# Patient Record
Sex: FEMALE | Race: WHITE | NOT HISPANIC OR LATINO
[De-identification: names, ages, dates, MRNs, and addresses within clinical notes are randomized per-mention and may not be internally consistent; named-entity substitution may affect disease eponyms.]

---

## 2017-01-05 ENCOUNTER — CLINICAL ADVICE (OUTPATIENT)
Age: 82
End: 2017-01-05

## 2017-01-18 ENCOUNTER — LABORATORY RESULT (OUTPATIENT)
Age: 82
End: 2017-01-18

## 2017-02-03 ENCOUNTER — MEDICATION RENEWAL (OUTPATIENT)
Age: 82
End: 2017-02-03

## 2017-02-14 ENCOUNTER — LABORATORY RESULT (OUTPATIENT)
Age: 82
End: 2017-02-14

## 2017-03-03 ENCOUNTER — APPOINTMENT (OUTPATIENT)
Dept: GERIATRICS | Facility: CLINIC | Age: 82
End: 2017-03-03

## 2017-03-03 VITALS
SYSTOLIC BLOOD PRESSURE: 130 MMHG | WEIGHT: 141 LBS | HEART RATE: 93 BPM | TEMPERATURE: 97.8 F | OXYGEN SATURATION: 98 % | HEIGHT: 61 IN | BODY MASS INDEX: 26.62 KG/M2 | RESPIRATION RATE: 16 BRPM | DIASTOLIC BLOOD PRESSURE: 62 MMHG

## 2017-03-03 DIAGNOSIS — Z87.19 PERSONAL HISTORY OF OTHER DISEASES OF THE DIGESTIVE SYSTEM: ICD-10-CM

## 2017-03-03 DIAGNOSIS — Z86.018 PERSONAL HISTORY OF OTHER BENIGN NEOPLASM: ICD-10-CM

## 2017-03-03 DIAGNOSIS — K56.5 INTESTINAL ADHESIONS [BANDS] WITH OBSTRUCTION (POSTPROCEDURAL) (POSTINFECTION): ICD-10-CM

## 2017-03-03 DIAGNOSIS — J06.9 ACUTE UPPER RESPIRATORY INFECTION, UNSPECIFIED: ICD-10-CM

## 2017-03-03 DIAGNOSIS — Z00.00 ENCOUNTER FOR GENERAL ADULT MEDICAL EXAMINATION W/OUT ABNORMAL FINDINGS: ICD-10-CM

## 2017-03-03 DIAGNOSIS — K64.8 OTHER HEMORRHOIDS: ICD-10-CM

## 2017-03-06 LAB
ALBUMIN SERPL ELPH-MCNC: 4.1 G/DL
ALP BLD-CCNC: 77 U/L
ALT SERPL-CCNC: 14 U/L
ANION GAP SERPL CALC-SCNC: 13 MMOL/L
AST SERPL-CCNC: 20 U/L
BASOPHILS # BLD AUTO: 0.01 K/UL
BASOPHILS NFR BLD AUTO: 0.1 %
BILIRUB SERPL-MCNC: 0.4 MG/DL
BUN SERPL-MCNC: 22 MG/DL
CALCIUM SERPL-MCNC: 10.3 MG/DL
CHLORIDE SERPL-SCNC: 105 MMOL/L
CO2 SERPL-SCNC: 24 MMOL/L
CREAT SERPL-MCNC: 1.07 MG/DL
EOSINOPHIL # BLD AUTO: 0.07 K/UL
EOSINOPHIL NFR BLD AUTO: 0.8 %
GLUCOSE SERPL-MCNC: 89 MG/DL
HBA1C MFR BLD HPLC: 5.5 %
HCT VFR BLD CALC: 33.2 %
HGB BLD-MCNC: 10.5 G/DL
IMM GRANULOCYTES NFR BLD AUTO: 0.2 %
LYMPHOCYTES # BLD AUTO: 0.96 K/UL
LYMPHOCYTES NFR BLD AUTO: 11.1 %
MAN DIFF?: NORMAL
MCHC RBC-ENTMCNC: 27.9 PG
MCHC RBC-ENTMCNC: 31.6 GM/DL
MCV RBC AUTO: 88.1 FL
MONOCYTES # BLD AUTO: 0.48 K/UL
MONOCYTES NFR BLD AUTO: 5.5 %
NEUTROPHILS # BLD AUTO: 7.12 K/UL
NEUTROPHILS NFR BLD AUTO: 82.3 %
PLATELET # BLD AUTO: 347 K/UL
POTASSIUM SERPL-SCNC: 4.3 MMOL/L
PROT SERPL-MCNC: 6.8 G/DL
RBC # BLD: 3.77 M/UL
RBC # FLD: 15.9 %
SODIUM SERPL-SCNC: 142 MMOL/L
TSH SERPL-ACNC: 1.55 UIU/ML
WBC # FLD AUTO: 8.66 K/UL

## 2017-03-07 ENCOUNTER — CLINICAL ADVICE (OUTPATIENT)
Age: 82
End: 2017-03-07

## 2017-03-07 LAB — DEPRECATED CARDIOLIPIN IGA SER: <5 APL

## 2017-03-23 ENCOUNTER — APPOINTMENT (OUTPATIENT)
Dept: OTOLARYNGOLOGY | Facility: CLINIC | Age: 82
End: 2017-03-23

## 2017-03-31 ENCOUNTER — APPOINTMENT (OUTPATIENT)
Dept: PULMONOLOGY | Facility: CLINIC | Age: 82
End: 2017-03-31

## 2017-03-31 VITALS
HEIGHT: 61 IN | WEIGHT: 140 LBS | DIASTOLIC BLOOD PRESSURE: 70 MMHG | HEART RATE: 68 BPM | RESPIRATION RATE: 17 BRPM | OXYGEN SATURATION: 98 % | BODY MASS INDEX: 26.43 KG/M2 | TEMPERATURE: 98.6 F | SYSTOLIC BLOOD PRESSURE: 140 MMHG

## 2017-04-19 ENCOUNTER — LABORATORY RESULT (OUTPATIENT)
Age: 82
End: 2017-04-19

## 2017-05-04 ENCOUNTER — APPOINTMENT (OUTPATIENT)
Dept: GERIATRICS | Facility: CLINIC | Age: 82
End: 2017-05-04

## 2017-05-04 VITALS
WEIGHT: 141 LBS | BODY MASS INDEX: 26.62 KG/M2 | HEIGHT: 61 IN | RESPIRATION RATE: 17 BRPM | TEMPERATURE: 97.8 F | SYSTOLIC BLOOD PRESSURE: 150 MMHG | HEART RATE: 68 BPM | DIASTOLIC BLOOD PRESSURE: 78 MMHG | OXYGEN SATURATION: 98 %

## 2017-05-04 RX ORDER — AZITHROMYCIN 250 MG/1
250 TABLET, FILM COATED ORAL
Qty: 6 | Refills: 0 | Status: DISCONTINUED | COMMUNITY
Start: 2017-01-14 | End: 2017-05-04

## 2017-05-15 ENCOUNTER — OTHER (OUTPATIENT)
Age: 82
End: 2017-05-15

## 2017-05-16 ENCOUNTER — MEDICATION RENEWAL (OUTPATIENT)
Age: 82
End: 2017-05-16

## 2017-05-16 ENCOUNTER — LABORATORY RESULT (OUTPATIENT)
Age: 82
End: 2017-05-16

## 2017-06-13 ENCOUNTER — LABORATORY RESULT (OUTPATIENT)
Age: 82
End: 2017-06-13

## 2017-07-11 ENCOUNTER — LABORATORY RESULT (OUTPATIENT)
Age: 82
End: 2017-07-11

## 2017-07-31 ENCOUNTER — INPATIENT (INPATIENT)
Facility: HOSPITAL | Age: 82
LOS: 6 days | Discharge: HOME | End: 2017-08-07
Attending: SURGERY | Admitting: SURGERY
Payer: MEDICARE

## 2017-07-31 VITALS
DIASTOLIC BLOOD PRESSURE: 106 MMHG | SYSTOLIC BLOOD PRESSURE: 146 MMHG | HEART RATE: 92 BPM | TEMPERATURE: 98 F | RESPIRATION RATE: 18 BRPM | OXYGEN SATURATION: 100 %

## 2017-07-31 DIAGNOSIS — Z98.89 OTHER SPECIFIED POSTPROCEDURAL STATES: Chronic | ICD-10-CM

## 2017-07-31 DIAGNOSIS — R10.9 UNSPECIFIED ABDOMINAL PAIN: ICD-10-CM

## 2017-07-31 LAB
ALBUMIN SERPL ELPH-MCNC: 3.3 G/DL — SIGNIFICANT CHANGE UP (ref 3.3–5)
ALP SERPL-CCNC: 68 U/L — SIGNIFICANT CHANGE UP (ref 40–120)
ALT FLD-CCNC: 14 U/L — SIGNIFICANT CHANGE UP (ref 4–33)
AMYLASE P1 CFR SERPL: 69 U/L — SIGNIFICANT CHANGE UP (ref 25–125)
APPEARANCE UR: CLEAR — SIGNIFICANT CHANGE UP
APTT BLD: 49.4 SEC — HIGH (ref 27.5–37.4)
AST SERPL-CCNC: 16 U/L — SIGNIFICANT CHANGE UP (ref 4–32)
BACTERIA # UR AUTO: SIGNIFICANT CHANGE UP
BASE EXCESS BLDV CALC-SCNC: 1.6 MMOL/L — SIGNIFICANT CHANGE UP
BILIRUB SERPL-MCNC: 0.3 MG/DL — SIGNIFICANT CHANGE UP (ref 0.2–1.2)
BILIRUB UR-MCNC: NEGATIVE — SIGNIFICANT CHANGE UP
BLOOD GAS VENOUS - CREATININE: 1.15 MG/DL — SIGNIFICANT CHANGE UP (ref 0.5–1.3)
BLOOD UR QL VISUAL: NEGATIVE — SIGNIFICANT CHANGE UP
BUN SERPL-MCNC: 25 MG/DL — HIGH (ref 7–23)
CALCIUM SERPL-MCNC: 9.8 MG/DL — SIGNIFICANT CHANGE UP (ref 8.4–10.5)
CHLORIDE BLDV-SCNC: 106 MMOL/L — SIGNIFICANT CHANGE UP (ref 96–108)
CHLORIDE SERPL-SCNC: 107 MMOL/L — SIGNIFICANT CHANGE UP (ref 98–107)
CO2 SERPL-SCNC: 22 MMOL/L — SIGNIFICANT CHANGE UP (ref 22–31)
COLOR SPEC: SIGNIFICANT CHANGE UP
CREAT SERPL-MCNC: 1.06 MG/DL — SIGNIFICANT CHANGE UP (ref 0.5–1.3)
GAS PNL BLDV: 136 MMOL/L — SIGNIFICANT CHANGE UP (ref 136–146)
GLUCOSE BLDV-MCNC: 91 — SIGNIFICANT CHANGE UP (ref 70–99)
GLUCOSE SERPL-MCNC: 92 MG/DL — SIGNIFICANT CHANGE UP (ref 70–99)
GLUCOSE UR-MCNC: NEGATIVE — SIGNIFICANT CHANGE UP
HCO3 BLDV-SCNC: 26 MMOL/L — SIGNIFICANT CHANGE UP (ref 20–27)
HCT VFR BLD CALC: 27.8 % — LOW (ref 34.5–45)
HCT VFR BLDV CALC: 28.5 % — LOW (ref 34.5–45)
HGB BLD-MCNC: 9.1 G/DL — LOW (ref 11.5–15.5)
HGB BLDV-MCNC: 9.2 G/DL — LOW (ref 11.5–15.5)
INR BLD: 5.02 — CRITICAL HIGH (ref 0.88–1.17)
KETONES UR-MCNC: NEGATIVE — SIGNIFICANT CHANGE UP
LACTATE BLDV-MCNC: 1.7 MMOL/L — SIGNIFICANT CHANGE UP (ref 0.5–2)
LEUKOCYTE ESTERASE UR-ACNC: SIGNIFICANT CHANGE UP
LIDOCAIN IGE QN: 37.7 U/L — SIGNIFICANT CHANGE UP (ref 7–60)
MCHC RBC-ENTMCNC: 27.6 PG — SIGNIFICANT CHANGE UP (ref 27–34)
MCHC RBC-ENTMCNC: 32.7 % — SIGNIFICANT CHANGE UP (ref 32–36)
MCV RBC AUTO: 84.2 FL — SIGNIFICANT CHANGE UP (ref 80–100)
MUCOUS THREADS # UR AUTO: SIGNIFICANT CHANGE UP
NITRITE UR-MCNC: NEGATIVE — SIGNIFICANT CHANGE UP
NRBC # FLD: 0 — SIGNIFICANT CHANGE UP
PCO2 BLDV: 35 MMHG — LOW (ref 41–51)
PH BLDV: 7.47 PH — HIGH (ref 7.32–7.43)
PH UR: 7 — SIGNIFICANT CHANGE UP (ref 4.6–8)
PLATELET # BLD AUTO: 248 K/UL — SIGNIFICANT CHANGE UP (ref 150–400)
PMV BLD: 9.3 FL — SIGNIFICANT CHANGE UP (ref 7–13)
PO2 BLDV: 37 MMHG — SIGNIFICANT CHANGE UP (ref 35–40)
POTASSIUM BLDV-SCNC: 3.7 MMOL/L — SIGNIFICANT CHANGE UP (ref 3.4–4.5)
POTASSIUM SERPL-MCNC: 4 MMOL/L — SIGNIFICANT CHANGE UP (ref 3.5–5.3)
POTASSIUM SERPL-SCNC: 4 MMOL/L — SIGNIFICANT CHANGE UP (ref 3.5–5.3)
PROT SERPL-MCNC: 5.9 G/DL — LOW (ref 6–8.3)
PROT UR-MCNC: NEGATIVE — SIGNIFICANT CHANGE UP
PROTHROM AB SERPL-ACNC: 58.1 SEC — HIGH (ref 9.8–13.1)
RBC # BLD: 3.3 M/UL — LOW (ref 3.8–5.2)
RBC # FLD: 14.7 % — HIGH (ref 10.3–14.5)
RBC CASTS # UR COMP ASSIST: SIGNIFICANT CHANGE UP (ref 0–?)
SAO2 % BLDV: 71 % — SIGNIFICANT CHANGE UP (ref 60–85)
SODIUM SERPL-SCNC: 141 MMOL/L — SIGNIFICANT CHANGE UP (ref 135–145)
SP GR SPEC: 1.01 — SIGNIFICANT CHANGE UP (ref 1–1.03)
UROBILINOGEN FLD QL: NORMAL E.U. — SIGNIFICANT CHANGE UP (ref 0.1–0.2)
WBC # BLD: 7.73 K/UL — SIGNIFICANT CHANGE UP (ref 3.8–10.5)
WBC # FLD AUTO: 7.73 K/UL — SIGNIFICANT CHANGE UP (ref 3.8–10.5)
WBC UR QL: SIGNIFICANT CHANGE UP (ref 0–?)

## 2017-07-31 PROCEDURE — 71010: CPT | Mod: 26

## 2017-07-31 PROCEDURE — 74177 CT ABD & PELVIS W/CONTRAST: CPT | Mod: 26

## 2017-07-31 PROCEDURE — 99222 1ST HOSP IP/OBS MODERATE 55: CPT | Mod: GC

## 2017-07-31 RX ORDER — MORPHINE SULFATE 50 MG/1
4 CAPSULE, EXTENDED RELEASE ORAL ONCE
Qty: 0 | Refills: 0 | Status: DISCONTINUED | OUTPATIENT
Start: 2017-07-31 | End: 2017-07-31

## 2017-07-31 RX ORDER — ONDANSETRON 8 MG/1
4 TABLET, FILM COATED ORAL ONCE
Qty: 0 | Refills: 0 | Status: COMPLETED | OUTPATIENT
Start: 2017-07-31 | End: 2017-07-31

## 2017-07-31 RX ORDER — SODIUM CHLORIDE 9 MG/ML
500 INJECTION INTRAMUSCULAR; INTRAVENOUS; SUBCUTANEOUS ONCE
Qty: 0 | Refills: 0 | Status: COMPLETED | OUTPATIENT
Start: 2017-07-31 | End: 2017-07-31

## 2017-07-31 RX ORDER — PANTOPRAZOLE SODIUM 20 MG/1
40 TABLET, DELAYED RELEASE ORAL DAILY
Qty: 0 | Refills: 0 | Status: DISCONTINUED | OUTPATIENT
Start: 2017-07-31 | End: 2017-08-04

## 2017-07-31 RX ORDER — LATANOPROST 0.05 MG/ML
1 SOLUTION/ DROPS OPHTHALMIC; TOPICAL AT BEDTIME
Qty: 0 | Refills: 0 | Status: DISCONTINUED | OUTPATIENT
Start: 2017-07-31 | End: 2017-08-07

## 2017-07-31 RX ORDER — SODIUM CHLORIDE 9 MG/ML
1000 INJECTION, SOLUTION INTRAVENOUS
Qty: 0 | Refills: 0 | Status: DISCONTINUED | OUTPATIENT
Start: 2017-07-31 | End: 2017-08-02

## 2017-07-31 RX ADMIN — PANTOPRAZOLE SODIUM 40 MILLIGRAM(S): 20 TABLET, DELAYED RELEASE ORAL at 12:16

## 2017-07-31 RX ADMIN — MORPHINE SULFATE 4 MILLIGRAM(S): 50 CAPSULE, EXTENDED RELEASE ORAL at 06:15

## 2017-07-31 RX ADMIN — SODIUM CHLORIDE 500 MILLILITER(S): 9 INJECTION INTRAMUSCULAR; INTRAVENOUS; SUBCUTANEOUS at 05:59

## 2017-07-31 RX ADMIN — ONDANSETRON 4 MILLIGRAM(S): 8 TABLET, FILM COATED ORAL at 05:59

## 2017-07-31 RX ADMIN — SODIUM CHLORIDE 100 MILLILITER(S): 9 INJECTION, SOLUTION INTRAVENOUS at 22:17

## 2017-07-31 RX ADMIN — MORPHINE SULFATE 4 MILLIGRAM(S): 50 CAPSULE, EXTENDED RELEASE ORAL at 05:59

## 2017-07-31 RX ADMIN — SODIUM CHLORIDE 100 MILLILITER(S): 9 INJECTION, SOLUTION INTRAVENOUS at 12:21

## 2017-07-31 RX ADMIN — LATANOPROST 1 DROP(S): 0.05 SOLUTION/ DROPS OPHTHALMIC; TOPICAL at 22:17

## 2017-07-31 NOTE — H&P ADULT - HISTORY OF PRESENT ILLNESS
89yo F hx of PE (coumadin), prior SBO (last 2 years ago; NGT only), now presents with one day of lower abdominal pain, nausea, and inability to pass gas since yesterday morning. She last had an obstruction 2 years ago; admitted to [facility] ([place] managed with NGT and d/c'd on HD4. Today she reports lower dull abdominal pain, and denies vomiting. She has started burping more as well. No fevers/chills.

## 2017-07-31 NOTE — ED ADULT NURSE NOTE - PMH
B12 deficiency    Colon cancer  6 yrs ago. did not require chemo  Gallstone    GERD (Gastroesophageal Reflux Disease)    Hard of Hearing    Hypertension    Lymphedema, limb  BLE

## 2017-07-31 NOTE — ED ADULT NURSE NOTE - OBJECTIVE STATEMENT
alert c/o diffuse abd pain since 1800   after eating  abd tender to touch with positive BS   states she is belching but not passing gas   seen by Dr Mojica Alert c/o diffuse abd pain since 1800   after eating  abd tender to touch with positive BS   states she is belching but not passing gas   seen by Dr Mojica  Received patient report from PM RN. Patient is alert and oriented times three and is resting comfortably. Patient is not c/o active pain but states that she has intermittent tolerable pain and slight discomfort r/t nasogastric tube. VS recorded and has low suction for naso gastric tube in place.  MALCOLM Vaz

## 2017-07-31 NOTE — H&P ADULT - NSHPPHYSICALEXAM_GEN_ALL_CORE
Gen: NAD  HEENT: no scleral injection, EOMI, no neck masses  CV: S1/S2  Abdomen: soft, tender to palpation in the epigastric area, mildly distended. No guarding/rebound.  Ext: warm well perfused

## 2017-07-31 NOTE — H&P ADULT - NSHPLABSRESULTS_GEN_ALL_CORE
CBC Full  -  ( 31 Jul 2017 05:00 )  WBC Count : 7.73 K/uL  Hemoglobin : 9.1 g/dL  Hematocrit : 27.8 %  Platelet Count - Automated : 248 K/uL  Mean Cell Volume : 84.2 fL  Mean Cell Hemoglobin : 27.6 pg  Mean Cell Hemoglobin Concentration : 32.7 %  Auto Neutrophil # : x  Auto Lymphocyte # : x  Auto Monocyte # : x  Auto Eosinophil # : x  Auto Basophil # : x  Auto Neutrophil % : x  Auto Lymphocyte % : x  Auto Monocyte % : x  Auto Eosinophil % : x  Auto Basophil % : x      07-31    141  |  107  |  25<H>  ----------------------------<  92  4.0   |  22  |  1.06    Ca    9.8      31 Jul 2017 05:00    TPro  5.9<L>  /  Alb  3.3  /  TBili  0.3  /  DBili  x   /  AST  16  /  ALT  14  /  AlkPhos  68  07-31    Imaging  CT Abdomen and Pelvis w/ Oral Cont and w/ IV Cont (07.31.17 @ 06:35)  BOWEL: Multiple dilated fluid-filled loops of small bowel with a   transition point to collapsed loops of bowel in the anterior right   hemiabdomen (series 602B: 23) with fecalization of bowel contents as   proximal tothe transition point. Status post right hemicolectomy.   Scattered diverticulosis without evidence of diverticulitis.  IMPRESSION:   Small bowel obstruction with a transition point in the anterior right   hemiabdomen.

## 2017-07-31 NOTE — H&P ADULT - ASSESSMENT
89yo F with SBO. Most likely adhesive SBO from prior surgery.  - NPO/NGT  - IVF  - Will hold coumadin (currently supratherapeutic INR at 5). Trend INR. If becomes subtherapeutic, will start heparin gtt  - Serial abdominal exams  - OOB  - venodynes  - D/w Dr. Rich

## 2017-07-31 NOTE — PATIENT PROFILE ADULT. - VISION (WITH CORRECTIVE LENSES IF THE PATIENT USUALLY WEARS THEM):
Partially impaired: cannot see medication labels or newsprint, but can see obstacles in path, and the surrounding layout; can count fingers at arm's length/uses eyeglasses

## 2017-07-31 NOTE — ED PROVIDER NOTE - PHYSICAL EXAMINATION
Diffuse abd pain in the lower abdomen.   No guarding, no rebound tenderness.   Abd is soft. No distension. Diffuse abd pain in the lower abdomen.   No guarding, no rebound tenderness.   Abd is soft. No distension.  Surgical scar on abdomen.

## 2017-07-31 NOTE — ED PROVIDER NOTE - ATTENDING CONTRIBUTION TO CARE
DR. SHELL, ATTENDING MD-  I performed a face to face bedside interview with patient regarding history of present illness, review of symptoms and past medical history. I completed an independent physical exam.  I have discussed patient's plan of care with the resident.   Documentation as above in the note.    89 y/o female h/o sbo here with abd pain, nausea, burping at 6pm.  Feels like prior sbo.  Not passing bm/flatus.  Denies f/c, ha, neck stiffness, cp, sob, cough, dysuria, rash.  Afebrile, vs wnl, uncomfortable, ctabil, s1s2 rrr no m/r/g, abd soft diffusely ttp distended no r/g, no cva tenderness b/l, no leg swelling b/l, no rash.  Likely sbo, consider mesenteric ischemia.  Obtain cbc, cmp, coags, t&s, ct abd/pelv, give ivf, pain med, npo, surg consult, needs admission.

## 2017-07-31 NOTE — ED PROVIDER NOTE - OBJECTIVE STATEMENT
89F hx of PE on coumadin, SBO, total hysterectomy and R hemicolectomy for CA presents with abdominal pain with nausea. Pain started yesterday evening, localized in lower abdomen. Pt went to sleep with a sleeping pill - pt then woke up at 3am with the same pain. Pain is constant and associated in nausea and burping. Pt had BM yesterday but it was difficult to push. Pt is not passing any gas but burping a lot. Denies any fever, chills. Increased urination at night but no dysuria or blood in urine. Pt had this kind of pain before but does not remember what was causing the pain in the past.

## 2017-07-31 NOTE — ED PROVIDER NOTE - PSH
H/O exploratory laparotomy  for SBO?  S/P colectomy  partial colectomy due to colon ca  S/P CAREY (Total Abdominal Hysterectomy)

## 2017-07-31 NOTE — ED PROCEDURE NOTE - ATTENDING CONTRIBUTION TO CARE
MD SHELL:  I was present for the critical portions of this procedure and provided direct attending supervision.

## 2017-08-01 LAB
APTT BLD: 47.2 SEC — HIGH (ref 27.5–37.4)
BUN SERPL-MCNC: 17 MG/DL — SIGNIFICANT CHANGE UP (ref 7–23)
BUN SERPL-MCNC: 17 MG/DL — SIGNIFICANT CHANGE UP (ref 7–23)
CALCIUM SERPL-MCNC: 9.1 MG/DL — SIGNIFICANT CHANGE UP (ref 8.4–10.5)
CALCIUM SERPL-MCNC: 9.1 MG/DL — SIGNIFICANT CHANGE UP (ref 8.4–10.5)
CHLORIDE SERPL-SCNC: 106 MMOL/L — SIGNIFICANT CHANGE UP (ref 98–107)
CHLORIDE SERPL-SCNC: 106 MMOL/L — SIGNIFICANT CHANGE UP (ref 98–107)
CO2 SERPL-SCNC: 25 MMOL/L — SIGNIFICANT CHANGE UP (ref 22–31)
CO2 SERPL-SCNC: 25 MMOL/L — SIGNIFICANT CHANGE UP (ref 22–31)
CREAT SERPL-MCNC: 1.03 MG/DL — SIGNIFICANT CHANGE UP (ref 0.5–1.3)
CREAT SERPL-MCNC: 1.03 MG/DL — SIGNIFICANT CHANGE UP (ref 0.5–1.3)
GLUCOSE SERPL-MCNC: 64 MG/DL — LOW (ref 70–99)
GLUCOSE SERPL-MCNC: 64 MG/DL — LOW (ref 70–99)
HCT VFR BLD CALC: 28.9 % — LOW (ref 34.5–45)
HGB BLD-MCNC: 8.9 G/DL — LOW (ref 11.5–15.5)
INR BLD: 3.46 — HIGH (ref 0.88–1.17)
LACTATE SERPL-SCNC: 0.9 MMOL/L — SIGNIFICANT CHANGE UP (ref 0.5–2)
MAGNESIUM SERPL-MCNC: 1.8 MG/DL — SIGNIFICANT CHANGE UP (ref 1.6–2.6)
MCHC RBC-ENTMCNC: 26.5 PG — LOW (ref 27–34)
MCHC RBC-ENTMCNC: 30.8 % — LOW (ref 32–36)
MCV RBC AUTO: 86 FL — SIGNIFICANT CHANGE UP (ref 80–100)
NRBC # FLD: 0 — SIGNIFICANT CHANGE UP
PHOSPHATE SERPL-MCNC: 2.7 MG/DL — SIGNIFICANT CHANGE UP (ref 2.5–4.5)
PLATELET # BLD AUTO: 231 K/UL — SIGNIFICANT CHANGE UP (ref 150–400)
PMV BLD: 9.3 FL — SIGNIFICANT CHANGE UP (ref 7–13)
POTASSIUM SERPL-MCNC: 3.8 MMOL/L — SIGNIFICANT CHANGE UP (ref 3.5–5.3)
POTASSIUM SERPL-MCNC: 3.8 MMOL/L — SIGNIFICANT CHANGE UP (ref 3.5–5.3)
POTASSIUM SERPL-SCNC: 3.8 MMOL/L — SIGNIFICANT CHANGE UP (ref 3.5–5.3)
POTASSIUM SERPL-SCNC: 3.8 MMOL/L — SIGNIFICANT CHANGE UP (ref 3.5–5.3)
PROTHROM AB SERPL-ACNC: 39.7 SEC — HIGH (ref 9.8–13.1)
RBC # BLD: 3.36 M/UL — LOW (ref 3.8–5.2)
RBC # FLD: 14.7 % — HIGH (ref 10.3–14.5)
SODIUM SERPL-SCNC: 143 MMOL/L — SIGNIFICANT CHANGE UP (ref 135–145)
SODIUM SERPL-SCNC: 143 MMOL/L — SIGNIFICANT CHANGE UP (ref 135–145)
WBC # BLD: 5.81 K/UL — SIGNIFICANT CHANGE UP (ref 3.8–10.5)
WBC # FLD AUTO: 5.81 K/UL — SIGNIFICANT CHANGE UP (ref 3.8–10.5)

## 2017-08-01 PROCEDURE — 99232 SBSQ HOSP IP/OBS MODERATE 35: CPT | Mod: GC

## 2017-08-01 RX ORDER — MAGNESIUM SULFATE 500 MG/ML
2 VIAL (ML) INJECTION ONCE
Qty: 0 | Refills: 0 | Status: COMPLETED | OUTPATIENT
Start: 2017-08-01 | End: 2017-08-01

## 2017-08-01 RX ORDER — POTASSIUM CHLORIDE 20 MEQ
10 PACKET (EA) ORAL
Qty: 0 | Refills: 0 | Status: COMPLETED | OUTPATIENT
Start: 2017-08-01 | End: 2017-08-01

## 2017-08-01 RX ADMIN — PANTOPRAZOLE SODIUM 40 MILLIGRAM(S): 20 TABLET, DELAYED RELEASE ORAL at 14:28

## 2017-08-01 RX ADMIN — Medication 100 MILLIEQUIVALENT(S): at 12:21

## 2017-08-01 RX ADMIN — LATANOPROST 1 DROP(S): 0.05 SOLUTION/ DROPS OPHTHALMIC; TOPICAL at 22:02

## 2017-08-01 RX ADMIN — SODIUM CHLORIDE 50 MILLILITER(S): 9 INJECTION, SOLUTION INTRAVENOUS at 10:47

## 2017-08-01 RX ADMIN — Medication 1.25 MILLIGRAM(S): at 14:27

## 2017-08-01 RX ADMIN — Medication 100 MILLIEQUIVALENT(S): at 14:22

## 2017-08-01 RX ADMIN — Medication 100 MILLIEQUIVALENT(S): at 15:50

## 2017-08-01 RX ADMIN — Medication 1.25 MILLIGRAM(S): at 21:02

## 2017-08-01 RX ADMIN — Medication 50 GRAM(S): at 10:47

## 2017-08-01 NOTE — PROGRESS NOTE ADULT - SUBJECTIVE AND OBJECTIVE BOX
DX:  SBO      SUBJECTIVE    Pt's NGT put out 100cc last night. She reports flatus but denies passing BM's.     10-point review of systems completed and negative except as noted above.    lactated ringers. 1000 milliLiter(s) IV Continuous <Continuous>  latanoprost 0.005% Ophthalmic Solution 1 Drop(s) Both EYES at bedtime  pantoprazole  Injectable 40 milliGRAM(s) IV Push daily  potassium chloride  10 mEq/100 mL IVPB 10 milliEquivalent(s) IV Intermittent every 1 hour            OBJECTIVE    T(C): 36.5 (17 @ 10:43), Max: 36.8 (17 @ 17:46)  HR: 69 (17 @ 10:43) (56 - 69)  BP: 152/68 (17 @ 10:43) (136/67 - 162/66)  RR: 17 (17 @ 10:43) (14 - 17)  SpO2: 98% (17 @ 10:43) (98% - 100%)    17 @ 07:  -  17 @ 07:00  --------------------------------------------------------  IN: 1500 mL / OUT: 1150 mL / NET: 350 mL    17 @ 07:  -  17 @ 11:28  --------------------------------------------------------  IN: 500 mL / OUT: 450 mL / NET: 50 mL        EXAM  General: awake, alert, NAD  Pulm: CTAB, respirations unlabored, no increased WOB  CV: Regular rate and rhythm  Abdomen: soft, decreased distention, non-tender  Extremities: WWPx4, Grossly symmetric    LABS                        8.9    5.81  )-----------( 231      ( 01 Aug 2017 07:05 )             28.9         143  |  106  |  17  ----------------------------<  64<L>  3.8   |  25  |  1.03    Ca    9.1      01 Aug 2017 07:05  Phos  2.7     08-  Mg     1.8     08-01    TPro  5.9<L>  /  Alb  3.3  /  TBili  0.3  /  DBili  x   /  AST  16  /  ALT  14  /  AlkPhos  68  07-31    PT/INR - ( 01 Aug 2017 07:05 )   PT: 39.7 SEC;   INR: 3.46          PTT - ( 01 Aug 2017 07:05 )  PTT:47.2 SEC  Urinalysis Basic - ( 2017 06:00 )    Color: PLYEL / Appearance: CLEAR / S.009 / pH: 7.0  Gluc: NEGATIVE / Ketone: NEGATIVE  / Bili: NEGATIVE / Urobili: NORMAL E.U.   Blood: NEGATIVE / Protein: NEGATIVE / Nitrite: NEGATIVE   Leuk Esterase: TRACE / RBC: 0-2 / WBC 2-5   Sq Epi: x / Non Sq Epi: x / Bacteria: FEW        RADIOLOGY & ADDITIONAL STUDIES        ASSESSMENT AND PLAN  89F with SBO. Pt has been passing flatus and has had low NGT output.    - clamp NGT trial  - decrease mIVF  - F/u GI function  - f/u INR (if sub-therapeutic may consider heparin gtt)  - continue to hold coumadin  - f/u PMD about coumadin  - c/s physical therapy

## 2017-08-01 NOTE — PHYSICAL THERAPY INITIAL EVALUATION ADULT - DISCHARGE DISPOSITION, PT EVAL
Anticipating Home PT/Home with Assist; Follow progress with PT after completion of functional assessment.

## 2017-08-02 LAB
APTT BLD: 41.9 SEC — HIGH (ref 27.5–37.4)
BUN SERPL-MCNC: 15 MG/DL — SIGNIFICANT CHANGE UP (ref 7–23)
CALCIUM SERPL-MCNC: 9 MG/DL — SIGNIFICANT CHANGE UP (ref 8.4–10.5)
CHLORIDE SERPL-SCNC: 105 MMOL/L — SIGNIFICANT CHANGE UP (ref 98–107)
CO2 SERPL-SCNC: 23 MMOL/L — SIGNIFICANT CHANGE UP (ref 22–31)
CREAT SERPL-MCNC: 1 MG/DL — SIGNIFICANT CHANGE UP (ref 0.5–1.3)
GLUCOSE SERPL-MCNC: 55 MG/DL — LOW (ref 70–99)
HCT VFR BLD CALC: 26.1 % — LOW (ref 34.5–45)
HGB BLD-MCNC: 8.3 G/DL — LOW (ref 11.5–15.5)
INR BLD: 2.79 — HIGH (ref 0.88–1.17)
MAGNESIUM SERPL-MCNC: 2.1 MG/DL — SIGNIFICANT CHANGE UP (ref 1.6–2.6)
MCHC RBC-ENTMCNC: 26.7 PG — LOW (ref 27–34)
MCHC RBC-ENTMCNC: 31.8 % — LOW (ref 32–36)
MCV RBC AUTO: 83.9 FL — SIGNIFICANT CHANGE UP (ref 80–100)
NRBC # FLD: 0 — SIGNIFICANT CHANGE UP
PHOSPHATE SERPL-MCNC: 2.8 MG/DL — SIGNIFICANT CHANGE UP (ref 2.5–4.5)
PLATELET # BLD AUTO: 219 K/UL — SIGNIFICANT CHANGE UP (ref 150–400)
PMV BLD: 8.8 FL — SIGNIFICANT CHANGE UP (ref 7–13)
POTASSIUM SERPL-MCNC: 3.7 MMOL/L — SIGNIFICANT CHANGE UP (ref 3.5–5.3)
POTASSIUM SERPL-SCNC: 3.7 MMOL/L — SIGNIFICANT CHANGE UP (ref 3.5–5.3)
PROTHROM AB SERPL-ACNC: 31.9 SEC — HIGH (ref 9.8–13.1)
RBC # BLD: 3.11 M/UL — LOW (ref 3.8–5.2)
RBC # FLD: 14.4 % — SIGNIFICANT CHANGE UP (ref 10.3–14.5)
SODIUM SERPL-SCNC: 143 MMOL/L — SIGNIFICANT CHANGE UP (ref 135–145)
WBC # BLD: 6.82 K/UL — SIGNIFICANT CHANGE UP (ref 3.8–10.5)
WBC # FLD AUTO: 6.82 K/UL — SIGNIFICANT CHANGE UP (ref 3.8–10.5)

## 2017-08-02 RX ORDER — POTASSIUM CHLORIDE 20 MEQ
10 PACKET (EA) ORAL
Qty: 0 | Refills: 0 | Status: DISCONTINUED | OUTPATIENT
Start: 2017-08-02 | End: 2017-08-02

## 2017-08-02 RX ORDER — POTASSIUM CHLORIDE 20 MEQ
10 PACKET (EA) ORAL DAILY
Qty: 0 | Refills: 0 | Status: DISCONTINUED | OUTPATIENT
Start: 2017-08-02 | End: 2017-08-03

## 2017-08-02 RX ORDER — SODIUM CHLORIDE 9 MG/ML
1000 INJECTION, SOLUTION INTRAVENOUS
Qty: 0 | Refills: 0 | Status: DISCONTINUED | OUTPATIENT
Start: 2017-08-02 | End: 2017-08-03

## 2017-08-02 RX ADMIN — Medication 1.25 MILLIGRAM(S): at 18:50

## 2017-08-02 RX ADMIN — Medication 1.25 MILLIGRAM(S): at 12:50

## 2017-08-02 RX ADMIN — PANTOPRAZOLE SODIUM 40 MILLIGRAM(S): 20 TABLET, DELAYED RELEASE ORAL at 13:16

## 2017-08-02 RX ADMIN — Medication 1.25 MILLIGRAM(S): at 00:08

## 2017-08-02 RX ADMIN — LATANOPROST 1 DROP(S): 0.05 SOLUTION/ DROPS OPHTHALMIC; TOPICAL at 22:47

## 2017-08-02 RX ADMIN — Medication 10 MILLIEQUIVALENT(S): at 13:16

## 2017-08-02 NOTE — PROGRESS NOTE ADULT - SUBJECTIVE AND OBJECTIVE BOX
ACS Progress Note    S: 89yo Female seen and examined. No acute events overnight; afebrile, VS stable. Pain well controlled with current regimen. Tolerating diet; denies N/V. Endorses flatus and bowel movements.     O:  Vital Signs Last 24 Hrs  T(C): 36.8 (02 Aug 2017 09:43), Max: 37.1 (01 Aug 2017 17:28)  T(F): 98.2 (02 Aug 2017 09:43), Max: 98.8 (01 Aug 2017 17:28)  HR: 62 (02 Aug 2017 12:16) (51 - 66)  BP: 139/65 (02 Aug 2017 12:16) (125/54 - 160/65)  BP(mean): --  RR: 17 (02 Aug 2017 12:16) (16 - 17)  SpO2: 100% (02 Aug 2017 12:16) (97% - 100%)    I&O's Detail    01 Aug 2017 07:01  -  02 Aug 2017 07:00  --------------------------------------------------------  IN:    IV PiggyBack: 200 mL    lactated ringers.: 1100 mL  Total IN: 1300 mL    OUT:    Nasoenteral Tube: 50 mL    Voided: 2050 mL  Total OUT: 2100 mL    Total NET: -800 mL      02 Aug 2017 07:01  -  02 Aug 2017 13:53  --------------------------------------------------------  IN:    lactated ringers.: 175 mL  Total IN: 175 mL    OUT:    Voided: 400 mL  Total OUT: 400 mL    Total NET: -225 mL      Physical Exam:  Gen: Resting in bed, NAD, alert and oriented.   Resp: No increased WOB   Abd: soft, NT/ND    MEDICATIONS  (STANDING):  latanoprost 0.005% Ophthalmic Solution 1 Drop(s) Both EYES at bedtime  pantoprazole  Injectable 40 milliGRAM(s) IV Push daily  enalaprilat Injectable 1.25 milliGRAM(s) IV Push every 6 hours  lactated ringers. 1000 milliLiter(s) (30 mL/Hr) IV Continuous <Continuous>  potassium chloride    Tablet ER 10 milliEquivalent(s) Oral daily    MEDICATIONS  (PRN):      LABS:                        8.3    6.82  )-----------( 219      ( 02 Aug 2017 06:30 )             26.1       08-02    143  |  105  |  15  ----------------------------<  55<L>  3.7   |  23  |  1.00    Ca    9.0      02 Aug 2017 06:30  Phos  2.8     08-02  Mg     2.1     08-02            IMAGING: ACS Progress Note    S: 89yo Female seen and examined. No acute events overnight; afebrile, VS stable. Pain well controlled with current regimen. NG discontinued. No N/V. PT recommended home with home PT    O:  Vital Signs Last 24 Hrs  T(C): 36.8 (02 Aug 2017 09:43), Max: 37.1 (01 Aug 2017 17:28)  T(F): 98.2 (02 Aug 2017 09:43), Max: 98.8 (01 Aug 2017 17:28)  HR: 62 (02 Aug 2017 12:16) (51 - 66)  BP: 139/65 (02 Aug 2017 12:16) (125/54 - 160/65)  BP(mean): --  RR: 17 (02 Aug 2017 12:16) (16 - 17)  SpO2: 100% (02 Aug 2017 12:16) (97% - 100%)    I&O's Detail    01 Aug 2017 07:01  -  02 Aug 2017 07:00  --------------------------------------------------------  IN:    IV PiggyBack: 200 mL    lactated ringers.: 1100 mL  Total IN: 1300 mL    OUT:    Nasoenteral Tube: 50 mL    Voided: 2050 mL  Total OUT: 2100 mL    Total NET: -800 mL      02 Aug 2017 07:01  -  02 Aug 2017 13:53  --------------------------------------------------------  IN:    lactated ringers.: 175 mL  Total IN: 175 mL    OUT:    Voided: 400 mL  Total OUT: 400 mL    Total NET: -225 mL      Physical Exam:  Gen: Resting in bed, NAD, alert and oriented.   Resp: No increased WOB   Abd: soft, NT/ND    MEDICATIONS  (STANDING):  latanoprost 0.005% Ophthalmic Solution 1 Drop(s) Both EYES at bedtime  pantoprazole  Injectable 40 milliGRAM(s) IV Push daily  enalaprilat Injectable 1.25 milliGRAM(s) IV Push every 6 hours  lactated ringers. 1000 milliLiter(s) (30 mL/Hr) IV Continuous <Continuous>  potassium chloride    Tablet ER 10 milliEquivalent(s) Oral daily    MEDICATIONS  (PRN):      LABS:                        8.3    6.82  )-----------( 219      ( 02 Aug 2017 06:30 )             26.1       08-02    143  |  105  |  15  ----------------------------<  55<L>  3.7   |  23  |  1.00    Ca    9.0      02 Aug 2017 06:30  Phos  2.8     08-02  Mg     2.1     08-02    PT/INR - ( 02 Aug 2017 06:30 )   PT: 31.9 SEC;   INR: 2.79     PTT - ( 02 Aug 2017 06:30 )  PTT:41.9 SEC

## 2017-08-02 NOTE — PROGRESS NOTE ADULT - ASSESSMENT
A: 89F with SBO. Pt has been passing flatus.    P:  - decrease IVF  - F/u GI function  - f/u INR (if sub-therapeutic may consider heparin gtt)  - continue to hold coumadin  - f/u PMD about coumadin  - PT recommended home with home PT

## 2017-08-02 NOTE — CHART NOTE - NSCHARTNOTEFT_GEN_A_CORE
89 yo F with h/o 2 PE (latest unprovoked) on coumadin, HTN, insomnia, SBO admitted with SBO, under surgery service, medically managed, passing flatus, tolerating clear liquid diet. She is our patient at [address] [place] [address] ([facility]) with PMD Dr Ivelisse Munoz. Coumadin is managed by pulm Dr. Mansfield. Seen and examined at bedside. Appears well, has no complaints, reports no pain/nausea/vomiting. Abdomen is soft/nontender/nondistended/with BSx4. Dr Munoz was called and updated about patient's hospitalization. Ms Martinez will follow up with Dr Munoz upon d/c. Appreciate all care provided by primary team.

## 2017-08-03 ENCOUNTER — TRANSCRIPTION ENCOUNTER (OUTPATIENT)
Age: 82
End: 2017-08-03

## 2017-08-03 LAB
APTT BLD: 39.7 SEC — HIGH (ref 27.5–37.4)
BUN SERPL-MCNC: 12 MG/DL — SIGNIFICANT CHANGE UP (ref 7–23)
CALCIUM SERPL-MCNC: 9 MG/DL — SIGNIFICANT CHANGE UP (ref 8.4–10.5)
CHLORIDE SERPL-SCNC: 106 MMOL/L — SIGNIFICANT CHANGE UP (ref 98–107)
CO2 SERPL-SCNC: 27 MMOL/L — SIGNIFICANT CHANGE UP (ref 22–31)
CREAT SERPL-MCNC: 1.01 MG/DL — SIGNIFICANT CHANGE UP (ref 0.5–1.3)
GLUCOSE SERPL-MCNC: 87 MG/DL — SIGNIFICANT CHANGE UP (ref 70–99)
HCT VFR BLD CALC: 25.7 % — LOW (ref 34.5–45)
HGB BLD-MCNC: 8.3 G/DL — LOW (ref 11.5–15.5)
INR BLD: 2.49 — HIGH (ref 0.88–1.17)
MAGNESIUM SERPL-MCNC: 1.8 MG/DL — SIGNIFICANT CHANGE UP (ref 1.6–2.6)
MCHC RBC-ENTMCNC: 26.9 PG — LOW (ref 27–34)
MCHC RBC-ENTMCNC: 32.3 % — SIGNIFICANT CHANGE UP (ref 32–36)
MCV RBC AUTO: 83.4 FL — SIGNIFICANT CHANGE UP (ref 80–100)
NRBC # FLD: 0 — SIGNIFICANT CHANGE UP
PHOSPHATE SERPL-MCNC: 2.1 MG/DL — LOW (ref 2.5–4.5)
PLATELET # BLD AUTO: 220 K/UL — SIGNIFICANT CHANGE UP (ref 150–400)
PMV BLD: 9 FL — SIGNIFICANT CHANGE UP (ref 7–13)
POTASSIUM SERPL-MCNC: 4.3 MMOL/L — SIGNIFICANT CHANGE UP (ref 3.5–5.3)
POTASSIUM SERPL-SCNC: 4.3 MMOL/L — SIGNIFICANT CHANGE UP (ref 3.5–5.3)
PROTHROM AB SERPL-ACNC: 28.4 SEC — HIGH (ref 9.8–13.1)
RBC # BLD: 3.08 M/UL — LOW (ref 3.8–5.2)
RBC # FLD: 14.5 % — SIGNIFICANT CHANGE UP (ref 10.3–14.5)
SODIUM SERPL-SCNC: 144 MMOL/L — SIGNIFICANT CHANGE UP (ref 135–145)
WBC # BLD: 5.82 K/UL — SIGNIFICANT CHANGE UP (ref 3.8–10.5)
WBC # FLD AUTO: 5.82 K/UL — SIGNIFICANT CHANGE UP (ref 3.8–10.5)

## 2017-08-03 PROCEDURE — 99232 SBSQ HOSP IP/OBS MODERATE 35: CPT | Mod: GC

## 2017-08-03 RX ORDER — WARFARIN SODIUM 2.5 MG/1
1 TABLET ORAL ONCE
Qty: 0 | Refills: 0 | Status: COMPLETED | OUTPATIENT
Start: 2017-08-03 | End: 2017-08-03

## 2017-08-03 RX ORDER — POTASSIUM PHOSPHATE, MONOBASIC POTASSIUM PHOSPHATE, DIBASIC 236; 224 MG/ML; MG/ML
15 INJECTION, SOLUTION INTRAVENOUS ONCE
Qty: 0 | Refills: 0 | Status: COMPLETED | OUTPATIENT
Start: 2017-08-03 | End: 2017-08-03

## 2017-08-03 RX ORDER — MAGNESIUM SULFATE 500 MG/ML
2 VIAL (ML) INJECTION ONCE
Qty: 0 | Refills: 0 | Status: COMPLETED | OUTPATIENT
Start: 2017-08-03 | End: 2017-08-03

## 2017-08-03 RX ADMIN — SODIUM CHLORIDE 30 MILLILITER(S): 9 INJECTION, SOLUTION INTRAVENOUS at 09:09

## 2017-08-03 RX ADMIN — Medication 1.25 MILLIGRAM(S): at 19:22

## 2017-08-03 RX ADMIN — WARFARIN SODIUM 1 MILLIGRAM(S): 2.5 TABLET ORAL at 19:24

## 2017-08-03 RX ADMIN — POTASSIUM PHOSPHATE, MONOBASIC POTASSIUM PHOSPHATE, DIBASIC 62.5 MILLIMOLE(S): 236; 224 INJECTION, SOLUTION INTRAVENOUS at 10:22

## 2017-08-03 RX ADMIN — Medication 1.25 MILLIGRAM(S): at 12:23

## 2017-08-03 RX ADMIN — PANTOPRAZOLE SODIUM 40 MILLIGRAM(S): 20 TABLET, DELAYED RELEASE ORAL at 12:23

## 2017-08-03 RX ADMIN — Medication 50 GRAM(S): at 15:22

## 2017-08-03 RX ADMIN — LATANOPROST 1 DROP(S): 0.05 SOLUTION/ DROPS OPHTHALMIC; TOPICAL at 21:05

## 2017-08-03 NOTE — DISCHARGE NOTE ADULT - MEDICATION SUMMARY - MEDICATIONS TO TAKE
I will START or STAY ON the medications listed below when I get home from the hospital:    valsartan 160 mg oral tablet  -- 1 tab(s) by mouth once a day  -- Indication: For Hypertension    warfarin 4 mg oral tablet  -- 1 tab(s) by mouth once a day  -- Indication: For Pulmonary embolism    zolpidem 10 mg oral tablet  -- 1 tab(s) by mouth once a day (at bedtime), As needed, Insomnia  -- Indication: For sleep agent    latanoprost 0.005% ophthalmic solution  -- 1 drop(s) to each affected eye once a day (in the evening)  -- Indication: For Glaucoma    omeprazole 20 mg oral delayed release capsule  -- 1 cap(s) by mouth 2 times a day  -- Indication: For GERD    Centrum Adults oral tablet  -- 1 tab(s) by mouth once a day  -- Indication: For Supplement    Vitamin B-12 1000 mcg oral tablet  -- 1 tab(s) by mouth once a day  -- Indication: For Supplement

## 2017-08-03 NOTE — DISCHARGE NOTE ADULT - CARE PROVIDER_API CALL
Lan Rich), ColonRectal Surgery; Surgery  [address] [place] [address]  Phone: [phone]  Fax: [phone] Lan Rich), ColonRectal Surgery; Surgery  [address] [place] [address]  Phone: [phone]  Fax: [phone]    Ivelisse Munoz), Geriatric Medicine; Internal Medicine  [address] [place] [address]  Phone: [phone]  Fax: [phone]

## 2017-08-03 NOTE — DISCHARGE NOTE ADULT - PATIENT PORTAL LINK FT
“You can access the [facility] Patient Portal, offered by [facility], by registering with the following website: [email or web address]”

## 2017-08-03 NOTE — DISCHARGE NOTE ADULT - INSTRUCTIONS
Regular diet Increasing pain, nausea with stomach bloating, unable to eat meals, fever monitor self for signs of bleeding, black tarry stool, use soft toothbrush, call MD, continue coumadin as ordered.

## 2017-08-03 NOTE — DISCHARGE NOTE ADULT - CARE PLAN
Principal Discharge DX:	Small bowel obstruction  Goal:	Toleration of diet, no longer having abdominal pain  Instructions for follow-up, activity and diet:	Please call to make a follow-up appointment at [phone] with Dr Rich  Please call to make an appointment to follow up with your primary care physician regarding your recent hospitalization.   NOTIFY YOUR PHYSICIAN IF: You have fever, persistent nausea/vomiting, diarrhea, pain not controlled on pain meds,  Secondary Diagnosis:	GERD (gastroesophageal reflux disease)  Goal:	Continue current medication  Secondary Diagnosis:	Hypertension  Goal:	Continue current medication Principal Discharge DX:	Small bowel obstruction  Goal:	Toleration of diet, no longer having abdominal pain  Instructions for follow-up, activity and diet:	Please call to make a follow-up appointment at [phone] with Dr Rich  Please call to make an appointment to follow up with your primary care physician regarding your recent hospitalization.   NOTIFY YOUR PHYSICIAN IF: You have fever, persistent nausea/vomiting, diarrhea, pain not controlled on pain meds,  Secondary Diagnosis:	GERD (gastroesophageal reflux disease)  Goal:	Continue current medication  Secondary Diagnosis:	Hypertension  Goal:	Continue current medication Principal Discharge DX:	Small bowel obstruction  Goal:	Toleration of diet, no longer having abdominal pain  Instructions for follow-up, activity and diet:	Please call to make a follow-up appointment at [phone] with Dr Rich  Please call to make an appointment to follow up with your primary care physician regarding your recent hospitalization.   NOTIFY YOUR PHYSICIAN IF: You have fever, persistent nausea/vomiting, diarrhea, pain not controlled on pain meds,  Secondary Diagnosis:	Hypertension  Goal:	Continue current medication  Secondary Diagnosis:	Pulmonary embolism  Goal:	Continue current medication  Instructions for follow-up, activity and diet:	Please follow-up with Dr. Munoz [phone] for continued therapy with coumadin and INR monitoring. Principal Discharge DX:	Small bowel obstruction  Goal:	Toleration of diet, no longer having abdominal pain  Instructions for follow-up, activity and diet:	Please call to make a follow-up appointment at [phone] with Dr Rich  Please call to make an appointment to follow up with your primary care physician regarding your recent hospitalization.   NOTIFY YOUR PHYSICIAN IF: You have fever, persistent nausea/vomiting, diarrhea, pain not controlled on pain meds,  Secondary Diagnosis:	Hypertension  Goal:	Continue current medication  Secondary Diagnosis:	Pulmonary embolism  Goal:	Continue current medication  Instructions for follow-up, activity and diet:	Please follow-up with Dr. Munoz [phone] for continued therapy with coumadin and INR monitoring. Principal Discharge DX:	Small bowel obstruction  Goal:	Toleration of diet, no longer having abdominal pain  Instructions for follow-up, activity and diet:	Please call to make a follow-up appointment at [phone] with Dr Rich  Please call to make an appointment on to follow up on wednesday or thursday with your primary care physician (Dr. Munoz) regarding your recent hospitalization.   NOTIFY YOUR PHYSICIAN IF: You have fever, persistent nausea/vomiting, diarrhea, pain not controlled on pain meds,  Secondary Diagnosis:	Hypertension  Goal:	Continue current medication  Secondary Diagnosis:	Pulmonary embolism  Goal:	Continue current medication  Instructions for follow-up, activity and diet:	Please follow-up with Dr. Munoz [phone] for continued therapy with coumadin and INR monitoring. Principal Discharge DX:	Small bowel obstruction  Goal:	Toleration of diet, no longer having abdominal pain  Instructions for follow-up, activity and diet:	Please call to make a follow-up appointment at [phone] with Dr Rich  Please call to make an appointment on to follow up on Wednesday 8/9/17 with your primary care physician (Dr. Munoz) regarding your recent hospitalization.   NOTIFY YOUR PHYSICIAN IF: You have fever, persistent nausea/vomiting, diarrhea, pain not controlled on pain meds,  Secondary Diagnosis:	Hypertension  Goal:	Continue current medication  Secondary Diagnosis:	Pulmonary embolism  Goal:	Continue current medication  Instructions for follow-up, activity and diet:	Please follow-up with Dr. Ivelisse Munoz [phone] for continued therapy with coumadin and INR monitoring. Principal Discharge DX:	Small bowel obstruction  Goal:	Toleration of diet, no longer having abdominal pain  Instructions for follow-up, activity and diet:	Please call to make a follow-up appointment at [phone] with Dr Rich  Please call to make an appointment on to follow up on Wednesday 8/9/17 with your primary care physician (Dr. Munoz) regarding your recent hospitalization.   NOTIFY YOUR PHYSICIAN IF: You have fever, persistent nausea/vomiting, diarrhea, pain not controlled on pain meds,  Secondary Diagnosis:	Hypertension  Goal:	Continue current medication  Secondary Diagnosis:	Pulmonary embolism  Goal:	Continue current medication  Instructions for follow-up, activity and diet:	Please follow-up with Dr. Ivelisse Munoz [phone] for continued therapy with coumadin and INR monitoring.

## 2017-08-03 NOTE — DISCHARGE NOTE ADULT - ADDITIONAL INSTRUCTIONS
Please call to make a follow-up appointment at [phone] with Dr Rich    Please call to make an appointment on to follow up on wednesday or thursday with your primary care physician (Dr. Munoz) regarding your recent hospitalization.     NOTIFY YOUR PHYSICIAN IF: You have fever, persistent nausea/vomiting, diarrhea, pain not controlled on pain meds, Please call to make a follow-up appointment at [phone] with Dr Rich    Please call to make an appointment on to follow up on Wednesday, 8/9/17 with your primary care physician (Dr. Munoz) regarding your recent hospitalization.     NOTIFY YOUR PHYSICIAN IF: You have fever, persistent nausea/vomiting, diarrhea, pain not controlled on pain meds,

## 2017-08-03 NOTE — PROGRESS NOTE ADULT - SUBJECTIVE AND OBJECTIVE BOX
SUBJECTIVE    Pt doing well. Pt reports passing flatus and Bm x2. Pt has been OOB. Denies N/V. Pt tolerating CLD.     10-point review of systems completed and negative except as noted above.    latanoprost 0.005% Ophthalmic Solution 1 Drop(s) Both EYES at bedtime  pantoprazole  Injectable 40 milliGRAM(s) IV Push daily  enalaprilat Injectable 1.25 milliGRAM(s) IV Push every 6 hours  potassium phosphate IVPB 15 milliMole(s) IV Intermittent once  magnesium sulfate  IVPB 2 Gram(s) IV Intermittent once            OBJECTIVE    T(C): 36.9 (08-03-17 @ 10:01), Max: 37.4 (08-03-17 @ 02:04)  HR: 70 (08-03-17 @ 10:01) (59 - 70)  BP: 138/65 (08-03-17 @ 10:01) (120/49 - 148/56)  RR: 18 (08-03-17 @ 10:01) (17 - 18)  SpO2: 99% (08-03-17 @ 10:01) (94% - 100%)    08-02-17 @ 07:01  -  08-03-17 @ 07:00  --------------------------------------------------------  IN: 1255 mL / OUT: 1300 mL / NET: -45 mL    08-03-17 @ 07:01  -  08-03-17 @ 11:29  --------------------------------------------------------  IN: 120 mL / OUT: 300 mL / NET: -180 mL        EXAM  General: awake, alert, NAD  Pulm: CTAB, respirations unlabored, no increased WOB  CV: Regular rate and rhythm  Abdomen: soft, ND, NT  Extremities: WWPx4, Grossly symmetric    LABS                        8.3    5.82  )-----------( 220      ( 03 Aug 2017 06:44 )             25.7     08-03    144  |  106  |  12  ----------------------------<  87  4.3   |  27  |  1.01    Ca    9.0      03 Aug 2017 06:44  Phos  2.1     08-03  Mg     1.8     08-03      PT/INR - ( 03 Aug 2017 06:44 )   PT: 28.4 SEC;   INR: 2.49          PTT - ( 03 Aug 2017 06:44 )  PTT:39.7 SEC      RADIOLOGY & ADDITIONAL STUDIES          ASSESSMENT AND PLAN  89F with SBO, now resolved. Pt had return of GI function and is tolerating CLD. Pain well controlled.     - Diet: CLD, transition to LRd  - F/u coags  - OOB  - pain control - continue current regimen  - dispo: possible discharge later today if tolerated LRD diet per Dr. Rich

## 2017-08-03 NOTE — DISCHARGE NOTE ADULT - HOSPITAL COURSE
89yo F hx of PE (coumadin), prior small bowel obstruction (last 2 years ago; NGT only), now presents with one day of lower abdominal pain, nausea, and inability to pass gas since yesterday morning. She last had an obstruction 2 years ago; admitted to [facility] ([place]), managed with NGT and d/c'd on HD4. Today she reports lower dull abdominal pain, and denies vomiting. She has started burping more as well. No fevers/chills.    CT scan of the abdomen/pelvis revealed small bowel obstruction with a transition point in the anterior right   hemiabdomen.    Nasogastric tube was placed when patient was admitted and was clamped and removed on hospital day 2.    Pt began having GI function and was advanced from clears to regular diet and tolerated well.    Pt was seen by Physical Therapist and they recommended possible home PT.     Pt is stable for discharge home. 89yo F hx of PE (coumadin), prior small bowel obstruction (last 2 years ago; NGT only), now presents with one day of lower abdominal pain, nausea, and inability to pass gas since yesterday morning. She last had an obstruction 2 years ago; admitted to [facility] ([place]), managed with NGT and d/c'd on HD4. Today she reports lower dull abdominal pain, and denies vomiting. She has started burping more as well. No fevers/chills.    CT scan of the abdomen/pelvis revealed small bowel obstruction with a transition point in the anterior right   hemiabdomen.    Nasogastric tube was placed when patient was admitted and was clamped and removed on hospital day 2.    Pt began having GI function and was advanced from clears to regular diet and tolerated well.    * Of note: at time of admission patient's INR was supra-therapeutic on patient's home dose of coumadin. Patient's cardiologist was called regarding recommendation for continued management. Cardiology recommended_______________    Pt was seen by Physical Therapist and they recommended possible home PT.     Pt is stable for discharge home. 89yo F hx of PE (coumadin), prior small bowel obstruction (last 2 years ago; NGT only), now presents with one day of lower abdominal pain, nausea, and inability to pass gas since yesterday morning. She last had an obstruction 2 years ago; admitted to [facility] ([place]), managed with NGT and d/c'd on HD4. Today she reports lower dull abdominal pain, and denies vomiting. She has started burping more as well. No fevers/chills.    CT scan of the abdomen/pelvis revealed small bowel obstruction with a transition point in the anterior right   hemiabdomen.    Nasogastric tube was placed when patient was admitted and was clamped and removed on hospital day 2.    Pt began having GI function and was advanced from clears to regular diet and tolerated well.    * Of note: at time of admission patient's INR was supra-therapeutic on patient's home dose of coumadin. Patient's primary care doctor (Dr. Munoz) was called regarding recommendation for continued management. Dr. Munoz recommended_______________    Pt was seen by Physical Therapist and they recommended possible home PT.     Pt is stable for discharge home. 89yo F hx of PE (coumadin), prior small bowel obstruction (last 2 years ago; NGT only), now presents with one day of lower abdominal pain, nausea, and inability to pass gas since yesterday morning. She last had an obstruction 2 years ago; admitted to [facility] ([place]), managed with NGT and d/c'd on HD4. Today she reports lower dull abdominal pain, and denies vomiting. She has started burping more as well. No fevers/chills.    CT scan of the abdomen/pelvis revealed small bowel obstruction with a transition point in the anterior right   hemiabdomen.    Nasogastric tube was placed when patient was admitted and was clamped and removed on hospital day 2.    Pt began having GI function and was advanced from clears to regular diet and tolerated well.    * Of note: at time of admission patient's INR was supra-therapeutic on patient's home dose of coumadin. Patient's primary care doctor (Dr. Munoz) was called regarding recommendation for continued management. Dr. Munoz recommended discharging the pt once her Coumadin was restarted and she was at a goal INR of 2.0. Coumadin was restarted and INR was trended daily until her goal INR was met and she was stable for discharge.     Pt was seen by Physical Therapist and they recommended possible home PT.     Pt is stable for discharge home.

## 2017-08-03 NOTE — DISCHARGE NOTE ADULT - CARE PROVIDERS DIRECT ADDRESSES
[email or web address]

## 2017-08-03 NOTE — DISCHARGE NOTE ADULT - PLAN OF CARE
Toleration of diet, no longer having abdominal pain Please call to make a follow-up appointment at [phone] with Dr Rich  Please call to make an appointment to follow up with your primary care physician regarding your recent hospitalization.   NOTIFY YOUR PHYSICIAN IF: You have fever, persistent nausea/vomiting, diarrhea, pain not controlled on pain meds, Continue current medication Please follow-up with Dr. Munoz [phone] for continued therapy with coumadin and INR monitoring. Please call to make a follow-up appointment at [phone] with Dr Rich  Please call to make an appointment on to follow up on wednesday or thursday with your primary care physician (Dr. Munoz) regarding your recent hospitalization.   NOTIFY YOUR PHYSICIAN IF: You have fever, persistent nausea/vomiting, diarrhea, pain not controlled on pain meds, Please follow-up with Dr. Ivelisse Munoz [phone] for continued therapy with coumadin and INR monitoring. Please call to make a follow-up appointment at [phone] with Dr Rich  Please call to make an appointment on to follow up on Wednesday 8/9/17 with your primary care physician (Dr. Munoz) regarding your recent hospitalization.   NOTIFY YOUR PHYSICIAN IF: You have fever, persistent nausea/vomiting, diarrhea, pain not controlled on pain meds,

## 2017-08-04 ENCOUNTER — CLINICAL ADVICE (OUTPATIENT)
Age: 82
End: 2017-08-04

## 2017-08-04 LAB
APTT BLD: 33.7 SEC — SIGNIFICANT CHANGE UP (ref 27.5–37.4)
BUN SERPL-MCNC: 15 MG/DL — SIGNIFICANT CHANGE UP (ref 7–23)
CALCIUM SERPL-MCNC: 8.7 MG/DL — SIGNIFICANT CHANGE UP (ref 8.4–10.5)
CHLORIDE SERPL-SCNC: 107 MMOL/L — SIGNIFICANT CHANGE UP (ref 98–107)
CO2 SERPL-SCNC: 26 MMOL/L — SIGNIFICANT CHANGE UP (ref 22–31)
CREAT SERPL-MCNC: 1.13 MG/DL — SIGNIFICANT CHANGE UP (ref 0.5–1.3)
GLUCOSE SERPL-MCNC: 90 MG/DL — SIGNIFICANT CHANGE UP (ref 70–99)
HCT VFR BLD CALC: 25.9 % — LOW (ref 34.5–45)
HGB BLD-MCNC: 8.3 G/DL — LOW (ref 11.5–15.5)
INR BLD: 1.84 — HIGH (ref 0.88–1.17)
MAGNESIUM SERPL-MCNC: 1.8 MG/DL — SIGNIFICANT CHANGE UP (ref 1.6–2.6)
MCHC RBC-ENTMCNC: 27.1 PG — SIGNIFICANT CHANGE UP (ref 27–34)
MCHC RBC-ENTMCNC: 32 % — SIGNIFICANT CHANGE UP (ref 32–36)
MCV RBC AUTO: 84.6 FL — SIGNIFICANT CHANGE UP (ref 80–100)
NRBC # FLD: 0 — SIGNIFICANT CHANGE UP
PHOSPHATE SERPL-MCNC: 2.6 MG/DL — SIGNIFICANT CHANGE UP (ref 2.5–4.5)
PLATELET # BLD AUTO: 207 K/UL — SIGNIFICANT CHANGE UP (ref 150–400)
PMV BLD: 9.2 FL — SIGNIFICANT CHANGE UP (ref 7–13)
POTASSIUM SERPL-MCNC: 3.9 MMOL/L — SIGNIFICANT CHANGE UP (ref 3.5–5.3)
POTASSIUM SERPL-SCNC: 3.9 MMOL/L — SIGNIFICANT CHANGE UP (ref 3.5–5.3)
PROTHROM AB SERPL-ACNC: 20.9 SEC — HIGH (ref 9.8–13.1)
RBC # BLD: 3.06 M/UL — LOW (ref 3.8–5.2)
RBC # FLD: 14.4 % — SIGNIFICANT CHANGE UP (ref 10.3–14.5)
SODIUM SERPL-SCNC: 142 MMOL/L — SIGNIFICANT CHANGE UP (ref 135–145)
WBC # BLD: 5.91 K/UL — SIGNIFICANT CHANGE UP (ref 3.8–10.5)
WBC # FLD AUTO: 5.91 K/UL — SIGNIFICANT CHANGE UP (ref 3.8–10.5)

## 2017-08-04 PROCEDURE — 99232 SBSQ HOSP IP/OBS MODERATE 35: CPT | Mod: GC

## 2017-08-04 RX ORDER — WARFARIN SODIUM 2.5 MG/1
3 TABLET ORAL ONCE
Qty: 0 | Refills: 0 | Status: DISCONTINUED | OUTPATIENT
Start: 2017-08-04 | End: 2017-08-04

## 2017-08-04 RX ORDER — SODIUM,POTASSIUM PHOSPHATES 278-250MG
1 POWDER IN PACKET (EA) ORAL ONCE
Qty: 0 | Refills: 0 | Status: COMPLETED | OUTPATIENT
Start: 2017-08-04 | End: 2017-08-04

## 2017-08-04 RX ORDER — HEPARIN SODIUM 5000 [USP'U]/ML
5000 INJECTION INTRAVENOUS; SUBCUTANEOUS EVERY 8 HOURS
Qty: 0 | Refills: 0 | Status: DISCONTINUED | OUTPATIENT
Start: 2017-08-04 | End: 2017-08-06

## 2017-08-04 RX ORDER — WARFARIN SODIUM 2.5 MG/1
4 TABLET ORAL ONCE
Qty: 0 | Refills: 0 | Status: COMPLETED | OUTPATIENT
Start: 2017-08-04 | End: 2017-08-04

## 2017-08-04 RX ORDER — PANTOPRAZOLE SODIUM 20 MG/1
40 TABLET, DELAYED RELEASE ORAL
Qty: 0 | Refills: 0 | Status: DISCONTINUED | OUTPATIENT
Start: 2017-08-04 | End: 2017-08-07

## 2017-08-04 RX ORDER — VALSARTAN 80 MG/1
160 TABLET ORAL DAILY
Qty: 0 | Refills: 0 | Status: DISCONTINUED | OUTPATIENT
Start: 2017-08-04 | End: 2017-08-07

## 2017-08-04 RX ORDER — MAGNESIUM SULFATE 500 MG/ML
2 VIAL (ML) INJECTION ONCE
Qty: 0 | Refills: 0 | Status: COMPLETED | OUTPATIENT
Start: 2017-08-04 | End: 2017-08-04

## 2017-08-04 RX ADMIN — Medication 1 TABLET(S): at 09:41

## 2017-08-04 RX ADMIN — PANTOPRAZOLE SODIUM 40 MILLIGRAM(S): 20 TABLET, DELAYED RELEASE ORAL at 09:41

## 2017-08-04 RX ADMIN — LATANOPROST 1 DROP(S): 0.05 SOLUTION/ DROPS OPHTHALMIC; TOPICAL at 21:04

## 2017-08-04 RX ADMIN — VALSARTAN 160 MILLIGRAM(S): 80 TABLET ORAL at 18:09

## 2017-08-04 RX ADMIN — Medication 50 GRAM(S): at 09:41

## 2017-08-04 RX ADMIN — Medication 1.25 MILLIGRAM(S): at 05:49

## 2017-08-04 RX ADMIN — WARFARIN SODIUM 4 MILLIGRAM(S): 2.5 TABLET ORAL at 18:09

## 2017-08-04 RX ADMIN — Medication 1.25 MILLIGRAM(S): at 00:02

## 2017-08-04 NOTE — CHART NOTE - NSCHARTNOTEFT_GEN_A_CORE
Spoke via telephone with patient's primary care doctor: Dr. Ivelisse Munoz ([phone]). Dr. Munoz manages the patient's INR while on coumadin for history of pulmonary embolisms. Dr. Munoz does not see patient's at [facility] Dr. Munoz explained that patient's INR has been between 2-3 over the past few months while on 4mg coumadin daily. Dr. Munoz believes her elevated INR at time of admission was an acute change likely due to acute SBO presentation. Given the patient's INR of 1.84 after 1mg of coumadin the night prior after no coumadin since 7/30, she recommended increasing the coumadin to 3mg tonight, check INR in AM, and possibly dose 4mg tomorrow to return patient to home regimen. Given history of PE Dr. Muonz did not recommend discharge patient prior to reaching therapeutic INR.

## 2017-08-04 NOTE — PROGRESS NOTE ADULT - SUBJECTIVE AND OBJECTIVE BOX
SUBJECTIVE    Patient seen and examined at bedside. Pt doing well. Passing flatus, having bowel movements. Pt has been OOB. Denies N/V. Pt tolerating regular diet    OBJECTIVE  Vital Signs Last 24 Hrs  T(C): 37.1 (04 Aug 2017 05:46), Max: 37.1 (03 Aug 2017 18:16)  T(F): 98.8 (04 Aug 2017 05:46), Max: 98.8 (04 Aug 2017 05:46)  HR: 66 (04 Aug 2017 06:04) (62 - 71)  BP: 135/67 (04 Aug 2017 06:04) (126/66 - 144/72)  BP(mean): --  RR: 17 (04 Aug 2017 06:04) (15 - 18)  SpO2: 97% (04 Aug 2017 06:04) (95% - 100%)    I&O's Detail    03 Aug 2017 07:01  -  04 Aug 2017 07:00  --------------------------------------------------------  IN:    lactated ringers.: 120 mL  Total IN: 120 mL    OUT:    Voided: 302 mL  Total OUT: 302 mL    Total NET: -182 mL      EXAM  General: awake, alert, NAD  Pulm: CTAB, respirations unlabored, no increased WOB  CV: Regular rate and rhythm  Abdomen: soft, ND, NT  Extremities: WWPx4, Grossly symmetric    LABS                        8.3    5.91  )-----------( 207      ( 04 Aug 2017 05:30 )             25.9       08-04    142  |  107  |  15  ----------------------------<  90  3.9   |  26  |  1.13    Ca    8.7      04 Aug 2017 05:30  Phos  2.6     08-04  Mg     1.8     08-04        PT/INR - ( 04 Aug 2017 05:30 )   PT: 20.9 SEC;   INR: 1.84          PTT - ( 04 Aug 2017 05:30 )  PTT:33.7 SEC               ASSESSMENT AND PLAN  89F with SBO, now resolved. Pt had return of GI function and is tolerating regular diet. Pain well controlled.     - Diet: regular  - F/u coags  - f/u home cardiologist--> re: restarting coumadin given supratherapeutic INR on admission   - OOB  - dispo: discharge

## 2017-08-05 LAB
APTT BLD: 32 SEC — SIGNIFICANT CHANGE UP (ref 27.5–37.4)
BUN SERPL-MCNC: 15 MG/DL — SIGNIFICANT CHANGE UP (ref 7–23)
CALCIUM SERPL-MCNC: 9.2 MG/DL — SIGNIFICANT CHANGE UP (ref 8.4–10.5)
CHLORIDE SERPL-SCNC: 107 MMOL/L — SIGNIFICANT CHANGE UP (ref 98–107)
CO2 SERPL-SCNC: 26 MMOL/L — SIGNIFICANT CHANGE UP (ref 22–31)
CREAT SERPL-MCNC: 1.06 MG/DL — SIGNIFICANT CHANGE UP (ref 0.5–1.3)
GLUCOSE SERPL-MCNC: 89 MG/DL — SIGNIFICANT CHANGE UP (ref 70–99)
HCT VFR BLD CALC: 25.5 % — LOW (ref 34.5–45)
HGB BLD-MCNC: 8.2 G/DL — LOW (ref 11.5–15.5)
INR BLD: 1.47 — HIGH (ref 0.88–1.17)
MAGNESIUM SERPL-MCNC: 2.1 MG/DL — SIGNIFICANT CHANGE UP (ref 1.6–2.6)
MCHC RBC-ENTMCNC: 27.4 PG — SIGNIFICANT CHANGE UP (ref 27–34)
MCHC RBC-ENTMCNC: 32.2 % — SIGNIFICANT CHANGE UP (ref 32–36)
MCV RBC AUTO: 85.3 FL — SIGNIFICANT CHANGE UP (ref 80–100)
NRBC # FLD: 0 — SIGNIFICANT CHANGE UP
PHOSPHATE SERPL-MCNC: 2.6 MG/DL — SIGNIFICANT CHANGE UP (ref 2.5–4.5)
PLATELET # BLD AUTO: 222 K/UL — SIGNIFICANT CHANGE UP (ref 150–400)
PMV BLD: 9.3 FL — SIGNIFICANT CHANGE UP (ref 7–13)
POTASSIUM SERPL-MCNC: 3.9 MMOL/L — SIGNIFICANT CHANGE UP (ref 3.5–5.3)
POTASSIUM SERPL-SCNC: 3.9 MMOL/L — SIGNIFICANT CHANGE UP (ref 3.5–5.3)
PROTHROM AB SERPL-ACNC: 16.6 SEC — HIGH (ref 9.8–13.1)
RBC # BLD: 2.99 M/UL — LOW (ref 3.8–5.2)
RBC # FLD: 14.6 % — HIGH (ref 10.3–14.5)
SODIUM SERPL-SCNC: 144 MMOL/L — SIGNIFICANT CHANGE UP (ref 135–145)
WBC # BLD: 5.74 K/UL — SIGNIFICANT CHANGE UP (ref 3.8–10.5)
WBC # FLD AUTO: 5.74 K/UL — SIGNIFICANT CHANGE UP (ref 3.8–10.5)

## 2017-08-05 RX ORDER — POTASSIUM PHOSPHATE, MONOBASIC POTASSIUM PHOSPHATE, DIBASIC 236; 224 MG/ML; MG/ML
15 INJECTION, SOLUTION INTRAVENOUS ONCE
Qty: 0 | Refills: 0 | Status: DISCONTINUED | OUTPATIENT
Start: 2017-08-05 | End: 2017-08-05

## 2017-08-05 RX ORDER — SODIUM,POTASSIUM PHOSPHATES 278-250MG
1 POWDER IN PACKET (EA) ORAL
Qty: 0 | Refills: 0 | Status: COMPLETED | OUTPATIENT
Start: 2017-08-05 | End: 2017-08-06

## 2017-08-05 RX ORDER — WARFARIN SODIUM 2.5 MG/1
3 TABLET ORAL ONCE
Qty: 0 | Refills: 0 | Status: COMPLETED | OUTPATIENT
Start: 2017-08-05 | End: 2017-08-05

## 2017-08-05 RX ADMIN — Medication 1 TABLET(S): at 13:53

## 2017-08-05 RX ADMIN — PANTOPRAZOLE SODIUM 40 MILLIGRAM(S): 20 TABLET, DELAYED RELEASE ORAL at 05:11

## 2017-08-05 RX ADMIN — LATANOPROST 1 DROP(S): 0.05 SOLUTION/ DROPS OPHTHALMIC; TOPICAL at 21:25

## 2017-08-05 RX ADMIN — WARFARIN SODIUM 3 MILLIGRAM(S): 2.5 TABLET ORAL at 17:50

## 2017-08-05 RX ADMIN — Medication 1 TABLET(S): at 22:35

## 2017-08-05 RX ADMIN — VALSARTAN 160 MILLIGRAM(S): 80 TABLET ORAL at 05:11

## 2017-08-05 NOTE — PROGRESS NOTE ADULT - SUBJECTIVE AND OBJECTIVE BOX
SUBJECTIVE    Pt doing well. Tolerating LRD without nausea or vomiting. Passing flatus and having BM's. OOB. Voiding. Pt had a large cup of spinach yesterday despite on coumadin.    10-point review of systems completed and negative except as noted above.    latanoprost 0.005% Ophthalmic Solution 1 Drop(s) Both EYES at bedtime  valsartan 160 milliGRAM(s) Oral daily  pantoprazole    Tablet 40 milliGRAM(s) Oral before breakfast  heparin  Injectable 5000 Unit(s) SubCutaneous every 8 hours  warfarin 3 milliGRAM(s) Oral once  potassium phosphate IVPB 15 milliMole(s) IV Intermittent once            OBJECTIVE    T(C): 36.6 (08-05-17 @ 10:37), Max: 37.3 (08-05-17 @ 02:25)  HR: 75 (08-05-17 @ 10:37) (56 - 76)  BP: 111/58 (08-05-17 @ 10:37) (111/58 - 147/58)  RR: 18 (08-05-17 @ 10:37) (18 - 18)  SpO2: 99% (08-05-17 @ 10:37) (97% - 100%)    08-04-17 @ 07:01  -  08-05-17 @ 07:00  --------------------------------------------------------  IN: 750 mL / OUT: 1350 mL / NET: -600 mL    08-05-17 @ 07:01  -  08-05-17 @ 11:42  --------------------------------------------------------  IN: 0 mL / OUT: 300 mL / NET: -300 mL        EXAM  General: awake, alert, NAD  Pulm: CTAB, respirations unlabored, no increased WOB  CV: Regular rate and rhythm  Abdomen: soft, ND, NT  Extremities: WWPx4, Grossly symmetric    LABS                        8.2    5.74  )-----------( 222      ( 05 Aug 2017 06:57 )             25.5     08-05    144  |  107  |  15  ----------------------------<  89  3.9   |  26  |  1.06    Ca    9.2      05 Aug 2017 06:57  Phos  2.6     08-05  Mg     2.1     08-05      PT/INR - ( 05 Aug 2017 06:57 )   PT: 16.6 SEC;   INR: 1.47          PTT - ( 05 Aug 2017 06:57 )  PTT:32.0 SEC      RADIOLOGY & ADDITIONAL STUDIES          ASSESSMENT AND PLAN  89F with SBO, now resolved. Pt had return of GI function and is tolerating LRD. Pain well controlled. Pt's INR subtherapeutic while on coumadin, likely 2/2 consumption of large amount of spinach yesterday.    - Diet: continue LRD  - F/u AM coags  - Continue Coumadin  - OOB  - pain control - continue current regimen  - dispo: stable, possible discharge tomorrow pending if pt's INR at or above 2

## 2017-08-06 LAB
APTT BLD: 34.7 SEC — SIGNIFICANT CHANGE UP (ref 27.5–37.4)
INR BLD: 1.79 — HIGH (ref 0.88–1.17)
PROTHROM AB SERPL-ACNC: 20.3 SEC — HIGH (ref 9.8–13.1)

## 2017-08-06 PROCEDURE — 99238 HOSP IP/OBS DSCHRG MGMT 30/<: CPT

## 2017-08-06 RX ORDER — ENOXAPARIN SODIUM 100 MG/ML
40 INJECTION SUBCUTANEOUS EVERY 12 HOURS
Qty: 0 | Refills: 0 | Status: DISCONTINUED | OUTPATIENT
Start: 2017-08-06 | End: 2017-08-07

## 2017-08-06 RX ORDER — WARFARIN SODIUM 2.5 MG/1
3 TABLET ORAL ONCE
Qty: 0 | Refills: 0 | Status: COMPLETED | OUTPATIENT
Start: 2017-08-06 | End: 2017-08-06

## 2017-08-06 RX ADMIN — Medication 1 TABLET(S): at 13:11

## 2017-08-06 RX ADMIN — Medication 1 TABLET(S): at 05:20

## 2017-08-06 RX ADMIN — WARFARIN SODIUM 3 MILLIGRAM(S): 2.5 TABLET ORAL at 18:12

## 2017-08-06 RX ADMIN — PANTOPRAZOLE SODIUM 40 MILLIGRAM(S): 20 TABLET, DELAYED RELEASE ORAL at 05:21

## 2017-08-06 RX ADMIN — LATANOPROST 1 DROP(S): 0.05 SOLUTION/ DROPS OPHTHALMIC; TOPICAL at 21:44

## 2017-08-06 RX ADMIN — ENOXAPARIN SODIUM 40 MILLIGRAM(S): 100 INJECTION SUBCUTANEOUS at 18:12

## 2017-08-06 RX ADMIN — VALSARTAN 160 MILLIGRAM(S): 80 TABLET ORAL at 05:20

## 2017-08-06 NOTE — PROGRESS NOTE ADULT - ASSESSMENT
89F with SBO, now resolved. Pt had return of GI function and is tolerating LRD. Pain well controlled. Pt's INR subtherapeutic while on coumadin, likely 2/2 consumption of large amount of spinach yesterday.    - Diet: continue LRD  - F/u AM coags  - Continue Coumadin  - OOB  - pain control - continue current regimen  - dispo: stable, possible discharge if we can confirm that she will be able to get her INR rechecked tomorrow.

## 2017-08-06 NOTE — PROGRESS NOTE ADULT - SUBJECTIVE AND OBJECTIVE BOX
S: 89yo Woman seen and examined. No acute events overnight; afebrile, VS stable. Pain well controlled with current regimen. Tolerating diet; denies N/V. Endorses flatus and bowel movements. She is under the impression that we have mismanaged her coumadin and that her stay in the hospital has been longer than warranted. I explained to her that we need to ensure that her INR is stable before discharge and that she will have adequate follow up outpatient. She states that her doctor works on Thursdays and Fridays and might not be able to check her blood tomorrow. We have been trying to get in touch with her PCP's office     O:  Vital Signs Last 24 Hrs  T(C): 36.4 (06 Aug 2017 10:38), Max: 37.6 (05 Aug 2017 17:45)  T(F): 97.5 (06 Aug 2017 10:38), Max: 99.6 (05 Aug 2017 17:45)  HR: 91 (06 Aug 2017 10:38) (66 - 91)  BP: 140/70 (06 Aug 2017 10:38) (116/60 - 142/66)  BP(mean): --  RR: 18 (06 Aug 2017 10:38) (16 - 18)  SpO2: 100% (06 Aug 2017 10:38) (97% - 100%)    I&O's Detail    05 Aug 2017 07:01  -  06 Aug 2017 07:00  --------------------------------------------------------  IN:    Oral Fluid: 550 mL  Total IN: 550 mL    OUT:    Voided: 600 mL  Total OUT: 600 mL    Total NET: -50 mL    Physical Exam:  Gen: Resting in bed, NAD, alert and oriented.   Resp: No increased WOB   Abd: soft, NT/ND    MEDICATIONS  (STANDING):  latanoprost 0.005% Ophthalmic Solution 1 Drop(s) Both EYES at bedtime  valsartan 160 milliGRAM(s) Oral daily  pantoprazole    Tablet 40 milliGRAM(s) Oral before breakfast  heparin  Injectable 5000 Unit(s) SubCutaneous every 8 hours  potassium acid phosphate/sodium acid phosphate tablet (K-PHOS No. 2) 1 Tablet(s) Oral four times a day    MEDICATIONS  (PRN):      LABS:                        8.2    5.74  )-----------( 222      ( 05 Aug 2017 06:57 )             25.5       08-05    144  |  107  |  15  ----------------------------<  89  3.9   |  26  |  1.06    Ca    9.2      05 Aug 2017 06:57  Phos  2.6     08-05  Mg     2.1     08-05 S: 89yo Woman seen and examined. No acute events overnight; afebrile, VS stable. Pain well controlled with current regimen. Tolerating diet; denies N/V. Endorses flatus and bowel movements. She is under the impression that we have mismanaged her coumadin and that her stay in the hospital has been longer than warranted. I explained to her that we need to ensure that her INR is stable before discharge and that she will have adequate follow up outpatient. She states that her doctor works on Thursdays and Fridays and might not be able to check her blood tomorrow. We have been trying to get in touch with her PCP's office and we are awaiting their call.    O:  Vital Signs Last 24 Hrs  T(C): 36.4 (06 Aug 2017 10:38), Max: 37.6 (05 Aug 2017 17:45)  T(F): 97.5 (06 Aug 2017 10:38), Max: 99.6 (05 Aug 2017 17:45)  HR: 91 (06 Aug 2017 10:38) (66 - 91)  BP: 140/70 (06 Aug 2017 10:38) (116/60 - 142/66)  BP(mean): --  RR: 18 (06 Aug 2017 10:38) (16 - 18)  SpO2: 100% (06 Aug 2017 10:38) (97% - 100%)    I&O's Detail    05 Aug 2017 07:01  -  06 Aug 2017 07:00  --------------------------------------------------------  IN:    Oral Fluid: 550 mL  Total IN: 550 mL    OUT:    Voided: 600 mL  Total OUT: 600 mL    Total NET: -50 mL    Physical Exam:  Gen: Resting in bed, NAD, alert and oriented.   Resp: No increased WOB   Abd: soft, NT/ND    MEDICATIONS  (STANDING):  latanoprost 0.005% Ophthalmic Solution 1 Drop(s) Both EYES at bedtime  valsartan 160 milliGRAM(s) Oral daily  pantoprazole    Tablet 40 milliGRAM(s) Oral before breakfast  heparin  Injectable 5000 Unit(s) SubCutaneous every 8 hours  potassium acid phosphate/sodium acid phosphate tablet (K-PHOS No. 2) 1 Tablet(s) Oral four times a day    MEDICATIONS  (PRN):      LABS:                        8.2    5.74  )-----------( 222      ( 05 Aug 2017 06:57 )             25.5       08-05    144  |  107  |  15  ----------------------------<  89  3.9   |  26  |  1.06    Ca    9.2      05 Aug 2017 06:57  Phos  2.6     08-05  Mg     2.1     08-05

## 2017-08-07 VITALS
TEMPERATURE: 98 F | SYSTOLIC BLOOD PRESSURE: 132 MMHG | OXYGEN SATURATION: 100 % | RESPIRATION RATE: 16 BRPM | HEART RATE: 66 BPM | DIASTOLIC BLOOD PRESSURE: 62 MMHG

## 2017-08-07 LAB
APTT BLD: 42.2 SEC — HIGH (ref 27.5–37.4)
INR BLD: 1.99 — HIGH (ref 0.88–1.17)
PROTHROM AB SERPL-ACNC: 22.6 SEC — HIGH (ref 9.8–13.1)

## 2017-08-07 RX ADMIN — ENOXAPARIN SODIUM 40 MILLIGRAM(S): 100 INJECTION SUBCUTANEOUS at 07:07

## 2017-08-07 RX ADMIN — VALSARTAN 160 MILLIGRAM(S): 80 TABLET ORAL at 07:06

## 2017-08-07 RX ADMIN — PANTOPRAZOLE SODIUM 40 MILLIGRAM(S): 20 TABLET, DELAYED RELEASE ORAL at 07:07

## 2017-08-07 NOTE — PROGRESS NOTE ADULT - SUBJECTIVE AND OBJECTIVE BOX
GENERAL SURGERY DAILY PROGRESS NOTE:     Hospital Day: 8    Postoperative Day: x    Status post: x    Subjective:  Pain controlled. Denies N/V. Tolerating diet. Passing flatus and BM. Wants to go home today.        Objective:    PE:  Gen: NAD  Resp: airway patent, respirations unlabored, no increased WoB  CVS: RRR  Abd: soft, ND, NT, no rebound or guarding  Ext: no edema, WWP  Neuro: AAOx3, no focal deficits    Vital Signs Last 24 Hrs  T(C): 36.4 (07 Aug 2017 07:02), Max: 36.9 (06 Aug 2017 17:46)  T(F): 97.6 (07 Aug 2017 07:02), Max: 98.5 (07 Aug 2017 02:20)  HR: 59 (07 Aug 2017 07:02) (59 - 91)  BP: 149/66 (07 Aug 2017 07:02) (131/55 - 155/73)  BP(mean): --  RR: 18 (07 Aug 2017 07:02) (16 - 18)  SpO2: 99% (07 Aug 2017 07:02) (95% - 100%)    I&O's Detail    06 Aug 2017 07:01  -  07 Aug 2017 07:00  --------------------------------------------------------  IN:  Total IN: 0 mL    OUT:    Voided: 1000 mL  Total OUT: 1000 mL    Total NET: -1000 mL          Daily     Daily     MEDICATIONS  (STANDING):  latanoprost 0.005% Ophthalmic Solution 1 Drop(s) Both EYES at bedtime  valsartan 160 milliGRAM(s) Oral daily  pantoprazole    Tablet 40 milliGRAM(s) Oral before breakfast  enoxaparin Injectable 40 milliGRAM(s) SubCutaneous every 12 hours    MEDICATIONS  (PRN):      LABS:          PT/INR - ( 06 Aug 2017 06:00 )   PT: 20.3 SEC;   INR: 1.79          PTT - ( 06 Aug 2017 06:00 )  PTT:34.7 SEC      RADIOLOGY & ADDITIONAL STUDIES:

## 2017-08-07 NOTE — PROGRESS NOTE ADULT - ASSESSMENT
89yF w/ SBO  - f/u AM INR  - Pt received coumadin 3mg last night  - Regular diet  - OOB/Ambulate  - dispo planning  - possible d/c home t/d if INR therapeutic.

## 2017-08-08 ENCOUNTER — LABORATORY RESULT (OUTPATIENT)
Age: 82
End: 2017-08-08

## 2017-08-11 ENCOUNTER — CLINICAL ADVICE (OUTPATIENT)
Age: 82
End: 2017-08-11

## 2017-08-21 ENCOUNTER — LABORATORY RESULT (OUTPATIENT)
Age: 82
End: 2017-08-21

## 2017-08-24 ENCOUNTER — APPOINTMENT (OUTPATIENT)
Dept: GERIATRICS | Facility: CLINIC | Age: 82
End: 2017-08-24
Payer: MEDICARE

## 2017-08-24 VITALS
DIASTOLIC BLOOD PRESSURE: 70 MMHG | RESPIRATION RATE: 17 BRPM | OXYGEN SATURATION: 97 % | BODY MASS INDEX: 26.06 KG/M2 | WEIGHT: 138 LBS | TEMPERATURE: 98.3 F | HEART RATE: 69 BPM | SYSTOLIC BLOOD PRESSURE: 140 MMHG | HEIGHT: 61 IN

## 2017-08-24 PROCEDURE — G0009: CPT

## 2017-08-24 PROCEDURE — 90670 PCV13 VACCINE IM: CPT

## 2017-08-24 PROCEDURE — 99214 OFFICE O/P EST MOD 30 MIN: CPT | Mod: 25

## 2017-09-19 ENCOUNTER — CLINICAL ADVICE (OUTPATIENT)
Age: 82
End: 2017-09-19

## 2017-09-19 ENCOUNTER — LABORATORY RESULT (OUTPATIENT)
Age: 82
End: 2017-09-19

## 2017-10-16 ENCOUNTER — LABORATORY RESULT (OUTPATIENT)
Age: 82
End: 2017-10-16

## 2017-11-13 ENCOUNTER — LABORATORY RESULT (OUTPATIENT)
Age: 82
End: 2017-11-13

## 2017-12-11 ENCOUNTER — LABORATORY RESULT (OUTPATIENT)
Age: 82
End: 2017-12-11

## 2017-12-11 ENCOUNTER — CLINICAL ADVICE (OUTPATIENT)
Age: 82
End: 2017-12-11

## 2018-01-18 ENCOUNTER — APPOINTMENT (OUTPATIENT)
Dept: GERIATRICS | Facility: CLINIC | Age: 83
End: 2018-01-18

## 2018-01-18 ENCOUNTER — RX RENEWAL (OUTPATIENT)
Age: 83
End: 2018-01-18

## 2018-02-01 ENCOUNTER — OUTPATIENT (OUTPATIENT)
Dept: OUTPATIENT SERVICES | Facility: HOSPITAL | Age: 83
LOS: 1 days | End: 2018-02-01
Payer: MEDICAID

## 2018-02-01 DIAGNOSIS — Z98.89 OTHER SPECIFIED POSTPROCEDURAL STATES: Chronic | ICD-10-CM

## 2018-02-01 PROCEDURE — G9001: CPT

## 2018-02-05 ENCOUNTER — INPATIENT (INPATIENT)
Facility: HOSPITAL | Age: 83
LOS: 2 days | Discharge: HOME | DRG: 596 | End: 2018-02-08
Attending: INTERNAL MEDICINE | Admitting: HOSPITALIST
Payer: MEDICARE

## 2018-02-05 VITALS
DIASTOLIC BLOOD PRESSURE: 54 MMHG | WEIGHT: 134.92 LBS | TEMPERATURE: 98 F | RESPIRATION RATE: 18 BRPM | SYSTOLIC BLOOD PRESSURE: 122 MMHG | HEART RATE: 87 BPM | OXYGEN SATURATION: 99 % | HEIGHT: 62 IN

## 2018-02-05 DIAGNOSIS — I10 ESSENTIAL (PRIMARY) HYPERTENSION: ICD-10-CM

## 2018-02-05 DIAGNOSIS — B02.9 ZOSTER WITHOUT COMPLICATIONS: ICD-10-CM

## 2018-02-05 DIAGNOSIS — Z98.89 OTHER SPECIFIED POSTPROCEDURAL STATES: Chronic | ICD-10-CM

## 2018-02-05 DIAGNOSIS — D64.9 ANEMIA, UNSPECIFIED: ICD-10-CM

## 2018-02-05 DIAGNOSIS — R79.1 ABNORMAL COAGULATION PROFILE: ICD-10-CM

## 2018-02-05 DIAGNOSIS — K21.9 GASTRO-ESOPHAGEAL REFLUX DISEASE WITHOUT ESOPHAGITIS: ICD-10-CM

## 2018-02-05 DIAGNOSIS — Z29.9 ENCOUNTER FOR PROPHYLACTIC MEASURES, UNSPECIFIED: ICD-10-CM

## 2018-02-05 LAB
ALBUMIN SERPL ELPH-MCNC: 3.3 G/DL — SIGNIFICANT CHANGE UP (ref 3.3–5)
ALP SERPL-CCNC: 66 U/L — SIGNIFICANT CHANGE UP (ref 40–120)
ALT FLD-CCNC: 20 U/L RC — SIGNIFICANT CHANGE UP (ref 10–45)
ANION GAP SERPL CALC-SCNC: 14 MMOL/L — SIGNIFICANT CHANGE UP (ref 5–17)
APTT BLD: 43.5 SEC — HIGH (ref 27.5–37.4)
AST SERPL-CCNC: 24 U/L — SIGNIFICANT CHANGE UP (ref 10–40)
BASOPHILS # BLD AUTO: 0 K/UL — SIGNIFICANT CHANGE UP (ref 0–0.2)
BASOPHILS NFR BLD AUTO: 0.1 % — SIGNIFICANT CHANGE UP (ref 0–2)
BILIRUB SERPL-MCNC: 0.2 MG/DL — SIGNIFICANT CHANGE UP (ref 0.2–1.2)
BUN SERPL-MCNC: 24 MG/DL — HIGH (ref 7–23)
CALCIUM SERPL-MCNC: 9.5 MG/DL — SIGNIFICANT CHANGE UP (ref 8.4–10.5)
CHLORIDE SERPL-SCNC: 106 MMOL/L — SIGNIFICANT CHANGE UP (ref 96–108)
CO2 SERPL-SCNC: 21 MMOL/L — LOW (ref 22–31)
CREAT SERPL-MCNC: 1.3 MG/DL — SIGNIFICANT CHANGE UP (ref 0.5–1.3)
EOSINOPHIL # BLD AUTO: 0 K/UL — SIGNIFICANT CHANGE UP (ref 0–0.5)
EOSINOPHIL NFR BLD AUTO: 0.5 % — SIGNIFICANT CHANGE UP (ref 0–6)
GLUCOSE SERPL-MCNC: 78 MG/DL — SIGNIFICANT CHANGE UP (ref 70–99)
HCT VFR BLD CALC: 21.7 % — LOW (ref 34.5–45)
HGB BLD-MCNC: 7.4 G/DL — LOW (ref 11.5–15.5)
INR BLD: 4.46 RATIO — HIGH (ref 0.88–1.16)
LYMPHOCYTES # BLD AUTO: 0.4 K/UL — LOW (ref 1–3.3)
LYMPHOCYTES # BLD AUTO: 8.2 % — LOW (ref 13–44)
MCHC RBC-ENTMCNC: 27.2 PG — SIGNIFICANT CHANGE UP (ref 27–34)
MCHC RBC-ENTMCNC: 33.9 GM/DL — SIGNIFICANT CHANGE UP (ref 32–36)
MCV RBC AUTO: 80.2 FL — SIGNIFICANT CHANGE UP (ref 80–100)
MONOCYTES # BLD AUTO: 0.4 K/UL — SIGNIFICANT CHANGE UP (ref 0–0.9)
MONOCYTES NFR BLD AUTO: 8.2 % — SIGNIFICANT CHANGE UP (ref 2–14)
NEUTROPHILS # BLD AUTO: 4.4 K/UL — SIGNIFICANT CHANGE UP (ref 1.8–7.4)
NEUTROPHILS NFR BLD AUTO: 83 % — HIGH (ref 43–77)
PLATELET # BLD AUTO: 255 K/UL — SIGNIFICANT CHANGE UP (ref 150–400)
POTASSIUM SERPL-MCNC: 4.1 MMOL/L — SIGNIFICANT CHANGE UP (ref 3.5–5.3)
POTASSIUM SERPL-SCNC: 4.1 MMOL/L — SIGNIFICANT CHANGE UP (ref 3.5–5.3)
PROT SERPL-MCNC: 5.9 G/DL — LOW (ref 6–8.3)
PROTHROM AB SERPL-ACNC: 49.7 SEC — HIGH (ref 9.8–12.7)
RBC # BLD: 2.7 M/UL — LOW (ref 3.8–5.2)
RBC # FLD: 13.8 % — SIGNIFICANT CHANGE UP (ref 10.3–14.5)
SODIUM SERPL-SCNC: 141 MMOL/L — SIGNIFICANT CHANGE UP (ref 135–145)
WBC # BLD: 5.3 K/UL — SIGNIFICANT CHANGE UP (ref 3.8–10.5)
WBC # FLD AUTO: 5.3 K/UL — SIGNIFICANT CHANGE UP (ref 3.8–10.5)

## 2018-02-05 PROCEDURE — 99285 EMERGENCY DEPT VISIT HI MDM: CPT

## 2018-02-05 PROCEDURE — 99223 1ST HOSP IP/OBS HIGH 75: CPT | Mod: GC

## 2018-02-05 RX ORDER — VALSARTAN 80 MG/1
160 TABLET ORAL DAILY
Qty: 0 | Refills: 0 | Status: DISCONTINUED | OUTPATIENT
Start: 2018-02-05 | End: 2018-02-08

## 2018-02-05 RX ORDER — ACETAMINOPHEN 500 MG
1000 TABLET ORAL ONCE
Qty: 0 | Refills: 0 | Status: COMPLETED | OUTPATIENT
Start: 2018-02-05 | End: 2018-02-05

## 2018-02-05 RX ORDER — HYDROMORPHONE HYDROCHLORIDE 2 MG/ML
2 INJECTION INTRAMUSCULAR; INTRAVENOUS; SUBCUTANEOUS ONCE
Qty: 0 | Refills: 0 | Status: DISCONTINUED | OUTPATIENT
Start: 2018-02-05 | End: 2018-02-05

## 2018-02-05 RX ORDER — VALACYCLOVIR 500 MG/1
1000 TABLET, FILM COATED ORAL THREE TIMES A DAY
Qty: 0 | Refills: 0 | Status: DISCONTINUED | OUTPATIENT
Start: 2018-02-05 | End: 2018-02-06

## 2018-02-05 RX ORDER — MULTIVIT-MIN/FERROUS GLUCONATE 9 MG/15 ML
1 LIQUID (ML) ORAL DAILY
Qty: 0 | Refills: 0 | Status: DISCONTINUED | OUTPATIENT
Start: 2018-02-05 | End: 2018-02-08

## 2018-02-05 RX ORDER — PANTOPRAZOLE SODIUM 20 MG/1
40 TABLET, DELAYED RELEASE ORAL
Qty: 0 | Refills: 0 | Status: DISCONTINUED | OUTPATIENT
Start: 2018-02-05 | End: 2018-02-08

## 2018-02-05 RX ORDER — ACETAMINOPHEN 500 MG
650 TABLET ORAL EVERY 6 HOURS
Qty: 0 | Refills: 0 | Status: DISCONTINUED | OUTPATIENT
Start: 2018-02-05 | End: 2018-02-08

## 2018-02-05 RX ORDER — PREGABALIN 225 MG/1
1000 CAPSULE ORAL DAILY
Qty: 0 | Refills: 0 | Status: DISCONTINUED | OUTPATIENT
Start: 2018-02-05 | End: 2018-02-08

## 2018-02-05 RX ORDER — LATANOPROST 0.05 MG/ML
1 SOLUTION/ DROPS OPHTHALMIC; TOPICAL AT BEDTIME
Qty: 0 | Refills: 0 | Status: DISCONTINUED | OUTPATIENT
Start: 2018-02-05 | End: 2018-02-08

## 2018-02-05 RX ORDER — VALACYCLOVIR 500 MG/1
1000 TABLET, FILM COATED ORAL ONCE
Qty: 0 | Refills: 0 | Status: COMPLETED | OUTPATIENT
Start: 2018-02-05 | End: 2018-02-05

## 2018-02-05 RX ADMIN — VALACYCLOVIR 1000 MILLIGRAM(S): 500 TABLET, FILM COATED ORAL at 16:21

## 2018-02-05 RX ADMIN — LATANOPROST 1 DROP(S): 0.05 SOLUTION/ DROPS OPHTHALMIC; TOPICAL at 21:56

## 2018-02-05 RX ADMIN — Medication 400 MILLIGRAM(S): at 14:32

## 2018-02-05 RX ADMIN — HYDROMORPHONE HYDROCHLORIDE 2 MILLIGRAM(S): 2 INJECTION INTRAMUSCULAR; INTRAVENOUS; SUBCUTANEOUS at 12:55

## 2018-02-05 NOTE — ED PROVIDER NOTE - MEDICAL DECISION MAKING DETAILS
89F with shingles, will dose valtrex, will attempt pain control, given hx of anticoagulation, risk of falls with oxycodone intake at home.

## 2018-02-05 NOTE — ED ADULT NURSE NOTE - OBJECTIVE STATEMENT
89 y/o female hx HTN presents to the ED c/o right abdominal pain/right flank pain x 4 days. As per pt, describes pain as burning, took 5mg of oxycodone with no relief. Denies fever, chills, n/v, diarrhea, constipation, urinary s/s. Pt A&Ox3, in no respiratory distress, presents with shingles rash to RLQ abdomen/right flank, abdomen soft, nontender, nondistended. Resting comfortably with safety maintained, airborne precautions placed.

## 2018-02-05 NOTE — ED PROVIDER NOTE - PHYSICAL EXAMINATION
NAD, AAOx3,   Head: NCAT  ENT: Airway patent, moist mucous membranes, nasal passageways clear, no pharyngeal erythema or exudates, uvula midline, no cervical lymphadenopathy  Cardiac: Normal rate, normal rhythm, no murmurs/rubs/gallops appreciated  Respiratory: Lungs CTA B/L  Gastrointestinal: +BS, Abdomen soft, nontender, nondistended, no rebound, no guarding, no organomegaly   MSK: No gross abnormalities, FROM of all four extremities, no edema  HEME: Extremities warm, pulses intact and symmetrical in all four extremities  Skin: + nonblistering papular rash extending from L1 dermatomal distribution on back and abdomen, not crossing midline  Neuro: No gross neurologic deficits NAD, AAOx3,   Head: NCAT  ENT: Airway patent, moist mucous membranes, nasal passageways clear, no pharyngeal erythema or exudates, uvula midline, no cervical lymphadenopathy  Cardiac: Normal rate, normal rhythm, no murmurs/rubs/gallops appreciated  Respiratory: Lungs CTA B/L  Gastrointestinal: +BS, Abdomen soft, nontender, nondistended, no rebound, no guarding, no organomegaly   MSK: No gross abnormalities, FROM of all four extremities, no edema  HEME: Extremities warm, pulses intact and symmetrical in all four extremities  Skin: + nonblistering papular rash extending from T11, T12, L1 dermatomal distribution on back and abdomen, not crossing midline  Neuro: No gross neurologic deficits

## 2018-02-05 NOTE — ED PROVIDER NOTE - PROGRESS NOTE DETAILS
Anthony PGY1: Hg 7.4 noted on labs, patient asymptomatic, not tachycardic/dizzy/weak, guiac performed, no occult blood, light brown stool, little stool in the rectal vault, patient notes hx of hemorrhoid and normally anemic for which she takes B12 vitamins. Chaperoned: Cruz PGY1: Hg 7.4 noted on labs, patient asymptomatic, not tachycardic/dizzy/weak, guiac performed, no occult blood, light brown stool, little stool in the rectal vault, patient notes hx of hemorrhoid and normally anemic for which she takes B12 vitamins. Chaperoned: Velma Tan, ED tech

## 2018-02-05 NOTE — ED ADULT TRIAGE NOTE - CHIEF COMPLAINT QUOTE
pt states burning pain to right abd and flank with bruising denies injury pt on coumadin normal urination and bowel movements

## 2018-02-05 NOTE — H&P ADULT - NSHPPHYSICALEXAM_GEN_ALL_CORE
Vital Signs Last 24 Hrs  T(C): 36.7 (05 Feb 2018 11:28), Max: 36.7 (05 Feb 2018 11:28)  T(F): 98.1 (05 Feb 2018 11:28), Max: 98.1 (05 Feb 2018 11:28)  HR: 68 (05 Feb 2018 12:54) (68 - 87)  BP: 137/56 (05 Feb 2018 12:54) (122/54 - 137/56)  BP(mean): --  RR: 16 (05 Feb 2018 12:54) (16 - 18)  SpO2: 100% (05 Feb 2018 12:54) (99% - 100%)    PHYSICAL EXAM:    Constitutional: well-developed; well-groomed; well-nourished; no distress,  Eyes: PERRL; EOMI  ENMT: Normal oropharynx, no oral lesions, no erythema, no exudates  Neck:  Supple; no JVD; normal thyroid gland  Respiratory: airway patent; breath sounds equal; good air movement, no wheezing, no crackes, no rhonchi. no increase in WOB  Cardiovascular: regular rate and rhythm  no rub , no murmur, no gallops.   Gastrointestinal: soft; no distention, normal BS, no TTP, no rebound, no guarding, no mass, no organomegaly, no ascites.  Extremities: b/l pitting edema to knees. no clubbing; no cyanosis DP and Radial pulses intact.  Neurological: alert and oriented x 3; responds to pain; responds to verbal commands; sensation intact: CN nerves grossly intact.   Skin: large papular rash in dermatomal pattern going from L back to L abdomen. Single dermatome, No open lesions or drainage. TTP.   Psychiatric: Calm, no SI/HI Vital Signs Last 24 Hrs  T(C): 36.7 (05 Feb 2018 11:28), Max: 36.7 (05 Feb 2018 11:28)  T(F): 98.1 (05 Feb 2018 11:28), Max: 98.1 (05 Feb 2018 11:28)  HR: 68 (05 Feb 2018 12:54) (68 - 87)  BP: 137/56 (05 Feb 2018 12:54) (122/54 - 137/56)  BP(mean): --  RR: 16 (05 Feb 2018 12:54) (16 - 18)  SpO2: 100% (05 Feb 2018 12:54) (99% - 100%)    PHYSICAL EXAM:    Constitutional: well-developed; well-groomed; well-nourished; no distress,  Eyes: PERRL; EOMI  ENMT: Normal oropharynx, no oral lesions, no erythema, no exudates  Neck:  Supple; no JVD; normal thyroid gland  Respiratory: airway patent; breath sounds equal; good air movement, no wheezing, no crackles, no rhonchi. no increase in WOB  Cardiovascular: regular rate and rhythm  no rub , no murmur, no gallops.   Gastrointestinal: soft; no distention, normal BS, no TTP, no rebound, no guarding, no mass, no organomegaly, no ascites.  Extremities: b/l pitting edema to knees. no clubbing; no cyanosis DP and Radial pulses intact.  Neurological: alert and oriented x 3; responds to pain; responds to verbal commands; sensation intact: CN nerves grossly intact.   Skin: large papular rash in dermatomal pattern going from R back to R abdomen. Single dermatome, No open lesions or drainage. TTP.   Psychiatric: Calm, no SI/HI

## 2018-02-05 NOTE — H&P ADULT - PROBLEM SELECTOR PLAN 1
Pt found to have shingles. One dermatome involved. Will start valacyclovir 1000 TID.   Pt will need contact isolation.   Will keep pt on dilaudid 2 mg PO for severe pain. Percocet for moderate pain and tylenol for mild pain.   Does not appear to have superimposed bacterial infection. Consider gabapentin if pain does not improve.

## 2018-02-05 NOTE — H&P ADULT - ATTENDING COMMENTS
89yoF w/ PMHx significant for PE on Coumadin, HTN, GERD, and chronic back pain on PRN Percocet, who presents from home, where she lives alone, able to independently perform all ADLs/IADLs, with complaints of right sided back pain radiating to right flank, described as pins/needles progressing to burning pain for the past four days, not controlled with self administration of home prescription of Percocet. On examination in ED, patient found to have rash extending from right midback to right mid flank towards RLQ (not crossing midline anteriorly or posteriorly, single dermatomal involvment), consistent with zoster. Patient started on Valcyclovir, and admitted for pain control. Will continue Valcyclovir TID, and initiate pain management regimen with Dilaudid PO PRN for severe pain (endorsed favorable response after administration in ED, but requesting more at time of examination), Percocet for moderate pain, and Tylenol for mild pain. Patient also found to have acute on chronic anemia, normocytic, in the setting of supratherapeutic INR-- no apparent signs/sx of bleeding. F/U UA, FOBT, and monitor CBC Q12H, with close monitoring of hemodynamics as pt to be continued on home ARB for HTN.

## 2018-02-05 NOTE — ED PROVIDER NOTE - ATTENDING CONTRIBUTION TO CARE
Patient presenting c/o burning R flank pain radiating to groin associated with rash.  Tried home percocet 5/325 without relief (takes occasionally for recurrent lower back pain) - reporting no improvement at all with meds.  No other associated symptoms.  On exam patient uncomfortable, shingles rash present on R flank.  Will start valcyclovir (labs for baseline LFTs), pain control - may require admission for pain control.

## 2018-02-05 NOTE — H&P ADULT - NSHPLABSRESULTS_GEN_ALL_CORE
7.4    5.3   )-----------( 255      ( 05 Feb 2018 13:07 )             21.7       02-05    141  |  106  |  24<H>  ----------------------------<  78  4.1   |  21<L>  |  1.30    Ca    9.5      05 Feb 2018 13:07    TPro  5.9<L>  /  Alb  3.3  /  TBili  0.2  /  DBili  x   /  AST  24  /  ALT  20  /  AlkPhos  66  02-05            PT/INR - ( 05 Feb 2018 14:47 )   PT: 49.7 sec;   INR: 4.46 ratio         PTT - ( 05 Feb 2018 14:47 )  PTT:43.5 sec    Lactate Trend    CAPILLARY BLOOD GLUCOSE      RADIOLOGY, EKG & ADDITIONAL TESTS: Reviewed.

## 2018-02-05 NOTE — H&P ADULT - PROBLEM SELECTOR PLAN 2
INR supratherapeutic to 4.46. Will hold coumadin today. No current indication for vitamin K , but will consider if anemia worsens.   Continue to monitor INR.

## 2018-02-05 NOTE — H&P ADULT - NSHPREVIEWOFSYSTEMS_GEN_ALL_CORE
REVIEW OF SYSTEMS:    CONSTITUTIONAL: No weakness, fevers or chills  EYES/ENT: No visual changes;  No vertigo or throat pain   NECK: No pain or stiffness  RESPIRATORY: No cough, wheezing, hemoptysis; No shortness of breath  CARDIOVASCULAR: No chest pain or palpitations  GASTROINTESTINAL: No abdominal or epigastric pain. No nausea, vomiting, or hematemesis; No diarrhea or constipation. No melena or hematochezia.  GENITOURINARY: No dysuria, frequency or hematuria  MUSCULOSKELETAL: +LE edema. no arthralgias or myalgias.   NEUROLOGICAL: No numbness or weakness  SKIN: +rash.   All other review of systems is negative unless indicated above. REVIEW OF SYSTEMS:    CONSTITUTIONAL: No weakness, fevers or chills  EYES/ENT: No visual changes;  No vertigo or throat pain   NECK: No pain or stiffness  RESPIRATORY: No cough, wheezing, hemoptysis; No shortness of breath  CARDIOVASCULAR: No chest pain or palpitations  GASTROINTESTINAL: No abdominal or epigastric pain. No nausea, vomiting, or hematemesis; No diarrhea or constipation. No melena or hematochezia.  GENITOURINARY: No dysuria, frequency or hematuria  MUSCULOSKELETAL: +LE edema. no arthralgias or myalgias. +back pain/flank pain  NEUROLOGICAL: No numbness or weakness  SKIN: +rash.   All other review of systems is negative unless indicated above.

## 2018-02-05 NOTE — ED ADULT NURSE NOTE - CHPI ED SYMPTOMS NEG
no hematuria/no burning urination/no fever/no dysuria/no abdominal distension/no blood in stool/no nausea/no diarrhea/no chills/no vomiting

## 2018-02-05 NOTE — H&P ADULT - HISTORY OF PRESENT ILLNESS
89 yo F hx PE on coumadin, HTN, GERD, chronic back pain presents with 4 days of worsening back/flank pain. Pt states on Thursday started developing "pins and needles" on her back. This quickly progressed to burning pain that was extremely sensitive to touch. She states she occasionally takes a percocet for "pinched" nerve. She took two of her percocet and it did not help with the pain at all. She noticed "bruising" on back, but thought it was related to coumadin. She didn't want to come to the hospital over the weekend because she felt like there wouldn't be any doctors and presented today. She otherwise lives on her own, able to do all ADL's. Denies fevers, chills, chest pain, headache, dizziness or weakness. She states she has chronic b/l LE swelling that is unchanged. She never received shingles vaccine.     In ED, pt was VSS. Found to have shingles rash. Given valacyclovir and dilaudid 2 mg PO for pain.

## 2018-02-05 NOTE — ED PROVIDER NOTE - OBJECTIVE STATEMENT
89F hx of PE (on coumadin), GERD, HTN, prior sbo, chronic back pain c/o right sided back and right lower quadrant nonblistering, painful (stabbing) rash, that developed Thursday, not associated with f/c/GI/ complaints, no ocular involvement, no cp/sob. Took oxycodone yesterday without relief  Dr NASREEN Munoz

## 2018-02-05 NOTE — H&P ADULT - PROBLEM SELECTOR PLAN 3
Baseline anemia seems to be between 8-9.   Now 7.4. No s/s of bleeding. Pt no symptomatic. Some concern given supratherapeutic INR, but no recent trauma or falls.   Will check UA and FOBT. continue to monitor CBC, if continues to fall will consider ct chest abdomen pelvis to evaluate for bleed. Pt already on protonix.

## 2018-02-05 NOTE — H&P ADULT - ASSESSMENT
89 yo F hx PE on coumadin, HTN, GERD, chronic back pain presents with 4 days of worsening back/flank pain found to have shingles. Supratherapeutic INR.

## 2018-02-06 DIAGNOSIS — R69 ILLNESS, UNSPECIFIED: ICD-10-CM

## 2018-02-06 LAB
ANION GAP SERPL CALC-SCNC: 5 MMOL/L — SIGNIFICANT CHANGE UP (ref 5–17)
APPEARANCE UR: CLEAR — SIGNIFICANT CHANGE UP
APTT BLD: 42.9 SEC — HIGH (ref 27.5–37.4)
BILIRUB UR-MCNC: NEGATIVE — SIGNIFICANT CHANGE UP
BLD GP AB SCN SERPL QL: NEGATIVE — SIGNIFICANT CHANGE UP
BUN SERPL-MCNC: 22 MG/DL — SIGNIFICANT CHANGE UP (ref 7–23)
CALCIUM SERPL-MCNC: 9.1 MG/DL — SIGNIFICANT CHANGE UP (ref 8.4–10.5)
CHLORIDE SERPL-SCNC: 109 MMOL/L — HIGH (ref 96–108)
CO2 SERPL-SCNC: 27 MMOL/L — SIGNIFICANT CHANGE UP (ref 22–31)
COLOR SPEC: SIGNIFICANT CHANGE UP
CREAT SERPL-MCNC: 1.37 MG/DL — HIGH (ref 0.5–1.3)
DIFF PNL FLD: ABNORMAL
EPI CELLS # UR: SIGNIFICANT CHANGE UP /HPF
FERRITIN SERPL-MCNC: 11 NG/ML — LOW (ref 15–150)
FOLATE SERPL-MCNC: >20 NG/ML — SIGNIFICANT CHANGE UP (ref 4.8–24.2)
GLUCOSE SERPL-MCNC: 79 MG/DL — SIGNIFICANT CHANGE UP (ref 70–99)
GLUCOSE UR QL: NEGATIVE — SIGNIFICANT CHANGE UP
HAPTOGLOB SERPL-MCNC: 169 MG/DL — SIGNIFICANT CHANGE UP (ref 34–200)
HCT VFR BLD CALC: 19.8 % — CRITICAL LOW (ref 34.5–45)
HCT VFR BLD CALC: 21.9 % — LOW (ref 34.5–45)
HCT VFR BLD CALC: 23.5 % — LOW (ref 34.5–45)
HGB BLD-MCNC: 6.8 G/DL — CRITICAL LOW (ref 11.5–15.5)
HGB BLD-MCNC: 7.2 G/DL — LOW (ref 11.5–15.5)
HGB BLD-MCNC: 7.7 G/DL — LOW (ref 11.5–15.5)
INR BLD: 3.18 RATIO — HIGH (ref 0.88–1.16)
IRON SATN MFR SERPL: 25 UG/DL — LOW (ref 30–160)
IRON SATN MFR SERPL: 7 % — LOW (ref 14–50)
KETONES UR-MCNC: NEGATIVE — SIGNIFICANT CHANGE UP
LDH SERPL L TO P-CCNC: 151 U/L — SIGNIFICANT CHANGE UP (ref 50–242)
LEUKOCYTE ESTERASE UR-ACNC: NEGATIVE — SIGNIFICANT CHANGE UP
MAGNESIUM SERPL-MCNC: 2.1 MG/DL — SIGNIFICANT CHANGE UP (ref 1.6–2.6)
MCHC RBC-ENTMCNC: 26.3 PG — LOW (ref 27–34)
MCHC RBC-ENTMCNC: 26.6 PG — LOW (ref 27–34)
MCHC RBC-ENTMCNC: 27.5 PG — SIGNIFICANT CHANGE UP (ref 27–34)
MCHC RBC-ENTMCNC: 32.8 GM/DL — SIGNIFICANT CHANGE UP (ref 32–36)
MCHC RBC-ENTMCNC: 32.9 GM/DL — SIGNIFICANT CHANGE UP (ref 32–36)
MCHC RBC-ENTMCNC: 34.1 GM/DL — SIGNIFICANT CHANGE UP (ref 32–36)
MCV RBC AUTO: 80 FL — SIGNIFICANT CHANGE UP (ref 80–100)
MCV RBC AUTO: 80.8 FL — SIGNIFICANT CHANGE UP (ref 80–100)
MCV RBC AUTO: 80.9 FL — SIGNIFICANT CHANGE UP (ref 80–100)
NITRITE UR-MCNC: NEGATIVE — SIGNIFICANT CHANGE UP
PH UR: 6 — SIGNIFICANT CHANGE UP (ref 5–8)
PHOSPHATE SERPL-MCNC: 3.1 MG/DL — SIGNIFICANT CHANGE UP (ref 2.5–4.5)
PLATELET # BLD AUTO: 206 K/UL — SIGNIFICANT CHANGE UP (ref 150–400)
PLATELET # BLD AUTO: 211 K/UL — SIGNIFICANT CHANGE UP (ref 150–400)
PLATELET # BLD AUTO: 216 K/UL — SIGNIFICANT CHANGE UP (ref 150–400)
POTASSIUM SERPL-MCNC: 4.1 MMOL/L — SIGNIFICANT CHANGE UP (ref 3.5–5.3)
POTASSIUM SERPL-SCNC: 4.1 MMOL/L — SIGNIFICANT CHANGE UP (ref 3.5–5.3)
PROT UR-MCNC: NEGATIVE — SIGNIFICANT CHANGE UP
PROTHROM AB SERPL-ACNC: 35.2 SEC — HIGH (ref 9.8–12.7)
RBC # BLD: 2.45 M/UL — LOW (ref 3.8–5.2)
RBC # BLD: 2.74 M/UL — LOW (ref 3.8–5.2)
RBC # BLD: 2.74 M/UL — LOW (ref 3.8–5.2)
RBC # BLD: 2.9 M/UL — LOW (ref 3.8–5.2)
RBC # FLD: 13.6 % — SIGNIFICANT CHANGE UP (ref 10.3–14.5)
RBC # FLD: 13.8 % — SIGNIFICANT CHANGE UP (ref 10.3–14.5)
RBC # FLD: 13.8 % — SIGNIFICANT CHANGE UP (ref 10.3–14.5)
RBC CASTS # UR COMP ASSIST: ABNORMAL /HPF (ref 0–2)
RETICS #: 35.8 K/UL — SIGNIFICANT CHANGE UP (ref 25–125)
RETICS/RBC NFR: 1.3 % — SIGNIFICANT CHANGE UP (ref 0.5–2.5)
RH IG SCN BLD-IMP: POSITIVE — SIGNIFICANT CHANGE UP
SODIUM SERPL-SCNC: 141 MMOL/L — SIGNIFICANT CHANGE UP (ref 135–145)
SP GR SPEC: 1.01 — SIGNIFICANT CHANGE UP (ref 1.01–1.02)
TIBC SERPL-MCNC: 348 UG/DL — SIGNIFICANT CHANGE UP (ref 220–430)
UIBC SERPL-MCNC: 323 UG/DL — SIGNIFICANT CHANGE UP (ref 110–370)
UROBILINOGEN FLD QL: NEGATIVE — SIGNIFICANT CHANGE UP
VIT B12 SERPL-MCNC: 493 PG/ML — SIGNIFICANT CHANGE UP (ref 232–1245)
WBC # BLD: 3.4 K/UL — LOW (ref 3.8–10.5)
WBC # BLD: 3.6 K/UL — LOW (ref 3.8–10.5)
WBC # BLD: 3.9 K/UL — SIGNIFICANT CHANGE UP (ref 3.8–10.5)
WBC # FLD AUTO: 3.4 K/UL — LOW (ref 3.8–10.5)
WBC # FLD AUTO: 3.6 K/UL — LOW (ref 3.8–10.5)
WBC # FLD AUTO: 3.9 K/UL — SIGNIFICANT CHANGE UP (ref 3.8–10.5)
WBC UR QL: SIGNIFICANT CHANGE UP /HPF (ref 0–5)

## 2018-02-06 PROCEDURE — 99233 SBSQ HOSP IP/OBS HIGH 50: CPT | Mod: GC

## 2018-02-06 RX ORDER — GABAPENTIN 400 MG/1
300 CAPSULE ORAL DAILY
Qty: 0 | Refills: 0 | Status: DISCONTINUED | OUTPATIENT
Start: 2018-02-06 | End: 2018-02-06

## 2018-02-06 RX ORDER — DOCUSATE SODIUM 100 MG
100 CAPSULE ORAL DAILY
Qty: 0 | Refills: 0 | Status: DISCONTINUED | OUTPATIENT
Start: 2018-02-06 | End: 2018-02-08

## 2018-02-06 RX ORDER — GABAPENTIN 400 MG/1
300 CAPSULE ORAL DAILY
Qty: 0 | Refills: 0 | Status: DISCONTINUED | OUTPATIENT
Start: 2018-02-07 | End: 2018-02-08

## 2018-02-06 RX ORDER — OXYCODONE HYDROCHLORIDE 5 MG/1
5 TABLET ORAL EVERY 6 HOURS
Qty: 0 | Refills: 0 | Status: DISCONTINUED | OUTPATIENT
Start: 2018-02-06 | End: 2018-02-08

## 2018-02-06 RX ORDER — VALACYCLOVIR 500 MG/1
1000 TABLET, FILM COATED ORAL DAILY
Qty: 0 | Refills: 0 | Status: DISCONTINUED | OUTPATIENT
Start: 2018-02-07 | End: 2018-02-08

## 2018-02-06 RX ORDER — LANOLIN ALCOHOL/MO/W.PET/CERES
3 CREAM (GRAM) TOPICAL ONCE
Qty: 0 | Refills: 0 | Status: COMPLETED | OUTPATIENT
Start: 2018-02-06 | End: 2018-02-06

## 2018-02-06 RX ORDER — HYDROMORPHONE HYDROCHLORIDE 2 MG/ML
2 INJECTION INTRAMUSCULAR; INTRAVENOUS; SUBCUTANEOUS EVERY 6 HOURS
Qty: 0 | Refills: 0 | Status: DISCONTINUED | OUTPATIENT
Start: 2018-02-06 | End: 2018-02-08

## 2018-02-06 RX ORDER — WARFARIN SODIUM 2.5 MG/1
1 TABLET ORAL ONCE
Qty: 0 | Refills: 0 | Status: COMPLETED | OUTPATIENT
Start: 2018-02-06 | End: 2018-02-06

## 2018-02-06 RX ORDER — SENNA PLUS 8.6 MG/1
2 TABLET ORAL AT BEDTIME
Qty: 0 | Refills: 0 | Status: DISCONTINUED | OUTPATIENT
Start: 2018-02-06 | End: 2018-02-08

## 2018-02-06 RX ADMIN — PANTOPRAZOLE SODIUM 40 MILLIGRAM(S): 20 TABLET, DELAYED RELEASE ORAL at 06:41

## 2018-02-06 RX ADMIN — Medication 3 MILLIGRAM(S): at 23:07

## 2018-02-06 RX ADMIN — Medication 100 MILLIGRAM(S): at 23:06

## 2018-02-06 RX ADMIN — VALACYCLOVIR 1000 MILLIGRAM(S): 500 TABLET, FILM COATED ORAL at 06:41

## 2018-02-06 RX ADMIN — WARFARIN SODIUM 1 MILLIGRAM(S): 2.5 TABLET ORAL at 21:28

## 2018-02-06 RX ADMIN — HYDROMORPHONE HYDROCHLORIDE 2 MILLIGRAM(S): 2 INJECTION INTRAMUSCULAR; INTRAVENOUS; SUBCUTANEOUS at 03:04

## 2018-02-06 RX ADMIN — VALACYCLOVIR 1000 MILLIGRAM(S): 500 TABLET, FILM COATED ORAL at 00:07

## 2018-02-06 RX ADMIN — GABAPENTIN 300 MILLIGRAM(S): 400 CAPSULE ORAL at 12:23

## 2018-02-06 RX ADMIN — VALSARTAN 160 MILLIGRAM(S): 80 TABLET ORAL at 06:40

## 2018-02-06 RX ADMIN — Medication 1 TABLET(S): at 12:16

## 2018-02-06 RX ADMIN — HYDROMORPHONE HYDROCHLORIDE 2 MILLIGRAM(S): 2 INJECTION INTRAMUSCULAR; INTRAVENOUS; SUBCUTANEOUS at 03:34

## 2018-02-06 RX ADMIN — PREGABALIN 1000 MICROGRAM(S): 225 CAPSULE ORAL at 12:17

## 2018-02-06 RX ADMIN — SENNA PLUS 2 TABLET(S): 8.6 TABLET ORAL at 23:07

## 2018-02-06 RX ADMIN — LATANOPROST 1 DROP(S): 0.05 SOLUTION/ DROPS OPHTHALMIC; TOPICAL at 21:28

## 2018-02-06 NOTE — PROGRESS NOTE ADULT - PROBLEM SELECTOR PLAN 2
INR supratherapeutic to 4.46. Will hold coumadin today. No current indication for vitamin K , but will consider if anemia worsens.   Continue to monitor INR. INR supratherapeutic to 3.18 Will give coumadin 1 mg tonight and if INR is within range tomorrow , will give a higher dose ,  No current bleed.    Continue to monitor INR.

## 2018-02-06 NOTE — PROGRESS NOTE ADULT - SUBJECTIVE AND OBJECTIVE BOX
Patient is a 89y old  Female who presents with a chief complaint of back pain (2018 16:37)        SUBJECTIVE / OVERNIGHT EVENTS:      MEDICATIONS  (STANDING):  cyanocobalamin 1000 MICROGram(s) Oral daily  latanoprost 0.005% Ophthalmic Solution 1 Drop(s) Both EYES at bedtime  multivitamin/minerals 1 Tablet(s) Oral daily  pantoprazole    Tablet 40 milliGRAM(s) Oral before breakfast  valACYclovir 1000 milliGRAM(s) Oral three times a day  valsartan 160 milliGRAM(s) Oral daily    MEDICATIONS  (PRN):  acetaminophen   Tablet. 650 milliGRAM(s) Oral every 6 hours PRN Mild Pain (1 - 3)  HYDROmorphone   Tablet 2 milliGRAM(s) Oral every 6 hours PRN Severe Pain (7 - 10)  oxyCODONE    IR 5 milliGRAM(s) Oral every 6 hours PRN Moderate Pain (4 - 6)      T(C): 36.8 (18 @ 05:00), Max: 36.8 (18 @ 05:00)  HR: 60 (18 @ 05:00) (60 - 87)  BP: 132/61 (18 @ 05:00) (122/54 - 142/75)  RR: 18 (18 @ 05:00) (16 - 18)  SpO2: 98% (18 @ 05:00) (98% - 100%)  CAPILLARY BLOOD GLUCOSE        I&O's Summary    2018 07:01  -  2018 07:00  --------------------------------------------------------  IN: 400 mL / OUT: 400 mL / NET: 0 mL        PHYSICAL EXAM  GENERAL: NAD, well-developed  HEAD:  Atraumatic, Normocephalic  EYES: EOMI, PERRLA, conjunctiva and sclera clear  NECK: Supple, No JVD  CHEST/LUNG: Clear to auscultation bilaterally; No wheeze  HEART: Regular rate and rhythm; No murmurs, rubs, or gallops  ABDOMEN: Soft, Nontender, Nondistended; Bowel sounds present  EXTREMITIES:  2+ Peripheral Pulses, No clubbing, cyanosis, or edema  PSYCH: AAOx3  SKIN: No rashes or lesions    LABS:                        7.2    3.4   )-----------( 216      ( 2018 07:53 )             21.9     02-06    141  |  109<H>  |  22  ----------------------------<  79  4.1   |  27  |  1.37<H>    Ca    9.1      2018 06:24  Phos  3.1     02-06  Mg     2.1     02-06    TPro  5.9<L>  /  Alb  3.3  /  TBili  0.2  /  DBili  x   /  AST  24  /  ALT  20  /  AlkPhos  66  02-05    PT/INR - ( 2018 07:53 )   PT: 35.2 sec;   INR: 3.18 ratio         PTT - ( 2018 07:53 )  PTT:42.9 sec      Urinalysis Basic - ( 2018 08:03 )    Color: PL Yellow / Appearance: Clear / S.014 / pH: x  Gluc: x / Ketone: Negative  / Bili: Negative / Urobili: Negative   Blood: x / Protein: Negative / Nitrite: Negative   Leuk Esterase: Negative / RBC: 2-5 /HPF / WBC 2-5 /HPF   Sq Epi: x / Non Sq Epi: Occasional /HPF / Bacteria: x        RADIOLOGY & ADDITIONAL TESTS:    Imaging Personally Reviewed:  Consultant(s) Notes Reviewed:    Care Discussed with Consultants/Other Providers: Patient is a 89y old  Female who presents with a chief complaint of back pain (2018 16:37)        SUBJECTIVE / OVERNIGHT EVENTS: Pt with shingles reports decreased pain R flank. Denies h/a, dizziness, n/v/d, chest pain, dyspnea.       MEDICATIONS  (STANDING):  cyanocobalamin 1000 MICROGram(s) Oral daily  latanoprost 0.005% Ophthalmic Solution 1 Drop(s) Both EYES at bedtime  multivitamin/minerals 1 Tablet(s) Oral daily  pantoprazole    Tablet 40 milliGRAM(s) Oral before breakfast  valACYclovir 1000 milliGRAM(s) Oral three times a day  valsartan 160 milliGRAM(s) Oral daily    MEDICATIONS  (PRN):  acetaminophen   Tablet. 650 milliGRAM(s) Oral every 6 hours PRN Mild Pain (1 - 3)  HYDROmorphone   Tablet 2 milliGRAM(s) Oral every 6 hours PRN Severe Pain (7 - 10)  oxyCODONE    IR 5 milliGRAM(s) Oral every 6 hours PRN Moderate Pain (4 - 6)      T(C): 36.8 (18 @ 05:00), Max: 36.8 (18 @ 05:00)  HR: 60 (18 @ 05:00) (60 - 87)  BP: 132/61 (18 @ 05:00) (122/54 - 142/75)  RR: 18 (18 @ 05:00) (16 - 18)  SpO2: 98% (18 @ 05:00) (98% - 100%)  CAPILLARY BLOOD GLUCOSE        I&O's Summary    2018 07:01  -  2018 07:00  --------------------------------------------------------  IN: 400 mL / OUT: 400 mL / NET: 0 mL        PHYSICAL EXAM  GENERAL: NAD, well-developed  HEAD:  Atraumatic, Normocephalic  EYES: EOMI, PERRLA, conjunctiva and sclera clear  NECK: Supple, No JVD  CHEST/LUNG: Clear to auscultation bilaterally; No wheeze  HEART: Regular rate and rhythm; No murmurs, rubs, or gallops  ABDOMEN: Soft, Nontender, Nondistended; Bowel sounds present  EXTREMITIES:  2+ Peripheral Pulses, No clubbing, cyanosis, or edema  PSYCH: AAOx3  SKIN: No rashes or lesions    LABS:                        7.2    3.4   )-----------( 216      ( 2018 07:53 )             21.9     02-06    141  |  109<H>  |  22  ----------------------------<  79  4.1   |  27  |  1.37<H>    Ca    9.1      2018 06:24  Phos  3.1     02-06  Mg     2.1     02-06    TPro  5.9<L>  /  Alb  3.3  /  TBili  0.2  /  DBili  x   /  AST  24  /  ALT  20  /  AlkPhos  66  02-05    PT/INR - ( 2018 07:53 )   PT: 35.2 sec;   INR: 3.18 ratio         PTT - ( 2018 07:53 )  PTT:42.9 sec      Urinalysis Basic - ( 2018 08:03 )    Color: PL Yellow / Appearance: Clear / S.014 / pH: x  Gluc: x / Ketone: Negative  / Bili: Negative / Urobili: Negative   Blood: x / Protein: Negative / Nitrite: Negative   Leuk Esterase: Negative / RBC: 2-5 /HPF / WBC 2-5 /HPF   Sq Epi: x / Non Sq Epi: Occasional /HPF / Bacteria: x        RADIOLOGY & ADDITIONAL TESTS:    Imaging Personally Reviewed:  Consultant(s) Notes Reviewed:    Care Discussed with Consultants/Other Providers: Patient is a 89y old  Female who presents with a chief complaint of back pain (2018 16:37)        SUBJECTIVE / OVERNIGHT EVENTS: Pt with shingles reports improved decreased pain R flank pain, 2/10. Denies h/a, dizziness, n/v/d, chest pain, dyspnea.  Pt reports history of anemia.  baseline Hg 8-9.    On [employer] chart review, pt with    MEDICATIONS  (STANDING):  cyanocobalamin 1000 MICROGram(s) Oral daily  latanoprost 0.005% Ophthalmic Solution 1 Drop(s) Both EYES at bedtime  multivitamin/minerals 1 Tablet(s) Oral daily  pantoprazole    Tablet 40 milliGRAM(s) Oral before breakfast  valACYclovir 1000 milliGRAM(s) Oral three times a day  valsartan 160 milliGRAM(s) Oral daily    MEDICATIONS  (PRN):  acetaminophen   Tablet. 650 milliGRAM(s) Oral every 6 hours PRN Mild Pain (1 - 3)  HYDROmorphone   Tablet 2 milliGRAM(s) Oral every 6 hours PRN Severe Pain (7 - 10)  oxyCODONE    IR 5 milliGRAM(s) Oral every 6 hours PRN Moderate Pain (4 - 6)      T(C): 36.8 (18 @ 05:00), Max: 36.8 (18 @ 05:00)  HR: 60 (18 @ 05:00) (60 - 87)  BP: 132/61 (18 @ 05:00) (122/54 - 142/75)  RR: 18 (18 @ 05:00) (16 - 18)  SpO2: 98% (18 @ 05:00) (98% - 100%)  CAPILLARY BLOOD GLUCOSE        I&O's Summary    2018 07:01  -  2018 07:00  --------------------------------------------------------  IN: 400 mL / OUT: 400 mL / NET: 0 mL        PHYSICAL EXAM  GENERAL: NAD, well-developed  HEAD:  Atraumatic, Normocephalic  EYES: EOMI, PERRLA, conjunctiva and sclera clear  NECK: Supple, No JVD  CHEST/LUNG: Clear to auscultation bilaterally; No wheeze  HEART: Regular rate and rhythm; No murmurs, rubs, or gallops  ABDOMEN: Soft, Nontender, Nondistended; Bowel sounds present  EXTREMITIES:  2+ Peripheral Pulses, No clubbing, cyanosis, or edema  PSYCH: AAOx3  SKIN: No rashes or lesions    LABS:                        7.2    3.4   )-----------( 216      ( 2018 07:53 )             21.9     02-06    141  |  109<H>  |  22  ----------------------------<  79  4.1   |  27  |  1.37<H>    Ca    9.1      2018 06:24  Phos  3.1     02-06  Mg     2.1     02-06    TPro  5.9<L>  /  Alb  3.3  /  TBili  0.2  /  DBili  x   /  AST  24  /  ALT  20  /  AlkPhos  66  02-05    PT/INR - ( 2018 07:53 )   PT: 35.2 sec;   INR: 3.18 ratio         PTT - ( 2018 07:53 )  PTT:42.9 sec      Urinalysis Basic - ( 2018 08:03 )    Color: PL Yellow / Appearance: Clear / S.014 / pH: x  Gluc: x / Ketone: Negative  / Bili: Negative / Urobili: Negative   Blood: x / Protein: Negative / Nitrite: Negative   Leuk Esterase: Negative / RBC: 2-5 /HPF / WBC 2-5 /HPF   Sq Epi: x / Non Sq Epi: Occasional /HPF / Bacteria: x        RADIOLOGY & ADDITIONAL TESTS:    Imaging Personally Reviewed:  Consultant(s) Notes Reviewed:    Care Discussed with Consultants/Other Providers: Patient is a 89y old  Female who presents with a chief complaint of back pain (2018 16:37)        SUBJECTIVE / OVERNIGHT EVENTS: Pt with shingles reports improved decreased pain R flank pain, 2/10. Denies h/a, dizziness, n/v/d, chest pain, dyspnea.  Pt reports history of anemia.  baseline Hg 8-9.    On [employer] chart review    MEDICATIONS  (STANDING):  cyanocobalamin 1000 MICROGram(s) Oral daily  latanoprost 0.005% Ophthalmic Solution 1 Drop(s) Both EYES at bedtime  multivitamin/minerals 1 Tablet(s) Oral daily  pantoprazole    Tablet 40 milliGRAM(s) Oral before breakfast  valACYclovir 1000 milliGRAM(s) Oral three times a day  valsartan 160 milliGRAM(s) Oral daily    MEDICATIONS  (PRN):  acetaminophen   Tablet. 650 milliGRAM(s) Oral every 6 hours PRN Mild Pain (1 - 3)  HYDROmorphone   Tablet 2 milliGRAM(s) Oral every 6 hours PRN Severe Pain (7 - 10)  oxyCODONE    IR 5 milliGRAM(s) Oral every 6 hours PRN Moderate Pain (4 - 6)      T(C): 36.8 (18 @ 05:00), Max: 36.8 (18 @ 05:00)  HR: 60 (18 @ 05:00) (60 - 87)  BP: 132/61 (18 @ 05:00) (122/54 - 142/75)  RR: 18 (18 @ 05:00) (16 - 18)  SpO2: 98% (18 @ 05:00) (98% - 100%)  CAPILLARY BLOOD GLUCOSE        I&O's Summary    2018 07:01  -  2018 07:00  --------------------------------------------------------  IN: 400 mL / OUT: 400 mL / NET: 0 mL        PHYSICAL EXAM  GENERAL: NAD, well-developed  HEAD:  Atraumatic, Normocephalic  EYES: EOMI, PERRLA, conjunctiva and sclera clear  NECK: Supple, No JVD  CHEST/LUNG: Clear to auscultation bilaterally; No wheeze  HEART: Regular rate and rhythm; No murmurs, rubs, or gallops  ABDOMEN: Soft, Nontender, Nondistended; Bowel sounds present  EXTREMITIES:  2+ Peripheral Pulses, No clubbing, cyanosis, or edema  PSYCH: AAOx3  SKIN:  RIght lower abdomen and right lower back vesicular lesions which coalesce on erythematous base with crusted lesions and some crushed vesicles.      LABS:                        7.2    3.4   )-----------( 216      ( 2018 07:53 )             21.9     02-06    141  |  109<H>  |  22  ----------------------------<  79  4.1   |  27  |  1.37<H>    Ca    9.1      2018 06:24  Phos  3.1     02-06  Mg     2.1     02-06    TPro  5.9<L>  /  Alb  3.3  /  TBili  0.2  /  DBili  x   /  AST  24  /  ALT  20  /  AlkPhos  66  02-05    PT/INR - ( 2018 07:53 )   PT: 35.2 sec;   INR: 3.18 ratio         PTT - ( 2018 07:53 )  PTT:42.9 sec      Urinalysis Basic - ( 2018 08:03 )    Color: PL Yellow / Appearance: Clear / S.014 / pH: x  Gluc: x / Ketone: Negative  / Bili: Negative / Urobili: Negative   Blood: x / Protein: Negative / Nitrite: Negative   Leuk Esterase: Negative / RBC: 2-5 /HPF / WBC 2-5 /HPF   Sq Epi: x / Non Sq Epi: Occasional /HPF / Bacteria: x        RADIOLOGY & ADDITIONAL TESTS:    Imaging Personally Reviewed:  Consultant(s) Notes Reviewed:    Care Discussed with Consultants/Other Providers:

## 2018-02-06 NOTE — PROGRESS NOTE ADULT - ASSESSMENT
89 yo F hx PE on coumadin, HTN, GERD, chronic back pain presents with 4 days of worsening back/flank pain found to have shingles. Supratherapeutic INR.

## 2018-02-06 NOTE — PROGRESS NOTE ADULT - PROBLEM SELECTOR PLAN 1
Pt found to have shingles. One dermatome involved. Will start valacyclovir 1000 TID.   Pt will need contact isolation.   Will keep pt on dilaudid 2 mg PO for severe pain. Percocet for moderate pain and tylenol for mild pain.   Does not appear to have superimposed bacterial infection. Consider gabapentin if pain does not improve. Pt found to have shingles. One dermatome involved. Will start valacyclovir 1000 TID.   Pt will need contact isolation.   Will keep pt on dilaudid 2 mg PO for severe pain. Percocet for moderate pain and tylenol for mild pain.   Does not appear to have superimposed bacterial infection.  will start gabapentin

## 2018-02-07 DIAGNOSIS — I26.99 OTHER PULMONARY EMBOLISM WITHOUT ACUTE COR PULMONALE: ICD-10-CM

## 2018-02-07 LAB
ANION GAP SERPL CALC-SCNC: 9 MMOL/L — SIGNIFICANT CHANGE UP (ref 5–17)
APTT BLD: 40.1 SEC — HIGH (ref 27.5–37.4)
BUN SERPL-MCNC: 21 MG/DL — SIGNIFICANT CHANGE UP (ref 7–23)
CALCIUM SERPL-MCNC: 8.9 MG/DL — SIGNIFICANT CHANGE UP (ref 8.4–10.5)
CHLORIDE SERPL-SCNC: 106 MMOL/L — SIGNIFICANT CHANGE UP (ref 96–108)
CO2 SERPL-SCNC: 26 MMOL/L — SIGNIFICANT CHANGE UP (ref 22–31)
CREAT SERPL-MCNC: 1.27 MG/DL — SIGNIFICANT CHANGE UP (ref 0.5–1.3)
GLUCOSE SERPL-MCNC: 83 MG/DL — SIGNIFICANT CHANGE UP (ref 70–99)
HCT VFR BLD CALC: 21 % — CRITICAL LOW (ref 34.5–45)
HGB BLD-MCNC: 7.1 G/DL — LOW (ref 11.5–15.5)
INR BLD: 2.95 RATIO — HIGH (ref 0.88–1.16)
MAGNESIUM SERPL-MCNC: 2 MG/DL — SIGNIFICANT CHANGE UP (ref 1.6–2.6)
MCHC RBC-ENTMCNC: 27.1 PG — SIGNIFICANT CHANGE UP (ref 27–34)
MCHC RBC-ENTMCNC: 33.7 GM/DL — SIGNIFICANT CHANGE UP (ref 32–36)
MCV RBC AUTO: 80.3 FL — SIGNIFICANT CHANGE UP (ref 80–100)
PHOSPHATE SERPL-MCNC: 2.7 MG/DL — SIGNIFICANT CHANGE UP (ref 2.5–4.5)
PLATELET # BLD AUTO: 229 K/UL — SIGNIFICANT CHANGE UP (ref 150–400)
POTASSIUM SERPL-MCNC: 3.7 MMOL/L — SIGNIFICANT CHANGE UP (ref 3.5–5.3)
POTASSIUM SERPL-SCNC: 3.7 MMOL/L — SIGNIFICANT CHANGE UP (ref 3.5–5.3)
PROTHROM AB SERPL-ACNC: 32.9 SEC — HIGH (ref 9.8–12.7)
RBC # BLD: 2.62 M/UL — LOW (ref 3.8–5.2)
RBC # FLD: 13.8 % — SIGNIFICANT CHANGE UP (ref 10.3–14.5)
SODIUM SERPL-SCNC: 141 MMOL/L — SIGNIFICANT CHANGE UP (ref 135–145)
WBC # BLD: 3.2 K/UL — LOW (ref 3.8–10.5)
WBC # FLD AUTO: 3.2 K/UL — LOW (ref 3.8–10.5)

## 2018-02-07 PROCEDURE — 99233 SBSQ HOSP IP/OBS HIGH 50: CPT | Mod: GC

## 2018-02-07 RX ORDER — LANOLIN ALCOHOL/MO/W.PET/CERES
3 CREAM (GRAM) TOPICAL ONCE
Qty: 0 | Refills: 0 | Status: COMPLETED | OUTPATIENT
Start: 2018-02-07 | End: 2018-02-07

## 2018-02-07 RX ORDER — CAPSAICIN 0.025 %
1 CREAM (GRAM) TOPICAL THREE TIMES A DAY
Qty: 0 | Refills: 0 | Status: DISCONTINUED | OUTPATIENT
Start: 2018-02-07 | End: 2018-02-08

## 2018-02-07 RX ORDER — CAPSAICIN 0.025 %
1 CREAM (GRAM) TOPICAL THREE TIMES A DAY
Qty: 0 | Refills: 0 | Status: DISCONTINUED | OUTPATIENT
Start: 2018-02-07 | End: 2018-02-07

## 2018-02-07 RX ORDER — LANOLIN ALCOHOL/MO/W.PET/CERES
3 CREAM (GRAM) TOPICAL AT BEDTIME
Qty: 0 | Refills: 0 | Status: DISCONTINUED | OUTPATIENT
Start: 2018-02-07 | End: 2018-02-07

## 2018-02-07 RX ORDER — WARFARIN SODIUM 2.5 MG/1
3 TABLET ORAL ONCE
Qty: 0 | Refills: 0 | Status: COMPLETED | OUTPATIENT
Start: 2018-02-07 | End: 2018-02-07

## 2018-02-07 RX ADMIN — VALSARTAN 160 MILLIGRAM(S): 80 TABLET ORAL at 06:49

## 2018-02-07 RX ADMIN — PANTOPRAZOLE SODIUM 40 MILLIGRAM(S): 20 TABLET, DELAYED RELEASE ORAL at 06:49

## 2018-02-07 RX ADMIN — WARFARIN SODIUM 3 MILLIGRAM(S): 2.5 TABLET ORAL at 21:39

## 2018-02-07 RX ADMIN — Medication 1 APPLICATION(S): at 23:34

## 2018-02-07 RX ADMIN — Medication 1 TABLET(S): at 11:34

## 2018-02-07 RX ADMIN — GABAPENTIN 300 MILLIGRAM(S): 400 CAPSULE ORAL at 11:36

## 2018-02-07 RX ADMIN — Medication 100 MILLIGRAM(S): at 11:36

## 2018-02-07 RX ADMIN — PREGABALIN 1000 MICROGRAM(S): 225 CAPSULE ORAL at 11:34

## 2018-02-07 RX ADMIN — Medication 3 MILLIGRAM(S): at 23:46

## 2018-02-07 RX ADMIN — VALACYCLOVIR 1000 MILLIGRAM(S): 500 TABLET, FILM COATED ORAL at 11:34

## 2018-02-07 RX ADMIN — LATANOPROST 1 DROP(S): 0.05 SOLUTION/ DROPS OPHTHALMIC; TOPICAL at 21:39

## 2018-02-07 NOTE — PROGRESS NOTE ADULT - ASSESSMENT
89 yo F hx PE on coumadin, HTN, GERD, chronic back pain presents with 4 days of worsening back/flank pain found to have shingles and Supratherapeutic INR.

## 2018-02-07 NOTE — PROGRESS NOTE ADULT - PROBLEM SELECTOR PLAN 3
Baseline anemia seems to be between 8-9. Pt reports previously worked up in the past  today Hg 7.4-->7.1.   No s/s of bleeding. Pt asymptomatic. Some concern given supratherapeutic INR, but no recent trauma or falls.   pending FOBT.   continue to monitor CBC, if continues to fall will consider ct chest abdomen pelvis to evaluate for bleed.  c/w protonix  UA with 2-5 RBC, trace blood Baseline anemia seems to be between 8-9. Pt reports previously worked up in the past  today Hg 7.4-->7.1.  Fe studies with low total Fe, low ferritin, will consider starting iron supplement   No s/s of bleeding. Pt asymptomatic. Some concern given supratherapeutic INR, but no recent trauma or falls.   pending FOBT.   continue to monitor CBC, if continues to fall will consider ct chest abdomen pelvis to evaluate for bleed.  c/w protonix  UA with 2-5 RBC, trace blood Baseline anemia seems to be between 8-9. Pt reports previously worked up in the past  today Hg 7.4-->7.1.  Fe studies with low total Fe, low ferritin, will consider starting iron supplement   No s/s of bleeding. Pt asymptomatic. Some concern given supratherapeutic INR, but no recent trauma or falls.   pending FOBT.   continue to monitor CBC, if continues to fall will consider ct chest abdomen pelvis to evaluate for bleed.  c/w protonix  UA with 2-5 RBC, trace blood, likely 2/2 coumadin  will cont to trend Hg

## 2018-02-07 NOTE — PROVIDER CONTACT NOTE (CRITICAL VALUE NOTIFICATION) - ACTION/TREATMENT ORDERED:
MD aware, repeat labs and type and screen.
MD made aware states he will discuss with resident regarding transfusing before Discharge

## 2018-02-07 NOTE — PROGRESS NOTE ADULT - PROBLEM SELECTOR PLAN 2
INR supratherapeutic to 3.18   - received 1mg coumadin last night, INR today at 2.95  Continue to monitor INR. INR supratherapeutic to 3.18   - received 1mg coumadin last night, INR today at 2.95  Continue to monitor INR.  - will dose with 3mg today. - received 1mg coumadin last night, INR today at 2.95  Continue to monitor INR.  - will dose with 3mg today.

## 2018-02-07 NOTE — PROVIDER CONTACT NOTE (CRITICAL VALUE NOTIFICATION) - BACKGROUND
Pt admitted for Shingles
Pt admitted for herpes zoster PMH Anemia, B12 deficiency; Pt's Hgb/Hct has been borderline low previous H&H 2/06/2017 @ 20:00 7.7/23.5
Declined

## 2018-02-07 NOTE — PROGRESS NOTE ADULT - PROBLEM SELECTOR PLAN 1
Pt found to have shingles. One dermatome involved.   c/w valacyclovir 1000mg daily, renally dose. day 2   contact isolation.   c/w dilaudid 2 mg PO for severe pain. Percocet for moderate pain and tylenol for mild pain.   c/w gabapentin 300mg daily, renally dosed Pt found to have shingles. One dermatome involved.   c/w valacyclovir 1000mg daily, renally dose. day 2   contact isolation.   c/w dilaudid 2 mg PO for severe pain. Percocet for moderate pain and tylenol for mild pain.   c/w gabapentin 300mg daily, renally dosed  - start topical capsaicin for burning sensation

## 2018-02-07 NOTE — PROGRESS NOTE ADULT - SUBJECTIVE AND OBJECTIVE BOX
Patient is a 89y old  Female who presents with a chief complaint of back pain (2018 16:37)        SUBJECTIVE / OVERNIGHT EVENTS: Pt with no events o/n. Reports no flank pain. Denies h/a, n/v/d, chest pain , dyspnea, abd pain.       MEDICATIONS  (STANDING):  cyanocobalamin 1000 MICROGram(s) Oral daily  docusate sodium 100 milliGRAM(s) Oral daily  gabapentin 300 milliGRAM(s) Oral daily  latanoprost 0.005% Ophthalmic Solution 1 Drop(s) Both EYES at bedtime  multivitamin/minerals 1 Tablet(s) Oral daily  pantoprazole    Tablet 40 milliGRAM(s) Oral before breakfast  senna 2 Tablet(s) Oral at bedtime  valACYclovir 1000 milliGRAM(s) Oral daily  valsartan 160 milliGRAM(s) Oral daily    MEDICATIONS  (PRN):  acetaminophen   Tablet. 650 milliGRAM(s) Oral every 6 hours PRN Mild Pain (1 - 3)  HYDROmorphone   Tablet 2 milliGRAM(s) Oral every 6 hours PRN Severe Pain (7 - 10)  oxyCODONE    IR 5 milliGRAM(s) Oral every 6 hours PRN Moderate Pain (4 - 6)      T(C): 37 (18 @ 04:37), Max: 37 (18 @ 04:37)  HR: 68 (18 @ 04:37) (68 - 75)  BP: 128/65 (18 @ 04:37) (115/63 - 134/68)  RR: 18 (18 @ 04:37) (18 - 18)  SpO2: 100% (18 @ 04:37) (98% - 100%)  CAPILLARY BLOOD GLUCOSE        I&O's Summary    2018 07:01  -  2018 07:00  --------------------------------------------------------  IN: 840 mL / OUT: 0 mL / NET: 840 mL        PHYSICAL EXAM  GENERAL: NAD, well-developed  HEAD:  Atraumatic, Normocephalic  EYES: EOMI, PERRLA, conjunctiva and sclera clear  NECK: Supple, No JVD  CHEST/LUNG: Clear to auscultation bilaterally; No wheeze  HEART: Regular rate and rhythm; No murmurs, rubs, or gallops  ABDOMEN: Soft, Nontender, Nondistended; Bowel sounds present  EXTREMITIES:  2+ Peripheral Pulses, No clubbing, cyanosis, or edema  PSYCH: AAOx3  SKIN: No rashes or lesions    LABS:                        7.1    3.2   )-----------( 229      ( 2018 06:15 )             21.0     02-07    141  |  106  |  21  ----------------------------<  83  3.7   |  26  |  1.27    Ca    8.9      2018 06:15  Phos  2.7     02-07  Mg     2.0     02-07    TPro  5.9<L>  /  Alb  3.3  /  TBili  0.2  /  DBili  x   /  AST  24  /  ALT  20  /  AlkPhos  66  02-05    PT/INR - ( 2018 06:15 )   PT: 32.9 sec;   INR: 2.95 ratio         PTT - ( 2018 06:15 )  PTT:40.1 sec      Urinalysis Basic - ( 2018 08:03 )    Color: PL Yellow / Appearance: Clear / S.014 / pH: x  Gluc: x / Ketone: Negative  / Bili: Negative / Urobili: Negative   Blood: x / Protein: Negative / Nitrite: Negative   Leuk Esterase: Negative / RBC: 2-5 /HPF / WBC 2-5 /HPF   Sq Epi: x / Non Sq Epi: Occasional /HPF / Bacteria: x        RADIOLOGY & ADDITIONAL TESTS:    Imaging Personally Reviewed:  Consultant(s) Notes Reviewed:    Care Discussed with Consultants/Other Providers: Patient is a 89y old  Female who presents with a chief complaint of back pain (2018 16:37)        SUBJECTIVE / OVERNIGHT EVENTS: Pt with no events o/n. Reports no burning pain in areas of lesions. Denies h/a, n/v/d, chest pain , dyspnea, abd pain.       MEDICATIONS  (STANDING):  cyanocobalamin 1000 MICROGram(s) Oral daily  docusate sodium 100 milliGRAM(s) Oral daily  gabapentin 300 milliGRAM(s) Oral daily  latanoprost 0.005% Ophthalmic Solution 1 Drop(s) Both EYES at bedtime  multivitamin/minerals 1 Tablet(s) Oral daily  pantoprazole    Tablet 40 milliGRAM(s) Oral before breakfast  senna 2 Tablet(s) Oral at bedtime  valACYclovir 1000 milliGRAM(s) Oral daily  valsartan 160 milliGRAM(s) Oral daily    MEDICATIONS  (PRN):  acetaminophen   Tablet. 650 milliGRAM(s) Oral every 6 hours PRN Mild Pain (1 - 3)  HYDROmorphone   Tablet 2 milliGRAM(s) Oral every 6 hours PRN Severe Pain (7 - 10)  oxyCODONE    IR 5 milliGRAM(s) Oral every 6 hours PRN Moderate Pain (4 - 6)      T(C): 37 (18 @ 04:37), Max: 37 (18 @ 04:37)  HR: 68 (18 @ 04:37) (68 - 75)  BP: 128/65 (18 @ 04:37) (115/63 - 134/68)  RR: 18 (18 @ 04:37) (18 - 18)  SpO2: 100% (18 @ 04:37) (98% - 100%)  CAPILLARY BLOOD GLUCOSE        I&O's Summary    2018 07:01  -  2018 07:00  --------------------------------------------------------  IN: 840 mL / OUT: 0 mL / NET: 840 mL        PHYSICAL EXAM  GENERAL: NAD, well-developed  HEAD:  Atraumatic, Normocephalic  EYES: EOMI, PERRLA, conjunctiva and sclera clear  NECK: Supple, No JVD  CHEST/LUNG: Clear to auscultation bilaterally; No wheeze  HEART: Regular rate and rhythm; No murmurs, rubs, or gallops  ABDOMEN: Soft, Nontender, Nondistended; Bowel sounds present  EXTREMITIES: 2+ Peripheral Pulses, No clubbing, cyanosis, or edema  PSYCH: AAOx3  SKIN: multiple vesicular coalescing lesions, with bullae in the RUQ up to the scapula  LABS:                        7.1    3.2   )-----------( 229      ( 2018 06:15 )             21.0     02-07    141  |  106  |  21  ----------------------------<  83  3.7   |  26  |  1.27    Ca    8.9      2018 06:15  Phos  2.7     02-07  Mg     2.0     02-07    TPro  5.9<L>  /  Alb  3.3  /  TBili  0.2  /  DBili  x   /  AST  24  /  ALT  20  /  AlkPhos  66  02-05    PT/INR - ( 2018 06:15 )   PT: 32.9 sec;   INR: 2.95 ratio         PTT - ( 2018 06:15 )  PTT:40.1 sec      Urinalysis Basic - ( 2018 08:03 )    Color: PL Yellow / Appearance: Clear / S.014 / pH: x  Gluc: x / Ketone: Negative  / Bili: Negative / Urobili: Negative   Blood: x / Protein: Negative / Nitrite: Negative   Leuk Esterase: Negative / RBC: 2-5 /HPF / WBC 2-5 /HPF   Sq Epi: x / Non Sq Epi: Occasional /HPF / Bacteria: x        RADIOLOGY & ADDITIONAL TESTS:    Imaging Personally Reviewed:  Consultant(s) Notes Reviewed:    Care Discussed with Consultants/Other Providers:

## 2018-02-08 ENCOUNTER — TRANSCRIPTION ENCOUNTER (OUTPATIENT)
Age: 83
End: 2018-02-08

## 2018-02-08 VITALS
RESPIRATION RATE: 16 BRPM | DIASTOLIC BLOOD PRESSURE: 67 MMHG | TEMPERATURE: 99 F | OXYGEN SATURATION: 97 % | SYSTOLIC BLOOD PRESSURE: 121 MMHG | HEART RATE: 68 BPM

## 2018-02-08 LAB
ANION GAP SERPL CALC-SCNC: 9 MMOL/L — SIGNIFICANT CHANGE UP (ref 5–17)
APTT BLD: 37.4 SEC — SIGNIFICANT CHANGE UP (ref 27.5–37.4)
BUN SERPL-MCNC: 21 MG/DL — SIGNIFICANT CHANGE UP (ref 7–23)
CALCIUM SERPL-MCNC: 9 MG/DL — SIGNIFICANT CHANGE UP (ref 8.4–10.5)
CHLORIDE SERPL-SCNC: 110 MMOL/L — HIGH (ref 96–108)
CO2 SERPL-SCNC: 22 MMOL/L — SIGNIFICANT CHANGE UP (ref 22–31)
CREAT SERPL-MCNC: 1.31 MG/DL — HIGH (ref 0.5–1.3)
GLUCOSE SERPL-MCNC: 84 MG/DL — SIGNIFICANT CHANGE UP (ref 70–99)
HCT VFR BLD CALC: 21.1 % — LOW (ref 34.5–45)
HGB BLD-MCNC: 7.2 G/DL — LOW (ref 11.5–15.5)
INR BLD: 2.61 RATIO — HIGH (ref 0.88–1.16)
MAGNESIUM SERPL-MCNC: 1.9 MG/DL — SIGNIFICANT CHANGE UP (ref 1.6–2.6)
MCHC RBC-ENTMCNC: 27.1 PG — SIGNIFICANT CHANGE UP (ref 27–34)
MCHC RBC-ENTMCNC: 33.8 GM/DL — SIGNIFICANT CHANGE UP (ref 32–36)
MCV RBC AUTO: 80 FL — SIGNIFICANT CHANGE UP (ref 80–100)
PHOSPHATE SERPL-MCNC: 2.5 MG/DL — SIGNIFICANT CHANGE UP (ref 2.5–4.5)
PLATELET # BLD AUTO: 193 K/UL — SIGNIFICANT CHANGE UP (ref 150–400)
POTASSIUM SERPL-MCNC: 3.6 MMOL/L — SIGNIFICANT CHANGE UP (ref 3.5–5.3)
POTASSIUM SERPL-SCNC: 3.6 MMOL/L — SIGNIFICANT CHANGE UP (ref 3.5–5.3)
PROTHROM AB SERPL-ACNC: 28.7 SEC — HIGH (ref 9.8–12.7)
RBC # BLD: 2.64 M/UL — LOW (ref 3.8–5.2)
RBC # FLD: 13.9 % — SIGNIFICANT CHANGE UP (ref 10.3–14.5)
SODIUM SERPL-SCNC: 141 MMOL/L — SIGNIFICANT CHANGE UP (ref 135–145)
WBC # BLD: 3.9 K/UL — SIGNIFICANT CHANGE UP (ref 3.8–10.5)
WBC # FLD AUTO: 3.9 K/UL — SIGNIFICANT CHANGE UP (ref 3.8–10.5)

## 2018-02-08 PROCEDURE — 83615 LACTATE (LD) (LDH) ENZYME: CPT

## 2018-02-08 PROCEDURE — 80048 BASIC METABOLIC PNL TOTAL CA: CPT

## 2018-02-08 PROCEDURE — 85730 THROMBOPLASTIN TIME PARTIAL: CPT

## 2018-02-08 PROCEDURE — 81001 URINALYSIS AUTO W/SCOPE: CPT

## 2018-02-08 PROCEDURE — 86850 RBC ANTIBODY SCREEN: CPT

## 2018-02-08 PROCEDURE — 99285 EMERGENCY DEPT VISIT HI MDM: CPT | Mod: 25

## 2018-02-08 PROCEDURE — 99239 HOSP IP/OBS DSCHRG MGMT >30: CPT

## 2018-02-08 PROCEDURE — 85027 COMPLETE CBC AUTOMATED: CPT

## 2018-02-08 PROCEDURE — 82728 ASSAY OF FERRITIN: CPT

## 2018-02-08 PROCEDURE — 82607 VITAMIN B-12: CPT

## 2018-02-08 PROCEDURE — 83550 IRON BINDING TEST: CPT

## 2018-02-08 PROCEDURE — 80053 COMPREHEN METABOLIC PANEL: CPT

## 2018-02-08 PROCEDURE — 85610 PROTHROMBIN TIME: CPT

## 2018-02-08 PROCEDURE — 83010 ASSAY OF HAPTOGLOBIN QUANT: CPT

## 2018-02-08 PROCEDURE — 82272 OCCULT BLD FECES 1-3 TESTS: CPT

## 2018-02-08 PROCEDURE — 86900 BLOOD TYPING SEROLOGIC ABO: CPT

## 2018-02-08 PROCEDURE — 84100 ASSAY OF PHOSPHORUS: CPT

## 2018-02-08 PROCEDURE — 85045 AUTOMATED RETICULOCYTE COUNT: CPT

## 2018-02-08 PROCEDURE — 82746 ASSAY OF FOLIC ACID SERUM: CPT

## 2018-02-08 PROCEDURE — 83735 ASSAY OF MAGNESIUM: CPT

## 2018-02-08 PROCEDURE — 96374 THER/PROPH/DIAG INJ IV PUSH: CPT

## 2018-02-08 PROCEDURE — 86901 BLOOD TYPING SEROLOGIC RH(D): CPT

## 2018-02-08 RX ORDER — VALSARTAN 80 MG/1
1 TABLET ORAL
Qty: 0 | Refills: 0 | COMMUNITY

## 2018-02-08 RX ORDER — CAPSAICIN 0.025 %
1 CREAM (GRAM) TOPICAL
Qty: 1 | Refills: 0 | OUTPATIENT
Start: 2018-02-08 | End: 2018-02-12

## 2018-02-08 RX ORDER — GABAPENTIN 400 MG/1
300 CAPSULE ORAL ONCE
Qty: 0 | Refills: 0 | Status: COMPLETED | OUTPATIENT
Start: 2018-02-08 | End: 2018-02-08

## 2018-02-08 RX ORDER — WARFARIN SODIUM 2.5 MG/1
1 TABLET ORAL
Qty: 0 | Refills: 0 | COMMUNITY

## 2018-02-08 RX ORDER — FERROUS SULFATE 325(65) MG
1 TABLET ORAL
Qty: 30 | Refills: 0 | OUTPATIENT
Start: 2018-02-08 | End: 2018-03-09

## 2018-02-08 RX ORDER — GABAPENTIN 400 MG/1
600 CAPSULE ORAL DAILY
Qty: 0 | Refills: 0 | Status: DISCONTINUED | OUTPATIENT
Start: 2018-02-09 | End: 2018-02-08

## 2018-02-08 RX ORDER — WARFARIN SODIUM 2.5 MG/1
3 TABLET ORAL
Qty: 21 | Refills: 0 | OUTPATIENT
Start: 2018-02-08 | End: 2018-02-14

## 2018-02-08 RX ORDER — ACETAMINOPHEN 500 MG
2 TABLET ORAL
Qty: 56 | Refills: 0 | OUTPATIENT
Start: 2018-02-08 | End: 2018-02-14

## 2018-02-08 RX ADMIN — Medication 1 APPLICATION(S): at 06:45

## 2018-02-08 RX ADMIN — VALSARTAN 160 MILLIGRAM(S): 80 TABLET ORAL at 06:45

## 2018-02-08 RX ADMIN — PREGABALIN 1000 MICROGRAM(S): 225 CAPSULE ORAL at 11:54

## 2018-02-08 RX ADMIN — Medication 100 MILLIGRAM(S): at 11:53

## 2018-02-08 RX ADMIN — PANTOPRAZOLE SODIUM 40 MILLIGRAM(S): 20 TABLET, DELAYED RELEASE ORAL at 06:45

## 2018-02-08 RX ADMIN — VALACYCLOVIR 1000 MILLIGRAM(S): 500 TABLET, FILM COATED ORAL at 11:53

## 2018-02-08 RX ADMIN — Medication 1 TABLET(S): at 11:53

## 2018-02-08 RX ADMIN — GABAPENTIN 300 MILLIGRAM(S): 400 CAPSULE ORAL at 11:53

## 2018-02-08 RX ADMIN — Medication 1 APPLICATION(S): at 15:27

## 2018-02-08 RX ADMIN — GABAPENTIN 300 MILLIGRAM(S): 400 CAPSULE ORAL at 15:26

## 2018-02-08 NOTE — DISCHARGE NOTE ADULT - MEDICATION SUMMARY - MEDICATIONS TO CHANGE
I will SWITCH the dose or number of times a day I take the medications listed below when I get home from the hospital:    warfarin 4 mg oral tablet  -- 1 tab(s) by mouth once a day

## 2018-02-08 NOTE — DISCHARGE NOTE ADULT - PLAN OF CARE
Resolution You came to the hospital with right flank pain and were found to have the shingles virus. You received pain medication and an antiviral medication. Please follow up with your primary care doctor Dr Munoz to ensure you are getting better. You have an appt on 2/15/18, please call at [phone] if you need to change it. Management You were found to have a low You came to the hospital with right flank pain and were found to have the shingles virus. You received pain medication and an antiviral medication. Continue the Valtrex (Valacyclovir) for 3 more days through 2/11 (Sunday) and we have also give you enough Gabapentin and Capsaicin to last through that time. Please follow up with your primary care doctor, Dr Munoz to ensure you are getting better. You have an appt on 2/15/18, please call at [phone] if you need to change it. You were found to have a low hemoglobin (around 7) that was lower than your normally low hemoglobin (8-9). It was stable during your admission and we did not note any abnormal bleeding but we will start you on iron tablets because your iron was low. Please follow up with your Primary Care doctor, Dr. Munoz, to determine further workup as to the cause of your anemia. Continue your Omeprazole and follow up with your Primary Care doctor. You had an high INR blood level on admission. Your anticoagulation, coumadin, was initially held and then restarted at a dose of 3mg. Please follow up with Dr Munoz for a repeat INR check and to adjust as needed. You developed an acute kidney injury in the hospital. This is likely due to dehydration. Please ensure that you eat and are drinking appropriately. Your home blood pressure medication valsartan was discontinued. Please follow up with your primary doctor to discuss restarting it.

## 2018-02-08 NOTE — DISCHARGE NOTE ADULT - SECONDARY DIAGNOSIS.
Anemia, unspecified type Gastroesophageal reflux disease, esophagitis presence not specified Chronic right-sided back pain, unspecified back location Other pulmonary embolism without acute cor pulmonale, unspecified chronicity Hypertension

## 2018-02-08 NOTE — DISCHARGE NOTE ADULT - CARE PLAN
Principal Discharge DX:	Herpes zoster without complication  Goal:	Resolution  Assessment and plan of treatment:	You came to the hospital with right flank pain and were found to have the shingles virus. You received pain medication and an antiviral medication. Please follow up with your primary care doctor Dr Munoz to ensure you are getting better. You have an appt on 2/15/18, please call at [phone] if you need to change it.  Secondary Diagnosis:	Anemia, unspecified type  Goal:	Management  Assessment and plan of treatment:	You were found to have a low  Secondary Diagnosis:	Gastroesophageal reflux disease, esophagitis presence not specified  Secondary Diagnosis:	Chronic right-sided back pain, unspecified back location  Secondary Diagnosis:	Other pulmonary embolism without acute cor pulmonale, unspecified chronicity Principal Discharge DX:	Herpes zoster without complication  Goal:	Resolution  Assessment and plan of treatment:	You came to the hospital with right flank pain and were found to have the shingles virus. You received pain medication and an antiviral medication. Continue the Valtrex (Valacyclovir) for 3 more days through 2/11 (Sunday) and we have also give you enough Gabapentin and Capsaicin to last through that time. Please follow up with your primary care doctor, Dr Munoz to ensure you are getting better. You have an appt on 2/15/18, please call at [phone] if you need to change it.  Secondary Diagnosis:	Anemia, unspecified type  Goal:	Management  Assessment and plan of treatment:	You were found to have a low hemoglobin (around 7) that was lower than your normally low hemoglobin (8-9). It was stable during your admission and we did not note any abnormal bleeding but we will start you on iron tablets because your iron was low. Please follow up with your Primary Care doctor, Dr. Munoz, to determine further workup as to the cause of your anemia.  Secondary Diagnosis:	Gastroesophageal reflux disease, esophagitis presence not specified  Goal:	Management  Assessment and plan of treatment:	Continue your Omeprazole and follow up with your Primary Care doctor.  Secondary Diagnosis:	Other pulmonary embolism without acute cor pulmonale, unspecified chronicity  Secondary Diagnosis:	Hypertension Principal Discharge DX:	Herpes zoster without complication  Goal:	Resolution  Assessment and plan of treatment:	You came to the hospital with right flank pain and were found to have the shingles virus. You received pain medication and an antiviral medication. Continue the Valtrex (Valacyclovir) for 3 more days through 2/11 (Sunday) and we have also give you enough Gabapentin and Capsaicin to last through that time. Please follow up with your primary care doctor, Dr Munoz to ensure you are getting better. You have an appt on 2/15/18, please call at [phone] if you need to change it.  Secondary Diagnosis:	Anemia, unspecified type  Goal:	Management  Assessment and plan of treatment:	You were found to have a low hemoglobin (around 7) that was lower than your normally low hemoglobin (8-9). It was stable during your admission and we did not note any abnormal bleeding but we will start you on iron tablets because your iron was low. Please follow up with your Primary Care doctor, Dr. Munoz, to determine further workup as to the cause of your anemia.  Secondary Diagnosis:	Gastroesophageal reflux disease, esophagitis presence not specified  Goal:	Management  Assessment and plan of treatment:	Continue your Omeprazole and follow up with your Primary Care doctor.  Secondary Diagnosis:	Other pulmonary embolism without acute cor pulmonale, unspecified chronicity  Goal:	Management  Assessment and plan of treatment:	You had an high INR blood level on admission. Your anticoagulation, coumadin, was initially held and then restarted at a dose of 3mg. Please follow up with Dr Munoz for a repeat INR check and to adjust as needed.  Secondary Diagnosis:	Hypertension  Goal:	Management  Assessment and plan of treatment:	You developed an acute kidney injury in the hospital. This is likely due to dehydration. Please ensure that you eat and are drinking appropriately. Your home blood pressure medication valsartan was discontinued. Please follow up with your primary doctor to discuss restarting it.

## 2018-02-08 NOTE — PROGRESS NOTE ADULT - ASSESSMENT
89 yo F hx PE on coumadin, HTN, GERD, chronic back pain presents with 4 days of worsening back/flank pain found to have shingles and Supratherapeutic INR.

## 2018-02-08 NOTE — DISCHARGE NOTE ADULT - HOSPITAL COURSE
89 yo F hx of PE on coumadin, HTN, GERD, chronic back pain presents with 4 days of worsening back/flank pain found to have shingles and Supratherapeutic INR 2/2 coumadin. Pt was treated with valacyclovir renally dosed and pain control.  Pt showed improvement in pain  and lesions showed no vesicular eruption. Gabapentin and capsaicin cream was started for herpetic neuralgia. Hospital course was complicated by acute on chronic anemia. Pt showed no signs of dizziness, melena, or emesis and iron studies showed iron deficiency. Pt also developed DIOGENES with Cr at 1.3, in comparison to prior hospitalization of Cr 1. All medications were renally dosed and valsartan was discontinued. Coumadin was held and pt's supratherapeutic INR was returned to normal between 2-3 and coumadin was restarted at 3mg. Physical therapists recommended home PT and patient is deemed medically stable for discharge with close follow up with Dr Munoz on 2/15/18.

## 2018-02-08 NOTE — DISCHARGE NOTE ADULT - ADDITIONAL INSTRUCTIONS
Please follow up with your appointment with Dr. Ivelisse Munoz on 2/15/18 at [address], [address], and please call at [phone] if you need to change it.

## 2018-02-08 NOTE — DISCHARGE NOTE ADULT - PATIENT PORTAL LINK FT
You can access the [facility], offered by [facility], by registering with the following website: [email or web address]

## 2018-02-08 NOTE — PROGRESS NOTE ADULT - PROBLEM SELECTOR PLAN 1
Pt found to have shingles. One dermatome involved.   c/w valacyclovir 1000mg daily, renally dose. day 3   contact isolation.   c/w dilaudid 2 mg PO for severe pain. Percocet for moderate pain and tylenol for mild pain.   c/w gabapentin 300mg daily, renally dosed  - c/w topical capsaicin for burning sensation

## 2018-02-08 NOTE — PROGRESS NOTE ADULT - ATTENDING COMMENTS
Patient still has pain on the right back and abd at the affected dermatome.  THe lesions are crusting over with no oozing or superinfection.  Patient will be d/c home .  SHe will need to f/up with her PCP tomorrow or the latest on Monday 2/12/18 to monitor on her pain control and her SERUM CREATININE.  Patient will be continued on Gabapentin/ capsaicin for pain and tylenol as needed and complete 7 days of therapy for the herpes Zoster.  pt is AAOX3.  She understands the instructions and will call her grandson.

## 2018-02-08 NOTE — PROGRESS NOTE ADULT - PROBLEM SELECTOR PLAN 2
- received 3mg coumadin last night, INR today 2.9-->2.6  Continue to monitor INR.  - will c/w with 3mg

## 2018-02-08 NOTE — PROGRESS NOTE ADULT - PROBLEM SELECTOR PLAN 3
Baseline anemia seems to be between 8-9. Pt reports previously worked up in the past  today Hg 7.4-->7.1.-->7.2  Fe studies with low total Fe, low ferritin, will consider starting iron supplement   No s/s of bleeding. Pt asymptomatic. Some concern given supratherapeutic INR, but no recent trauma or falls.   pending FOBT.   continue to monitor CBC, if continues to fall will consider ct chest abdomen pelvis to evaluate for bleed.  c/w protonix  UA with 2-5 RBC, trace blood, likely 2/2 coumadin  will cont to trend Hg

## 2018-02-08 NOTE — PROGRESS NOTE ADULT - SUBJECTIVE AND OBJECTIVE BOX
Patient is a 89y old  Female who presents with a chief complaint of back pain (05 Feb 2018 16:37)        SUBJECTIVE / OVERNIGHT EVENTS:  Pt reports continued burning sensation along lesions and with difficulty sleeping. Denies further flank pain. Denies h/a, n/v/d, chest pain, dyspnea, abd pain, extremity pain. Reports bowel movement yesterday.       MEDICATIONS  (STANDING):  capsaicin 0.025% Cream 1 Application(s) Topical three times a day  cyanocobalamin 1000 MICROGram(s) Oral daily  docusate sodium 100 milliGRAM(s) Oral daily  gabapentin 300 milliGRAM(s) Oral daily  latanoprost 0.005% Ophthalmic Solution 1 Drop(s) Both EYES at bedtime  multivitamin/minerals 1 Tablet(s) Oral daily  pantoprazole    Tablet 40 milliGRAM(s) Oral before breakfast  senna 2 Tablet(s) Oral at bedtime  valACYclovir 1000 milliGRAM(s) Oral daily  valsartan 160 milliGRAM(s) Oral daily    MEDICATIONS  (PRN):  acetaminophen   Tablet. 650 milliGRAM(s) Oral every 6 hours PRN Mild Pain (1 - 3)  HYDROmorphone   Tablet 2 milliGRAM(s) Oral every 6 hours PRN Severe Pain (7 - 10)  oxyCODONE    IR 5 milliGRAM(s) Oral every 6 hours PRN Moderate Pain (4 - 6)      T(C): 37.1 (02-08-18 @ 05:46), Max: 37.1 (02-08-18 @ 05:46)  HR: 72 (02-08-18 @ 05:46) (71 - 82)  BP: 124/60 (02-08-18 @ 05:46) (124/60 - 133/73)  RR: 18 (02-08-18 @ 05:46) (14 - 18)  SpO2: 99% (02-08-18 @ 05:46) (98% - 99%)  CAPILLARY BLOOD GLUCOSE        I&O's Summary    07 Feb 2018 07:01  -  08 Feb 2018 07:00  --------------------------------------------------------  IN: 480 mL / OUT: 0 mL / NET: 480 mL        PHYSICAL EXAM  GENERAL: NAD, well-developed  HEAD:  Atraumatic, Normocephalic  EYES: EOMI, PERRLA, conjunctiva and sclera clear  NECK: Supple, No JVD  CHEST/LUNG: Clear to auscultation bilaterally; No wheeze  HEART: Regular rate and rhythm; No murmurs, rubs, or gallops  ABDOMEN: Soft, Nontender, Nondistended; Bowel sounds present  EXTREMITIES:  2+ Peripheral Pulses, No clubbing, cyanosis, or edema  PSYCH: AAOx3  SKIN: multiple vesicular coalescing lesions, no crusting, no bullae or swelling; along RUQ, left flank, and moving to the back, does not pass midline    LABS:                        7.2    3.9   )-----------( 193      ( 08 Feb 2018 06:37 )             21.1     02-08    141  |  110<H>  |  21  ----------------------------<  84  3.6   |  22  |  1.31<H>    Ca    9.0      08 Feb 2018 06:37  Phos  2.5     02-08  Mg     1.9     02-08      PT/INR - ( 08 Feb 2018 06:37 )   PT: 28.7 sec;   INR: 2.61 ratio         PTT - ( 08 Feb 2018 06:37 )  PTT:37.4 sec          RADIOLOGY & ADDITIONAL TESTS:    Imaging Personally Reviewed:  Consultant(s) Notes Reviewed:    Care Discussed with Consultants/Other Providers:

## 2018-02-08 NOTE — DISCHARGE NOTE ADULT - MEDICATION SUMMARY - MEDICATIONS TO TAKE
I will START or STAY ON the medications listed below when I get home from the hospital:    Neurontin 600 mg oral tablet  -- 1 tab(s) by mouth once a day  -- Indication: For Herpes zoster without complication    valACYclovir 1 g oral tablet  -- 1 tab(s) by mouth once a day  -- Indication: For Herpes zoster without complication    capsaicin 0.025% topical cream  -- 1 application on skin 3 times a day  -- Indication: For Herpes zoster without complication    latanoprost 0.005% ophthalmic solution  -- 1 drop(s) to each affected eye once a day (in the evening)  -- Indication: For Eye Care    omeprazole 20 mg oral delayed release capsule  -- 1 cap(s) by mouth 2 times a day  -- Indication: For GERD (gastroesophageal reflux disease)    Centrum Adults oral tablet  -- 1 tab(s) by mouth once a day  -- Indication: For Vitamin/Supplement    Vitamin B-12 1000 mcg oral tablet  -- 1 tab(s) by mouth once a day  -- Indication: For Vitamin/Supplement I will START or STAY ON the medications listed below when I get home from the hospital:    Tylenol 325 mg oral tablet  -- 2 tab(s) by mouth every 6 hours, As needed, Mild Pain (1 - 3)  -- Indication: For flank pain    Coumadin 3 mg oral tablet  -- 3 milligram(s) by mouth once a day (at bedtime)   -- Indication: For pulmonary embolism    Neurontin 600 mg oral tablet  -- 1 tab(s) by mouth once a day  -- Indication: For Herpes zoster without complication    valACYclovir 1 g oral tablet  -- 1 tab(s) by mouth once a day  -- Indication: For Herpes zoster without complication    capsaicin 0.025% topical cream  -- 1 application on skin 3 times a day  -- Indication: For Herpes zoster without complication    FeroSul 325 mg (65 mg elemental iron) oral tablet  -- 1 tab(s) by mouth once a day   -- Check with your doctor before becoming pregnant.  Do not chew, break, or crush.  May discolor urine or feces.    -- Indication: For iron deficiency    latanoprost 0.005% ophthalmic solution  -- 1 drop(s) to each affected eye once a day (in the evening)  -- Indication: For Eye Care    omeprazole 20 mg oral delayed release capsule  -- 1 cap(s) by mouth 2 times a day  -- Indication: For GERD (gastroesophageal reflux disease)    Centrum Adults oral tablet  -- 1 tab(s) by mouth once a day  -- Indication: For Vitamin/Supplement    Vitamin B-12 1000 mcg oral tablet  -- 1 tab(s) by mouth once a day  -- Indication: For Vitamin/Supplement

## 2018-02-08 NOTE — DISCHARGE NOTE ADULT - CARE PROVIDER_API CALL
Ivelisse Munoz), Geriatric Medicine; Internal Medicine  [address] [place] [address]  Phone: [phone]  Fax: [phone]

## 2018-02-09 RX ORDER — GABAPENTIN 400 MG/1
1 CAPSULE ORAL
Qty: 5 | Refills: 0 | OUTPATIENT
Start: 2018-02-09

## 2018-02-09 RX ORDER — VALACYCLOVIR 500 MG/1
1 TABLET, FILM COATED ORAL
Qty: 3 | Refills: 0 | OUTPATIENT
Start: 2018-02-09 | End: 2018-02-11

## 2018-02-11 ENCOUNTER — INPATIENT (INPATIENT)
Facility: HOSPITAL | Age: 83
LOS: 4 days | Discharge: NURSING, REHAB OR LONG-TERM CARE | DRG: 481 | End: 2018-02-16
Attending: HOSPITALIST | Admitting: HOSPITALIST
Payer: MEDICARE

## 2018-02-11 VITALS
OXYGEN SATURATION: 99 % | SYSTOLIC BLOOD PRESSURE: 122 MMHG | HEART RATE: 88 BPM | RESPIRATION RATE: 18 BRPM | DIASTOLIC BLOOD PRESSURE: 71 MMHG

## 2018-02-11 DIAGNOSIS — Z29.9 ENCOUNTER FOR PROPHYLACTIC MEASURES, UNSPECIFIED: ICD-10-CM

## 2018-02-11 DIAGNOSIS — I89.0 LYMPHEDEMA, NOT ELSEWHERE CLASSIFIED: ICD-10-CM

## 2018-02-11 DIAGNOSIS — I10 ESSENTIAL (PRIMARY) HYPERTENSION: ICD-10-CM

## 2018-02-11 DIAGNOSIS — K21.9 GASTRO-ESOPHAGEAL REFLUX DISEASE WITHOUT ESOPHAGITIS: ICD-10-CM

## 2018-02-11 DIAGNOSIS — D64.9 ANEMIA, UNSPECIFIED: ICD-10-CM

## 2018-02-11 DIAGNOSIS — I26.99 OTHER PULMONARY EMBOLISM WITHOUT ACUTE COR PULMONALE: ICD-10-CM

## 2018-02-11 DIAGNOSIS — I45.9 CONDUCTION DISORDER, UNSPECIFIED: ICD-10-CM

## 2018-02-11 DIAGNOSIS — B02.9 ZOSTER WITHOUT COMPLICATIONS: ICD-10-CM

## 2018-02-11 DIAGNOSIS — Z98.89 OTHER SPECIFIED POSTPROCEDURAL STATES: Chronic | ICD-10-CM

## 2018-02-11 DIAGNOSIS — S72.92XA UNSPECIFIED FRACTURE OF LEFT FEMUR, INITIAL ENCOUNTER FOR CLOSED FRACTURE: ICD-10-CM

## 2018-02-11 DIAGNOSIS — S72.009A FRACTURE OF UNSPECIFIED PART OF NECK OF UNSPECIFIED FEMUR, INITIAL ENCOUNTER FOR CLOSED FRACTURE: ICD-10-CM

## 2018-02-11 DIAGNOSIS — C18.9 MALIGNANT NEOPLASM OF COLON, UNSPECIFIED: ICD-10-CM

## 2018-02-11 LAB
ALBUMIN SERPL ELPH-MCNC: 3.4 G/DL — SIGNIFICANT CHANGE UP (ref 3.3–5)
ALP SERPL-CCNC: 72 U/L — SIGNIFICANT CHANGE UP (ref 40–120)
ALT FLD-CCNC: 27 U/L RC — SIGNIFICANT CHANGE UP (ref 10–45)
ANION GAP SERPL CALC-SCNC: 11 MMOL/L — SIGNIFICANT CHANGE UP (ref 5–17)
APPEARANCE UR: CLEAR — SIGNIFICANT CHANGE UP
APTT BLD: 40.2 SEC — HIGH (ref 27.5–37.4)
AST SERPL-CCNC: 29 U/L — SIGNIFICANT CHANGE UP (ref 10–40)
BASOPHILS # BLD AUTO: 0 K/UL — SIGNIFICANT CHANGE UP (ref 0–0.2)
BASOPHILS NFR BLD AUTO: 0.1 % — SIGNIFICANT CHANGE UP (ref 0–2)
BILIRUB SERPL-MCNC: 0.3 MG/DL — SIGNIFICANT CHANGE UP (ref 0.2–1.2)
BILIRUB UR-MCNC: NEGATIVE — SIGNIFICANT CHANGE UP
BLD GP AB SCN SERPL QL: NEGATIVE — SIGNIFICANT CHANGE UP
BUN SERPL-MCNC: 37 MG/DL — HIGH (ref 7–23)
CALCIUM SERPL-MCNC: 9.5 MG/DL — SIGNIFICANT CHANGE UP (ref 8.4–10.5)
CHLORIDE SERPL-SCNC: 110 MMOL/L — HIGH (ref 96–108)
CO2 SERPL-SCNC: 21 MMOL/L — LOW (ref 22–31)
COLOR SPEC: YELLOW — SIGNIFICANT CHANGE UP
CREAT SERPL-MCNC: 1.32 MG/DL — HIGH (ref 0.5–1.3)
DIFF PNL FLD: NEGATIVE — SIGNIFICANT CHANGE UP
EOSINOPHIL # BLD AUTO: 0 K/UL — SIGNIFICANT CHANGE UP (ref 0–0.5)
EOSINOPHIL NFR BLD AUTO: 0 % — SIGNIFICANT CHANGE UP (ref 0–6)
GLUCOSE SERPL-MCNC: 120 MG/DL — HIGH (ref 70–99)
GLUCOSE UR QL: NEGATIVE — SIGNIFICANT CHANGE UP
HCT VFR BLD CALC: 22.6 % — LOW (ref 34.5–45)
HGB BLD-MCNC: 7.4 G/DL — LOW (ref 11.5–15.5)
INR BLD: 3.4 RATIO — HIGH (ref 0.88–1.16)
KETONES UR-MCNC: NEGATIVE — SIGNIFICANT CHANGE UP
LEUKOCYTE ESTERASE UR-ACNC: NEGATIVE — SIGNIFICANT CHANGE UP
LYMPHOCYTES # BLD AUTO: 0.5 K/UL — LOW (ref 1–3.3)
LYMPHOCYTES # BLD AUTO: 4.5 % — LOW (ref 13–44)
MCHC RBC-ENTMCNC: 25.9 PG — LOW (ref 27–34)
MCHC RBC-ENTMCNC: 32.6 GM/DL — SIGNIFICANT CHANGE UP (ref 32–36)
MCV RBC AUTO: 79.6 FL — LOW (ref 80–100)
MONOCYTES # BLD AUTO: 0.6 K/UL — SIGNIFICANT CHANGE UP (ref 0–0.9)
MONOCYTES NFR BLD AUTO: 5.3 % — SIGNIFICANT CHANGE UP (ref 2–14)
NEUTROPHILS # BLD AUTO: 10.8 K/UL — HIGH (ref 1.8–7.4)
NEUTROPHILS NFR BLD AUTO: 90.1 % — HIGH (ref 43–77)
NITRITE UR-MCNC: NEGATIVE — SIGNIFICANT CHANGE UP
PH UR: 6 — SIGNIFICANT CHANGE UP (ref 5–8)
PLATELET # BLD AUTO: 332 K/UL — SIGNIFICANT CHANGE UP (ref 150–400)
POTASSIUM SERPL-MCNC: 4.4 MMOL/L — SIGNIFICANT CHANGE UP (ref 3.5–5.3)
POTASSIUM SERPL-SCNC: 4.4 MMOL/L — SIGNIFICANT CHANGE UP (ref 3.5–5.3)
PROT SERPL-MCNC: 6 G/DL — SIGNIFICANT CHANGE UP (ref 6–8.3)
PROT UR-MCNC: SIGNIFICANT CHANGE UP
PROTHROM AB SERPL-ACNC: 37.7 SEC — HIGH (ref 9.8–12.7)
RBC # BLD: 2.84 M/UL — LOW (ref 3.8–5.2)
RBC # FLD: 14.8 % — HIGH (ref 10.3–14.5)
RH IG SCN BLD-IMP: POSITIVE — SIGNIFICANT CHANGE UP
SODIUM SERPL-SCNC: 142 MMOL/L — SIGNIFICANT CHANGE UP (ref 135–145)
SP GR SPEC: 1.01 — SIGNIFICANT CHANGE UP (ref 1.01–1.02)
UROBILINOGEN FLD QL: NEGATIVE — SIGNIFICANT CHANGE UP
WBC # BLD: 12 K/UL — HIGH (ref 3.8–10.5)
WBC # FLD AUTO: 12 K/UL — HIGH (ref 3.8–10.5)

## 2018-02-11 PROCEDURE — 99285 EMERGENCY DEPT VISIT HI MDM: CPT | Mod: 25

## 2018-02-11 PROCEDURE — 93010 ELECTROCARDIOGRAM REPORT: CPT

## 2018-02-11 PROCEDURE — 71045 X-RAY EXAM CHEST 1 VIEW: CPT | Mod: 26

## 2018-02-11 PROCEDURE — 73551 X-RAY EXAM OF FEMUR 1: CPT | Mod: 26,LT

## 2018-02-11 PROCEDURE — 70450 CT HEAD/BRAIN W/O DYE: CPT | Mod: 26

## 2018-02-11 PROCEDURE — 73560 X-RAY EXAM OF KNEE 1 OR 2: CPT | Mod: 26,LT

## 2018-02-11 PROCEDURE — 73501 X-RAY EXAM HIP UNI 1 VIEW: CPT | Mod: 26,LT

## 2018-02-11 PROCEDURE — 72170 X-RAY EXAM OF PELVIS: CPT | Mod: 26,77,LT

## 2018-02-11 PROCEDURE — 99223 1ST HOSP IP/OBS HIGH 75: CPT

## 2018-02-11 PROCEDURE — 73501 X-RAY EXAM HIP UNI 1 VIEW: CPT | Mod: 26,77,LT

## 2018-02-11 RX ORDER — PHYTONADIONE (VIT K1) 5 MG
10 TABLET ORAL ONCE
Qty: 0 | Refills: 0 | Status: DISCONTINUED | OUTPATIENT
Start: 2018-02-11 | End: 2018-02-11

## 2018-02-11 RX ORDER — CAPSAICIN 0.025 %
1 CREAM (GRAM) TOPICAL THREE TIMES A DAY
Qty: 0 | Refills: 0 | Status: DISCONTINUED | OUTPATIENT
Start: 2018-02-11 | End: 2018-02-16

## 2018-02-11 RX ORDER — SODIUM CHLORIDE 9 MG/ML
1000 INJECTION INTRAMUSCULAR; INTRAVENOUS; SUBCUTANEOUS
Qty: 0 | Refills: 0 | Status: DISCONTINUED | OUTPATIENT
Start: 2018-02-11 | End: 2018-02-13

## 2018-02-11 RX ORDER — ACETAMINOPHEN 500 MG
1000 TABLET ORAL ONCE
Qty: 0 | Refills: 0 | Status: COMPLETED | OUTPATIENT
Start: 2018-02-11 | End: 2018-02-11

## 2018-02-11 RX ORDER — GABAPENTIN 400 MG/1
600 CAPSULE ORAL DAILY
Qty: 0 | Refills: 0 | Status: DISCONTINUED | OUTPATIENT
Start: 2018-02-11 | End: 2018-02-16

## 2018-02-11 RX ORDER — ACETAMINOPHEN 500 MG
650 TABLET ORAL EVERY 6 HOURS
Qty: 0 | Refills: 0 | Status: DISCONTINUED | OUTPATIENT
Start: 2018-02-11 | End: 2018-02-12

## 2018-02-11 RX ORDER — PANTOPRAZOLE SODIUM 20 MG/1
40 TABLET, DELAYED RELEASE ORAL
Qty: 0 | Refills: 0 | Status: DISCONTINUED | OUTPATIENT
Start: 2018-02-11 | End: 2018-02-16

## 2018-02-11 RX ORDER — PHYTONADIONE (VIT K1) 5 MG
10 TABLET ORAL DAILY
Qty: 0 | Refills: 0 | Status: DISCONTINUED | OUTPATIENT
Start: 2018-02-11 | End: 2018-02-11

## 2018-02-11 RX ORDER — PREGABALIN 225 MG/1
1000 CAPSULE ORAL DAILY
Qty: 0 | Refills: 0 | Status: DISCONTINUED | OUTPATIENT
Start: 2018-02-11 | End: 2018-02-16

## 2018-02-11 RX ORDER — FERROUS SULFATE 325(65) MG
325 TABLET ORAL
Qty: 0 | Refills: 0 | Status: DISCONTINUED | OUTPATIENT
Start: 2018-02-11 | End: 2018-02-16

## 2018-02-11 RX ORDER — PHYTONADIONE (VIT K1) 5 MG
10 TABLET ORAL ONCE
Qty: 0 | Refills: 0 | Status: COMPLETED | OUTPATIENT
Start: 2018-02-11 | End: 2018-02-11

## 2018-02-11 RX ORDER — SODIUM CHLORIDE 9 MG/ML
3 INJECTION INTRAMUSCULAR; INTRAVENOUS; SUBCUTANEOUS EVERY 8 HOURS
Qty: 0 | Refills: 0 | Status: DISCONTINUED | OUTPATIENT
Start: 2018-02-11 | End: 2018-02-16

## 2018-02-11 RX ORDER — MULTIVIT-MIN/FERROUS GLUCONATE 9 MG/15 ML
1 LIQUID (ML) ORAL DAILY
Qty: 0 | Refills: 0 | Status: DISCONTINUED | OUTPATIENT
Start: 2018-02-11 | End: 2018-02-16

## 2018-02-11 RX ORDER — LATANOPROST 0.05 MG/ML
1 SOLUTION/ DROPS OPHTHALMIC; TOPICAL AT BEDTIME
Qty: 0 | Refills: 0 | Status: DISCONTINUED | OUTPATIENT
Start: 2018-02-11 | End: 2018-02-16

## 2018-02-11 RX ORDER — MORPHINE SULFATE 50 MG/1
2 CAPSULE, EXTENDED RELEASE ORAL ONCE
Qty: 0 | Refills: 0 | Status: DISCONTINUED | OUTPATIENT
Start: 2018-02-11 | End: 2018-02-11

## 2018-02-11 RX ADMIN — SODIUM CHLORIDE 65 MILLILITER(S): 9 INJECTION INTRAMUSCULAR; INTRAVENOUS; SUBCUTANEOUS at 18:03

## 2018-02-11 RX ADMIN — MORPHINE SULFATE 2 MILLIGRAM(S): 50 CAPSULE, EXTENDED RELEASE ORAL at 19:30

## 2018-02-11 RX ADMIN — Medication 400 MILLIGRAM(S): at 12:21

## 2018-02-11 RX ADMIN — MORPHINE SULFATE 2 MILLIGRAM(S): 50 CAPSULE, EXTENDED RELEASE ORAL at 18:02

## 2018-02-11 RX ADMIN — Medication 1000 MILLIGRAM(S): at 15:04

## 2018-02-11 RX ADMIN — SODIUM CHLORIDE 3 MILLILITER(S): 9 INJECTION INTRAMUSCULAR; INTRAVENOUS; SUBCUTANEOUS at 15:04

## 2018-02-11 RX ADMIN — Medication 102 MILLIGRAM(S): at 18:03

## 2018-02-11 RX ADMIN — Medication 325 MILLIGRAM(S): at 21:46

## 2018-02-11 RX ADMIN — SODIUM CHLORIDE 3 MILLILITER(S): 9 INJECTION INTRAMUSCULAR; INTRAVENOUS; SUBCUTANEOUS at 23:09

## 2018-02-11 NOTE — H&P ADULT - NSHPLABSRESULTS_GEN_ALL_CORE
.  LABS:                         7.4    12.0  )-----------( 332      ( 11 Feb 2018 12:15 )             22.6     02-11    142  |  110<H>  |  37<H>  ----------------------------<  120<H>  4.4   |  21<L>  |  1.32<H>    Ca    9.5      11 Feb 2018 12:15    TPro  6.0  /  Alb  3.4  /  TBili  0.3  /  DBili  x   /  AST  29  /  ALT  27  /  AlkPhos  72  02-11    PT/INR - ( 11 Feb 2018 12:15 )   PT: 37.7 sec;   INR: 3.40 ratio         PTT - ( 11 Feb 2018 12:15 )  PTT:40.2 sec          EKG personally reviewed: sinus with 1st degree AV block, RBBB with LAD (bifascicular block). No prior EKGs in sunrise or in allscripts found. .  LABS:                         7.4    12.0  )-----------( 332      ( 11 Feb 2018 12:15 )             22.6     02-11    142  |  110<H>  |  37<H>  ----------------------------<  120<H>  4.4   |  21<L>  |  1.32<H>    Ca    9.5      11 Feb 2018 12:15    TPro  6.0  /  Alb  3.4  /  TBili  0.3  /  DBili  x   /  AST  29  /  ALT  27  /  AlkPhos  72  02-11    PT/INR - ( 11 Feb 2018 12:15 )   PT: 37.7 sec;   INR: 3.40 ratio         PTT - ( 11 Feb 2018 12:15 )  PTT:40.2 sec          EKG personally reviewed: sinus with 1st degree AV block, incomplete RBBB with LAFB. No prior EKGs in sunrise or in allscripts found.  CXR personally reviewed: clear lungs    < from: Xray Knee 1 or 2 Views, Left (02.11.18 @ 15:28) >      INTERPRETATION:  no emergent finding    < end of copied text >    < from: Xray Hip w/ Pelvis 1 View, Left (02.11.18 @ 14:14) >    INTERPRETATION:  Impacted and comminuted left intertrochanteric fracture    < end of copied text >

## 2018-02-11 NOTE — ED PROVIDER NOTE - MUSCULOSKELETAL MINIMAL EXAM
motor intact/RANGE OF MOTION LIMITED/TENDERNESS/**ATTENDING ADDENDUM (Dr. Rock Martinez): POSITIVE limited range of motion at the left hip and upper leg. NO restriction with knee or ankle range of motion. NO distal discoloration./neck supple

## 2018-02-11 NOTE — H&P ADULT - NSHPSOCIALHISTORY_GEN_ALL_CORE
nonsomker, nondrinker, no drugs, lives alone at home, walks indepdendently without assistive devices.

## 2018-02-11 NOTE — ED PROVIDER NOTE - ATTENDING CONTRIBUTION TO CARE
**ATTENDING ADDENDUM (Dr. Rock Martinez): I attest that I have directly examined this patient and reviewed and formulated the diagnostic and therapeutic management plan in collaboration with the EM resident. Please see MDM note and remainder of EMR for findings from CC, HPI, ROS, and PE. ([place])

## 2018-02-11 NOTE — ED PROVIDER NOTE - OBJECTIVE STATEMENT
89F h/o HTN, Colon CA (remission) presents with unwitnessed fall at home, does not admit to LOC or hitting head. She states she was getting up to get her cane and fell over, now complaining of severe left hip/femur pain, unable to walk, or move the leg. She remember the whole event, is AOx3. Of note, she was recently in the hospital and has shingles, on medication. She has no other complaints, no headache. dizziness, fevers, chills, N/V/D,.  She lives at home alone. 89F h/o HTN, Colon CA (remission) presents with unwitnessed fall at home, does not admit to LOC or hitting head. She states she was getting up to get her cane and fell over, now complaining of severe left hip/femur pain, unable to walk, or move the leg. She remember the whole event, is AOx3. Of note, she was recently in the hospital and has shingles, on medication. She has no other complaints, no headache. dizziness, fevers, chills, N/V/D.  She lives at home alone. 89F h/o HTN, Colon CA (remission) presents with unwitnessed fall at home, does not admit to LOC or hitting head. She states she was getting up to get her cane and fell over, now complaining of severe left hip/femur pain, unable to walk, or move the leg. She remember the whole event, is AOx3. Of note, she was recently in the hospital and has shingles, on medication. She has no other complaints, no headache. dizziness, fevers, chills, N/V/D.  She lives at home alone.  **ATTENDING ADDENDUM (Dr. Rock Martinez): I attest that I have directly and personally interviewed and examined this patient and elicited a comparable history of present illness and review of systems as documented, along with my EM resident. I attest that I have made significant contributions to the documentation where necessary and as noted in the EMR. Of note, and in addition to the above, patient is a 89-year-old woman s/p recent admission for herpes zoster of the right lower quadrant (PCP: "Dr. Munoz"; lives home alone) now presenting s/p mechanical fall with left hip/knee/femur pain and tenderness, worse with palpation and some positional changes, but without chest pain, palpitations, shortness of breath, dyspnea on exertion, obvious head trauma/headache, neck pain, numbness, tingling, weakness, paresthesias, distal discoloration, syncope, or near-syncope.

## 2018-02-11 NOTE — ED PROVIDER NOTE - RESPIRATORY, MLM
Breath sounds clear and equal bilaterally. Breath sounds clear and equal bilaterally. **ATTENDING ADDENDUM (Dr. Rock Martienz): **ATTENDING ADDENDUM (Dr. Rock Martinez): BREATHING CLEAR. POSITIVE BILATERAL BREATH SOUNDS auscultated. NO stridor, drooling, tripoding, or wheezing. POSITIVE bilateral chest wall expansion WITHOUT crepitus, tenderness, or deformity. POSITIVE midline trachea.

## 2018-02-11 NOTE — H&P ADULT - PROBLEM SELECTOR PLAN 5
chronic, continue monitoring on coumadin with elevated INR 3.4  - 10 units vitamin K as per ortho  - hold coumadin prior to surgery  - continue monitoring INR

## 2018-02-11 NOTE — ED PROVIDER NOTE - PLAN OF CARE
ATTENDING ADDENDUM (Dr. Rock Martinez): Goals of care include resolution of emergent/urgent symptoms and concerns, and restoration to baseline level of homeostasis.

## 2018-02-11 NOTE — ED PROVIDER NOTE - OTHER FINDINGS
axis -59 NO FREIDA poor baseline noted. axis -59 NO FREIDA poor baseline noted incomplete right bundle branch block left anterior fascicular block

## 2018-02-11 NOTE — ED PROVIDER NOTE - CONDUCTED A DETAILED DISCUSSION WITH PATIENT AND/OR GUARDIAN REGARDING, MDM
radiology results/lab results/need to admit/**ATTENDING ADDENDUM (Dr. Rock Martinez): Anticipatory guidance provided.

## 2018-02-11 NOTE — H&P ADULT - PROBLEM SELECTOR PLAN 2
EKG in ED with 1st degree AV block, incomplete RBBB and LAFB. Asymptomatic  - No prior EKGs found in sun[facility] or allscripts for comparison  - continue monitoring Microcytic anemia with Hb 7.4, likely BARRY. No ongoing blood loss suspected at this time, however patient with elevated INR making her higher bleed risk for OR tomorrow  - would transfuse 1 unit PRBC, check post transfusion CBC

## 2018-02-11 NOTE — ED PROVIDER NOTE - PHYSICAL EXAMINATION
**ATTENDING ADDENDUM (Dr. Rock Martinez): I have reviewed and substantially contributed to the elements of the PE as documented above. I have directly performed an examination of this patient in conjunction with the other members (EM resident/PA/NP) of the patient care team.

## 2018-02-11 NOTE — ED PROVIDER NOTE - HEME/LYMPH [+], MLM
**ATTENDING ADDENDUM (Dr. Rock Martinez): POSITIVE history of lymphedema in the bilateral lower extremities.

## 2018-02-11 NOTE — ED PROVIDER NOTE - LOWER EXTREMITY EXAM, LEFT
Some bruising on lateral proximal thigh, pain with ROM, unable to range. Pulses dimineshed on left, possibly due to chronic edema b/l. Not shortened or rotated./TENDERNESS/REDUCED STRENGTH/LIMITED ROM

## 2018-02-11 NOTE — CONSULT NOTE ADULT - SUBJECTIVE AND OBJECTIVE BOX
89yFemale c/o L hip and thigh pain s/p mechanical fall. Patient denies head strike or LOC. Patient denies numbness or tingling in the LLE. Patient denies any other injuries. Patient last ate last ngiht.     Pt takes coumadin for because she is hypercoaguable and has a propensity for clots    PMH:  B12 deficiency  Lymphedema, limb  Colon cancer  Hard of Hearing  GERD (Gastroesophageal Reflux Disease)  Gallstone  Hypertension    PSH:  H/O exploratory laparotomy  S/P colectomy  S/P CAREY (Total Abdominal Hysterectomy)    AH:  penicillin (Unknown)    Meds: See med rec    T(C): 36.8 (02-11-18 @ 15:45)  HR: 84 (02-11-18 @ 15:45)  BP: 153/75 (02-11-18 @ 15:45)  RR: 16 (02-11-18 @ 12:01)  SpO2: 100% (02-11-18 @ 15:45)  Wt(kg): --                        7.4    12.0  )-----------( 332      ( 11 Feb 2018 12:15 )             22.6     02-11    142  |  110<H>  |  37<H>  ----------------------------<  120<H>  4.4   |  21<L>  |  1.32<H>    Ca    9.5      11 Feb 2018 12:15    TPro  6.0  /  Alb  3.4  /  TBili  0.3  /  DBili  x   /  AST  29  /  ALT  27  /  AlkPhos  72  02-11    PT/INR - ( 11 Feb 2018 12:15 )   PT: 37.7 sec;   INR: 3.40 ratio         PTT - ( 11 Feb 2018 12:15 )  PTT:40.2 sec      PE  Gen: Laying in bed, alert and oriented, NAD  Resp: Unlabored breathing  LLE: Skin intact, no ecchymosis, SILT DP/SP, +EHL/FHL/TA/Gastroc, +Knee/ankle ROM, hip ROM limited 2/2 pain, DP+, soft compartments, no calf ttp, +log roll.    Secondary:  No TTP over bony landmarks, SILT BL, ROM intact BL, distal pulses palpable.    Imaging:  XR demonstrating L hip fracture 89yFemale c/o L hip and thigh pain s/p mechanical fall. Patient denies head strike or LOC. Patient denies numbness or tingling in the LLE. Patient denies any other injuries. Patient last ate last ngiht.     Pt takes coumadin because she is hypercoaguable and has a propensity for getting LE DVTs. According to her grandson, she was briefly off coumadin and immediately developed clots, and failed trials of other NOACs.     PMH:  B12 deficiency  Lymphedema, limb  Colon cancer  Hard of Hearing  GERD (Gastroesophageal Reflux Disease)  Gallstone  Hypertension    PSH:  H/O exploratory laparotomy  S/P colectomy  S/P CAREY (Total Abdominal Hysterectomy)    AH:  penicillin (Unknown)    Meds: See med rec    T(C): 36.8 (02-11-18 @ 15:45)  HR: 84 (02-11-18 @ 15:45)  BP: 153/75 (02-11-18 @ 15:45)  RR: 16 (02-11-18 @ 12:01)  SpO2: 100% (02-11-18 @ 15:45)  Wt(kg): --                        7.4    12.0  )-----------( 332      ( 11 Feb 2018 12:15 )             22.6     02-11    142  |  110<H>  |  37<H>  ----------------------------<  120<H>  4.4   |  21<L>  |  1.32<H>    Ca    9.5      11 Feb 2018 12:15    TPro  6.0  /  Alb  3.4  /  TBili  0.3  /  DBili  x   /  AST  29  /  ALT  27  /  AlkPhos  72  02-11    PT/INR - ( 11 Feb 2018 12:15 )   PT: 37.7 sec;   INR: 3.40 ratio         PTT - ( 11 Feb 2018 12:15 )  PTT:40.2 sec      PE  Gen: Laying in bed, alert and oriented, NAD  Resp: Unlabored breathing  LLE: Skin intact, no ecchymosis, SILT DP/SP, +EHL/FHL/TA/Gastroc, +Knee/ankle ROM, hip ROM limited 2/2 pain, DP+, soft compartments, no calf ttp, +log roll.    Secondary:  No TTP over bony landmarks, SILT BL, ROM intact BL, distal pulses palpable.    Imaging:  XR demonstrating L IT fracture

## 2018-02-11 NOTE — H&P ADULT - PROBLEM SELECTOR PLAN 4
on coumadin with elevated INR 3.4  - 10 units vitamin K as per ortho  - hold coumadin prior to surgery  - continue monitoring INR resolved without any open lesions.   - may stop valacyclovir as today is last day of therapy  - continue capsaicin ointment PRN for any pain

## 2018-02-11 NOTE — H&P ADULT - HISTORY OF PRESENT ILLNESS
89f hx colon cancer in remission, unprovoked PE on coumadin, htn, chronic B/L LE lymphedema, recent [facility] admission for zoster presenting today s/p fall at home. Unwitnessed. Patient recalls entire episode- states she slipped and fell onto her L side and developed resultant L hip and LE pain. No preceding syncope, CP, palpitations, sob. No head trauma or LOC. States her zoster rash is much better now and doesn't cause her any discomfort. No fevers/chills. Reports good compliance with coumadin. No bruising noted anywhere. No recent sick contacts or travel.    In ED: 97.6, 88, 122/71, 18, 99RA. Ortho called in ED.

## 2018-02-11 NOTE — ED ADULT NURSE NOTE - OBJECTIVE STATEMENT
89 y/o F brought by Ambulance post fall. Aox3 states she got up last night to use the bathroom, tripped and fell on her backside. Pt takes coumadin. Pt has L hip pain, non-pitting edema in bl ankles. Strong pedal pulse in R foot, weak pedal pulse in L foot, extremities cool normal color for race, red socks applied. Pt denies hitting head, LOC, chest pain, SOB, dizziness. No bleeding or abrasions noted. Pt has hx of lymphoderma bl and shingles on RLQ of abdomen. VSS, red socks applied, will continue to reassess. 89 y/o F brought by Ambulance post fall. Aox3 states she got up last night to use the bathroom, tripped and fell on her backside. Pt takes coumadin. Pt has L hip pain, non-pitting edema in bl ankles. Strong pedal pulse in R foot, weak pedal pulse in L foot, extremities cool normal color for race, red socks applied. Pt denies hitting head, LOC, chest pain, SOB, dizziness. No bleeding or abrasions noted. Pt has hx of lymphoderma bl and shingles on RLQ of abdomen. VSS, ECG reading afib, red socks applied, will continue to reassess.

## 2018-02-11 NOTE — ED PROVIDER NOTE - SKIN RASH DESCRIPTION
REDDENED/PATCHY AREAS/VESICULAR/**ATTENDING ADDENDUM (Dr. Rock Martinez): herpes zoster as noted above.

## 2018-02-11 NOTE — ED PROVIDER NOTE - MUSCULOSKELETAL NECK EXAM
trachea midline/**ATTENDING ADDENDUM (Dr. Rock Martinez): NO cervical-thoracic-lumbar-sacral midline bony tenderness or stepoff./no deformity, pain or tenderness. no restriction of movement/supple

## 2018-02-11 NOTE — H&P ADULT - PROBLEM SELECTOR PLAN 1
s/p mechanical fall at home.   - plan for OR with ortho  - pt without any active cardiac complaints or known CAD, however EKG shows 1st degree AV block with bifascicular block, planned for semi-urgent, intermediate risk surgery. Functional status at home is unknown. RCRI of 0, class I risk and medically optimized for intermediate risk surgery.  - tylenol prn pain s/p mechanical fall at home.   - plan for OR with ortho  - pt without any active cardiac complaints or known CAD, however EKG shows 1st degree AV block with bifascicular block, planned for semi-urgent, intermediate risk surgery. Functional status at home is unknown. RCRI of 0, class I risk. Patient will be medically optimized for OR after 1 unit PRBC.  - tylenol prn pain

## 2018-02-11 NOTE — H&P ADULT - PROBLEM SELECTOR PLAN 6
Valsartan stopped last admission, given patient's age, may continue monitoring BP off antihypertensives for now. chronic, continue monitoring

## 2018-02-11 NOTE — H&P ADULT - PROBLEM SELECTOR PLAN 7
Reportedly in remission  - recommend OP follow up Valsartan stopped last admission, given patient's age, may continue monitoring BP off antihypertensives for now.

## 2018-02-11 NOTE — ED PROVIDER NOTE - EYES, MLM
Clear bilaterally, pupils equal, round and reactive to light. Acus senilis. Clear bilaterally, pupils equal, round and reactive to light. Acus senilis. **ATTENDING ADDENDUM (Dr. Rock Martinez): agree with notation by Blinder. Extraocular muscle movements intact.

## 2018-02-11 NOTE — ED PROVIDER NOTE - AGGRAVATING FACTORS
positional change/**ATTENDING ADDENDUM (Dr. Rock Martinez): palpation over the proximal left hip and thigh/movement

## 2018-02-11 NOTE — ED PROVIDER NOTE - NEUROLOGICAL, MLM
Alert and oriented, no focal deficits, no motor or sensory deficits. **ATTENDING ADDENDUM (Dr. Rock Martinez): follows commands appropriately.

## 2018-02-11 NOTE — ED PROVIDER NOTE - CHIEF COMPLAINT
The patient is a 89y Female complaining of The patient is a 89y Female complaining of fall The patient is a 89y Female complaining of fall with left lower extremity pain and decreased range of motion.

## 2018-02-11 NOTE — ED PROVIDER NOTE - PROGRESS NOTE DETAILS
**ATTENDING ADDENDUM (Dr. Rock Martinez): evaluated. Agree with goals/plan of ED care as described in EMR, including diagnostics, therapeutics and consultation as clinically warranted. Anticipatory guidance provided. Will continue to observe and monitor closely. Awaiting completion of all ED diagnostics. **ATTENDING ADDENDUM (Dr. Rock Martinez): XR noted; with left-sided intertrochanteric v. distal neck fracture of hip. Orthopedics consulted. Likely admission pending completion of all ED diagnostics and consultations. Will continue to observe and monitor closely. **ATTENDING ADDENDUM (Dr. Rock Martinez): patient serially evaluated throughout ED course. NO acute deterioration up to this time in the ED. Case reviewed with ED staff, including staff RN, clinical pharmacist, and orthopedic resident re: recommendations/orders for IV phytonadione (vitamin K) for reversal of coagulopathy. Discussion of oral v. IV formulation, alternative reversal agents for coagulopathy e.g. KCentra, FFP or equivalent, and risks/benefits/alternatives. Following discussion with clinical pharmacologist, agree with IV dosing in this context as ordered in the EMR (over 30 minutes, slow infusion). ED team Will continue to observe and monitor closely until definitive placement is made. For operative repair tomorrow pending reversal of coagulopathy.

## 2018-02-11 NOTE — H&P ADULT - PROBLEM SELECTOR PLAN 10
-ppx: SCDs, hold coumadin  - diet: dash/tlc  - dispo; OR as per ortho    11. DIOGENES- likely 2/2 poor PO intake in setting of fall  - encourage PO intake today as pt will be NPO p MN  - maintenance IVF @ 75cc/hr  - pt to receive 1 unit PRBC

## 2018-02-11 NOTE — H&P ADULT - PROBLEM SELECTOR PLAN 3
resolved without any open lesions.   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 Asymptomatic  - No prior EKGs found in [facility]rise or allscripts for comparison  - continue monitoring

## 2018-02-11 NOTE — ED PROVIDER NOTE - GASTROINTESTINAL NEGATIVE STATEMENT, MLM
no abdominal pain, no bloating, no constipation, no diarrhea, no nausea and no vomiting. no abdominal pain, no bloating, no constipation, no diarrhea, no nausea and no vomiting. **ATTENDING ADDENDUM (Dr. Rock Martinez): POSITIVE history of gastroesophageal reflux disease.

## 2018-02-11 NOTE — ED PROVIDER NOTE - MEDICAL DECISION MAKING DETAILS
**ATTENDING MEDICAL DECISION MAKING/SYNTHESIS (Dr. Rock Martinez): I have reviewed the Chief Complaint, the HPI, the ROS, and have directly performed and confirmed the findings on the Physical Examination. I have reviewed the medical decision making with all providers, as applicable. The PROBLEM REPRESENTATION at this time is: 89-year-old woman s/p recent admission for herpes zoster of the right lower quadrant (PCP: "Dr. Munoz"; lives home alone) now presenting s/p mechanical fall with left hip/knee/femur pain and tenderness, worse with palpation and some positional changes, but without chest pain, palpitations, shortness of breath, dyspnea on exertion, obvious head trauma/headache, neck pain, numbness, tingling, weakness, paresthesias, distal discoloration, syncope, or near-syncope. The MOST LIKELY DIAGNOSIS, and the LIST OF DIFFERENTIAL DIAGNOSES, includes (but is not limited to) the following: sequela of fall: fracture of left lower extremity, dislocation (both are possible), sprain/strain (possible), intracerebral hemorrhage (unlikely but possible), cord/spine injury, rib fracture(s) or flail chest (possible), intra-thoracic hemorrhage (hemothorax), intra-abdominal hemorrhage (hemoperitoneum). Cause for fall: arrhythmia, acute coronary syndrome (unlikely), dehydration, deconditioning, electrolyte-metabolic-endocrine derangements, arrhythmia, serious bacterial infection or sepsis/severe sepsis (possible but less likely). The likelihood of each of these diagnoses has been appropriately considered in the context of this patient's presentation and my evaluation. PLAN: as described in EMR, including diagnostics, therapeutics and consultation as clinically warranted. I will continue to reevaluate the patient, including the results of all testing, and monitor response to therapy throughout the patient's course in the ED. **ATTENDING MEDICAL DECISION MAKING/SYNTHESIS (Dr. Rock Martinez): I have reviewed the Chief Complaint, the HPI, the ROS, and have directly performed and confirmed the findings on the Physical Examination. I have reviewed the medical decision making with all providers, as applicable. The PROBLEM REPRESENTATION at this time is: 89-year-old woman s/p recent admission for herpes zoster of the right lower quadrant (PCP: "Dr. Munoz"; lives home alone) now presenting s/p mechanical fall with left hip/knee/femur pain and tenderness, worse with palpation and some positional changes, but without chest pain, palpitations, shortness of breath, dyspnea on exertion, obvious head trauma/headache, neck pain, numbness, tingling, weakness, paresthesias, distal discoloration, syncope, or near-syncope. The MOST LIKELY DIAGNOSIS, and the LIST OF DIFFERENTIAL DIAGNOSES, includes (but is not limited to) the following: sequela of fall: fracture of left lower extremity (hip, intertrochanteric or equivalent), dislocation (both are possible), sprain/strain (possible), intracerebral hemorrhage (unlikely but possible), cord/spine injury, rib fracture(s) or flail chest (possible), intra-thoracic hemorrhage (hemothorax), intra-abdominal hemorrhage (hemoperitoneum). Cause for fall: arrhythmia, acute coronary syndrome (unlikely), dehydration, deconditioning, electrolyte-metabolic-endocrine derangements, arrhythmia, serious bacterial infection or sepsis/severe sepsis (possible but less likely). The likelihood of each of these diagnoses has been appropriately considered in the context of this patient's presentation and my evaluation. PLAN: as described in EMR, including diagnostics, therapeutics and consultation as clinically warranted. I will continue to reevaluate the patient, including the results of all testing, and monitor response to therapy throughout the patient's course in the ED.

## 2018-02-11 NOTE — ED PROVIDER NOTE - GASTROINTESTINAL, MLM
Abdomen soft, non-tender, no guarding. Abdomen soft, non-tender **ATTENDING ADDENDUM (Dr. Rock Martinez): NO guarding, rebound, or rigidity. NO pulsatile or non-pulsatile masses. NO hernias. NO obvious hepatosplenomegaly. Of note, and in addition to the above, there is a vesicular rash consistent with herpes zoster extended along a dermatomal distribution from the right flank to the right lower quadrant of the abdomen.

## 2018-02-11 NOTE — ED PROVIDER NOTE - CARE PLAN
Principal Discharge DX:	Femur fracture, left  Secondary Diagnosis:	Shingles Principal Discharge DX:	Femur fracture, left  Goal:	ATTENDING ADDENDUM (Dr. Rock Martinez): Goals of care include resolution of emergent/urgent symptoms and concerns, and restoration to baseline level of homeostasis.  Secondary Diagnosis:	Shingles Principal Discharge DX:	Closed fracture of left hip, initial encounter  Goal:	ATTENDING ADDENDUM (Dr. Rock Martinez): Goals of care include resolution of emergent/urgent symptoms and concerns, and restoration to baseline level of homeostasis.  Secondary Diagnosis:	Lymphedema, limb

## 2018-02-12 ENCOUNTER — TRANSCRIPTION ENCOUNTER (OUTPATIENT)
Age: 83
End: 2018-02-12

## 2018-02-12 DIAGNOSIS — N18.3 CHRONIC KIDNEY DISEASE, STAGE 3 (MODERATE): ICD-10-CM

## 2018-02-12 LAB
ANION GAP SERPL CALC-SCNC: 14 MMOL/L — SIGNIFICANT CHANGE UP (ref 5–17)
ANION GAP SERPL CALC-SCNC: 9 MMOL/L — SIGNIFICANT CHANGE UP (ref 5–17)
APTT BLD: 30.8 SEC — SIGNIFICANT CHANGE UP (ref 27.5–37.4)
BLD GP AB SCN SERPL QL: NEGATIVE — SIGNIFICANT CHANGE UP
BUN SERPL-MCNC: 22 MG/DL — SIGNIFICANT CHANGE UP (ref 7–23)
BUN SERPL-MCNC: 30 MG/DL — HIGH (ref 7–23)
CALCIUM SERPL-MCNC: 8.9 MG/DL — SIGNIFICANT CHANGE UP (ref 8.4–10.5)
CALCIUM SERPL-MCNC: 9 MG/DL — SIGNIFICANT CHANGE UP (ref 8.4–10.5)
CHLORIDE SERPL-SCNC: 107 MMOL/L — SIGNIFICANT CHANGE UP (ref 96–108)
CHLORIDE SERPL-SCNC: 114 MMOL/L — HIGH (ref 96–108)
CO2 SERPL-SCNC: 20 MMOL/L — LOW (ref 22–31)
CO2 SERPL-SCNC: 23 MMOL/L — SIGNIFICANT CHANGE UP (ref 22–31)
CREAT SERPL-MCNC: 1.11 MG/DL — SIGNIFICANT CHANGE UP (ref 0.5–1.3)
CREAT SERPL-MCNC: 1.26 MG/DL — SIGNIFICANT CHANGE UP (ref 0.5–1.3)
CULTURE RESULTS: SIGNIFICANT CHANGE UP
GLUCOSE SERPL-MCNC: 90 MG/DL — SIGNIFICANT CHANGE UP (ref 70–99)
GLUCOSE SERPL-MCNC: 90 MG/DL — SIGNIFICANT CHANGE UP (ref 70–99)
HCT VFR BLD CALC: 23.5 % — LOW (ref 34.5–45)
HCT VFR BLD CALC: 32.1 % — LOW (ref 34.5–45)
HGB BLD-MCNC: 10.5 G/DL — LOW (ref 11.5–15.5)
HGB BLD-MCNC: 8 G/DL — LOW (ref 11.5–15.5)
INR BLD: 1.41 RATIO — HIGH (ref 0.88–1.16)
MCHC RBC-ENTMCNC: 27.2 PG — SIGNIFICANT CHANGE UP (ref 27–34)
MCHC RBC-ENTMCNC: 27.6 PG — SIGNIFICANT CHANGE UP (ref 27–34)
MCHC RBC-ENTMCNC: 32.8 GM/DL — SIGNIFICANT CHANGE UP (ref 32–36)
MCHC RBC-ENTMCNC: 33.9 GM/DL — SIGNIFICANT CHANGE UP (ref 32–36)
MCV RBC AUTO: 81.4 FL — SIGNIFICANT CHANGE UP (ref 80–100)
MCV RBC AUTO: 82.9 FL — SIGNIFICANT CHANGE UP (ref 80–100)
PLATELET # BLD AUTO: 263 K/UL — SIGNIFICANT CHANGE UP (ref 150–400)
PLATELET # BLD AUTO: 290 K/UL — SIGNIFICANT CHANGE UP (ref 150–400)
POTASSIUM SERPL-MCNC: 4.1 MMOL/L — SIGNIFICANT CHANGE UP (ref 3.5–5.3)
POTASSIUM SERPL-MCNC: 4.5 MMOL/L — SIGNIFICANT CHANGE UP (ref 3.5–5.3)
POTASSIUM SERPL-SCNC: 4.1 MMOL/L — SIGNIFICANT CHANGE UP (ref 3.5–5.3)
POTASSIUM SERPL-SCNC: 4.5 MMOL/L — SIGNIFICANT CHANGE UP (ref 3.5–5.3)
PROTHROM AB SERPL-ACNC: 15.3 SEC — HIGH (ref 9.8–12.7)
RBC # BLD: 2.88 M/UL — LOW (ref 3.8–5.2)
RBC # BLD: 3.87 M/UL — SIGNIFICANT CHANGE UP (ref 3.8–5.2)
RBC # FLD: 14.7 % — HIGH (ref 10.3–14.5)
RBC # FLD: 15.5 % — HIGH (ref 10.3–14.5)
RH IG SCN BLD-IMP: POSITIVE — SIGNIFICANT CHANGE UP
SODIUM SERPL-SCNC: 141 MMOL/L — SIGNIFICANT CHANGE UP (ref 135–145)
SODIUM SERPL-SCNC: 146 MMOL/L — HIGH (ref 135–145)
SPECIMEN SOURCE: SIGNIFICANT CHANGE UP
WBC # BLD: 7.2 K/UL — SIGNIFICANT CHANGE UP (ref 3.8–10.5)
WBC # BLD: 7.8 K/UL — SIGNIFICANT CHANGE UP (ref 3.8–10.5)
WBC # FLD AUTO: 7.2 K/UL — SIGNIFICANT CHANGE UP (ref 3.8–10.5)
WBC # FLD AUTO: 7.8 K/UL — SIGNIFICANT CHANGE UP (ref 3.8–10.5)

## 2018-02-12 PROCEDURE — 99222 1ST HOSP IP/OBS MODERATE 55: CPT

## 2018-02-12 PROCEDURE — 99233 SBSQ HOSP IP/OBS HIGH 50: CPT

## 2018-02-12 RX ORDER — ONDANSETRON 8 MG/1
4 TABLET, FILM COATED ORAL EVERY 6 HOURS
Qty: 0 | Refills: 0 | Status: DISCONTINUED | OUTPATIENT
Start: 2018-02-12 | End: 2018-02-16

## 2018-02-12 RX ORDER — ACETAMINOPHEN 500 MG
650 TABLET ORAL EVERY 6 HOURS
Qty: 0 | Refills: 0 | Status: DISCONTINUED | OUTPATIENT
Start: 2018-02-12 | End: 2018-02-16

## 2018-02-12 RX ORDER — PREGABALIN 225 MG/1
1000 CAPSULE ORAL DAILY
Qty: 0 | Refills: 0 | Status: DISCONTINUED | OUTPATIENT
Start: 2018-02-12 | End: 2018-02-12

## 2018-02-12 RX ORDER — ASCORBIC ACID 60 MG
500 TABLET,CHEWABLE ORAL
Qty: 0 | Refills: 0 | Status: DISCONTINUED | OUTPATIENT
Start: 2018-02-12 | End: 2018-02-16

## 2018-02-12 RX ORDER — GABAPENTIN 400 MG/1
600 CAPSULE ORAL DAILY
Qty: 0 | Refills: 0 | Status: DISCONTINUED | OUTPATIENT
Start: 2018-02-12 | End: 2018-02-12

## 2018-02-12 RX ORDER — DOCUSATE SODIUM 100 MG
100 CAPSULE ORAL THREE TIMES A DAY
Qty: 0 | Refills: 0 | Status: DISCONTINUED | OUTPATIENT
Start: 2018-02-12 | End: 2018-02-16

## 2018-02-12 RX ORDER — LATANOPROST 0.05 MG/ML
1 SOLUTION/ DROPS OPHTHALMIC; TOPICAL AT BEDTIME
Qty: 0 | Refills: 0 | Status: DISCONTINUED | OUTPATIENT
Start: 2018-02-12 | End: 2018-02-12

## 2018-02-12 RX ORDER — CAPSAICIN 0.025 %
1 CREAM (GRAM) TOPICAL THREE TIMES A DAY
Qty: 0 | Refills: 0 | Status: DISCONTINUED | OUTPATIENT
Start: 2018-02-12 | End: 2018-02-12

## 2018-02-12 RX ORDER — VALACYCLOVIR 500 MG/1
1000 TABLET, FILM COATED ORAL EVERY 24 HOURS
Qty: 0 | Refills: 0 | Status: DISCONTINUED | OUTPATIENT
Start: 2018-02-12 | End: 2018-02-12

## 2018-02-12 RX ORDER — MORPHINE SULFATE 50 MG/1
2 CAPSULE, EXTENDED RELEASE ORAL EVERY 4 HOURS
Qty: 0 | Refills: 0 | Status: DISCONTINUED | OUTPATIENT
Start: 2018-02-12 | End: 2018-02-14

## 2018-02-12 RX ORDER — SODIUM CHLORIDE 9 MG/ML
1000 INJECTION, SOLUTION INTRAVENOUS
Qty: 0 | Refills: 0 | Status: DISCONTINUED | OUTPATIENT
Start: 2018-02-12 | End: 2018-02-13

## 2018-02-12 RX ORDER — WARFARIN SODIUM 2.5 MG/1
5 TABLET ORAL ONCE
Qty: 0 | Refills: 0 | Status: COMPLETED | OUTPATIENT
Start: 2018-02-12 | End: 2018-02-12

## 2018-02-12 RX ORDER — VALACYCLOVIR 500 MG/1
1000 TABLET, FILM COATED ORAL EVERY 24 HOURS
Qty: 0 | Refills: 0 | Status: DISCONTINUED | OUTPATIENT
Start: 2018-02-12 | End: 2018-02-15

## 2018-02-12 RX ORDER — TRAMADOL HYDROCHLORIDE 50 MG/1
25 TABLET ORAL EVERY 6 HOURS
Qty: 0 | Refills: 0 | Status: DISCONTINUED | OUTPATIENT
Start: 2018-02-12 | End: 2018-02-16

## 2018-02-12 RX ORDER — BENZOCAINE AND MENTHOL 5; 1 G/100ML; G/100ML
1 LIQUID ORAL EVERY 4 HOURS
Qty: 0 | Refills: 0 | Status: DISCONTINUED | OUTPATIENT
Start: 2018-02-12 | End: 2018-02-16

## 2018-02-12 RX ADMIN — Medication 325 MILLIGRAM(S): at 10:21

## 2018-02-12 RX ADMIN — SODIUM CHLORIDE 3 MILLILITER(S): 9 INJECTION INTRAMUSCULAR; INTRAVENOUS; SUBCUTANEOUS at 05:32

## 2018-02-12 RX ADMIN — TRAMADOL HYDROCHLORIDE 25 MILLIGRAM(S): 50 TABLET ORAL at 23:25

## 2018-02-12 RX ADMIN — SODIUM CHLORIDE 3 MILLILITER(S): 9 INJECTION INTRAMUSCULAR; INTRAVENOUS; SUBCUTANEOUS at 21:50

## 2018-02-12 RX ADMIN — SODIUM CHLORIDE 3 MILLILITER(S): 9 INJECTION INTRAMUSCULAR; INTRAVENOUS; SUBCUTANEOUS at 14:41

## 2018-02-12 RX ADMIN — WARFARIN SODIUM 5 MILLIGRAM(S): 2.5 TABLET ORAL at 22:36

## 2018-02-12 RX ADMIN — VALACYCLOVIR 1000 MILLIGRAM(S): 500 TABLET, FILM COATED ORAL at 22:35

## 2018-02-12 RX ADMIN — PREGABALIN 1000 MICROGRAM(S): 225 CAPSULE ORAL at 12:23

## 2018-02-12 RX ADMIN — SODIUM CHLORIDE 100 MILLILITER(S): 9 INJECTION, SOLUTION INTRAVENOUS at 20:22

## 2018-02-12 RX ADMIN — Medication 100 MILLIGRAM(S): at 22:37

## 2018-02-12 RX ADMIN — Medication 1 TABLET(S): at 12:23

## 2018-02-12 RX ADMIN — LATANOPROST 1 DROP(S): 0.05 SOLUTION/ DROPS OPHTHALMIC; TOPICAL at 01:25

## 2018-02-12 RX ADMIN — LATANOPROST 1 DROP(S): 0.05 SOLUTION/ DROPS OPHTHALMIC; TOPICAL at 22:35

## 2018-02-12 RX ADMIN — GABAPENTIN 600 MILLIGRAM(S): 400 CAPSULE ORAL at 12:23

## 2018-02-12 RX ADMIN — TRAMADOL HYDROCHLORIDE 25 MILLIGRAM(S): 50 TABLET ORAL at 22:50

## 2018-02-12 NOTE — CONSULT NOTE ADULT - SUBJECTIVE AND OBJECTIVE BOX
"HPI: 5f hx colon cancer in remission, unprovoked PE on coumadin, htn, chronic B/L LE lymphedema, recent [facility] admission for zoster presenting today s/p fall at home. Unwitnessed. Patient recalls entire episode- states she slipped and fell onto her L side and developed resultant L hip and LE pain. No preceding syncope, CP, palpitations, sob. No head trauma or LOC. States her zoster rash is much better now and doesn't cause her any discomfort. No fevers/chills. Reports good compliance with coumadin. No bruising noted anywhere. No recent sick contacts or travel.    In ED: 97.6, 88, 122/71, 18, 99RA. Ortho called in ED. (2018 16:36)"    Above reviewed. Saw/spoke to patient. No fevers, no chills. Patient initially with fall, and pain to L hip and LLE. Patient states had Shingles on R flank, diagnosed 13 days ago, and that it is getting better. No history immunocompromise, does not frequently get episodes of shingles. Reports that the area is not expanding, no painful presently. No redness spreading from site. No accompanying fevers. Has taken anti-shingles medication at home and has had relief with this medications. Otherwise well aside from LLE pain which is worse around the hip.    PAST MEDICAL & SURGICAL HISTORY:  B12 deficiency  Lymphedema, limb: BLE  Colon cancer: 6 yrs ago. did not require chemo  Hard of Hearing  GERD (Gastroesophageal Reflux Disease)  Gallstone  Hypertension  H/O exploratory laparotomy: for SBO?  S/P colectomy: partial colectomy due to colon ca  S/P CAREY (Total Abdominal Hysterectomy)    Allergies    penicillin (Unknown)    ANTIMICROBIALS:  Off    OTHER MEDS:  acetaminophen   Tablet. 650 milliGRAM(s) Oral every 6 hours PRN  capsaicin 0.025% Cream 1 Application(s) Topical three times a day PRN  cyanocobalamin 1000 MICROGram(s) Oral daily  ferrous    sulfate 325 milliGRAM(s) Oral two times a day with meals  gabapentin 600 milliGRAM(s) Oral daily  latanoprost 0.005% Ophthalmic Solution 1 Drop(s) Both EYES at bedtime  multivitamin/minerals 1 Tablet(s) Oral daily  pantoprazole    Tablet 40 milliGRAM(s) Oral before breakfast  sodium chloride 0.9% lock flush 3 milliLiter(s) IV Push every 8 hours  sodium chloride 0.9%. 1000 milliLiter(s) IV Continuous <Continuous>    SOCIAL HISTORY: No tobacco, no alcohol, no illicit drugs    FAMILY HISTORY:  No pertinent family history in first degree relatives    Drug Dosing Weight  Height (cm): 157.48 (2018 11:28)  Weight (kg): 61.2 (2018 11:28)  BMI (kg/m2): 24.7 (2018 11:)  BSA (m2): 1.62 (2018 11:28)    PE:    Vital Signs Last 24 Hrs  T(C): 36.7 (2018 10:29), Max: 37.1 (2018 23:24)  T(F): 98 (2018 10:29), Max: 98.8 (2018 23:24)  HR: 77 (2018 10:29) (61 - 90)  BP: 157/70 (2018 10:29) (130/57 - 165/81)  BP(mean): 101 (2018 16:36) (101 - 101)  RR: 16 (2018 10:29) (16 - 18)  SpO2: 100% (2018 10:29) (100% - 100%)    Gen: AOx2-3, NAD, non-toxic, pleasant, [place]  CV: S1+S2 normal, no murmurs, nontachycardic  Resp: Clear bilat, no resp distress, no crackles/wheezes  Abd: Soft, nontender, +BS  Ext: Localizes pain to LLE, hip  : Rene in place  IV/Skin: R abd to R flank patchy rash consistent with shingles beginning to crust; LE bilaterally feet, no evidence active shingles  Msk: L hip pain  Neuro: No sensory deficits, no motor deficits    LABS:                        8.0    7.2   )-----------( 290      ( 2018 06:21 )             23.5     02-12    146<H>  |  114<H>  |  30<H>  ----------------------------<  90  4.5   |  23  |  1.26    Ca    9.0      2018 06:39    TPro  6.0  /  Alb  3.4  /  TBili  0.3  /  DBili  x   /  AST  29  /  ALT  27  /  AlkPhos  72  02-    Urinalysis Basic - ( 2018 23:03 )    Color: Yellow / Appearance: Clear / S.015 / pH: x  Gluc: x / Ketone: Negative  / Bili: Negative / Urobili: Negative   Blood: x / Protein: Trace / Nitrite: Negative   Leuk Esterase: Negative / RBC: x / WBC x   Sq Epi: x / Non Sq Epi: x / Bacteria: x    MICROBIOLOGY:    No new available    RADIOLOGY:    CXR :    FINDINGS:  The cardiac silhouette is normal in size. There are no focal   consolidations or pleural effusions. The hilar and mediastinal structures   appear unremarkable. The osseous structures are intact.    IMPRESSION: Clear lungs.     XR Hip:    IMPRESSION:     Intertrochanteric left femur fracture is better appreciated on AP x-ray   performed earlier the same day.

## 2018-02-12 NOTE — PROCEDURE NOTE - NSURITECHNIQUE_GU_A_CORE
Proper hand hygiene was performed/The catheter was appropriately lubricated/The catheter was secured with a securement device (e.g. StatLock)/The collection bag is below the level of the patient and urinary bladder/Sterile gloves were worn for the duration of the procedure/A sterile drape was used to cover all adjacent areas/The site was cleaned with soap/water and sterile solution (betadine)

## 2018-02-12 NOTE — PROCEDURE NOTE - ADDITIONAL PROCEDURE DETAILS
Patient with left hip fx, nurses unable to pass lombardo catheter.  16F Coude placed, draining clear yellow urine.

## 2018-02-12 NOTE — CONSULT NOTE ADULT - ASSESSMENT
89 F on coumadin for hypercoagulable with history of DVTs and PEs, recently admitted for Shingles, presents s/p mechanical fall with L intertrochanteric hip fracture    ·	Admit to medicine, OR pending medical clearance  ·	10 mg IV Vitamin K STAT  ·	trend INR, CBC  ·	regular diet today, NPOpMN with light IVF  ·	Plan for OR tomorrow pending clearance    Ortho [id number]
89 F hx colon cancer in remission, unprovoked PE on coumadin, htn, chronic B/L LE lymphedema, recent [facility] admission for zoster presenting today s/p fall at home. Recent admission to [facility] for shingles, supratherapeutic INR. Now returns after fall and fracture. Consult for Zoster. Appears lesions starting to crust, appears single dermatome across R abd to R flank; improving, not painful. No evidence dissemination or immunocompromise. Note has been on Valtrex since 2/5. Overall, localized shingles, fall, fracture.  - Valtrex 1g q 24 through 2/14/18 (CrCL ~30)  - Weigh patient  - No need for airborne/contact precautions, not disseminated, not immunocompromised--keep lesions covered  - Monitor for full crusting    Rock Salgado MD  Pager [phone]  After 5pm and on weekends call [phone]

## 2018-02-12 NOTE — BRIEF OPERATIVE NOTE - PROCEDURE
<<-----Click on this checkbox to enter Procedure Intramedullary nailing for fracture of left femur  02/12/2018    Active  [id number]

## 2018-02-12 NOTE — CHART NOTE - NSCHARTNOTEFT_GEN_A_CORE
02-12-18 @ 20:00    Pt comfortable.  Pain well controlled.  No chest pain, no N/V, no SOB.      T(C): 36.5 (02-12-18 @ 18:30), Max: 37.1 (02-11-18 @ 23:24)  HR: 75 (02-12-18 @ 19:45) (61 - 193)  BP: 156/67 (02-12-18 @ 19:45) (130/57 - 186/74)  RR: 14 (02-12-18 @ 19:45) (14 - 18)  SpO2: 100% (02-12-18 @ 19:45) (92% - 100%)    Proximal Left hip dressing clean/dry/intact.  More distal dressing with small  amount of SS drainage. +DF/PF.  +Distal Pulses.   Motor/Sensory function grossly intact.  Calves soft/NT with venodynes  intact.                            10.5   7.8   )-----------( 263      ( 12 Feb 2018 19:20 )             32.1   02-12    141  |  107  |  22  ----------------------------<  90  4.1   |  20<L>  |  1.11    Ca    8.9      12 Feb 2018 19:20    TPro  6.0  /  Alb  3.4  /  TBili  0.3  /  DBili  x   /  AST  29  /  ALT  27  /  AlkPhos  72  02-11    PT/INR - ( 12 Feb 2018 06:21 )   PT: 15.3 sec;   INR: 1.41 ratio         PTT - ( 12 Feb 2018 06:21 )  PTT:30.8 sec    Pt s/p L Hip IMN  -Analgesia  -Cont to Monitor  -DVT Prophylaxis - Coumadin    GRAYSON Degroot PA-C  Orthopaedic Surgery  [id number]

## 2018-02-13 LAB
ANION GAP SERPL CALC-SCNC: 9 MMOL/L — SIGNIFICANT CHANGE UP (ref 5–17)
BUN SERPL-MCNC: 22 MG/DL — SIGNIFICANT CHANGE UP (ref 7–23)
CALCIUM SERPL-MCNC: 8.4 MG/DL — SIGNIFICANT CHANGE UP (ref 8.4–10.5)
CHLORIDE SERPL-SCNC: 108 MMOL/L — SIGNIFICANT CHANGE UP (ref 96–108)
CO2 SERPL-SCNC: 23 MMOL/L — SIGNIFICANT CHANGE UP (ref 22–31)
CREAT SERPL-MCNC: 1.01 MG/DL — SIGNIFICANT CHANGE UP (ref 0.5–1.3)
GLUCOSE SERPL-MCNC: 88 MG/DL — SIGNIFICANT CHANGE UP (ref 70–99)
HCT VFR BLD CALC: 25.3 % — LOW (ref 34.5–45)
HGB BLD-MCNC: 8.3 G/DL — LOW (ref 11.5–15.5)
INR BLD: 1.2 RATIO — HIGH (ref 0.88–1.16)
MCHC RBC-ENTMCNC: 26.3 PG — LOW (ref 27–34)
MCHC RBC-ENTMCNC: 32.8 GM/DL — SIGNIFICANT CHANGE UP (ref 32–36)
MCV RBC AUTO: 80.1 FL — SIGNIFICANT CHANGE UP (ref 80–100)
PLATELET # BLD AUTO: 222 K/UL — SIGNIFICANT CHANGE UP (ref 150–400)
POTASSIUM SERPL-MCNC: 4 MMOL/L — SIGNIFICANT CHANGE UP (ref 3.5–5.3)
POTASSIUM SERPL-SCNC: 4 MMOL/L — SIGNIFICANT CHANGE UP (ref 3.5–5.3)
PROTHROM AB SERPL-ACNC: 13.6 SEC — HIGH (ref 10–13.1)
RBC # BLD: 3.16 M/UL — LOW (ref 3.8–5.2)
RBC # FLD: 16.2 % — HIGH (ref 10.3–14.5)
SODIUM SERPL-SCNC: 140 MMOL/L — SIGNIFICANT CHANGE UP (ref 135–145)
WBC # BLD: 5.71 K/UL — SIGNIFICANT CHANGE UP (ref 3.8–10.5)
WBC # FLD AUTO: 5.71 K/UL — SIGNIFICANT CHANGE UP (ref 3.8–10.5)

## 2018-02-13 PROCEDURE — 99232 SBSQ HOSP IP/OBS MODERATE 35: CPT

## 2018-02-13 PROCEDURE — 99233 SBSQ HOSP IP/OBS HIGH 50: CPT

## 2018-02-13 RX ORDER — WARFARIN SODIUM 2.5 MG/1
5 TABLET ORAL ONCE
Qty: 0 | Refills: 0 | Status: COMPLETED | OUTPATIENT
Start: 2018-02-13 | End: 2018-02-13

## 2018-02-13 RX ADMIN — Medication 500 MILLIGRAM(S): at 05:46

## 2018-02-13 RX ADMIN — Medication 100 MILLIGRAM(S): at 05:46

## 2018-02-13 RX ADMIN — Medication 500 MILLIGRAM(S): at 17:02

## 2018-02-13 RX ADMIN — TRAMADOL HYDROCHLORIDE 25 MILLIGRAM(S): 50 TABLET ORAL at 06:35

## 2018-02-13 RX ADMIN — TRAMADOL HYDROCHLORIDE 25 MILLIGRAM(S): 50 TABLET ORAL at 06:06

## 2018-02-13 RX ADMIN — Medication 100 MILLIGRAM(S): at 12:15

## 2018-02-13 RX ADMIN — Medication 100 MILLIGRAM(S): at 08:22

## 2018-02-13 RX ADMIN — Medication 325 MILLIGRAM(S): at 08:22

## 2018-02-13 RX ADMIN — Medication 100 MILLIGRAM(S): at 21:24

## 2018-02-13 RX ADMIN — PREGABALIN 1000 MICROGRAM(S): 225 CAPSULE ORAL at 12:15

## 2018-02-13 RX ADMIN — SODIUM CHLORIDE 3 MILLILITER(S): 9 INJECTION INTRAMUSCULAR; INTRAVENOUS; SUBCUTANEOUS at 12:14

## 2018-02-13 RX ADMIN — SODIUM CHLORIDE 100 MILLILITER(S): 9 INJECTION, SOLUTION INTRAVENOUS at 05:47

## 2018-02-13 RX ADMIN — Medication 1 TABLET(S): at 12:15

## 2018-02-13 RX ADMIN — WARFARIN SODIUM 5 MILLIGRAM(S): 2.5 TABLET ORAL at 21:23

## 2018-02-13 RX ADMIN — VALACYCLOVIR 1000 MILLIGRAM(S): 500 TABLET, FILM COATED ORAL at 17:01

## 2018-02-13 RX ADMIN — Medication 100 MILLIGRAM(S): at 00:47

## 2018-02-13 RX ADMIN — GABAPENTIN 600 MILLIGRAM(S): 400 CAPSULE ORAL at 12:15

## 2018-02-13 RX ADMIN — SODIUM CHLORIDE 3 MILLILITER(S): 9 INJECTION INTRAMUSCULAR; INTRAVENOUS; SUBCUTANEOUS at 05:25

## 2018-02-13 RX ADMIN — SODIUM CHLORIDE 3 MILLILITER(S): 9 INJECTION INTRAMUSCULAR; INTRAVENOUS; SUBCUTANEOUS at 20:28

## 2018-02-13 RX ADMIN — LATANOPROST 1 DROP(S): 0.05 SOLUTION/ DROPS OPHTHALMIC; TOPICAL at 21:24

## 2018-02-13 RX ADMIN — Medication 325 MILLIGRAM(S): at 17:01

## 2018-02-13 RX ADMIN — PANTOPRAZOLE SODIUM 40 MILLIGRAM(S): 20 TABLET, DELAYED RELEASE ORAL at 05:46

## 2018-02-13 NOTE — PHYSICAL THERAPY INITIAL EVALUATION ADULT - LIVES WITH, PROFILE
Pt lives alone in a co-op. Pt reports several stairs to enter. Pt reports several stairs to enter, bedroom on main floor./alone

## 2018-02-13 NOTE — PHYSICAL THERAPY INITIAL EVALUATION ADULT - PERTINENT HX OF CURRENT PROBLEM, REHAB EVAL
Pt is a 89 y/o female h/o colon cancer in remission, unprovoked PE on coumadin, htn, chronic B/L LE lymphedema, recent [facility] admission for zoster presenting today s/p fall at home. Unwitnessed. Patient recalls entire episode- states she slipped and fell onto her L side and developed resultant L hip and LE pain. No preceding syncope, CP, palpitations, sob. No head trauma or LOC. States her zoster rash is much better now and doesn't cause her any discomfort. No fevers/chills

## 2018-02-13 NOTE — PHYSICAL THERAPY INITIAL EVALUATION ADULT - IMPAIRMENTS CONTRIBUTING TO GAIT DEVIATIONS, PT EVAL
impaired balance/decreased strength/Pt with antalgic gait on LLe. Pt required verbal cuing for sequencing of gait./narrow base of support/pain

## 2018-02-13 NOTE — PHYSICAL THERAPY INITIAL EVALUATION ADULT - REFERRING PHYSICIAN, REHAB EVAL
pertinent history continued.... Pt admitted with Left intertrochanteric femur fracture.  Pt s/p L hip IM nail 2/12/18.

## 2018-02-13 NOTE — PHYSICAL THERAPY INITIAL EVALUATION ADULT - IMPAIRMENTS CONTRIBUTING IMPAIRED BED MOBILITY, REHAB EVAL
ASSESSMENT/PLAN:       1. Benign essential hypertension  chronic  - TSH with free T4 reflex  - Basic metabolic panel  - - start 81mg aspirin, prescription sent    2. Hyperlipidemia LDL goal <100  chronic  - Lipid Profile    3. Gastroesophageal reflux disease without esophagitis  chronic    4. On statin therapy  - Hepatic panel    FUTURE APPOINTMENTS:       - Follow-up visit in 6 months or as needed for acute concerns.     Randi Haddad NP  [facility]      
impaired postural control/decreased flexibility

## 2018-02-14 ENCOUNTER — TRANSCRIPTION ENCOUNTER (OUTPATIENT)
Age: 83
End: 2018-02-14

## 2018-02-14 LAB
ANION GAP SERPL CALC-SCNC: 9 MMOL/L — SIGNIFICANT CHANGE UP (ref 5–17)
BUN SERPL-MCNC: 27 MG/DL — HIGH (ref 7–23)
CALCIUM SERPL-MCNC: 8.9 MG/DL — SIGNIFICANT CHANGE UP (ref 8.4–10.5)
CHLORIDE SERPL-SCNC: 107 MMOL/L — SIGNIFICANT CHANGE UP (ref 96–108)
CO2 SERPL-SCNC: 21 MMOL/L — LOW (ref 22–31)
CREAT SERPL-MCNC: 1.28 MG/DL — SIGNIFICANT CHANGE UP (ref 0.5–1.3)
GLUCOSE SERPL-MCNC: 107 MG/DL — HIGH (ref 70–99)
HCT VFR BLD CALC: 26.3 % — LOW (ref 34.5–45)
HGB BLD-MCNC: 8.6 G/DL — LOW (ref 11.5–15.5)
INR BLD: 1.52 RATIO — HIGH (ref 0.88–1.16)
MCHC RBC-ENTMCNC: 26.5 PG — LOW (ref 27–34)
MCHC RBC-ENTMCNC: 32.7 GM/DL — SIGNIFICANT CHANGE UP (ref 32–36)
MCV RBC AUTO: 80.9 FL — SIGNIFICANT CHANGE UP (ref 80–100)
PLATELET # BLD AUTO: 235 K/UL — SIGNIFICANT CHANGE UP (ref 150–400)
POTASSIUM SERPL-MCNC: 4.1 MMOL/L — SIGNIFICANT CHANGE UP (ref 3.5–5.3)
POTASSIUM SERPL-SCNC: 4.1 MMOL/L — SIGNIFICANT CHANGE UP (ref 3.5–5.3)
PROTHROM AB SERPL-ACNC: 17.3 SEC — HIGH (ref 10–13.1)
RBC # BLD: 3.25 M/UL — LOW (ref 3.8–5.2)
RBC # FLD: 17.2 % — HIGH (ref 10.3–14.5)
SODIUM SERPL-SCNC: 137 MMOL/L — SIGNIFICANT CHANGE UP (ref 135–145)
WBC # BLD: 8.39 K/UL — SIGNIFICANT CHANGE UP (ref 3.8–10.5)
WBC # FLD AUTO: 8.39 K/UL — SIGNIFICANT CHANGE UP (ref 3.8–10.5)

## 2018-02-14 PROCEDURE — 99233 SBSQ HOSP IP/OBS HIGH 50: CPT

## 2018-02-14 RX ORDER — POLYETHYLENE GLYCOL 3350 17 G/17G
17 POWDER, FOR SOLUTION ORAL DAILY
Qty: 0 | Refills: 0 | Status: DISCONTINUED | OUTPATIENT
Start: 2018-02-14 | End: 2018-02-16

## 2018-02-14 RX ORDER — SENNA PLUS 8.6 MG/1
2 TABLET ORAL AT BEDTIME
Qty: 0 | Refills: 0 | Status: DISCONTINUED | OUTPATIENT
Start: 2018-02-14 | End: 2018-02-16

## 2018-02-14 RX ORDER — WARFARIN SODIUM 2.5 MG/1
5 TABLET ORAL ONCE
Qty: 0 | Refills: 0 | Status: DISCONTINUED | OUTPATIENT
Start: 2018-02-14 | End: 2018-02-14

## 2018-02-14 RX ORDER — DOCUSATE SODIUM 100 MG
1 CAPSULE ORAL
Qty: 0 | Refills: 0 | COMMUNITY
Start: 2018-02-14

## 2018-02-14 RX ORDER — VALACYCLOVIR 500 MG/1
1 TABLET, FILM COATED ORAL
Qty: 0 | Refills: 0 | COMMUNITY
Start: 2018-02-14

## 2018-02-14 RX ORDER — ACETAMINOPHEN 500 MG
2 TABLET ORAL
Qty: 0 | Refills: 0 | COMMUNITY
Start: 2018-02-14

## 2018-02-14 RX ORDER — TRAMADOL HYDROCHLORIDE 50 MG/1
0.5 TABLET ORAL
Qty: 0 | Refills: 0 | COMMUNITY
Start: 2018-02-14

## 2018-02-14 RX ORDER — ASCORBIC ACID 60 MG
1 TABLET,CHEWABLE ORAL
Qty: 0 | Refills: 0 | COMMUNITY
Start: 2018-02-14

## 2018-02-14 RX ORDER — WARFARIN SODIUM 2.5 MG/1
7.5 TABLET ORAL ONCE
Qty: 0 | Refills: 0 | Status: COMPLETED | OUTPATIENT
Start: 2018-02-14 | End: 2018-02-14

## 2018-02-14 RX ADMIN — TRAMADOL HYDROCHLORIDE 25 MILLIGRAM(S): 50 TABLET ORAL at 11:10

## 2018-02-14 RX ADMIN — SODIUM CHLORIDE 3 MILLILITER(S): 9 INJECTION INTRAMUSCULAR; INTRAVENOUS; SUBCUTANEOUS at 21:12

## 2018-02-14 RX ADMIN — Medication 1 TABLET(S): at 10:41

## 2018-02-14 RX ADMIN — VALACYCLOVIR 1000 MILLIGRAM(S): 500 TABLET, FILM COATED ORAL at 17:41

## 2018-02-14 RX ADMIN — TRAMADOL HYDROCHLORIDE 25 MILLIGRAM(S): 50 TABLET ORAL at 10:41

## 2018-02-14 RX ADMIN — Medication 500 MILLIGRAM(S): at 05:20

## 2018-02-14 RX ADMIN — WARFARIN SODIUM 7.5 MILLIGRAM(S): 2.5 TABLET ORAL at 21:11

## 2018-02-14 RX ADMIN — GABAPENTIN 600 MILLIGRAM(S): 400 CAPSULE ORAL at 10:41

## 2018-02-14 RX ADMIN — Medication 325 MILLIGRAM(S): at 17:41

## 2018-02-14 RX ADMIN — SODIUM CHLORIDE 3 MILLILITER(S): 9 INJECTION INTRAMUSCULAR; INTRAVENOUS; SUBCUTANEOUS at 13:59

## 2018-02-14 RX ADMIN — PREGABALIN 1000 MICROGRAM(S): 225 CAPSULE ORAL at 10:41

## 2018-02-14 RX ADMIN — Medication 100 MILLIGRAM(S): at 13:58

## 2018-02-14 RX ADMIN — Medication 500 MILLIGRAM(S): at 17:41

## 2018-02-14 RX ADMIN — SENNA PLUS 2 TABLET(S): 8.6 TABLET ORAL at 21:11

## 2018-02-14 RX ADMIN — Medication 100 MILLIGRAM(S): at 21:11

## 2018-02-14 RX ADMIN — LATANOPROST 1 DROP(S): 0.05 SOLUTION/ DROPS OPHTHALMIC; TOPICAL at 21:11

## 2018-02-14 RX ADMIN — PANTOPRAZOLE SODIUM 40 MILLIGRAM(S): 20 TABLET, DELAYED RELEASE ORAL at 05:20

## 2018-02-14 RX ADMIN — Medication 100 MILLIGRAM(S): at 05:20

## 2018-02-14 RX ADMIN — SODIUM CHLORIDE 3 MILLILITER(S): 9 INJECTION INTRAMUSCULAR; INTRAVENOUS; SUBCUTANEOUS at 05:19

## 2018-02-14 NOTE — OCCUPATIONAL THERAPY INITIAL EVALUATION ADULT - ADDITIONAL COMMENTS
CTH (-) Xray knee (-) xray Hip: Comminuted intertrochanteric fracture with osseous fragmentation adjacent to the greater and lesser trochanter and varus angulation of the major fracture fragments. There is mild joint space narrowing in bilateral hips. The right femur is intact. Xray Chest: (-) s/p IMN 2/12

## 2018-02-14 NOTE — DISCHARGE NOTE ADULT - CARE PROVIDER_API CALL
Ivelisse Munoz), Geriatric Medicine; Internal Medicine  [address] [place] [address]  Phone: [phone]  Fax: [phone] Ivelisse Munoz), Geriatric Medicine; Internal Medicine  [address]  [place] [address]  Phone: [phone]  Fax: [phone]    Andrew Bowen), Orthopaedic Surgery  [address]  [place] [address]  Phone: [phone]  Fax: [phone]

## 2018-02-14 NOTE — OCCUPATIONAL THERAPY INITIAL EVALUATION ADULT - LIVES WITH, PROFILE
lives alone in private home +ambulates with cane outside home, independent with ADLs, assist for IADLs

## 2018-02-14 NOTE — DISCHARGE NOTE ADULT - CARE PLAN
Principal Discharge DX:	Closed fracture of left hip, initial encounter  Goal:	s/p L hip IMN,  Assessment and plan of treatment:	WBAT LLE  c/w Physical therapy.  c/w pain management.  f/u with orthopedic after discharge from rehab  Secondary Diagnosis:	Iron deficiency anemia, unspecified iron deficiency anemia type  Assessment and plan of treatment:	c/w iron   Monitor CBC with your primary care doctor.  Secondary Diagnosis:	Conduction disorder of the heart  Assessment and plan of treatment:	EKG in ED with 1st degree AV block, incomplete RBBB and LAFB. Asymptomatic. No prior EKGs found in [facility] or [facility] for comparison.  please f/u with your PCP or cardiologist.  Secondary Diagnosis:	Herpes zoster without complication  Assessment and plan of treatment:	resolved without any open lesions.   completed valtrex.     - continue capsaicin ointment PRN for any pain.  Secondary Diagnosis:	Other pulmonary embolism without acute cor pulmonale, unspecified chronicity  Assessment and plan of treatment:	Take your coumadin as directed.  Follow up with your health care provider within one week. Call for appointment.  If you develop shortness of breath or if your shortness of breath worsens call your Health Care Provider or go to the Emergency Department.  Secondary Diagnosis:	CKD (chronic kidney disease) stage 3, GFR 30-59 ml/min  Assessment and plan of treatment:	Avoid taking (NSAIDs) - (ex: Ibuprofen, Advil, Celebrex, Naprosyn)  Avoid taking any nephrotoxic agents (can harm kidneys) - Intravenous contrast for diagnostic testing, combination cold medications.  Have all medications adjusted for your renal function by your Health Care Provider.  Blood pressure control is important.  Take all medication as prescribed.  Secondary Diagnosis:	Essential hypertension  Assessment and plan of treatment:	Low salt diet  Activity as tolerated.  Take all medication as prescribed.  Follow up with your medical doctor for routine blood pressure monitoring at your next visit.  Notify your doctor if you have any of the following symptoms:   Dizziness, Lightheadedness, Blurry vision, Headache, Chest pain, Shortness of breath Principal Discharge DX:	Closed fracture of left hip, initial encounter  Goal:	s/p L hip IMN,  Assessment and plan of treatment:	removed L hip staples on 2/25/18.  WBAT LLE.  c/w Physical therapy.  c/w pain management.  f/u with orthopedic after discharge from rehab  Secondary Diagnosis:	Iron deficiency anemia, unspecified iron deficiency anemia type  Assessment and plan of treatment:	c/w iron   Monitor CBC with your primary care doctor.  Secondary Diagnosis:	Conduction disorder of the heart  Assessment and plan of treatment:	EKG in ED with 1st degree AV block, incomplete RBBB and LAFB. Asymptomatic. No prior EKGs found in [facility] for comparison.  please f/u with your PCP or cardiologist.  Secondary Diagnosis:	Herpes zoster without complication  Assessment and plan of treatment:	resolved without any open lesions.   completed valtrex.     - continue capsaicin ointment PRN for any pain.  Secondary Diagnosis:	Other pulmonary embolism without acute cor pulmonale, unspecified chronicity  Assessment and plan of treatment:	Take your coumadin as directed.  Follow up with your health care provider within one week. Call for appointment.  If you develop shortness of breath or if your shortness of breath worsens call your Health Care Provider or go to the Emergency Department.  Secondary Diagnosis:	CKD (chronic kidney disease) stage 3, GFR 30-59 ml/min  Assessment and plan of treatment:	Avoid taking (NSAIDs) - (ex: Ibuprofen, Advil, Celebrex, Naprosyn)  Avoid taking any nephrotoxic agents (can harm kidneys) - Intravenous contrast for diagnostic testing, combination cold medications.  Have all medications adjusted for your renal function by your Health Care Provider.  Blood pressure control is important.  Take all medication as prescribed.  Secondary Diagnosis:	Essential hypertension  Assessment and plan of treatment:	Low salt diet  Activity as tolerated.  Take all medication as prescribed.  Follow up with your medical doctor for routine blood pressure monitoring at your next visit.  Notify your doctor if you have any of the following symptoms:   Dizziness, Lightheadedness, Blurry vision, Headache, Chest pain, Shortness of breath Principal Discharge DX:	Closed fracture of left hip, initial encounter  Goal:	s/p L hip IMN,  Assessment and plan of treatment:	removed L hip staples on 2/25/18.  WBAT LLE.  c/w Physical therapy at rehab  c/w pain management.  f/u with orthopedic Dr Bowen after discharge from rehab  Secondary Diagnosis:	Iron deficiency anemia, unspecified iron deficiency anemia type  Goal:	no anemia  Assessment and plan of treatment:	c/w iron   Monitor CBC with your primary care doctor.  Secondary Diagnosis:	Conduction disorder of the heart  Goal:	rate control  Assessment and plan of treatment:	EKG in ED with 1st degree AV block, incomplete RBBB and LAFB. Asymptomatic. No prior EKGs found in [facility] or [facility] for comparison.  please f/u with your PCP or cardiologist.  Secondary Diagnosis:	Herpes zoster without complication  Assessment and plan of treatment:	resolved without any open lesions.   completed valtrex.     - continue capsaicin ointment PRN for any pain.  Secondary Diagnosis:	Other pulmonary embolism without acute cor pulmonale, unspecified chronicity  Assessment and plan of treatment:	Take your coumadin as directed.  INR to be checked at rehab.  Today INR was 2.59, Coumadin 5 mg last received last night.  Follow up with your health care provider within one week. Call for appointment.  If you develop shortness of breath or if your shortness of breath worsens call your Health Care Provider or go to the Emergency Department.  Secondary Diagnosis:	CKD (chronic kidney disease) stage 3, GFR 30-59 ml/min  Assessment and plan of treatment:	Avoid taking (NSAIDs) - (ex: Ibuprofen, Advil, Celebrex, Naprosyn)  Avoid taking any nephrotoxic agents (can harm kidneys) - Intravenous contrast for diagnostic testing, combination cold medications.  Have all medications adjusted for your renal function by your Health Care Provider.  Blood pressure control is important.  Take all medication as prescribed.  Secondary Diagnosis:	Essential hypertension  Assessment and plan of treatment:	Low salt diet  Activity as tolerated.  Take all medication as prescribed.  Follow up with your medical doctor for routine blood pressure monitoring at your next visit.  Notify your doctor if you have any of the following symptoms:   Dizziness, Lightheadedness, Blurry vision, Headache, Chest pain, Shortness of breath Principal Discharge DX:	Closed fracture of left hip, initial encounter  Goal:	s/p L hip IMN,  Assessment and plan of treatment:	removed L hip staples on 2/25/18.  WBAT LLE.  c/w Physical therapy at rehab  c/w pain management.  f/u with orthopedic Dr Bowen after discharge from rehab  Secondary Diagnosis:	Iron deficiency anemia, unspecified iron deficiency anemia type  Goal:	no anemia  Assessment and plan of treatment:	c/w iron   Monitor CBC with your primary care doctor.  Secondary Diagnosis:	Conduction disorder of the heart  Goal:	rate control  Assessment and plan of treatment:	EKG in ED with 1st degree AV block, incomplete RBBB and LAFB. Asymptomatic. No prior EKGs found in [facility] or [facility] for comparison.  please f/u with your PCP or cardiologist.  Secondary Diagnosis:	Herpes zoster without complication  Assessment and plan of treatment:	resolved without any open lesions.   completed valtrex.   - continue capsaicin ointment PRN for any pain.  Secondary Diagnosis:	Other pulmonary embolism without acute cor pulmonale, unspecified chronicity  Assessment and plan of treatment:	Take your coumadin as directed.  INR to be checked at rehab.  Today INR was 2.59, Coumadin 5 mg last received last night.  Follow up with your health care provider within one week. Call for appointment.  If you develop shortness of breath or if your shortness of breath worsens call your Health Care Provider or go to the Emergency Department.  Secondary Diagnosis:	CKD (chronic kidney disease) stage 3, GFR 30-59 ml/min  Assessment and plan of treatment:	Avoid taking (NSAIDs) - (ex: Ibuprofen, Advil, Celebrex, Naprosyn)  Avoid taking any nephrotoxic agents (can harm kidneys) - Intravenous contrast for diagnostic testing, combination cold medications.  Have all medications adjusted for your renal function by your Health Care Provider.  Blood pressure control is important.  Take all medication as prescribed.  Secondary Diagnosis:	Essential hypertension  Assessment and plan of treatment:	Low salt diet  Activity as tolerated.  Take all medication as prescribed.  Follow up with your medical doctor for routine blood pressure monitoring at your next visit.  Notify your doctor if you have any of the following symptoms:   Dizziness, Lightheadedness, Blurry vision, Headache, Chest pain, Shortness of breath

## 2018-02-14 NOTE — OCCUPATIONAL THERAPY INITIAL EVALUATION ADULT - PERTINENT HX OF CURRENT PROBLEM, REHAB EVAL
89f hx colon cancer in remission, unprovoked PE on coumadin, htn, chronic B/L LE lymphedema, recent [facility] admission for zoster presenting today s/p fall at home. Unwitnessed. Patient recalls entire episode- states she slipped and fell onto her L side and developed resultant L hip and LE pain.

## 2018-02-14 NOTE — DISCHARGE NOTE ADULT - PLAN OF CARE
s/p L hip IMN, WBAT LLE  c/w Physical therapy.  c/w pain management.  f/u with orthopedic after discharge from rehab c/w iron   Monitor CBC with your primary care doctor. EKG in ED with 1st degree AV block, incomplete RBBB and LAFB. Asymptomatic. No prior EKGs found in [facility] or [facility] for comparison.  please f/u with your PCP or cardiologist. resolved without any open lesions.   completed valtrex.     - continue capsaicin ointment PRN for any pain. Take your coumadin as directed.  Follow up with your health care provider within one week. Call for appointment.  If you develop shortness of breath or if your shortness of breath worsens call your Health Care Provider or go to the Emergency Department. Avoid taking (NSAIDs) - (ex: Ibuprofen, Advil, Celebrex, Naprosyn)  Avoid taking any nephrotoxic agents (can harm kidneys) - Intravenous contrast for diagnostic testing, combination cold medications.  Have all medications adjusted for your renal function by your Health Care Provider.  Blood pressure control is important.  Take all medication as prescribed. Low salt diet  Activity as tolerated.  Take all medication as prescribed.  Follow up with your medical doctor for routine blood pressure monitoring at your next visit.  Notify your doctor if you have any of the following symptoms:   Dizziness, Lightheadedness, Blurry vision, Headache, Chest pain, Shortness of breath removed L hip staples on 2/25/18.  WBAT LLE.  c/w Physical therapy.  c/w pain management.  f/u with orthopedic after discharge from rehab removed L hip staples on 2/25/18.  WBAT LLE.  c/w Physical therapy at rehab  c/w pain management.  f/u with orthopedic Dr Bowen after discharge from rehab no anemia rate control Take your coumadin as directed.  INR to be checked at rehab.  Today INR was 2.59, Coumadin 5 mg last received last night.  Follow up with your health care provider within one week. Call for appointment.  If you develop shortness of breath or if your shortness of breath worsens call your Health Care Provider or go to the Emergency Department. resolved without any open lesions.   completed valtrex.   - continue capsaicin ointment PRN for any pain.

## 2018-02-14 NOTE — DISCHARGE NOTE ADULT - MEDICATION SUMMARY - MEDICATIONS TO STOP TAKING
I will STOP taking the medications listed below when I get home from the hospital:    Coumadin 3 mg oral tablet  -- 3 milligram(s) by mouth once a day (at bedtime)    valACYclovir 1 g oral tablet  -- 1 tab(s) by mouth once a day

## 2018-02-14 NOTE — DISCHARGE NOTE ADULT - MEDICATION SUMMARY - MEDICATIONS TO TAKE
I will START or STAY ON the medications listed below when I get home from the hospital:    acetaminophen 325 mg oral tablet  -- 2 tab(s) by mouth every 6 hours, As needed, For Temp greater than 38 C (100.4 F)  -- Indication: For Pain    acetaminophen 325 mg oral tablet  -- 2 tab(s) by mouth every 6 hours, As needed, Mild Pain (1 - 3)  -- Indication: For Pain    traMADol 50 mg oral tablet  -- 0.5 tab(s) by mouth every 6 hours, As needed, Moderate Pain (4 - 6)  -- Indication: For moderate pain    warfarin 5 mg oral tablet  -- 1 tab(s) by mouth once a day  -- Indication: For Other pulmonary embolism without acute cor pulmonale, unspecified chronicity    Neurontin 600 mg oral tablet  -- 1 tab(s) by mouth once a day  -- Indication: For Pain    capsaicin 0.025% topical cream  -- Apply on skin to affected area 3 times a day, As Needed   -- Indication: For Pain    FeroSul 325 mg (65 mg elemental iron) oral tablet  -- 1 tab(s) by mouth once a day   -- Check with your doctor before becoming pregnant.  Do not chew, break, or crush.  May discolor urine or feces.    -- Indication: For Anemia    polyethylene glycol 3350 oral powder for reconstitution  -- 17 gram(s) by mouth once a day, As needed, Constipation  -- Indication: For Constipation    senna oral tablet  -- 2 tab(s) by mouth once a day (at bedtime)  -- Indication: For Constipation    bisacodyl 10 mg rectal suppository  -- 1 suppository(ies) rectally once a day, As needed, If no bowel movement  -- Indication: For Constipation    docusate sodium 100 mg oral capsule  -- 1 cap(s) by mouth 3 times a day  -- Indication: For Constipation    latanoprost 0.005% ophthalmic solution  -- 1 drop(s) to each affected eye once a day (in the evening)  -- Indication: For Glaucoma    omeprazole 20 mg oral delayed release capsule  -- 1 cap(s) by mouth 2 times a day  -- Indication: For dyspepsia    Centrum Adults oral tablet  -- 1 tab(s) by mouth once a day  -- Indication: For Supplement    Vitamin B-12 1000 mcg oral tablet  -- 1 tab(s) by mouth once a day  -- Indication: For Supplement    ascorbic acid 500 mg oral tablet  -- 1 tab(s) by mouth 2 times a day  -- Indication: For Supplement

## 2018-02-14 NOTE — OCCUPATIONAL THERAPY INITIAL EVALUATION ADULT - PLANNED THERAPY INTERVENTIONS, OT EVAL
ROM/strengthening/balance training/fine motor coordination training/transfer training/ADL retraining/bed mobility training

## 2018-02-14 NOTE — DISCHARGE NOTE ADULT - ADDITIONAL INSTRUCTIONS
please f/u with PCP in 1wk, cardiologist in 2wks, orthopedic in 2 wks, and oncologist as needed after discharge from rehab.  Bring all your current medications and discharge notes to your providers. please f/u with PCP, orthopedic and oncologist after discharge from rehab.  Bring all your current medications and discharge notes to your providers.

## 2018-02-14 NOTE — DISCHARGE NOTE ADULT - SECONDARY DIAGNOSIS.
Iron deficiency anemia, unspecified iron deficiency anemia type Conduction disorder of the heart Herpes zoster without complication Other pulmonary embolism without acute cor pulmonale, unspecified chronicity CKD (chronic kidney disease) stage 3, GFR 30-59 ml/min Essential hypertension

## 2018-02-14 NOTE — DISCHARGE NOTE ADULT - PATIENT PORTAL LINK FT
You can access the [facility] Portal, offered by [facility], by registering with the following website: [email or web address]

## 2018-02-14 NOTE — DISCHARGE NOTE ADULT - CARE PROVIDERS DIRECT ADDRESSES
,[email or web address]

## 2018-02-14 NOTE — DISCHARGE NOTE ADULT - HOSPITAL COURSE
89f hx colon cancer in remission, unprovoked PE on coumadin, htn, chronic B/L LE lymphedema, recent [facility] admission for zoster presenting today s/p Unwitnessed fall at home. 10 units vitamin K prior to sx.  s/p L hip fracture, WBAT LLE, and pain management.  -Microcytic Anemia from BARRY. c/w iron.  - Conduction disorder of the heart.  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 Asymptomatic. No prior EKGs found in sunrise or allscripts for comparison  - Shingles. resolved without any open lesions.   completed valtrex . c/w capsaicin ointment PRN for any pain.   - PE c/w coumadin to maintain INR 2~3.  Pt is stable to discharge to rehab and will f/u with PCP, cardio, ortho, and onc for further medical managements.

## 2018-02-15 ENCOUNTER — APPOINTMENT (OUTPATIENT)
Dept: GERIATRICS | Facility: CLINIC | Age: 83
End: 2018-02-15

## 2018-02-15 LAB
ANION GAP SERPL CALC-SCNC: 11 MMOL/L — SIGNIFICANT CHANGE UP (ref 5–17)
BUN SERPL-MCNC: 23 MG/DL — SIGNIFICANT CHANGE UP (ref 7–23)
CALCIUM SERPL-MCNC: 8.3 MG/DL — LOW (ref 8.4–10.5)
CHLORIDE SERPL-SCNC: 105 MMOL/L — SIGNIFICANT CHANGE UP (ref 96–108)
CO2 SERPL-SCNC: 20 MMOL/L — LOW (ref 22–31)
CREAT SERPL-MCNC: 1.05 MG/DL — SIGNIFICANT CHANGE UP (ref 0.5–1.3)
GLUCOSE SERPL-MCNC: 102 MG/DL — HIGH (ref 70–99)
HCT VFR BLD CALC: 25.4 % — LOW (ref 34.5–45)
HGB BLD-MCNC: 8.2 G/DL — LOW (ref 11.5–15.5)
INR BLD: 1.8 RATIO — HIGH (ref 0.88–1.16)
MCHC RBC-ENTMCNC: 26.2 PG — LOW (ref 27–34)
MCHC RBC-ENTMCNC: 32.3 GM/DL — SIGNIFICANT CHANGE UP (ref 32–36)
MCV RBC AUTO: 81.2 FL — SIGNIFICANT CHANGE UP (ref 80–100)
PLATELET # BLD AUTO: 255 K/UL — SIGNIFICANT CHANGE UP (ref 150–400)
POTASSIUM SERPL-MCNC: 4.2 MMOL/L — SIGNIFICANT CHANGE UP (ref 3.5–5.3)
POTASSIUM SERPL-SCNC: 4.2 MMOL/L — SIGNIFICANT CHANGE UP (ref 3.5–5.3)
PROTHROM AB SERPL-ACNC: 19.9 SEC — HIGH (ref 9.8–12.7)
RBC # BLD: 3.13 M/UL — LOW (ref 3.8–5.2)
RBC # FLD: 17.7 % — HIGH (ref 10.3–14.5)
SODIUM SERPL-SCNC: 136 MMOL/L — SIGNIFICANT CHANGE UP (ref 135–145)
WBC # BLD: 8.08 K/UL — SIGNIFICANT CHANGE UP (ref 3.8–10.5)
WBC # FLD AUTO: 8.08 K/UL — SIGNIFICANT CHANGE UP (ref 3.8–10.5)

## 2018-02-15 PROCEDURE — 99233 SBSQ HOSP IP/OBS HIGH 50: CPT | Mod: GC

## 2018-02-15 RX ORDER — WARFARIN SODIUM 2.5 MG/1
5 TABLET ORAL ONCE
Qty: 0 | Refills: 0 | Status: COMPLETED | OUTPATIENT
Start: 2018-02-15 | End: 2018-02-15

## 2018-02-15 RX ORDER — WARFARIN SODIUM 2.5 MG/1
1 TABLET ORAL
Qty: 0 | Refills: 0 | COMMUNITY
Start: 2018-02-15

## 2018-02-15 RX ADMIN — Medication 1 TABLET(S): at 12:16

## 2018-02-15 RX ADMIN — WARFARIN SODIUM 5 MILLIGRAM(S): 2.5 TABLET ORAL at 21:26

## 2018-02-15 RX ADMIN — Medication 100 MILLIGRAM(S): at 06:44

## 2018-02-15 RX ADMIN — PANTOPRAZOLE SODIUM 40 MILLIGRAM(S): 20 TABLET, DELAYED RELEASE ORAL at 06:44

## 2018-02-15 RX ADMIN — Medication 650 MILLIGRAM(S): at 21:27

## 2018-02-15 RX ADMIN — Medication 650 MILLIGRAM(S): at 22:00

## 2018-02-15 RX ADMIN — Medication 325 MILLIGRAM(S): at 17:30

## 2018-02-15 RX ADMIN — PREGABALIN 1000 MICROGRAM(S): 225 CAPSULE ORAL at 12:16

## 2018-02-15 RX ADMIN — GABAPENTIN 600 MILLIGRAM(S): 400 CAPSULE ORAL at 12:16

## 2018-02-15 RX ADMIN — SODIUM CHLORIDE 3 MILLILITER(S): 9 INJECTION INTRAMUSCULAR; INTRAVENOUS; SUBCUTANEOUS at 06:45

## 2018-02-15 RX ADMIN — Medication 500 MILLIGRAM(S): at 06:44

## 2018-02-15 RX ADMIN — SODIUM CHLORIDE 3 MILLILITER(S): 9 INJECTION INTRAMUSCULAR; INTRAVENOUS; SUBCUTANEOUS at 21:22

## 2018-02-15 RX ADMIN — SODIUM CHLORIDE 3 MILLILITER(S): 9 INJECTION INTRAMUSCULAR; INTRAVENOUS; SUBCUTANEOUS at 11:38

## 2018-02-15 RX ADMIN — Medication 325 MILLIGRAM(S): at 07:52

## 2018-02-15 RX ADMIN — Medication 500 MILLIGRAM(S): at 17:30

## 2018-02-15 RX ADMIN — LATANOPROST 1 DROP(S): 0.05 SOLUTION/ DROPS OPHTHALMIC; TOPICAL at 21:23

## 2018-02-16 VITALS
DIASTOLIC BLOOD PRESSURE: 71 MMHG | TEMPERATURE: 98 F | OXYGEN SATURATION: 95 % | RESPIRATION RATE: 16 BRPM | HEART RATE: 76 BPM | SYSTOLIC BLOOD PRESSURE: 112 MMHG

## 2018-02-16 LAB
ANION GAP SERPL CALC-SCNC: 11 MMOL/L — SIGNIFICANT CHANGE UP (ref 5–17)
APTT BLD: 32 SEC — SIGNIFICANT CHANGE UP (ref 27.5–37.4)
BLD GP AB SCN SERPL QL: NEGATIVE — SIGNIFICANT CHANGE UP
BUN SERPL-MCNC: 22 MG/DL — SIGNIFICANT CHANGE UP (ref 7–23)
CALCIUM SERPL-MCNC: 8.7 MG/DL — SIGNIFICANT CHANGE UP (ref 8.4–10.5)
CHLORIDE SERPL-SCNC: 109 MMOL/L — HIGH (ref 96–108)
CO2 SERPL-SCNC: 20 MMOL/L — LOW (ref 22–31)
CREAT SERPL-MCNC: 1.2 MG/DL — SIGNIFICANT CHANGE UP (ref 0.5–1.3)
GLUCOSE SERPL-MCNC: 88 MG/DL — SIGNIFICANT CHANGE UP (ref 70–99)
HCT VFR BLD CALC: 25.5 % — LOW (ref 34.5–45)
HGB BLD-MCNC: 8.3 G/DL — LOW (ref 11.5–15.5)
INR BLD: 2.59 RATIO — HIGH (ref 0.88–1.16)
MCHC RBC-ENTMCNC: 26.9 PG — LOW (ref 27–34)
MCHC RBC-ENTMCNC: 32.5 GM/DL — SIGNIFICANT CHANGE UP (ref 32–36)
MCV RBC AUTO: 82.5 FL — SIGNIFICANT CHANGE UP (ref 80–100)
PLATELET # BLD AUTO: 271 K/UL — SIGNIFICANT CHANGE UP (ref 150–400)
POTASSIUM SERPL-MCNC: 4.6 MMOL/L — SIGNIFICANT CHANGE UP (ref 3.5–5.3)
POTASSIUM SERPL-SCNC: 4.6 MMOL/L — SIGNIFICANT CHANGE UP (ref 3.5–5.3)
PROTHROM AB SERPL-ACNC: 29.8 SEC — HIGH (ref 10–13.1)
RBC # BLD: 3.09 M/UL — LOW (ref 3.8–5.2)
RBC # FLD: 18.2 % — HIGH (ref 10.3–14.5)
RH IG SCN BLD-IMP: POSITIVE — SIGNIFICANT CHANGE UP
SODIUM SERPL-SCNC: 140 MMOL/L — SIGNIFICANT CHANGE UP (ref 135–145)
WBC # BLD: 8.03 K/UL — SIGNIFICANT CHANGE UP (ref 3.8–10.5)
WBC # FLD AUTO: 8.03 K/UL — SIGNIFICANT CHANGE UP (ref 3.8–10.5)

## 2018-02-16 PROCEDURE — 86850 RBC ANTIBODY SCREEN: CPT

## 2018-02-16 PROCEDURE — 80048 BASIC METABOLIC PNL TOTAL CA: CPT

## 2018-02-16 PROCEDURE — 85610 PROTHROMBIN TIME: CPT

## 2018-02-16 PROCEDURE — 93005 ELECTROCARDIOGRAM TRACING: CPT

## 2018-02-16 PROCEDURE — 81003 URINALYSIS AUTO W/O SCOPE: CPT

## 2018-02-16 PROCEDURE — 73551 X-RAY EXAM OF FEMUR 1: CPT

## 2018-02-16 PROCEDURE — 99285 EMERGENCY DEPT VISIT HI MDM: CPT | Mod: 25

## 2018-02-16 PROCEDURE — 73560 X-RAY EXAM OF KNEE 1 OR 2: CPT

## 2018-02-16 PROCEDURE — 86900 BLOOD TYPING SEROLOGIC ABO: CPT

## 2018-02-16 PROCEDURE — C1889: CPT

## 2018-02-16 PROCEDURE — 97110 THERAPEUTIC EXERCISES: CPT

## 2018-02-16 PROCEDURE — 86923 COMPATIBILITY TEST ELECTRIC: CPT

## 2018-02-16 PROCEDURE — 72170 X-RAY EXAM OF PELVIS: CPT

## 2018-02-16 PROCEDURE — C1713: CPT

## 2018-02-16 PROCEDURE — 96374 THER/PROPH/DIAG INJ IV PUSH: CPT

## 2018-02-16 PROCEDURE — 85730 THROMBOPLASTIN TIME PARTIAL: CPT

## 2018-02-16 PROCEDURE — 87086 URINE CULTURE/COLONY COUNT: CPT

## 2018-02-16 PROCEDURE — 97116 GAIT TRAINING THERAPY: CPT

## 2018-02-16 PROCEDURE — 76000 FLUOROSCOPY <1 HR PHYS/QHP: CPT

## 2018-02-16 PROCEDURE — 97530 THERAPEUTIC ACTIVITIES: CPT

## 2018-02-16 PROCEDURE — C1769: CPT

## 2018-02-16 PROCEDURE — 86901 BLOOD TYPING SEROLOGIC RH(D): CPT

## 2018-02-16 PROCEDURE — 80053 COMPREHEN METABOLIC PANEL: CPT

## 2018-02-16 PROCEDURE — 97162 PT EVAL MOD COMPLEX 30 MIN: CPT

## 2018-02-16 PROCEDURE — 85027 COMPLETE CBC AUTOMATED: CPT

## 2018-02-16 PROCEDURE — 36430 TRANSFUSION BLD/BLD COMPNT: CPT

## 2018-02-16 PROCEDURE — 71045 X-RAY EXAM CHEST 1 VIEW: CPT

## 2018-02-16 PROCEDURE — 70450 CT HEAD/BRAIN W/O DYE: CPT

## 2018-02-16 PROCEDURE — 99239 HOSP IP/OBS DSCHRG MGMT >30: CPT

## 2018-02-16 PROCEDURE — 73501 X-RAY EXAM HIP UNI 1 VIEW: CPT

## 2018-02-16 PROCEDURE — P9016: CPT

## 2018-02-16 RX ORDER — POLYETHYLENE GLYCOL 3350 17 G/17G
17 POWDER, FOR SOLUTION ORAL
Qty: 0 | Refills: 0 | COMMUNITY
Start: 2018-02-16

## 2018-02-16 RX ORDER — SENNA PLUS 8.6 MG/1
2 TABLET ORAL
Qty: 0 | Refills: 0 | COMMUNITY
Start: 2018-02-16

## 2018-02-16 RX ORDER — CAPSAICIN 0.025 %
1 CREAM (GRAM) TOPICAL
Qty: 1 | Refills: 0 | OUTPATIENT
Start: 2018-02-16 | End: 2018-02-20

## 2018-02-16 RX ADMIN — SODIUM CHLORIDE 3 MILLILITER(S): 9 INJECTION INTRAMUSCULAR; INTRAVENOUS; SUBCUTANEOUS at 06:51

## 2018-02-16 RX ADMIN — Medication 650 MILLIGRAM(S): at 06:58

## 2018-02-16 RX ADMIN — Medication 325 MILLIGRAM(S): at 08:31

## 2018-02-16 RX ADMIN — PREGABALIN 1000 MICROGRAM(S): 225 CAPSULE ORAL at 11:17

## 2018-02-16 RX ADMIN — Medication 1 TABLET(S): at 11:17

## 2018-02-16 RX ADMIN — GABAPENTIN 600 MILLIGRAM(S): 400 CAPSULE ORAL at 11:21

## 2018-02-16 RX ADMIN — Medication 500 MILLIGRAM(S): at 06:58

## 2018-02-16 RX ADMIN — PANTOPRAZOLE SODIUM 40 MILLIGRAM(S): 20 TABLET, DELAYED RELEASE ORAL at 06:58

## 2018-02-16 NOTE — PROGRESS NOTE ADULT - PROBLEM SELECTOR PROBLEM 10
GERD (Gastroesophageal Reflux Disease)

## 2018-02-16 NOTE — PROGRESS NOTE ADULT - PROBLEM SELECTOR PLAN 1
-had L hip IMN, POD 1   -WBAT LLE  -PT   -s/p mechanical fall at home  -management per ortho
-had L hip IMN, POD 5   -WBAT LLE  -lombardo d/tanvir, passed trial void   -patient had BM   -continue with tramadol for pain
s/p mechanical fall at home.   - plan for OR with ortho  - pt without any active cardiac complaints or known CAD, however EKG shows 1st degree AV block with bifascicular block, planned for semi-urgent, intermediate risk surgery. Functional status at home is unknown. RCRI of 0, class I risk. Patient will be medically optimized for OR after 1 unit PRBC.  - tylenol prn pain
-had L hip IMN, POD 4   -WBAT LLE  -lombardo d/tanvir, passed trial void   -patient had BM   -continue with tramadol for pain
-had L hip IMN, POD 2   -WBAT LLE  -PT recommending NOVA   -continue with tramadol for pain, will d/c IV morphine as patient as not used it   -d/c lombardo today, trial void  -if stable, likely d/c tomorrow

## 2018-02-16 NOTE — PROGRESS NOTE ADULT - PROBLEM SELECTOR PLAN 8
Valsartan stopped last admission, given patient's age, may continue monitoring BP off antihypertensives for now.

## 2018-02-16 NOTE — PROGRESS NOTE ADULT - PROVIDER SPECIALTY LIST ADULT
Hospitalist
Infectious Disease
Orthopedics
Hospitalist
Hospitalist

## 2018-02-16 NOTE — PROGRESS NOTE ADULT - PROBLEM SELECTOR PLAN 7
-chronic, continue monitoring
chronic, continue monitoring
chronic, continue monitoring
-chronic, continue monitoring
-chronic, continue monitoring

## 2018-02-16 NOTE — PROGRESS NOTE ADULT - PROBLEM SELECTOR PLAN 9
Reportedly in remission  - recommend OP follow up

## 2018-02-16 NOTE — PROGRESS NOTE ADULT - PROBLEM SELECTOR PLAN 4
resolved without any open lesions.   - ID consult appreciated.   - continue capsaicin ointment PRN for any pain
resolved without any open lesions.   - continue valtrex 1g q24hrs through 2/14/18  - ID consult appreciated.   - continue capsaicin ointment PRN for any pain
resolved without any open lesions.   - received valtrex 1g q24hrs through 2/14/18  - ID consult appreciated
resolved without any open lesions.   - received valtrex 1g q24hrs through 2/14/18  - ID consult appreciated
resolved without any open lesions.   - continue valtrex 1g q24hrs through 2/14/18  - ID consult appreciated.   - continue capsaicin ointment PRN for pain

## 2018-02-16 NOTE — PROGRESS NOTE ADULT - PROBLEM SELECTOR PLAN 6
-Cr stable
Mild DIOGENES on CKD 3, mild dehydration, monitor renal function.
Mild DIOGENES on CKD 3, mild dehydration, monitor renal function.
-Cr stable
-Cr stable

## 2018-02-16 NOTE — PROGRESS NOTE ADULT - PROBLEM SELECTOR PROBLEM 3
Conduction disorder of the heart

## 2018-02-16 NOTE — PROGRESS NOTE ADULT - PROBLEM SELECTOR PLAN 2
-continue to monitor H&H  -Microcytic anemia likely BARRY.
-continue to monitor H&H  -Microcytic anemia likely BARRY.
Microcytic anemia with Hb 7.4, likely BARRY. No ongoing blood loss suspected at this time.    - Transfuse PRBC, check post transfusion CBC
-continue to monitor H&H  -Microcytic anemia likely BARRY.
-continue to monitor H&H  -Microcytic anemia likely BARRY.

## 2018-02-16 NOTE — PROGRESS NOTE ADULT - SUBJECTIVE AND OBJECTIVE BOX
pt seen and examined. pain controlled, unchanged from baseline                            8.0    7.2   )-----------( 290      ( 12 Feb 2018 06:21 )             23.5     12 Feb 2018 06:39    146    |  114    |  30     ----------------------------<  90     4.5     |  23     |  1.26     Ca    9.0        12 Feb 2018 06:39    TPro  6.0    /  Alb  3.4    /  TBili  0.3    /  DBili  x      /  AST  29     /  ALT  27     /  AlkPhos  72     11 Feb 2018 12:15    PT/INR - ( 12 Feb 2018 06:21 )   PT: 15.3 sec;   INR: 1.41 ratio         PTT - ( 12 Feb 2018 06:21 )  PTT:30.8 sec  Vital Signs Last 24 Hrs  T(C): 36.7 (02-12-18 @ 07:32), Max: 37.1 (02-11-18 @ 23:24)  T(F): 98.1 (02-12-18 @ 07:32), Max: 98.8 (02-11-18 @ 23:24)  HR: 61 (02-12-18 @ 07:32) (61 - 90)  BP: 162/76 (02-12-18 @ 07:32) (122/71 - 162/76)  BP(mean): 101 (02-11-18 @ 16:36) (101 - 101)  RR: 18 (02-12-18 @ 07:32) (16 - 18)  SpO2: 100% (02-12-18 @ 07:32) (99% - 100%)      Physical Exam  Gen: NAD  L LE: skin intact, unable to SLR L LE, + log roll L LE, +ttp hip/groin, no ttp elsewhere, +ehl/fhl/ta/gs function, silt l3-s1, no calf ttp, dp/pt pulse intact, compartments soft  Secondary survey: benign, nv intact, able to SLR contralateral leg, negative log roll contralateral leg, no bony ttp elsewhere, shingles lesions voer right abdomen
CC: F/U for Shingles    Saw/spoke to patient. Patient complains of pain to LLE. No pain at shingles rash site.    Allergies  penicillin (Unknown)    ANTIMICROBIALS:  valACYclovir 1000 every 24 hours    PE:    Vital Signs Last 24 Hrs  T(C): 36.7 (2018 08:00), Max: 37.2 (2018 00:00)  T(F): 98.1 (2018 08:00), Max: 99 (2018 00:00)  HR: 76 (2018 10:55) (71 - 193)  BP: 107/63 (2018 10:55) (101/56 - 186/74)  BP(mean): 102 (2018 20:15) (93 - 111)  RR: 18 (2018 08:00) (14 - 18)  SpO2: 95% (2018 08:00) (92% - 100%)    Gen: AOx2-3, NAD, non-toxic, pleasant  CV: S1+S2 normal, no murmurs, nontachycardic  Resp: Clear bilat, no resp distress, no crackles/wheezes  Abd: Soft, nontender, +BS  Ext: LLE pain  Rash: Crusting rash to R abd, flank    LABS:                        8.3    5.71  )-----------( 222      ( 2018 07:46 )             25.3     02-13    140  |  108  |  22  ----------------------------<  88  4.0   |  23  |  1.01    Ca    8.4      2018 07:36    Urinalysis Basic - ( 2018 23:03 )    Color: Yellow / Appearance: Clear / S.015 / pH: x  Gluc: x / Ketone: Negative  / Bili: Negative / Urobili: Negative   Blood: x / Protein: Trace / Nitrite: Negative   Leuk Esterase: Negative / RBC: x / WBC x   Sq Epi: x / Non Sq Epi: x / Bacteria: x    MICROBIOLOGY:    .Urine Catheterized  18   <10,000 CFU/ml  Normal Urogenital rhianna present     RADIOLOGY:    No new available
Orthopaedic Surgery Preop Note    Dx: L femur IT fx   Procedure: IMN  Surgeon: Andrés    02-11    142  |  110<H>  |  37<H>  ----------------------------<  120<H>  4.4   |  21<L>  |  1.32<H>    Ca    9.5      11 Feb 2018 12:15    TPro  6.0  /  Alb  3.4  /  TBili  0.3  /  DBili  x   /  AST  29  /  ALT  27  /  AlkPhos  72  02-11                          7.4    12.0  )-----------( 332      ( 11 Feb 2018 12:15 )             22.6     PT/INR - ( 11 Feb 2018 12:15 )   PT: 37.7 sec;   INR: 3.40 ratio         PTT - ( 11 Feb 2018 12:15 )  PTT:40.2 sec      - EKG in chart  - T/S done  - UA pend  - CXR done    89y Female to undergo xx  - NPO pMN  - IVF when NPO  - Hold anticoagulation pMN  - Consent in chart  - Plan for OR tomorrow
Patient is a 89y old  Female who presents with a chief complaint of s/p fall (11 Feb 2018 16:36)      SUBJECTIVE / OVERNIGHT EVENTS: Patient complaining of Lt hip pain, manageable on pain medications. Denies chest pain/ palpitations. Walks with a cane.   No events overnight.     T(C): 36.7 (02-12-18 @ 10:29), Max: 37 (02-12-18 @ 05:30)  HR: 77 (02-12-18 @ 10:29) (61 - 90)  BP: 157/70 (02-12-18 @ 10:29) (130/57 - 165/81)  RR: 16 (02-12-18 @ 10:29) (16 - 18)  SpO2: 100% (02-12-18 @ 10:29) (100% - 100%)    MEDICATIONS  (STANDING):  cyanocobalamin 1000 MICROGram(s) Oral daily  ferrous    sulfate 325 milliGRAM(s) Oral two times a day with meals  gabapentin 600 milliGRAM(s) Oral daily  latanoprost 0.005% Ophthalmic Solution 1 Drop(s) Both EYES at bedtime  multivitamin/minerals 1 Tablet(s) Oral daily  pantoprazole    Tablet 40 milliGRAM(s) Oral before breakfast  sodium chloride 0.9% lock flush 3 milliLiter(s) IV Push every 8 hours  sodium chloride 0.9%. 1000 milliLiter(s) (65 mL/Hr) IV Continuous <Continuous>    MEDICATIONS  (PRN):  acetaminophen   Tablet. 650 milliGRAM(s) Oral every 6 hours PRN Moderate Pain (4 - 6)  capsaicin 0.025% Cream 1 Application(s) Topical three times a day PRN zoster rash pain      CAPILLARY BLOOD GLUCOSE          PHYSICAL EXAM:  GENERAL: NAD, well-developed  HEAD:  Atraumatic, Normocephalic  EYES: EOMI, conjunctiva and sclera clear  NECK: Supple, No JVD  CHEST/LUNG: Clear to auscultation bilaterally; No wheeze  HEART: Regular rate and rhythm; No murmurs, rubs, or gallops  ABDOMEN: Soft, Nontender, Nondistended; Bowel sounds present  EXTREMITIES:  2+ Peripheral Pulses, No clubbing, cyanosis, or edema  PSYCH: AAOx3  NEUROLOGY: non-focal  SKIN: No rashes or lesions                          8.0    7.2   )-----------( 290      ( 12 Feb 2018 06:21 )             23.5           LIVER FUNCTIONS - ( 11 Feb 2018 12:15 )  Alb: 3.4 g/dL / Pro: 6.0 g/dL / ALK PHOS: 72 U/L / ALT: 27 U/L RC / AST: 29 U/L / GGT: x           PT/INR - ( 12 Feb 2018 06:21 )   PT: 15.3 sec;   INR: 1.41 ratio         PTT - ( 12 Feb 2018 06:21 )  PTT:30.8 sec  146|114|30<90  4.5|23|1.26  9.0,--,--  02-12 @ 06:39            RADIOLOGY & ADDITIONAL TESTS:    Imaging Personally Reviewed:    Consultant(s) Notes Reviewed:  ID     Care Discussed with Consultants/Other Providers:
Patient is a 89y old  Female who presents with a chief complaint of s/p fall (14 Feb 2018 12:49)      SUBJECTIVE / OVERNIGHT EVENTS: no acute events overnight, patient feels well.     MEDICATIONS  (STANDING):  ascorbic acid 500 milliGRAM(s) Oral two times a day  cyanocobalamin 1000 MICROGram(s) Oral daily  docusate sodium 100 milliGRAM(s) Oral three times a day  ferrous    sulfate 325 milliGRAM(s) Oral two times a day with meals  gabapentin 600 milliGRAM(s) Oral daily  latanoprost 0.005% Ophthalmic Solution 1 Drop(s) Both EYES at bedtime  multivitamin 1 Tablet(s) Oral daily  multivitamin/minerals 1 Tablet(s) Oral daily  pantoprazole    Tablet 40 milliGRAM(s) Oral before breakfast  senna 2 Tablet(s) Oral at bedtime  sodium chloride 0.9% lock flush 3 milliLiter(s) IV Push every 8 hours    MEDICATIONS  (PRN):  acetaminophen   Tablet 650 milliGRAM(s) Oral every 6 hours PRN For Temp greater than 38 C (100.4 F)  acetaminophen   Tablet. 650 milliGRAM(s) Oral every 6 hours PRN Mild Pain (1 - 3)  benzocaine 15 mG/menthol 3.6 mG Lozenge 1 Lozenge Oral every 4 hours PRN Sore Throat  bisacodyl Suppository 10 milliGRAM(s) Rectal daily PRN If no bowel movement  capsaicin 0.025% Cream 1 Application(s) Topical three times a day PRN zoster rash pain  ondansetron Injectable 4 milliGRAM(s) IV Push every 6 hours PRN Nausea and/or Vomiting  polyethylene glycol 3350 17 Gram(s) Oral daily PRN Constipation  traMADol 25 milliGRAM(s) Oral every 6 hours PRN Moderate Pain (4 - 6)      Vital Signs Last 24 Hrs  T(C): 36.8 (16 Feb 2018 08:30), Max: 37.6 (15 Feb 2018 16:43)  T(F): 98.2 (16 Feb 2018 08:30), Max: 99.7 (15 Feb 2018 16:43)  HR: 76 (16 Feb 2018 08:30) (58 - 96)  BP: 112/71 (16 Feb 2018 08:30) (100/58 - 127/63)  BP(mean): --  RR: 16 (16 Feb 2018 08:30) (16 - 18)  SpO2: 95% (16 Feb 2018 08:30) (94% - 99%)  CAPILLARY BLOOD GLUCOSE        I&O's Summary    15 Feb 2018 07:01  -  16 Feb 2018 07:00  --------------------------------------------------------  IN: 1400 mL / OUT: 800 mL / NET: 600 mL    16 Feb 2018 07:01  -  16 Feb 2018 15:07  --------------------------------------------------------  IN: 380 mL / OUT: 0 mL / NET: 380 mL        PHYSICAL EXAM:  GENERAL: NAD  HEAD:  Atraumatic, Normocephalic  EYES: no icterus   CHEST/LUNG: Clear to auscultation bilaterally; No wheeze  HEART: Regular rate and rhythm; No murmurs  ABDOMEN: Soft, Nontender, Nondistended  EXTREMITIES: +peripheral edema, dressing in place at right thigh   PSYCH: AAOx3      LABS:                        8.3    8.03  )-----------( 271      ( 16 Feb 2018 07:29 )             25.5     02-16    140  |  109<H>  |  22  ----------------------------<  88  4.6   |  20<L>  |  1.20    Ca    8.7      16 Feb 2018 07:54      PT/INR - ( 16 Feb 2018 07:29 )   PT: 29.8 sec;   INR: 2.59 ratio         PTT - ( 16 Feb 2018 07:29 )  PTT:32.0 sec
Patient is a 89y old  Female who presents with a chief complaint of s/p fall (2018 16:36)      SUBJECTIVE / OVERNIGHT EVENTS: patient reports pain in her left thigh, has been painful after the surgery. Otherwise feeling well. Last BM was 3 days ago.     MEDICATIONS  (STANDING):  ascorbic acid 500 milliGRAM(s) Oral two times a day  cyanocobalamin 1000 MICROGram(s) Oral daily  docusate sodium 100 milliGRAM(s) Oral three times a day  ferrous    sulfate 325 milliGRAM(s) Oral two times a day with meals  gabapentin 600 milliGRAM(s) Oral daily  lactated ringers. 1000 milliLiter(s) (100 mL/Hr) IV Continuous <Continuous>  latanoprost 0.005% Ophthalmic Solution 1 Drop(s) Both EYES at bedtime  multivitamin 1 Tablet(s) Oral daily  multivitamin/minerals 1 Tablet(s) Oral daily  pantoprazole    Tablet 40 milliGRAM(s) Oral before breakfast  sodium chloride 0.9% lock flush 3 milliLiter(s) IV Push every 8 hours  sodium chloride 0.9%. 1000 milliLiter(s) (65 mL/Hr) IV Continuous <Continuous>  valACYclovir 1000 milliGRAM(s) Oral every 24 hours    MEDICATIONS  (PRN):  acetaminophen   Tablet 650 milliGRAM(s) Oral every 6 hours PRN For Temp greater than 38 C (100.4 F)  acetaminophen   Tablet. 650 milliGRAM(s) Oral every 6 hours PRN Mild Pain (1 - 3)  benzocaine 15 mG/menthol 3.6 mG Lozenge 1 Lozenge Oral every 4 hours PRN Sore Throat  capsaicin 0.025% Cream 1 Application(s) Topical three times a day PRN zoster rash pain  morphine  - Injectable 2 milliGRAM(s) IV Push every 4 hours PRN Severe Pain (7 - 10)  ondansetron Injectable 4 milliGRAM(s) IV Push every 6 hours PRN Nausea and/or Vomiting  traMADol 25 milliGRAM(s) Oral every 6 hours PRN Moderate Pain (4 - 6)      Vital Signs Last 24 Hrs  T(C): 36.7 (2018 14:00), Max: 37.2 (2018 00:00)  T(F): 98.1 (2018 14:00), Max: 99 (2018 00:00)  HR: 77 (2018 14:00) (71 - 193)  BP: 108/66 (2018 14:00) (101/56 - 186/74)  BP(mean): 102 (2018 20:15) (93 - 111)  RR: 18 (2018 14:00) (14 - 18)  SpO2: 95% (2018 14:00) (92% - 100%)  CAPILLARY BLOOD GLUCOSE        I&O's Summary    2018 07:01  -  2018 07:00  --------------------------------------------------------  IN: 640 mL / OUT: 510 mL / NET: 130 mL    2018 07:01  -  2018 15:22  --------------------------------------------------------  IN: 960 mL / OUT: 450 mL / NET: 510 mL        PHYSICAL EXAM:  GENERAL: sitting up in chair   HEAD:  Atraumatic, Normocephalic  EYES: EOMI  NECK: Supple, No JVD  CHEST/LUNG: Clear to auscultation bilaterally; No wheeze  HEART: Regular rate and rhythm; No murmurs  ABDOMEN: Soft, Nontender, Nondistended; Bowel sounds present  EXTREMITIES: dressing in place on left thigh, no peripheral edema   PSYCH: AAOx3      LABS:                        8.3    5.71  )-----------( 222      ( 2018 07:46 )             25.3     02-13    140  |  108  |  22  ----------------------------<  88  4.0   |  23  |  1.01    Ca    8.4      2018 07:36      PT/INR - ( 2018 07:46 )   PT: 13.6 sec;   INR: 1.20 ratio         PTT - ( 2018 06:21 )  PTT:30.8 sec      Urinalysis Basic - ( 2018 23:03 )    Color: Yellow / Appearance: Clear / S.015 / pH: x  Gluc: x / Ketone: Negative  / Bili: Negative / Urobili: Negative   Blood: x / Protein: Trace / Nitrite: Negative   Leuk Esterase: Negative / RBC: x / WBC x   Sq Epi: x / Non Sq Epi: x / Bacteria: x      Consultant(s) Notes Reviewed:  ID, Ortho
Pt S/E at bedside, no acute events overnight, pain controlled    AVSS  Gen: NAD, AAOx3    Left Lower Extremity:  Dressing clean dry intact  +EHL/FHL/TA/GS  SILT L3-S1  +DP/PT Pulses  Compartments soft  No calf TTP B/L
Pt S/E at bedside, no acute events overnight, pain controlled    AVSS  Gen: NAD, AAOx3    Left Lower Extremity:  Dressing mild spotting on the dressing  +EHL/FHL/TA/GS  SILT L3-S1  +DP/PT Pulses  Compartments soft  No calf TTP B/L
Pt S/E at bedside, no acute events overnight, pain controlled. Denies fever/chills, CP, SOB. No complaints this AM.    Vital Signs Last 24 Hrs  T(C): 37.2 (13 Feb 2018 05:23), Max: 37.2 (13 Feb 2018 00:00)  T(F): 99 (13 Feb 2018 05:23), Max: 99 (13 Feb 2018 00:00)  HR: 81 (13 Feb 2018 05:23) (61 - 193)  BP: 101/56 (13 Feb 2018 05:23) (101/56 - 186/74)  BP(mean): 102 (12 Feb 2018 20:15) (93 - 111)  RR: 18 (13 Feb 2018 05:23) (14 - 18)  SpO2: 97% (13 Feb 2018 05:23) (92% - 100%)    Gen: NAD, AAOx3    Left Lower Extremity:  Dressing clean dry intact  +EHL/FHL/TA/GS  SILT L3-S1  +DP/PT Pulses  Compartments soft  No calf TTP B/L
Patient is a 89y old  Female who presents with a chief complaint of s/p fall (14 Feb 2018 12:49)      SUBJECTIVE / OVERNIGHT EVENTS: no acute events overnight. Patient had BM, lombardo removed. She has pain in her right thigh.     MEDICATIONS  (STANDING):  ascorbic acid 500 milliGRAM(s) Oral two times a day  cyanocobalamin 1000 MICROGram(s) Oral daily  docusate sodium 100 milliGRAM(s) Oral three times a day  ferrous    sulfate 325 milliGRAM(s) Oral two times a day with meals  gabapentin 600 milliGRAM(s) Oral daily  latanoprost 0.005% Ophthalmic Solution 1 Drop(s) Both EYES at bedtime  multivitamin 1 Tablet(s) Oral daily  multivitamin/minerals 1 Tablet(s) Oral daily  pantoprazole    Tablet 40 milliGRAM(s) Oral before breakfast  senna 2 Tablet(s) Oral at bedtime  sodium chloride 0.9% lock flush 3 milliLiter(s) IV Push every 8 hours  valACYclovir 1000 milliGRAM(s) Oral every 24 hours    MEDICATIONS  (PRN):  acetaminophen   Tablet 650 milliGRAM(s) Oral every 6 hours PRN For Temp greater than 38 C (100.4 F)  acetaminophen   Tablet. 650 milliGRAM(s) Oral every 6 hours PRN Mild Pain (1 - 3)  benzocaine 15 mG/menthol 3.6 mG Lozenge 1 Lozenge Oral every 4 hours PRN Sore Throat  bisacodyl Suppository 10 milliGRAM(s) Rectal daily PRN If no bowel movement  capsaicin 0.025% Cream 1 Application(s) Topical three times a day PRN zoster rash pain  ondansetron Injectable 4 milliGRAM(s) IV Push every 6 hours PRN Nausea and/or Vomiting  polyethylene glycol 3350 17 Gram(s) Oral daily PRN Constipation  traMADol 25 milliGRAM(s) Oral every 6 hours PRN Moderate Pain (4 - 6)      Vital Signs Last 24 Hrs  T(C): 36.8 (15 Feb 2018 08:50), Max: 37.2 (15 Feb 2018 04:21)  T(F): 98.3 (15 Feb 2018 08:50), Max: 99 (15 Feb 2018 04:21)  HR: 83 (15 Feb 2018 08:50) (71 - 83)  BP: 134/71 (15 Feb 2018 08:50) (104/57 - 134/71)  BP(mean): --  RR: 16 (15 Feb 2018 08:50) (16 - 18)  SpO2: 95% (15 Feb 2018 08:50) (95% - 96%)  CAPILLARY BLOOD GLUCOSE        I&O's Summary    14 Feb 2018 07:01  -  15 Feb 2018 07:00  --------------------------------------------------------  IN: 1360 mL / OUT: 600 mL / NET: 760 mL    15 Feb 2018 07:01  -  15 Feb 2018 11:06  --------------------------------------------------------  IN: 380 mL / OUT: 200 mL / NET: 180 mL        PHYSICAL EXAM:  GENERAL: resting in bed, eating breakfast   HEAD:  Atraumatic, Normocephalic  EYES: no icterus   CHEST/LUNG: Clear to auscultation bilaterally; No wheeze  HEART: Regular rate and rhythm; No murmurs  ABDOMEN: Soft, Nontender, Nondistended  EXTREMITIES: +peripheral edema, dressing in place at right thigh   PSYCH: AAOx3      LABS:                        8.2    8.08  )-----------( 255      ( 15 Feb 2018 07:36 )             25.4     02-15    136  |  105  |  23  ----------------------------<  102<H>  4.2   |  20<L>  |  1.05    Ca    8.3<L>      15 Feb 2018 07:37      PT/INR - ( 15 Feb 2018 06:29 )   PT: 19.9 sec;   INR: 1.80 ratio          Consultant(s) Notes Reviewed:  Orthopedics
Patient is a 89y old  Female who presents with a chief complaint of s/p fall (14 Feb 2018 12:49)      SUBJECTIVE / OVERNIGHT EVENTS: no acute events overnight. Patient reports ongoing pain in R thigh. Had breakfast this morning. No other complaints.     MEDICATIONS  (STANDING):  ascorbic acid 500 milliGRAM(s) Oral two times a day  cyanocobalamin 1000 MICROGram(s) Oral daily  docusate sodium 100 milliGRAM(s) Oral three times a day  ferrous    sulfate 325 milliGRAM(s) Oral two times a day with meals  gabapentin 600 milliGRAM(s) Oral daily  latanoprost 0.005% Ophthalmic Solution 1 Drop(s) Both EYES at bedtime  multivitamin 1 Tablet(s) Oral daily  multivitamin/minerals 1 Tablet(s) Oral daily  pantoprazole    Tablet 40 milliGRAM(s) Oral before breakfast  sodium chloride 0.9% lock flush 3 milliLiter(s) IV Push every 8 hours  valACYclovir 1000 milliGRAM(s) Oral every 24 hours  warfarin 7.5 milliGRAM(s) Oral once    MEDICATIONS  (PRN):  acetaminophen   Tablet 650 milliGRAM(s) Oral every 6 hours PRN For Temp greater than 38 C (100.4 F)  acetaminophen   Tablet. 650 milliGRAM(s) Oral every 6 hours PRN Mild Pain (1 - 3)  benzocaine 15 mG/menthol 3.6 mG Lozenge 1 Lozenge Oral every 4 hours PRN Sore Throat  bisacodyl Suppository 10 milliGRAM(s) Rectal daily PRN If no bowel movement  capsaicin 0.025% Cream 1 Application(s) Topical three times a day PRN zoster rash pain  morphine  - Injectable 2 milliGRAM(s) IV Push every 4 hours PRN Severe Pain (7 - 10)  ondansetron Injectable 4 milliGRAM(s) IV Push every 6 hours PRN Nausea and/or Vomiting  traMADol 25 milliGRAM(s) Oral every 6 hours PRN Moderate Pain (4 - 6)      Vital Signs Last 24 Hrs  T(C): 36.8 (14 Feb 2018 13:26), Max: 37.4 (13 Feb 2018 16:21)  T(F): 98.2 (14 Feb 2018 13:26), Max: 99.3 (13 Feb 2018 16:21)  HR: 79 (14 Feb 2018 13:26) (78 - 98)  BP: 104/57 (14 Feb 2018 13:26) (104/57 - 119/71)  BP(mean): --  RR: 16 (14 Feb 2018 13:26) (16 - 18)  SpO2: 96% (14 Feb 2018 13:26) (95% - 97%)  CAPILLARY BLOOD GLUCOSE        I&O's Summary    13 Feb 2018 07:01  -  14 Feb 2018 07:00  --------------------------------------------------------  IN: 1200 mL / OUT: 1100 mL / NET: 100 mL    14 Feb 2018 07:01  -  14 Feb 2018 14:22  --------------------------------------------------------  IN: 740 mL / OUT: 450 mL / NET: 290 mL        PHYSICAL EXAM:  GENERAL: resting in the chair   HEAD:  Atraumatic, Normocephalic  EYES: EOMI, conjunctiva clear   NECK: Supple  CHEST/LUNG: Clear to auscultation bilaterally; No wheeze  HEART: Regular rate and rhythm; No murmurs  ABDOMEN: Soft, Nontender, Nondistended; Bowel sounds present  : lombardo in place draining clear urine   EXTREMITIES:  Dressing in place on lateral right thigh   PSYCH: AAOx3  NEUROLOGY: non-focal      LABS:                        8.6    8.39  )-----------( 235      ( 14 Feb 2018 09:14 )             26.3     02-14    137  |  107  |  27<H>  ----------------------------<  107<H>  4.1   |  21<L>  |  1.28    Ca    8.9      14 Feb 2018 09:15      PT/INR - ( 14 Feb 2018 09:14 )   PT: 17.3 sec;   INR: 1.52 ratio

## 2018-02-16 NOTE — PROGRESS NOTE ADULT - PROBLEM SELECTOR PLAN 5
-INR therapeutic  -continue with coumadin
on coumadin with elevated INR 3.4  - 10 units vitamin K as per ortho  - hold coumadin prior to surgery  - continue monitoring INR
on coumadin with elevated INR 3.4  - 10 units vitamin K as per ortho  - hold coumadin prior to surgery  - continue monitoring INR
-INR 1.8  -dose 5mg coumadin tonight
-re-dosing coumadin, 7.5mg PO tonight  -monitor INR daily

## 2018-02-16 NOTE — PROGRESS NOTE ADULT - ASSESSMENT
A/P: 89y Female with L hip fracture    Pain control  NWB L LE, bedrest  Admit to medical team  clear'd for OR for today  npo except meds  iv fluids while npo  hold chemical AC  attending aware
89 F hx colon cancer in remission, unprovoked PE on coumadin, htn, chronic B/L LE lymphedema, recent [facility] admission for zoster presenting today s/p fall at home. Recent admission to [facility] for shingles, supratherapeutic INR. Now returns after fall and fracture. Consult for Zoster. Appears lesions starting to crust, appears single dermatome across R abd to R flank; improving, not painful. No evidence dissemination or immunocompromise. Note has been on Valtrex since 2/5. Today, remains stable, still with pain in LLE; shingles stable. Overall, localized shingles, fall, fracture.  - Continue Valtrex 1g q 24 through 2/14/18 (CrCL ~30)  - No need for airborne/contact precautions, not disseminated, not immunocompromised--keep lesions covered  - Monitor for full crusting  - Fracture per surgical team  - Will sign off. Please call with further questions or change in status.    Rock Salgado MD  Pager [phone]  After 5pm and on weekends call [phone]
89F s/p L hip IMN POD 1  Analgesia  DVT ppx with coumadin  WBAT LLE  PT/OOB  Incentive spirometry  DC planning
89f hx as above presenting with L hip fracture s/p mechanical fall
A/P: 89F s/p L Hip IMN POD 2  Analgesia  DVT ppx  WBAT  PT/OT  DC planning
A/P: 89F s/p L Hip IMN POD 3  Analgesia  DVT ppx  WBAT  PT/OT  DC planning
89f hx as above presenting with L hip fracture s/p mechanical fall
89f hx as above presenting with L hip fracture s/p mechanical fall

## 2018-02-16 NOTE — PROGRESS NOTE ADULT - ATTENDING COMMENTS
Dose coumadin tonight  Can be discharged tomorrow as INR will likely be therapeutic  D/C to [facility]
Dispo planning: discussed with CM, list of rehabs given to son. Possibly d/c tomorrow if patient passes trial void and INR improved.
to be discharged to rehab today    outpatient f/u with ortho    More than 45 minutes spent coordinating discharge.

## 2018-03-09 ENCOUNTER — MESSAGE (OUTPATIENT)
Age: 83
End: 2018-03-09

## 2018-03-15 ENCOUNTER — MESSAGE (OUTPATIENT)
Age: 83
End: 2018-03-15

## 2018-03-22 ENCOUNTER — CLINICAL ADVICE (OUTPATIENT)
Age: 83
End: 2018-03-22

## 2018-03-23 ENCOUNTER — CLINICAL ADVICE (OUTPATIENT)
Age: 83
End: 2018-03-23

## 2018-03-29 ENCOUNTER — CLINICAL ADVICE (OUTPATIENT)
Age: 83
End: 2018-03-29

## 2018-03-30 ENCOUNTER — LABORATORY RESULT (OUTPATIENT)
Age: 83
End: 2018-03-30

## 2018-04-02 ENCOUNTER — RESULT REVIEW (OUTPATIENT)
Age: 83
End: 2018-04-02

## 2018-04-13 ENCOUNTER — APPOINTMENT (OUTPATIENT)
Dept: HOME HEALTH SERVICES | Facility: HOME HEALTH | Age: 83
End: 2018-04-13
Payer: MEDICARE

## 2018-04-13 ENCOUNTER — APPOINTMENT (OUTPATIENT)
Dept: HOME HEALTH SERVICES | Facility: HOME HEALTH | Age: 83
End: 2018-04-13

## 2018-04-13 VITALS
DIASTOLIC BLOOD PRESSURE: 65 MMHG | TEMPERATURE: 98.8 F | SYSTOLIC BLOOD PRESSURE: 120 MMHG | HEART RATE: 65 BPM | BODY MASS INDEX: 26.64 KG/M2 | WEIGHT: 141 LBS

## 2018-04-13 DIAGNOSIS — Z86.19 PERSONAL HISTORY OF OTHER INFECTIOUS AND PARASITIC DISEASES: ICD-10-CM

## 2018-04-13 DIAGNOSIS — Z87.898 PERSONAL HISTORY OF OTHER SPECIFIED CONDITIONS: ICD-10-CM

## 2018-04-13 DIAGNOSIS — Z63.4 DISAPPEARANCE AND DEATH OF FAMILY MEMBER: ICD-10-CM

## 2018-04-13 DIAGNOSIS — Z92.29 PERSONAL HISTORY OF OTHER DRUG THERAPY: ICD-10-CM

## 2018-04-13 DIAGNOSIS — I26.99 OTHER PULMONARY EMBOLISM W/OUT ACUTE COR PULMONALE: ICD-10-CM

## 2018-04-13 DIAGNOSIS — Z87.09 PERSONAL HISTORY OF OTHER DISEASES OF THE RESPIRATORY SYSTEM: ICD-10-CM

## 2018-04-13 PROCEDURE — 99344 HOME/RES VST NEW MOD MDM 60: CPT | Mod: 25

## 2018-04-13 PROCEDURE — G0506: CPT

## 2018-04-13 RX ORDER — AZITHROMYCIN 250 MG/1
250 TABLET, FILM COATED ORAL
Qty: 6 | Refills: 0 | Status: COMPLETED | COMMUNITY
Start: 2017-12-19 | End: 2018-04-13

## 2018-04-13 RX ORDER — VALACYCLOVIR 1 G/1
1 TABLET, FILM COATED ORAL
Qty: 3 | Refills: 0 | Status: COMPLETED | COMMUNITY
Start: 2018-02-08

## 2018-04-13 RX ORDER — WARFARIN 3 MG/1
3 TABLET ORAL
Qty: 21 | Refills: 0 | Status: COMPLETED | COMMUNITY
Start: 2018-02-08

## 2018-04-13 RX ORDER — POLYETHYLENE GLYCOL 3350 17 G/17G
17 POWDER, FOR SOLUTION ORAL
Qty: 255 | Refills: 0 | Status: COMPLETED | COMMUNITY
Start: 2018-03-22

## 2018-04-13 RX ORDER — WARFARIN 5 MG/1
5 TABLET ORAL
Qty: 10 | Refills: 0 | Status: COMPLETED | COMMUNITY
Start: 2018-03-22

## 2018-04-13 SDOH — SOCIAL STABILITY - SOCIAL INSECURITY: DISSAPEARANCE AND DEATH OF FAMILY MEMBER: Z63.4

## 2018-04-14 PROBLEM — Z86.19 HISTORY OF HERPES ZOSTER: Status: RESOLVED | Noted: 2018-04-14 | Resolved: 2018-04-14

## 2018-04-16 ENCOUNTER — LABORATORY RESULT (OUTPATIENT)
Age: 83
End: 2018-04-16

## 2018-04-24 ENCOUNTER — MEDICATION RENEWAL (OUTPATIENT)
Age: 83
End: 2018-04-24

## 2018-04-24 ENCOUNTER — APPOINTMENT (OUTPATIENT)
Dept: HOME HEALTH SERVICES | Facility: HOME HEALTH | Age: 83
End: 2018-04-24

## 2018-04-25 LAB
25(OH)D3 SERPL-MCNC: 26.8 NG/ML
ALBUMIN SERPL ELPH-MCNC: 3.7 G/DL
ALP BLD-CCNC: 98 U/L
ALT SERPL-CCNC: 25 U/L
ANION GAP SERPL CALC-SCNC: 18 MMOL/L
AST SERPL-CCNC: 25 U/L
BASOPHILS # BLD AUTO: 0.02 K/UL
BASOPHILS NFR BLD AUTO: 0.3 %
BILIRUB SERPL-MCNC: 0.5 MG/DL
BUN SERPL-MCNC: 23 MG/DL
CALCIUM SERPL-MCNC: 9.7 MG/DL
CHLORIDE SERPL-SCNC: 103 MMOL/L
CO2 SERPL-SCNC: 22 MMOL/L
CREAT SERPL-MCNC: 1.15 MG/DL
EOSINOPHIL # BLD AUTO: 0.07 K/UL
EOSINOPHIL NFR BLD AUTO: 1 %
HBA1C MFR BLD HPLC: 5.1 %
HCT VFR BLD CALC: 30.5 %
HGB BLD-MCNC: 9.4 G/DL
IMM GRANULOCYTES NFR BLD AUTO: 0.1 %
INR PPP: 3.42 RATIO
LYMPHOCYTES # BLD AUTO: 0.7 K/UL
LYMPHOCYTES NFR BLD AUTO: 10.1 %
MAN DIFF?: NORMAL
MCHC RBC-ENTMCNC: 27.7 PG
MCHC RBC-ENTMCNC: 30.8 GM/DL
MCV RBC AUTO: 90 FL
MONOCYTES # BLD AUTO: 0.5 K/UL
MONOCYTES NFR BLD AUTO: 7.2 %
NEUTROPHILS # BLD AUTO: 5.64 K/UL
NEUTROPHILS NFR BLD AUTO: 81.3 %
PLATELET # BLD AUTO: 285 K/UL
POTASSIUM SERPL-SCNC: 4.4 MMOL/L
PROT SERPL-MCNC: 6.2 G/DL
PT BLD: 39.6 SEC
RBC # BLD: 3.39 M/UL
RBC # FLD: 18.2 %
SODIUM SERPL-SCNC: 143 MMOL/L
WBC # FLD AUTO: 6.94 K/UL

## 2018-05-08 LAB
INR PPP: 2.57 RATIO
PT BLD: 29.6 SEC

## 2018-05-15 ENCOUNTER — MEDICATION RENEWAL (OUTPATIENT)
Age: 83
End: 2018-05-15

## 2018-05-15 ENCOUNTER — RX RENEWAL (OUTPATIENT)
Age: 83
End: 2018-05-15

## 2018-06-05 ENCOUNTER — MEDICATION RENEWAL (OUTPATIENT)
Age: 83
End: 2018-06-05

## 2018-06-05 LAB
INR PPP: 4.21 RATIO
PT BLD: 49 SEC

## 2018-06-06 ENCOUNTER — MEDICATION RENEWAL (OUTPATIENT)
Age: 83
End: 2018-06-06

## 2018-06-07 RX ORDER — PANTOPRAZOLE 40 MG/1
40 TABLET, DELAYED RELEASE ORAL
Qty: 90 | Refills: 1 | Status: COMPLETED | COMMUNITY
Start: 2018-03-22 | End: 2018-06-07

## 2018-06-12 LAB
INR PPP: 2.57 RATIO
PT BLD: 29.6 SEC

## 2018-06-22 ENCOUNTER — APPOINTMENT (OUTPATIENT)
Dept: HOME HEALTH SERVICES | Facility: HOME HEALTH | Age: 83
End: 2018-06-22
Payer: MEDICARE

## 2018-06-22 VITALS
HEART RATE: 60 BPM | RESPIRATION RATE: 16 BRPM | OXYGEN SATURATION: 97 % | SYSTOLIC BLOOD PRESSURE: 110 MMHG | DIASTOLIC BLOOD PRESSURE: 60 MMHG

## 2018-06-22 DIAGNOSIS — R73.09 OTHER ABNORMAL GLUCOSE: ICD-10-CM

## 2018-06-22 DIAGNOSIS — R59.1 GENERALIZED ENLARGED LYMPH NODES: ICD-10-CM

## 2018-06-22 PROCEDURE — 99349 HOME/RES VST EST MOD MDM 40: CPT

## 2018-06-23 ENCOUNTER — MEDICATION RENEWAL (OUTPATIENT)
Age: 83
End: 2018-06-23

## 2018-07-10 ENCOUNTER — LABORATORY RESULT (OUTPATIENT)
Age: 83
End: 2018-07-10

## 2018-08-07 LAB
INR PPP: 2.58 RATIO
PT BLD: 29.7 SEC

## 2018-08-28 ENCOUNTER — APPOINTMENT (OUTPATIENT)
Dept: HOME HEALTH SERVICES | Facility: HOME HEALTH | Age: 83
End: 2018-08-28
Payer: MEDICARE

## 2018-08-28 VITALS
HEART RATE: 80 BPM | OXYGEN SATURATION: 98 % | RESPIRATION RATE: 14 BRPM | DIASTOLIC BLOOD PRESSURE: 70 MMHG | SYSTOLIC BLOOD PRESSURE: 138 MMHG

## 2018-08-28 PROCEDURE — 99350 HOME/RES VST EST HIGH MDM 60: CPT

## 2018-08-28 RX ORDER — GABAPENTIN 600 MG/1
600 TABLET, COATED ORAL
Qty: 90 | Refills: 1 | Status: COMPLETED | COMMUNITY
Start: 2018-03-22 | End: 2018-08-28

## 2018-09-04 ENCOUNTER — MEDICATION RENEWAL (OUTPATIENT)
Age: 83
End: 2018-09-04

## 2018-09-17 LAB
INR PPP: 1.93 RATIO
PT BLD: 22.1 SEC

## 2018-10-09 LAB
INR PPP: 2.79 RATIO
PT BLD: 32.2 SEC

## 2018-10-17 ENCOUNTER — CLINICAL ADVICE (OUTPATIENT)
Age: 83
End: 2018-10-17

## 2018-10-19 ENCOUNTER — EMERGENCY (EMERGENCY)
Facility: HOSPITAL | Age: 83
LOS: 1 days | Discharge: HOME | End: 2018-10-19
Attending: EMERGENCY MEDICINE
Payer: MEDICARE

## 2018-10-19 VITALS
DIASTOLIC BLOOD PRESSURE: 67 MMHG | HEIGHT: 62 IN | RESPIRATION RATE: 17 BRPM | WEIGHT: 143.08 LBS | OXYGEN SATURATION: 98 % | SYSTOLIC BLOOD PRESSURE: 137 MMHG | HEART RATE: 79 BPM | TEMPERATURE: 98 F

## 2018-10-19 VITALS
SYSTOLIC BLOOD PRESSURE: 137 MMHG | DIASTOLIC BLOOD PRESSURE: 77 MMHG | HEART RATE: 74 BPM | RESPIRATION RATE: 18 BRPM | OXYGEN SATURATION: 99 % | TEMPERATURE: 98 F

## 2018-10-19 DIAGNOSIS — Z98.89 OTHER SPECIFIED POSTPROCEDURAL STATES: Chronic | ICD-10-CM

## 2018-10-19 LAB
ALBUMIN SERPL ELPH-MCNC: 3.5 G/DL — SIGNIFICANT CHANGE UP (ref 3.3–5)
ALP SERPL-CCNC: 89 U/L — SIGNIFICANT CHANGE UP (ref 40–120)
ALT FLD-CCNC: 10 U/L — SIGNIFICANT CHANGE UP (ref 10–45)
ANION GAP SERPL CALC-SCNC: 11 MMOL/L — SIGNIFICANT CHANGE UP (ref 5–17)
APPEARANCE UR: CLEAR — SIGNIFICANT CHANGE UP
APTT BLD: 25.5 SEC — LOW (ref 27.5–37.4)
AST SERPL-CCNC: 19 U/L — SIGNIFICANT CHANGE UP (ref 10–40)
BASOPHILS # BLD AUTO: 0 K/UL — SIGNIFICANT CHANGE UP (ref 0–0.2)
BASOPHILS NFR BLD AUTO: 0 % — SIGNIFICANT CHANGE UP (ref 0–2)
BILIRUB SERPL-MCNC: 0.3 MG/DL — SIGNIFICANT CHANGE UP (ref 0.2–1.2)
BILIRUB UR-MCNC: NEGATIVE — SIGNIFICANT CHANGE UP
BUN SERPL-MCNC: 23 MG/DL — SIGNIFICANT CHANGE UP (ref 7–23)
CALCIUM SERPL-MCNC: 9.4 MG/DL — SIGNIFICANT CHANGE UP (ref 8.4–10.5)
CHLORIDE SERPL-SCNC: 109 MMOL/L — HIGH (ref 96–108)
CO2 SERPL-SCNC: 21 MMOL/L — LOW (ref 22–31)
COLOR SPEC: YELLOW — SIGNIFICANT CHANGE UP
CREAT SERPL-MCNC: 1.22 MG/DL — SIGNIFICANT CHANGE UP (ref 0.5–1.3)
DIFF PNL FLD: ABNORMAL
EOSINOPHIL # BLD AUTO: 0.1 K/UL — SIGNIFICANT CHANGE UP (ref 0–0.5)
EOSINOPHIL NFR BLD AUTO: 0.9 % — SIGNIFICANT CHANGE UP (ref 0–6)
GLUCOSE SERPL-MCNC: 81 MG/DL — SIGNIFICANT CHANGE UP (ref 70–99)
GLUCOSE UR QL: NEGATIVE — SIGNIFICANT CHANGE UP
HCT VFR BLD CALC: 25.4 % — LOW (ref 34.5–45)
HGB BLD-MCNC: 8.2 G/DL — LOW (ref 11.5–15.5)
INR BLD: 1.65 RATIO — HIGH (ref 0.88–1.16)
KETONES UR-MCNC: NEGATIVE — SIGNIFICANT CHANGE UP
LEUKOCYTE ESTERASE UR-ACNC: ABNORMAL
LYMPHOCYTES # BLD AUTO: 0.5 K/UL — LOW (ref 1–3.3)
LYMPHOCYTES # BLD AUTO: 7.9 % — LOW (ref 13–44)
MCHC RBC-ENTMCNC: 25.7 PG — LOW (ref 27–34)
MCHC RBC-ENTMCNC: 32.5 GM/DL — SIGNIFICANT CHANGE UP (ref 32–36)
MCV RBC AUTO: 79 FL — LOW (ref 80–100)
MONOCYTES # BLD AUTO: 0.4 K/UL — SIGNIFICANT CHANGE UP (ref 0–0.9)
MONOCYTES NFR BLD AUTO: 6.8 % — SIGNIFICANT CHANGE UP (ref 2–14)
NEUTROPHILS # BLD AUTO: 5.5 K/UL — SIGNIFICANT CHANGE UP (ref 1.8–7.4)
NEUTROPHILS NFR BLD AUTO: 84.4 % — HIGH (ref 43–77)
NITRITE UR-MCNC: NEGATIVE — SIGNIFICANT CHANGE UP
PH UR: 6.5 — SIGNIFICANT CHANGE UP (ref 5–8)
PLATELET # BLD AUTO: 298 K/UL — SIGNIFICANT CHANGE UP (ref 150–400)
POTASSIUM SERPL-MCNC: 4.3 MMOL/L — SIGNIFICANT CHANGE UP (ref 3.5–5.3)
POTASSIUM SERPL-SCNC: 4.3 MMOL/L — SIGNIFICANT CHANGE UP (ref 3.5–5.3)
PROT SERPL-MCNC: 6.2 G/DL — SIGNIFICANT CHANGE UP (ref 6–8.3)
PROT UR-MCNC: ABNORMAL
PROTHROM AB SERPL-ACNC: 18 SEC — HIGH (ref 9.8–12.7)
RBC # BLD: 3.21 M/UL — LOW (ref 3.8–5.2)
RBC # FLD: 14.1 % — SIGNIFICANT CHANGE UP (ref 10.3–14.5)
SODIUM SERPL-SCNC: 141 MMOL/L — SIGNIFICANT CHANGE UP (ref 135–145)
SP GR SPEC: 1.01 — SIGNIFICANT CHANGE UP (ref 1.01–1.02)
UROBILINOGEN FLD QL: NEGATIVE — SIGNIFICANT CHANGE UP
WBC # BLD: 6.5 K/UL — SIGNIFICANT CHANGE UP (ref 3.8–10.5)
WBC # FLD AUTO: 6.5 K/UL — SIGNIFICANT CHANGE UP (ref 3.8–10.5)

## 2018-10-19 PROCEDURE — 87186 SC STD MICRODIL/AGAR DIL: CPT

## 2018-10-19 PROCEDURE — 85027 COMPLETE CBC AUTOMATED: CPT

## 2018-10-19 PROCEDURE — 83690 ASSAY OF LIPASE: CPT

## 2018-10-19 PROCEDURE — 99284 EMERGENCY DEPT VISIT MOD MDM: CPT | Mod: 25

## 2018-10-19 PROCEDURE — 99283 EMERGENCY DEPT VISIT LOW MDM: CPT

## 2018-10-19 PROCEDURE — 81001 URINALYSIS AUTO W/SCOPE: CPT

## 2018-10-19 PROCEDURE — 85610 PROTHROMBIN TIME: CPT

## 2018-10-19 PROCEDURE — 80053 COMPREHEN METABOLIC PANEL: CPT

## 2018-10-19 PROCEDURE — 93005 ELECTROCARDIOGRAM TRACING: CPT

## 2018-10-19 PROCEDURE — 93010 ELECTROCARDIOGRAM REPORT: CPT

## 2018-10-19 PROCEDURE — 87086 URINE CULTURE/COLONY COUNT: CPT

## 2018-10-19 PROCEDURE — 85730 THROMBOPLASTIN TIME PARTIAL: CPT

## 2018-10-19 RX ORDER — SODIUM CHLORIDE 9 MG/ML
1000 INJECTION INTRAMUSCULAR; INTRAVENOUS; SUBCUTANEOUS ONCE
Qty: 0 | Refills: 0 | Status: COMPLETED | OUTPATIENT
Start: 2018-10-19 | End: 2018-10-19

## 2018-10-19 RX ADMIN — SODIUM CHLORIDE 1000 MILLILITER(S): 9 INJECTION INTRAMUSCULAR; INTRAVENOUS; SUBCUTANEOUS at 13:34

## 2018-10-19 NOTE — ED PROVIDER NOTE - PROGRESS NOTE DETAILS
Rukhsana Fragoso PGY1  Labs negative, patient stable, is ready for d/c with f/u.  Spoke to patient's great grandson Andreas who stated someone will come to pick her up.

## 2018-10-19 NOTE — ED ADULT NURSE NOTE - OBJECTIVE STATEMENT
Pt is an ambulatory 89 yr old female a/OX 3 c/o 2 episodes of diarrhea associated with epigastric pain.  PERRL wnl, nelson with equal strength.  LUNGS CTA no chest pain or sob.  Denies N/v/F.  Abdomen NT ND with BS+4Q.  SKIN WDI.  Peripheral pulses +2bl no edema

## 2018-10-19 NOTE — ED PROVIDER NOTE - MEDICAL DECISION MAKING DETAILS
Patient is 89 female with pmhx of PE on coumadin GERD and HTN presenting with epigastric pain and diarrhea. will evaluate for infection with basic labs, will give fluid, and will check coags. Likely acid reflux.

## 2018-10-19 NOTE — ED PROVIDER NOTE - ATTENDING CONTRIBUTION TO CARE
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 burning to epigastrium, radiates upwards, alleviated with belching, flatus. improves with eating. no n/v. also with some episodes of non-bloody diarrhea. no cp or sob. no f/c. no other abd pain. no lightheadedness or dizziness. pt denies sx at this point. 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 burning to epigastrium, radiates upwards, alleviated with belching, flatus. improves with eating. states she primarily experiences the pain in the afternoon, but then improves when eats dinner. no n/v. also with a few episodes of non-bloody diarrhea. no cp or sob. no f/c. no other abd pain. no lightheadedness or dizziness. pt denies sx at this point.    PE: Well appearing elderly female in NAD, NCAT, MMM, Trachea midline, Normal conjunctiva, lungs CTAB, S1/S2 RRR, Normal perfusion, 2+ radial pulses bilat, Abdomen Soft, NTND, No rebound/guarding, No LE edema, No deformity of extremities, No rashes,  No focal motor or sensory deficits.     VS without sig abnormality, afebrile. Pt well appearing. Non-tender on exam. Most c/w gastritis, pt does have hx of GERD. Pt states pain is alleviated with eating, not worse after eating, hx not supportive of mesenteric ischemia. Non-tender on exam, non-distended, soft, no RUQ ttp, very low suspicion of acute surgical intra-abd pathology including but not limited to diverticulitis, colitis, sbo, Check CBC eval for anemia, leukocytosis, cmp eval for metabolic derangement. Check lipase. Is on coumadin, check coags. 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 intermittent loose stools x 1 week. burning to epigastrium, radiates upwards, alleviated with belching, flatus. improves with eating. states she primarily experiences the pain in the afternoon, around 3-4 PM, several hours after eating lunch, but then improves when eats dinner. no n/v. also with a few episodes of non-bloody diarrhea. no cp or sob. no f/c. no other abd pain. no lightheadedness or dizziness. pt denies sx at this point.    PE: Well appearing elderly female in NAD, NCAT, MMM, Trachea midline, Normal conjunctiva, lungs CTAB, S1/S2 RRR, Normal perfusion, 2+ radial pulses bilat, Abdomen Soft, NTND, No rebound/guarding, No LE edema, No deformity of extremities, No rashes,  No focal motor or sensory deficits.     VS without sig abnormality, afebrile. Pt well appearing. Non-tender on exam. Most c/w gastritis, pt does have hx of GERD. Pt states pain is alleviated with eating, not worse after eating, hx not supportive of mesenteric ischemia. Non-tender on exam, non-distended, soft, no RUQ ttp, very low suspicion of acute surgical intra-abd pathology including but not limited to diverticulitis, colitis, sbo, Check CBC eval for anemia, leukocytosis, cmp eval for metabolic derangement. Check lipase. Is on coumadin, check coags. 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 intermittent loose stools x 1 week. burning to epigastrium, radiates upwards, alleviated with belching, flatus. improves with eating. states she primarily experiences the pain in the afternoon, around 3-4 PM, several hours after eating lunch, but then improves when eats dinner. no n/v. also with a few episodes of non-bloody diarrhea. no cp or sob. no f/c. no other abd pain. no lightheadedness or dizziness. pt denies sx at this point.    PE: Well appearing elderly female in NAD, NCAT, MMM, Trachea midline, Normal conjunctiva, lungs CTAB, S1/S2 RRR, Normal perfusion, 2+ radial pulses bilat, Abdomen Soft, NTND, No rebound/guarding, No LE edema, No deformity of extremities, No rashes,  No focal motor or sensory deficits.     VS without sig abnormality, afebrile. Pt well appearing. Non-tender on exam. Most c/w gastritis, pt does have hx of GERD. Pt states pain is alleviated with eating, not worse after eating, hx not supportive of mesenteric ischemia. Non-tender on exam, non-distended, soft, no RUQ ttp, very low suspicion of acute surgical intra-abd pathology including but not limited to diverticulitis, colitis, sbo, Check CBC eval for anemia, leukocytosis, cmp eval for metabolic derangement. Check lipase. Is on coumadin, check coags. - Randall Vieira MD

## 2018-10-19 NOTE — ED ADULT NURSE NOTE - NSIMPLEMENTINTERV_GEN_ALL_ED
Implemented All Fall with Harm Risk Interventions:  [place] to call system. Call bell, personal items and telephone within reach. Instruct patient to call for assistance. Room bathroom lighting operational. Non-slip footwear when patient is off stretcher. Physically safe environment: no spills, clutter or unnecessary equipment. Stretcher in lowest position, wheels locked, appropriate side rails in place. Provide visual cue, wrist band, yellow gown, etc. Monitor gait and stability. Monitor for mental status changes and reorient to person, place, and time. Review medications for side effects contributing to fall risk. Reinforce activity limits and safety measures with patient and family. Provide visual clues: red socks.

## 2018-10-19 NOTE — ED PROVIDER NOTE - OBJECTIVE STATEMENT
89 year old female with PE on coumadin HTN GERD complaining of 1 week of reflux and 2 days of diarrhea. Epigastric pain begins after eating but sometimes at any time, localized discomfort to midepigastric area. Not relieved by patient's omeprazole. Also had a few episodes of nonbloody diarrhea for 2 days. Had some decreased PO as well.    Denies SOB CP abdominal pain LOC 89 year old female with PE on coumadin HTN GERD complaining of 1 week of reflux and 2 days of diarrhea. Epigastric pain with localized discomfort to midepigastric area. Not relieved by patient's omeprazole. Also had a few episodes of nonbloody diarrhea for 2 days. Had some decreased PO as well.    Denies SOB CP abdominal pain LOC

## 2018-10-20 NOTE — ED POST DISCHARGE NOTE - ADDITIONAL DOCUMENTATION
10/10: Contacted pt via phone to check in on status and ensure she understands f/u instructions. Pt states she feels well since d/c, ate in ED and when she got home last night without any sx, and currently is eating breakfast without sx. Reiterated to pt her low INR, she states she did miss a dose or two of coumadin a few days ago but did take last night. She also states she had her INR checked last week. I stressed to her importance of having blood redrawn for repeat INR within next few days, she expresses understanding, she will contact her doctor to arrange, and will arrange for her visiting doctor to see her within next few days for re-evaluation. she will discuss dosing of coumadin with her doctor along with current sx. return precautions discussed. - Randall Vieira MD 10/10: Contacted pt via phone to check in on status and ensure she understands f/u instructions. Pt states she feels well since d/c, ate in ED and when she got home last night without any sx, and currently is eating breakfast without sx. Reiterated to pt her low INR, she states she did miss a dose or two of coumadin a few days ago but did take last night. She also states she had her INR checked last week. I stressed to her importance of having blood redrawn for repeat INR and CBC within next few days, she expresses understanding, she will contact her doctor to arrange, and will arrange for her visiting doctor to see her within next few days for re-evaluation. she will discuss dosing of coumadin with her doctor along with chronic anemia and current sx. return precautions discussed. - Randall Vieira MD

## 2018-10-21 RX ORDER — NITROFURANTOIN MACROCRYSTAL 50 MG
1 CAPSULE ORAL
Qty: 14 | Refills: 0 | OUTPATIENT
Start: 2018-10-21 | End: 2018-10-27

## 2018-10-23 ENCOUNTER — APPOINTMENT (OUTPATIENT)
Dept: HOME HEALTH SERVICES | Facility: HOME HEALTH | Age: 83
End: 2018-10-23
Payer: MEDICARE

## 2018-10-23 ENCOUNTER — CHART COPY (OUTPATIENT)
Age: 83
End: 2018-10-23

## 2018-10-23 VITALS
TEMPERATURE: 97.1 F | OXYGEN SATURATION: 95 % | SYSTOLIC BLOOD PRESSURE: 128 MMHG | RESPIRATION RATE: 16 BRPM | HEART RATE: 67 BPM | DIASTOLIC BLOOD PRESSURE: 70 MMHG

## 2018-10-23 DIAGNOSIS — L24.81 IRRITANT CONTACT DERMATITIS DUE TO METALS: ICD-10-CM

## 2018-10-23 LAB
BASOPHILS # BLD AUTO: 0.02 K/UL
BASOPHILS NFR BLD AUTO: 0.3 %
EOSINOPHIL # BLD AUTO: 0.24 K/UL
EOSINOPHIL NFR BLD AUTO: 3.1 %
HCT VFR BLD CALC: 26.4 %
HGB BLD-MCNC: 8.2 G/DL
IMM GRANULOCYTES NFR BLD AUTO: 0.3 %
INR PPP: 1.39 RATIO
LYMPHOCYTES # BLD AUTO: 1.14 K/UL
LYMPHOCYTES NFR BLD AUTO: 14.5 %
MAN DIFF?: NORMAL
MCHC RBC-ENTMCNC: 24.3 PG
MCHC RBC-ENTMCNC: 31.1 GM/DL
MCV RBC AUTO: 78.3 FL
MONOCYTES # BLD AUTO: 0.63 K/UL
MONOCYTES NFR BLD AUTO: 8 %
NEUTROPHILS # BLD AUTO: 5.79 K/UL
NEUTROPHILS NFR BLD AUTO: 73.8 %
PLATELET # BLD AUTO: 381 K/UL
PT BLD: 15.8 SEC
RBC # BLD: 3.37 M/UL
RBC # FLD: 15.7 %
WBC # FLD AUTO: 7.84 K/UL

## 2018-10-23 PROCEDURE — 99497 ADVNCD CARE PLAN 30 MIN: CPT

## 2018-10-23 PROCEDURE — 99350 HOME/RES VST EST HIGH MDM 60: CPT | Mod: 25

## 2018-10-23 RX ORDER — WARFARIN 3 MG/1
3 TABLET ORAL
Qty: 30 | Refills: 1 | Status: COMPLETED | COMMUNITY
Start: 2018-06-05 | End: 2018-10-23

## 2018-10-23 RX ORDER — LOSARTAN POTASSIUM 50 MG/1
50 TABLET, FILM COATED ORAL DAILY
Qty: 30 | Refills: 3 | Status: COMPLETED | COMMUNITY
Start: 2018-08-09 | End: 2018-10-23

## 2018-10-23 RX ORDER — FUROSEMIDE 20 MG/1
20 TABLET ORAL
Qty: 30 | Refills: 0 | Status: COMPLETED | COMMUNITY
Start: 2018-06-22 | End: 2018-10-23

## 2018-11-05 ENCOUNTER — TRANSCRIPTION ENCOUNTER (OUTPATIENT)
Age: 83
End: 2018-11-05

## 2018-11-06 ENCOUNTER — LABORATORY RESULT (OUTPATIENT)
Age: 83
End: 2018-11-06

## 2018-11-10 ENCOUNTER — TRANSCRIPTION ENCOUNTER (OUTPATIENT)
Age: 83
End: 2018-11-10

## 2018-11-13 ENCOUNTER — APPOINTMENT (OUTPATIENT)
Dept: HOME HEALTH SERVICES | Facility: HOME HEALTH | Age: 83
End: 2018-11-13
Payer: MEDICARE

## 2018-11-13 VITALS
DIASTOLIC BLOOD PRESSURE: 70 MMHG | RESPIRATION RATE: 15 BRPM | HEART RATE: 72 BPM | SYSTOLIC BLOOD PRESSURE: 100 MMHG | OXYGEN SATURATION: 97 % | TEMPERATURE: 97.8 F

## 2018-11-13 DIAGNOSIS — Z87.898 PERSONAL HISTORY OF OTHER SPECIFIED CONDITIONS: ICD-10-CM

## 2018-11-13 PROCEDURE — 99350 HOME/RES VST EST HIGH MDM 60: CPT | Mod: 25

## 2018-11-13 PROCEDURE — 99497 ADVNCD CARE PLAN 30 MIN: CPT

## 2018-11-13 RX ORDER — MAG HYDROX/ALUMINUM HYD/SIMETH 400-400-40
400-400-40 SUSPENSION, ORAL (FINAL DOSE FORM) ORAL
Qty: 1 | Refills: 0 | Status: COMPLETED | COMMUNITY
Start: 2018-11-05 | End: 2018-11-13

## 2018-11-13 RX ORDER — VALSARTAN 80 MG/1
80 TABLET, COATED ORAL DAILY
Qty: 90 | Refills: 3 | Status: COMPLETED | COMMUNITY
Start: 2018-10-23 | End: 2018-11-13

## 2018-11-13 RX ORDER — RANITIDINE 150 MG/1
150 TABLET ORAL
Qty: 90 | Refills: 2 | Status: COMPLETED | COMMUNITY
Start: 2018-11-07 | End: 2018-11-13

## 2018-11-15 ENCOUNTER — TRANSCRIPTION ENCOUNTER (OUTPATIENT)
Age: 83
End: 2018-11-15

## 2018-11-22 ENCOUNTER — INPATIENT (INPATIENT)
Facility: HOSPITAL | Age: 83
LOS: 5 days | Discharge: HOME | DRG: 812 | End: 2018-11-28
Attending: HOSPITALIST | Admitting: HOSPITALIST
Payer: MEDICARE

## 2018-11-22 ENCOUNTER — TRANSCRIPTION ENCOUNTER (OUTPATIENT)
Age: 83
End: 2018-11-22

## 2018-11-22 VITALS
OXYGEN SATURATION: 98 % | DIASTOLIC BLOOD PRESSURE: 61 MMHG | TEMPERATURE: 98 F | SYSTOLIC BLOOD PRESSURE: 111 MMHG | RESPIRATION RATE: 18 BRPM | HEART RATE: 56 BPM

## 2018-11-22 DIAGNOSIS — Z29.9 ENCOUNTER FOR PROPHYLACTIC MEASURES, UNSPECIFIED: ICD-10-CM

## 2018-11-22 DIAGNOSIS — K92.2 GASTROINTESTINAL HEMORRHAGE, UNSPECIFIED: ICD-10-CM

## 2018-11-22 DIAGNOSIS — N17.9 ACUTE KIDNEY FAILURE, UNSPECIFIED: ICD-10-CM

## 2018-11-22 DIAGNOSIS — R09.89 OTHER SPECIFIED SYMPTOMS AND SIGNS INVOLVING THE CIRCULATORY AND RESPIRATORY SYSTEMS: ICD-10-CM

## 2018-11-22 DIAGNOSIS — E53.8 DEFICIENCY OF OTHER SPECIFIED B GROUP VITAMINS: ICD-10-CM

## 2018-11-22 DIAGNOSIS — K21.9 GASTRO-ESOPHAGEAL REFLUX DISEASE WITHOUT ESOPHAGITIS: ICD-10-CM

## 2018-11-22 DIAGNOSIS — I10 ESSENTIAL (PRIMARY) HYPERTENSION: ICD-10-CM

## 2018-11-22 DIAGNOSIS — Z98.89 OTHER SPECIFIED POSTPROCEDURAL STATES: Chronic | ICD-10-CM

## 2018-11-22 DIAGNOSIS — R79.1 ABNORMAL COAGULATION PROFILE: ICD-10-CM

## 2018-11-22 DIAGNOSIS — I48.91 UNSPECIFIED ATRIAL FIBRILLATION: ICD-10-CM

## 2018-11-22 LAB
ALBUMIN SERPL ELPH-MCNC: 3.4 G/DL — SIGNIFICANT CHANGE UP (ref 3.3–5)
ALP SERPL-CCNC: 97 U/L — SIGNIFICANT CHANGE UP (ref 40–120)
ALT FLD-CCNC: 13 U/L — SIGNIFICANT CHANGE UP (ref 10–45)
ANION GAP SERPL CALC-SCNC: 15 MMOL/L — SIGNIFICANT CHANGE UP (ref 5–17)
ANISOCYTOSIS BLD QL: SLIGHT — SIGNIFICANT CHANGE UP
APPEARANCE UR: CLEAR — SIGNIFICANT CHANGE UP
APTT BLD: 28.2 SEC — SIGNIFICANT CHANGE UP (ref 27.5–36.3)
AST SERPL-CCNC: 23 U/L — SIGNIFICANT CHANGE UP (ref 10–40)
BACTERIA # UR AUTO: ABNORMAL
BASOPHILS # BLD AUTO: 0 K/UL — SIGNIFICANT CHANGE UP (ref 0–0.2)
BASOPHILS # BLD AUTO: 0 K/UL — SIGNIFICANT CHANGE UP (ref 0–0.2)
BASOPHILS NFR BLD AUTO: 0.2 % — SIGNIFICANT CHANGE UP (ref 0–2)
BASOPHILS NFR BLD AUTO: 0.4 % — SIGNIFICANT CHANGE UP (ref 0–2)
BILIRUB SERPL-MCNC: 0.2 MG/DL — SIGNIFICANT CHANGE UP (ref 0.2–1.2)
BILIRUB UR-MCNC: NEGATIVE — SIGNIFICANT CHANGE UP
BLD GP AB SCN SERPL QL: NEGATIVE — SIGNIFICANT CHANGE UP
BUN SERPL-MCNC: 38 MG/DL — HIGH (ref 7–23)
BURR CELLS BLD QL SMEAR: PRESENT — SIGNIFICANT CHANGE UP
CALCIUM SERPL-MCNC: 9.6 MG/DL — SIGNIFICANT CHANGE UP (ref 8.4–10.5)
CHLORIDE SERPL-SCNC: 108 MMOL/L — SIGNIFICANT CHANGE UP (ref 96–108)
CO2 SERPL-SCNC: 15 MMOL/L — LOW (ref 22–31)
COLOR SPEC: SIGNIFICANT CHANGE UP
CREAT SERPL-MCNC: 1.72 MG/DL — HIGH (ref 0.5–1.3)
DACRYOCYTES BLD QL SMEAR: SLIGHT — SIGNIFICANT CHANGE UP
DIFF PNL FLD: ABNORMAL
ELLIPTOCYTES BLD QL SMEAR: SLIGHT — SIGNIFICANT CHANGE UP
EOSINOPHIL # BLD AUTO: 0 K/UL — SIGNIFICANT CHANGE UP (ref 0–0.5)
EOSINOPHIL # BLD AUTO: 0.1 K/UL — SIGNIFICANT CHANGE UP (ref 0–0.5)
EOSINOPHIL NFR BLD AUTO: 0.5 % — SIGNIFICANT CHANGE UP (ref 0–6)
EOSINOPHIL NFR BLD AUTO: 0.7 % — SIGNIFICANT CHANGE UP (ref 0–6)
EPI CELLS # UR: 1 /HPF — SIGNIFICANT CHANGE UP
GAS PNL BLDV: SIGNIFICANT CHANGE UP
GLUCOSE SERPL-MCNC: 107 MG/DL — HIGH (ref 70–99)
GLUCOSE UR QL: NEGATIVE — SIGNIFICANT CHANGE UP
HCT VFR BLD CALC: 20.3 % — CRITICAL LOW (ref 34.5–45)
HCT VFR BLD CALC: 23.4 % — LOW (ref 34.5–45)
HGB BLD-MCNC: 6.6 G/DL — CRITICAL LOW (ref 11.5–15.5)
HGB BLD-MCNC: 7.6 G/DL — LOW (ref 11.5–15.5)
HYALINE CASTS # UR AUTO: 1 /LPF — SIGNIFICANT CHANGE UP (ref 0–2)
INR BLD: 1.64 RATIO — HIGH (ref 0.88–1.16)
INR BLD: 6.12 RATIO — CRITICAL HIGH (ref 0.88–1.16)
INR BLD: 7.16 RATIO — CRITICAL HIGH (ref 0.88–1.16)
KETONES UR-MCNC: NEGATIVE — SIGNIFICANT CHANGE UP
LEUKOCYTE ESTERASE UR-ACNC: NEGATIVE — SIGNIFICANT CHANGE UP
LYMPHOCYTES # BLD AUTO: 0.6 K/UL — LOW (ref 1–3.3)
LYMPHOCYTES # BLD AUTO: 0.6 K/UL — LOW (ref 1–3.3)
LYMPHOCYTES # BLD AUTO: 6.8 % — LOW (ref 13–44)
LYMPHOCYTES # BLD AUTO: 8.4 % — LOW (ref 13–44)
MACROCYTES BLD QL: SLIGHT — SIGNIFICANT CHANGE UP
MAGNESIUM SERPL-MCNC: 1.7 MG/DL — SIGNIFICANT CHANGE UP (ref 1.6–2.6)
MCHC RBC-ENTMCNC: 25.1 PG — LOW (ref 27–34)
MCHC RBC-ENTMCNC: 25.3 PG — LOW (ref 27–34)
MCHC RBC-ENTMCNC: 32.6 GM/DL — SIGNIFICANT CHANGE UP (ref 32–36)
MCHC RBC-ENTMCNC: 32.8 GM/DL — SIGNIFICANT CHANGE UP (ref 32–36)
MCV RBC AUTO: 76.9 FL — LOW (ref 80–100)
MCV RBC AUTO: 77.3 FL — LOW (ref 80–100)
MICROCYTES BLD QL: SIGNIFICANT CHANGE UP
MONOCYTES # BLD AUTO: 0.3 K/UL — SIGNIFICANT CHANGE UP (ref 0–0.9)
MONOCYTES # BLD AUTO: 0.5 K/UL — SIGNIFICANT CHANGE UP (ref 0–0.9)
MONOCYTES NFR BLD AUTO: 4.4 % — SIGNIFICANT CHANGE UP (ref 2–14)
MONOCYTES NFR BLD AUTO: 5.2 % — SIGNIFICANT CHANGE UP (ref 2–14)
NEUTROPHILS # BLD AUTO: 6.2 K/UL — SIGNIFICANT CHANGE UP (ref 1.8–7.4)
NEUTROPHILS # BLD AUTO: 7.6 K/UL — HIGH (ref 1.8–7.4)
NEUTROPHILS NFR BLD AUTO: 86.5 % — HIGH (ref 43–77)
NEUTROPHILS NFR BLD AUTO: 86.9 % — HIGH (ref 43–77)
NITRITE UR-MCNC: NEGATIVE — SIGNIFICANT CHANGE UP
OB PNL STL: POSITIVE
OVALOCYTES BLD QL SMEAR: SLIGHT — SIGNIFICANT CHANGE UP
PH UR: 6 — SIGNIFICANT CHANGE UP (ref 5–8)
PHOSPHATE SERPL-MCNC: 2.9 MG/DL — SIGNIFICANT CHANGE UP (ref 2.5–4.5)
PLAT MORPH BLD: NORMAL — SIGNIFICANT CHANGE UP
PLATELET # BLD AUTO: 302 K/UL — SIGNIFICANT CHANGE UP (ref 150–400)
PLATELET # BLD AUTO: 343 K/UL — SIGNIFICANT CHANGE UP (ref 150–400)
POIKILOCYTOSIS BLD QL AUTO: SIGNIFICANT CHANGE UP
POLYCHROMASIA BLD QL SMEAR: SLIGHT — SIGNIFICANT CHANGE UP
POTASSIUM SERPL-MCNC: 4.5 MMOL/L — SIGNIFICANT CHANGE UP (ref 3.5–5.3)
POTASSIUM SERPL-SCNC: 4.5 MMOL/L — SIGNIFICANT CHANGE UP (ref 3.5–5.3)
PROT SERPL-MCNC: 6.1 G/DL — SIGNIFICANT CHANGE UP (ref 6–8.3)
PROT UR-MCNC: ABNORMAL
PROTHROM AB SERPL-ACNC: 19.1 SEC — HIGH (ref 10–12.9)
PROTHROM AB SERPL-ACNC: 74.4 SEC — HIGH (ref 10–12.9)
PROTHROM AB SERPL-ACNC: 87.5 SEC — HIGH (ref 10–12.9)
RBC # BLD: 2.63 M/UL — LOW (ref 3.8–5.2)
RBC # BLD: 3.05 M/UL — LOW (ref 3.8–5.2)
RBC # FLD: 15.8 % — HIGH (ref 10.3–14.5)
RBC # FLD: 16 % — HIGH (ref 10.3–14.5)
RBC BLD AUTO: ABNORMAL
RBC CASTS # UR COMP ASSIST: 13 /HPF — HIGH (ref 0–4)
RH IG SCN BLD-IMP: POSITIVE — SIGNIFICANT CHANGE UP
SCHISTOCYTES BLD QL AUTO: SLIGHT — SIGNIFICANT CHANGE UP
SODIUM SERPL-SCNC: 138 MMOL/L — SIGNIFICANT CHANGE UP (ref 135–145)
SP GR SPEC: 1.01 — SIGNIFICANT CHANGE UP (ref 1.01–1.02)
TARGETS BLD QL SMEAR: SLIGHT — SIGNIFICANT CHANGE UP
TROPONIN T, HIGH SENSITIVITY RESULT: 44 NG/L — SIGNIFICANT CHANGE UP (ref 0–51)
TROPONIN T, HIGH SENSITIVITY RESULT: 49 NG/L — SIGNIFICANT CHANGE UP (ref 0–51)
UROBILINOGEN FLD QL: NEGATIVE — SIGNIFICANT CHANGE UP
WBC # BLD: 7.2 K/UL — SIGNIFICANT CHANGE UP (ref 3.8–10.5)
WBC # BLD: 8.8 K/UL — SIGNIFICANT CHANGE UP (ref 3.8–10.5)
WBC # FLD AUTO: 7.2 K/UL — SIGNIFICANT CHANGE UP (ref 3.8–10.5)
WBC # FLD AUTO: 8.8 K/UL — SIGNIFICANT CHANGE UP (ref 3.8–10.5)
WBC UR QL: 1 /HPF — SIGNIFICANT CHANGE UP (ref 0–5)

## 2018-11-22 PROCEDURE — 99285 EMERGENCY DEPT VISIT HI MDM: CPT | Mod: 25

## 2018-11-22 PROCEDURE — 71046 X-RAY EXAM CHEST 2 VIEWS: CPT | Mod: 26

## 2018-11-22 PROCEDURE — 99223 1ST HOSP IP/OBS HIGH 75: CPT | Mod: GC

## 2018-11-22 PROCEDURE — 93010 ELECTROCARDIOGRAM REPORT: CPT

## 2018-11-22 PROCEDURE — 99233 SBSQ HOSP IP/OBS HIGH 50: CPT | Mod: GC

## 2018-11-22 RX ORDER — LATANOPROST 0.05 MG/ML
1 SOLUTION/ DROPS OPHTHALMIC; TOPICAL AT BEDTIME
Qty: 0 | Refills: 0 | Status: DISCONTINUED | OUTPATIENT
Start: 2018-11-22 | End: 2018-11-28

## 2018-11-22 RX ORDER — SODIUM CHLORIDE 9 MG/ML
500 INJECTION INTRAMUSCULAR; INTRAVENOUS; SUBCUTANEOUS ONCE
Qty: 0 | Refills: 0 | Status: COMPLETED | OUTPATIENT
Start: 2018-11-22 | End: 2018-11-22

## 2018-11-22 RX ORDER — OMEPRAZOLE 10 MG/1
1 CAPSULE, DELAYED RELEASE ORAL
Qty: 0 | Refills: 0 | COMMUNITY

## 2018-11-22 RX ORDER — PANTOPRAZOLE SODIUM 20 MG/1
40 TABLET, DELAYED RELEASE ORAL
Qty: 0 | Refills: 0 | Status: DISCONTINUED | OUTPATIENT
Start: 2018-11-22 | End: 2018-11-26

## 2018-11-22 RX ORDER — GABAPENTIN 400 MG/1
300 CAPSULE ORAL AT BEDTIME
Qty: 0 | Refills: 0 | Status: DISCONTINUED | OUTPATIENT
Start: 2018-11-22 | End: 2018-11-28

## 2018-11-22 RX ORDER — SODIUM CHLORIDE 9 MG/ML
1000 INJECTION INTRAMUSCULAR; INTRAVENOUS; SUBCUTANEOUS
Qty: 0 | Refills: 0 | Status: DISCONTINUED | OUTPATIENT
Start: 2018-11-22 | End: 2018-11-23

## 2018-11-22 RX ORDER — PROTHROMBIN COMPLEX CONCENTRATE (HUMAN) 25.5; 16.5; 24; 22; 22; 26 [IU]/ML; [IU]/ML; [IU]/ML; [IU]/ML; [IU]/ML; [IU]/ML
1500 POWDER, FOR SOLUTION INTRAVENOUS ONCE
Qty: 0 | Refills: 0 | Status: COMPLETED | OUTPATIENT
Start: 2018-11-22 | End: 2018-11-22

## 2018-11-22 RX ORDER — LORATADINE 10 MG/1
10 TABLET ORAL DAILY
Qty: 0 | Refills: 0 | Status: DISCONTINUED | OUTPATIENT
Start: 2018-11-22 | End: 2018-11-28

## 2018-11-22 RX ORDER — PHYTONADIONE (VIT K1) 5 MG
5 TABLET ORAL ONCE
Qty: 0 | Refills: 0 | Status: COMPLETED | OUTPATIENT
Start: 2018-11-22 | End: 2018-11-22

## 2018-11-22 RX ORDER — PREGABALIN 225 MG/1
1000 CAPSULE ORAL DAILY
Qty: 0 | Refills: 0 | Status: DISCONTINUED | OUTPATIENT
Start: 2018-11-22 | End: 2018-11-28

## 2018-11-22 RX ADMIN — PROTHROMBIN COMPLEX CONCENTRATE (HUMAN) 1500 INTERNATIONAL UNIT(S): 25.5; 16.5; 24; 22; 22; 26 POWDER, FOR SOLUTION INTRAVENOUS at 20:58

## 2018-11-22 RX ADMIN — SODIUM CHLORIDE 1000 MILLILITER(S): 9 INJECTION INTRAMUSCULAR; INTRAVENOUS; SUBCUTANEOUS at 18:33

## 2018-11-22 RX ADMIN — SODIUM CHLORIDE 500 MILLILITER(S): 9 INJECTION INTRAMUSCULAR; INTRAVENOUS; SUBCUTANEOUS at 21:02

## 2018-11-22 RX ADMIN — LATANOPROST 1 DROP(S): 0.05 SOLUTION/ DROPS OPHTHALMIC; TOPICAL at 23:26

## 2018-11-22 RX ADMIN — Medication 30 MILLILITER(S): at 17:56

## 2018-11-22 RX ADMIN — PROTHROMBIN COMPLEX CONCENTRATE (HUMAN) 400 INTERNATIONAL UNIT(S): 25.5; 16.5; 24; 22; 22; 26 POWDER, FOR SOLUTION INTRAVENOUS at 20:44

## 2018-11-22 RX ADMIN — Medication 5 MILLIGRAM(S): at 20:44

## 2018-11-22 RX ADMIN — PANTOPRAZOLE SODIUM 40 MILLIGRAM(S): 20 TABLET, DELAYED RELEASE ORAL at 23:25

## 2018-11-22 NOTE — ED PROVIDER NOTE - PROGRESS NOTE DETAILS
Patient with supra therapeutic INR and DIOGENES to 1.7. Will defer CTA at this time. Will administer 1 unit pRBC and admit for further w/u given DIOGENES & +FOBT Dr. Rodriguez: Pt now with single episode of bloody stool mixed with brown stool. Will contact blood bank and order PCC. Belen Hopkins M.D. Resident  Pt received at signout.   MICU consult, will see patient. Belen Hopkins M.D. Resident  Pt admitted to medicine. Consult GI to follow patient. Belen Hopkins M.D. Resident  Pt admitted to medicine on Tele. Medicine requested repeat coags 30mins after Kcentra and GI consult to follow patient. Will do both. Belen Hopkins M.D. Resident  Paged GI, was told that non-emergent consults should be emailed to [email or web address]. Belen Hopkins M.D. Resident  21:14 coags drawn before KCentra.

## 2018-11-22 NOTE — ED ADULT NURSE NOTE - NSIMPLEMENTINTERV_GEN_ALL_ED
Implemented All Fall with Harm Risk Interventions:  [place] to call system. Call bell, personal items and telephone within reach. Instruct patient to call for assistance. Room bathroom lighting operational. Non-slip footwear when patient is off stretcher. Physically safe environment: no spills, clutter or unnecessary equipment. Stretcher in lowest position, wheels locked, appropriate side rails in place. Provide visual cue, wrist band, yellow gown, etc. Monitor gait and stability. Monitor for mental status changes and reorient to person, place, and time. Review medications for side effects contributing to fall risk. Reinforce activity limits and safety measures with patient and family. Provide visual clues: red socks.

## 2018-11-22 NOTE — H&P ADULT - ATTENDING COMMENTS
Patient assigned to me by night hospitalist in charge for management and care for patient for this evening only. Care to be resumed by day hospitalist in the morning and thereafter.     Pt seen and examined. Case d/w house staff Dr. Mathis. Agree with assessment and plan, with changes made where appropriate.

## 2018-11-22 NOTE — H&P ADULT - PROBLEM SELECTOR PLAN 3
Patient presenting with DIOGENES, creatinine 1.71, baseline ~1.1  - Likely in setting of volume loss secondary to GI bleed, also endorsing that she does not drink a lot of water  - s/p 500 cc bolus, will c/w IV fluids at 100 cc/hr  - avoid nephrotoxic agents, renally dose medications  - continue to monitor renal function  - monitor I's and O's Patient presenting with supra therapeutic INR to >7 with active bleeding, s/p Patient s/p K centra and Vitamin K 5 mg PO, with improvement of INR to 1.6  - Takes coumadin 4 mg daily, last INR was 1.39 on October 22nd  - Unclear why patient's INR supra therapeutic, endorsing that she takes the coumadin daily, has not been taking double doses; last abx 3w ago for UTI, no changes in diet   - may be related to diarrhea that patient has been having the last couple of weeks  - patient took antibiotics 2 weeks for UTI which may have also contributed to elevated INR   - continue to monitor INR, holding coumadin for now

## 2018-11-22 NOTE — ED CLERICAL - NS ED CLERK NOTE PRE-ARRIVAL INFORMATION; ADDITIONAL PRE-ARRIVAL INFORMATION

## 2018-11-22 NOTE — H&P ADULT - NSHPREVIEWOFSYSTEMS_GEN_ALL_CORE
Review of Systems:  Constitutional: No fever, No weight loss  Eyes: No blurry vision, No diplopia  Neuro: No tremors, No muscle weakness   Cardiovascular: No chest pain, + palpitations  Respiratory: +LUONG, No cough  GI: No nausea, No vomiting, + diarrhea  : No dysuria, No hematuria  Skin: No rash Review of Systems:  Constitutional: No fever, No weight loss  Eyes: No blurry vision, No diplopia  Neck: no neck pain or stiffness, no sore throat   Neuro: No tremors, No muscle weakness   Cardiovascular: No chest pain, + palpitations  Respiratory: +LUONG, No cough  GI: No nausea, No vomiting, + diarrhea  : No dysuria, No hematuria  Skin: No rash  Psych: no anxiety, depression   MSK: no joint pain, no back pain

## 2018-11-22 NOTE — CONSULT NOTE ADULT - ATTENDING COMMENTS
89F Hx of unprovoked PE on Coumadin, Colon CA in remission presents with LUONG, new GI bleed with 2 episodes of BRBPR and supra therapeutic INR 7.1. She received PO Vit K 5 mg and IV Kcentra for coagulopathy. Hemodynamically stable and  receiving 1st PRBC pending serial CBC and INR.   - Transfuse 2: 1: 0    - Follow Anion-Gap Metabolic Acidosis and ARG-DIOGENES.

## 2018-11-22 NOTE — H&P ADULT - ASSESSMENT
89 year old Female with PMHx of HTN, unprovoked PE (dx 2014, on Coumadin, previously on Xarelto but switched due to severe anemia), paroxysmal afib, colon cancer (10 years ago, s/p resection), GERD, and chronic lymphedema presents with palpitations and dyspnea, found to have anemia in the setting of likely GI bleed with supra therapeutic INR. 89 year old Female with PMHx of HTN, unprovoked PE (dx 2014, on Coumadin, previously on Xarelto but switched due to severe anemia), paroxysmal afib, colon cancer (10 years ago, s/p resection), GERD, and chronic lymphedema presents with palpitations and dyspnea, found to have anemia in the setting of GI bleed with supra therapeutic INR.

## 2018-11-22 NOTE — ED PROVIDER NOTE - MEDICAL DECISION MAKING DETAILS
89 y/o F hx of PE on Coumadin, colon ca in remission, HTN, GERD, chronic lymphedema presents with dyspnea. Low suspicion ACS, PNA. Would r/o PE given sudden onset with progressive dyspnea and prior subtherapeutic INR. Plan: CTA, EKG, trend CE, basic lab work, reassess. 89 y/o F hx of PE on Coumadin, colon ca in remission, HTN, GERD, chronic lymphedema presents with dyspnea. Low suspicion ACS, PNA. Would r/o PE given sudden onset with progressive dyspnea and prior subtherapeutic INR. Plan: CTA, EKG, trend CE, basic lab work, reassess.  Michael: See attending statement below 89 y/o F hx of PE on Coumadin, colon ca in remission, HTN, GERD, chronic lymphedema presents with dyspnea. Low suspicion ACS, PNA. Would r/o PE given sudden onset with progressive dyspnea and prior subtherapeutic INR vs. symptomatic anemia. Plan: CTA, EKG, trend CE, basic lab work, reassess.  Michael: See attending statement below

## 2018-11-22 NOTE — CONSULT NOTE ADULT - ASSESSMENT
89 y/o F hx of unprovoked PE on Coumadin (dx 2014) (previously on Xarelto, however changed to Coumadin due to intolerance), colon ca in remission, HTN, GERD, chronic lymphedema presents with dyspnea on exertion with new GI bleed today x 2 episodes with MICU consult for BRBPR in the setting of supratherapeutic INR.     #BRBPR in setting of supratherapeutic INR: Patient hemodynamically stable with hemoglobin of 7.6. At this time, would recommend 1 unit of PRBC, with post transfusion CBC followup. Agree with reversal of INR with K centra, would recommend IV Vitamin 10mg (avoid PO as poor GI absorption in the setting of GI bleed). Repeat INR in the AM. Continue to monitor VS q4H overnight. Keep NPO, recommend GI consult.     Patient is not a candidate for the MICU at this time. Please re-consult as appropriate.    Chuyita Lockhart MD PGY-3  #[id number] 89 y/o F hx of unprovoked PE on Coumadin (dx 2014) (previously on Xarelto, however changed to Coumadin due to intolerance), colon ca in remission, HTN, GERD, chronic lymphedema presents with dyspnea on exertion with new GI bleed today x 2 episodes with MICU consult for BRBPR in the setting of supratherapeutic INR.     #BRBPR in setting of supratherapeutic INR: Patient hemodynamically stable with hemoglobin of 7.6. At this time, would recommend 1 unit of PRBC, with post transfusion CBC followup. Agree with reversal of INR with K centra, would recommend IV Vitamin 10mg (avoid PO as poor GI absorption in the setting of GI bleed). Repeat INR in the AM. Continue to monitor VS q4H overnight. Keep NPO, recommend GI consult. Would recommend CT A/P to investigate etiology of bleed.     Patient is not a candidate for the MICU at this time. Please re-consult as appropriate.    Chuyita Lockhart MD PGY-3  #[id number]

## 2018-11-22 NOTE — H&P ADULT - PROBLEM SELECTOR PLAN 9
DVT PPx: SCD's in setting of GI bleed  fall and aspiration precautions   Discussed with pt - wishes to be full code at this time.

## 2018-11-22 NOTE — H&P ADULT - PROBLEM SELECTOR PLAN 2
Patient presenting with supra therapeutic INR to >7 with active bleeding, s/p Patient s/p K centra and Vitamin K 5 mg PO, with improvement of INR to 1.6  - Takes coumadin 4 mg daily, last INR was 1.39 on October 22nd  - Unclear why patient's INR supratherapeutic, endorsing that she takes the coumadin daily, has not been taking double doses  - may be related to diarrhea that patient has been having the last couple of weeks  - continue to monitor INR, holding coumadin for now Patient presenting with supra therapeutic INR to >7 with active bleeding, s/p Patient s/p K centra and Vitamin K 5 mg PO, with improvement of INR to 1.6  - Takes coumadin 4 mg daily, last INR was 1.39 on October 22nd  - Unclear why patient's INR supra therapeutic, endorsing that she takes the coumadin daily, has not been taking double doses  - may be related to diarrhea that patient has been having the last couple of weeks  - patient took antibiotics 2 weeks for UTI which may have also contributed to elevated INR   - continue to monitor INR, holding coumadin for now Patient presenting with palpitations, dyspnea and lightheadedness in setting of BRBPR and anemia: Hgb 6.6 (7.9 on CBC 2 weeks ago)  - BP and HR stable, but + orthostatics   - On coumadin 4 mg daily, INR found to be supra therapeutic to >7  - Likely UGI as patient with history of GERD worsening in the last day, although not with melena, may be brisk UGI bleed, but would expect patient to be more unstable, can be lower GI bleed   - s/p Patient s/p K centra and Vitamin K 5 mg PO, with improvement of INR to 1.6, will hold further coumadin   - will give 1 unit of pRBC's, may need more if continues to have active GI bleeding   - will start IV protonix   - s/p 500 cc bolus, will IV fluids at 100 cc/hr   - continue to monitor CBC, active type and screen   - will consult GI (emailed)  - will keep NPO

## 2018-11-22 NOTE — H&P ADULT - HISTORY OF PRESENT ILLNESS
89 year old Female with PMHx of HTN, unprovoked PE (dx 2014, on Coumadin, previously on Xarelto but switched due to severe anemia), paroxysmal afib, colon cancer (10 years ago, s/p resection), and chronic lymphedema presents with palpitations and dyspnea on exertion for since yesterday. Patient endorsing that she has been having acid reflux symptoms periodically for the past 2 weeks, and was started on Omeprazole 20 mg daily. Yesterday, she endorsed that symptoms worsened, and she was having terrible burning sensation in her epigastric area and gas pain. Patient says that she felt as if her heart was racing whenever she got up to move around, along with dyspnea and lightheadedness. Patient denies any dark stools, says that she has been having some loose stools. Patient denies fever/chills, URI symptoms, chest pain, abdominal pain, n/v or urinary symptoms. Patient endorsing that she does not drink a lot of water regularly. Patient endorsing that about 5 years ago, she felt the exact same way when she had been placed on Xarelto, and received 3 units of pRBC's when she was found to have a hemoglobin of 4.6. At that time, anticoagulation was switched to coumadin. Patient checks INR regularly, last INR was 1.39 on October 22nd. Patient currently on Warfarin 4 mg daily.      In the ED, patient afebrile, HR 56-92, -134/54-80, Sa02 % on RA. Orthostatic BP: 146/84 HR:81 when lying down, /85 HR 80 when sitting and /66  when standing. In ED, patient with multiple loos stools with bright red/pink blood. INR was supra therapeutic at 6.12, patient received K centra 1500 and Vitamin K 5 mg. Shauna Duncan M.D.   PGY-2 | Internal Medicine   [phone] | [id number]      89 year old Female with PMHx of HTN, unprovoked PE (dx 2014, on Coumadin, previously on Xarelto but switched due to severe anemia), paroxysmal afib, colon cancer (10 years ago, s/p resection), GERD, and chronic lymphedema presents with palpitations and dyspnea on exertion for since yesterday. Patient endorsing that she has been having acid reflux symptoms periodically for the past 2 weeks, and was started on Omeprazole 20 mg daily. Yesterday, she endorsed that symptoms worsened, and she was having terrible burning sensation in her epigastric area and gas pain. Patient says that she felt as if her heart was racing whenever she got up to move around, along with dyspnea and lightheadedness. Patient denies any dark stools, says that she has been having some loose stools. Patient denies fever/chills, URI symptoms, chest pain, abdominal pain, n/v or urinary symptoms. Patient endorsing that she does not drink a lot of water regularly. Patient endorsing that about 5 years ago, she felt the exact same way when she had been placed on Xarelto, and received 3 units of pRBC's when she was found to have a hemoglobin of 4.6. At that time, anticoagulation was switched to coumadin. Patient checks INR regularly, last INR was 1.39 on October 22nd. Patient currently on Warfarin 4 mg daily.      In the ED, patient afebrile, HR 56-92, -134/54-80, Sa02 % on RA. Orthostatic BP: 146/84 HR:81 when lying down, /85 HR 80 when sitting and /66  when standing. In ED, patient with multiple loos stools with bright red/pink blood. INR was supra therapeutic at 6.12, patient received K centra 1500 and Vitamin K 5 mg. Shauna Duncan M.D.   PGY-2 | Internal Medicine   [phone] | [id number]      89 year old Female with PMHx of HTN, unprovoked PE (dx 2014, on Coumadin, previously on Xarelto but switched due to severe anemia), paroxysmal afib, colon cancer (10 years ago, s/p resection), GERD, and chronic lymphedema presents with palpitations and dyspnea on exertion for since yesterday. Patient endorsing that she has been having acid reflux symptoms periodically for the past 2 weeks, and was started on Omeprazole 20 mg daily. Yesterday, she endorsed that symptoms worsened, and she was having terrible burning sensation in her epigastric area and gas pain. Patient says that she felt as if her heart was racing whenever she got up to move around, along with dyspnea and lightheadedness. Patient denies any dark stools, says that she has been having some loose stools. Patient denies fever/chills, URI symptoms, chest pain, abdominal pain, n/v or urinary symptoms. Patient endorsing that she does not drink a lot of water regularly. Patient endorsing that about 5 years ago, she felt the exact same way when she had been placed on Xarelto, and received 3 units of pRBC's when she was found to have a hemoglobin of 4.6. At that time, anticoagulation was switched to coumadin. Patient checks INR regularly, last INR was 1.39 on October 22nd. Patient currently on Warfarin 4 mg daily.      In the ED, patient afebrile, HR 56-92, -134/54-80, Sa02 % on RA. Orthostatic BP: 146/84 HR:81 when lying down, /85 HR 80 when sitting and /66  when standing. In ED, patient with multiple loos stools with bright red/pink blood. INR was supra therapeutic at 6.12, patient received K centra 1500 and Vitamin K 5 mg.     No family hx of GI bleed.

## 2018-11-22 NOTE — ED ADULT NURSE NOTE - OBJECTIVE STATEMENT
89 year old female presents to the ED via EMS from home complaining of heartburn/generalized weakness/anxiety. PMH of HTN, PE - on coumadin, CHF on lasix, GERD. As per EMS, patient lives alone and was very anxious when they arrived. Pt. states she did not take her GERD medicine x 1 day because it gives her loose stool. Pt. reports generalized weakness today, noting difficulty ambulating/LUONG. Pt. denies chest pain, dizziness, fever, chills, nausea, vomiting, diarrhea, dysuria, hematuria, recent travel, recent sick contacts. 20g peripheral IV placed in right wrist and labs drawn and sent to lab. Patient undressed and placed into gown, call bell in hand and side rails up with bed in lowest position for safety. blanket provided. Comfort and safety provided.

## 2018-11-22 NOTE — H&P ADULT - NSHPLABSRESULTS_GEN_ALL_CORE
LABS, IMAGING AND EKG PERSONALLY REVIEWED:                            6.6    7.2   )-----------( 302      ( 2018 21:14 )             20.3           138  |  108  |  38<H>  ----------------------------<  107<H>  4.5   |  15<L>  |  1.72<H>    Ca    9.6      2018 17:54  Phos  2.9       Mg     1.7         TPro  6.1  /  Alb  3.4  /  TBili  0.2  /  DBili  x   /  AST  23  /  ALT  13  /  AlkPhos  97                Urinalysis Basic - ( 2018 21:14 )    Color: Light Yellow / Appearance: Clear / S.013 / pH: x  Gluc: x / Ketone: Negative  / Bili: Negative / Urobili: Negative   Blood: x / Protein: Trace / Nitrite: Negative   Leuk Esterase: Negative / RBC: 13 /hpf / WBC 1 /hpf   Sq Epi: x / Non Sq Epi: 1 /hpf / Bacteria: Moderate        PT/INR - ( 2018 21:45 )   PT: 19.1 sec;   INR: 6.12-->7.16-->1.64        PTT - ( 2018 17:54 )  PTT:28.2 sec      EKG: Atrial Fibrillation, HR 82, RBBB LABS, IMAGING AND EKG PERSONALLY REVIEWED:                            6.6    7.2   )-----------( 302      ( 2018 21:14 )             20.3           138  |  108  |  38<H>  ----------------------------<  107<H>  4.5   |  15<L>  |  1.72<H>    Ca    9.6      2018 17:54  Phos  2.9       Mg     1.7         TPro  6.1  /  Alb  3.4  /  TBili  0.2  /  DBili  x   /  AST  23  /  ALT  13  /  AlkPhos  97                Urinalysis Basic - ( 2018 21:14 )    Color: Light Yellow / Appearance: Clear / S.013 / pH: x  Gluc: x / Ketone: Negative  / Bili: Negative / Urobili: Negative   Blood: x / Protein: Trace / Nitrite: Negative   Leuk Esterase: Negative / RBC: 13 /hpf / WBC 1 /hpf   Sq Epi: x / Non Sq Epi: 1 /hpf / Bacteria: Moderate        PT/INR - ( 2018 21:45 )   PT: 19.1 sec;   INR: 6.12-->7.16-->1.64        PTT - ( 2018 17:54 )  PTT:28.2 sec      EKG: Atrial Fibrillation, HR 82, RBBB    < from: Xray Chest 2 Views PA/Lat (18 @ 18:07) >    IMPRESSION:   No focal consolidation, pleural effusion, pneumothorax.    Cardiac silhouette is unremarkable. The aorta is markedly uncoiled.    Degenerative spine changes.    < end of copied text > LABS, IMAGING AND EKG PERSONALLY REVIEWED. Labs notable for Hb 7.6 --> 6.6, INR on admission 6.12--> 1.64 after K centra, Cr 1.72 (baseline ~1). CXR clear lungs. EKG: Atrial Fibrillation, HR 82, RBBB                            6.6    7.2   )-----------( 302      ( 2018 21:14 )             20.3           138  |  108  |  38<H>  ----------------------------<  107<H>  4.5   |  15<L>  |  1.72<H>    Ca    9.6      2018 17:54  Phos  2.9       Mg     1.7         TPro  6.1  /  Alb  3.4  /  TBili  0.2  /  DBili  x   /  AST  23  /  ALT  13  /  AlkPhos  97          PT/INR - ( 2018 21:45 )   PT: 19.1 sec;   INR: 6.12-->7.16-->1.64   PTT - ( 2018 17:54 )  PTT:28.2 sec    Urinalysis Basic - ( 2018 21:14 )  Color: Light Yellow / Appearance: Clear / S.013 / pH: x  Gluc: x / Ketone: Negative  / Bili: Negative / Urobili: Negative   Blood: x / Protein: Trace / Nitrite: Negative   Leuk Esterase: Negative / RBC: 13 /hpf / WBC 1 /hpf   Sq Epi: x / Non Sq Epi: 1 /hpf / Bacteria: Moderate      < from: Xray Chest 2 Views PA/Lat (18 @ 18:07) >    IMPRESSION:   No focal consolidation, pleural effusion, pneumothorax.    Cardiac silhouette is unremarkable. The aorta is markedly uncoiled.    Degenerative spine changes.    < end of copied text >

## 2018-11-22 NOTE — ED ADULT NURSE REASSESSMENT NOTE - NS ED NURSE REASSESS COMMENT FT1
22:30. 1 unit of PRBCs infusing at this time. pt tolerating well. Pt denies back pain, flank pain, dyspnea, SOB, n/v, headache, or SOB. No hives present. Skin warm and dry. VSS. Safety & comfort measures maintained. Will continue to reassess.

## 2018-11-22 NOTE — H&P ADULT - PROBLEM SELECTOR PLAN 1
Patient presenting with palpitations, dyspnea and lightheadedness in setting of BRBPR and anemia: Hgb 6.6 (7.9 on CBC 2 weeks ago)  - BP and HR stable, but + orthostatics   - On coumadin 4 mg daily, INR found to be supra therapeutic to >7  - Likely UGI as patient with history of GERD worsening in the last day, although not with melena, may be brisk UGI bleed, but would expect patient to be more unstable  - May be lower GI bleed as BRBPR  - s/p Patient s/p K centra and Vitamin K 5 mg PO, with improvement of INR to 1.6, will hold coumadin   - will give 1 unit of pRBC's, may need more if continues to have active GI bleeding   - will start IV protonix   - s/p 500 cc bolus, will IV fluids at 100 cc/hr   - continue to monitor CBC, active type and screen   - will consult GI  - will keep NPO Patient presenting with palpitations, dyspnea and lightheadedness in setting of BRBPR and anemia: Hgb 6.6 (7.9 on CBC 2 weeks ago)  - BP and HR stable, but + orthostatics   - On coumadin 4 mg daily, INR found to be supra therapeutic to >7  - Likely UGI as patient with history of GERD worsening in the last day, although not with melena, may be brisk UGI bleed, but would expect patient to be more unstable, can be lower GI bleed   - s/p Patient s/p K centra and Vitamin K 5 mg PO, with improvement of INR to 1.6, will hold further coumadin   - will give 1 unit of pRBC's, may need more if continues to have active GI bleeding   - will start IV protonix   - s/p 500 cc bolus, will IV fluids at 100 cc/hr   - continue to monitor CBC, active type and screen   - will consult GI  - will keep NPO pt with acute blood loss anemia, with baseline Hb in 10/2018 ~8, in ED with Hb 7.6 --> 6.6, in setting of BRBPR with elevated INR. Symptomatic with LUONG, dizziness and palpitations, +orthostatics    - GI bleed w/u and treatment as below  - maintain 2 large bore IVs  - monitor vitals q4h   - maintain active type and screen   - monitor CBC q6h for now, until Hb stable   - transfuse as below

## 2018-11-22 NOTE — ED PROVIDER NOTE - PHYSICAL EXAMINATION
GENERAL: mildly anxious in no respiratory distresss  HEAD:  Atraumatic, Normocephalic  EYES:  PERRLA  ENT: MMM; oropharynx clear  NECK: Supple, No JVD  CHEST/LUNG: Clear to auscultation bilaterally; No wheeze  HEART: Regular rate and rhythm; No murmurs, rubs, or gallops  ABDOMEN: Soft, Nontender, Nondistended; Bowel sounds present  EXTREMITIES:  2+ Peripheral Pulses, bilateral non-pitting  LE edema.   PSYCH: AAOx3  NEUROLOGY: no focal motor or sensory deficits. 5/5 muscle strength in all extremities.   SKIN: No rashes or lesions

## 2018-11-22 NOTE — ED PROVIDER NOTE - OBJECTIVE STATEMENT
89 y/o F hx of PE on Coumadin, colon ca in remission, HTN, chronic lymphedema presents with dyspnea.     Patient has been experiencing dyspnea with exertion x 2 days associated with exertional palpitations. States that prior to this has been in usual state of health. She denies any fevers/chills, chest pain, hematochezia or melena. Unsure of last time INR was checked, but states she is compliant with Coumadin. Notes acid reflux sx. Denies smoking hx or PCI. 89 y/o F hx of PE on Coumadin, colon ca in remission, HTN, GERD, chronic lymphedema presents with dyspnea.     Patient has been experiencing dyspnea with exertion x 2 days associated with exertional palpitations. States that prior to this has been in usual state of health. She denies any fevers/chills, chest pain, hematochezia or melena. Unsure of last time INR was checked, but states she is compliant with Coumadin. Notes acid reflux sx. Denies smoking hx or PCI. 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     Patient has been experiencing dyspnea with exertion x 2 days associated with exertional palpitations. States that prior to this has been in usual state of health. She denies any fevers/chills, chest pain, hematochezia or melena. Unsure of last time INR was checked, but states she is compliant with Coumadin. Notes acid reflux sx. Denies smoking hx or PCI.

## 2018-11-22 NOTE — H&P ADULT - PROBLEM SELECTOR PLAN 7
DVT PPx: SCD's in setting of GI bleed c/w Vitamin b12 1000 mcg daily Patient with history of HTN, losartan discontinued by PMD as blood pressure had been controlled

## 2018-11-22 NOTE — ED PROVIDER NOTE - ATTENDING CONTRIBUTION TO CARE
89y F hx of unprovoked PE on Coumadin, had prior GI bleed on Xarelto, colon CA in remission, HTN, GERD, chronic lymphedema here with LUONG x2 days, palpitations with exertion. Pt states every time she stands she feels sx, does not even have to walk to become symptomatic. No chest pain. No inc leg swelling.   PCP [facility] calls   Gen: Elderly female NAD mild pallor  HEENT: NCAT PERRL EOMI normal pharynx  Neck: supple  CV: irreg, no murmur  Lung: CTA BL  Abd: +BS soft NTND  Ext: wwp, palp pulses, BL LE edema (chronic)   Neuro: CN grossly intact, sensation intact, motor 5/5 throughout  AP: 89y F hx of unprovoked PE on Coumadin, had prior GI bleed on Xarelto, colon CA in remission, HTN, GERD, chronic lymphedema here with LUONG x2 days, palpitations with exertion. Concern for cardiac vs sx anemia vs dehydration vs infection/sepsis. Less likely VTE as pt on AC though does have hx of PE 2014. Will check labs, occult blood stool. CXR. Trop. EKG.

## 2018-11-22 NOTE — H&P ADULT - NSHPPHYSICALEXAM_GEN_ALL_CORE
Vital Signs Last 24 Hrs  T(C): 36.7 (22 Nov 2018 22:30), Max: 37.3 (22 Nov 2018 19:00)  T(F): 98 (22 Nov 2018 22:30), Max: 99.2 (22 Nov 2018 19:00)  HR: 91 (22 Nov 2018 22:30) (56 - 92)  BP: 127/61 (22 Nov 2018 22:30) (111/61 - 134/61)  BP(mean): --  RR: 16 (22 Nov 2018 22:30) (16 - 19)  SpO2: 100% (22 Nov 2018 22:30) (97% - 100%)      PHYSICAL EXAM  GENERAL: NAD, lying comfortably in bed   HEAD:  Atraumatic, Normocephalic  EYES: EOMI b/l, PERRLA b/l, conjunctiva and sclera clear  NECK: Supple, No JVD, No LAD   CHEST/LUNG: Clear to auscultation bilaterally; No wheeze or ronchi  HEART: Regular rate and rhythm; S1 and S2 present, No murmurs, rubs, or gallops  ABDOMEN: Soft, Nontender, Nondistended; Bowel sounds present  EXTREMITIES:  2+ Peripheral Pulses, 2+ non-pitting edema bilaterally   NEURO: AAOx3, non-focal   SKIN: No rashes or lesions Vital Signs Last 24 Hrs  T(C): 36.7 (22 Nov 2018 22:30), Max: 37.3 (22 Nov 2018 19:00)  T(F): 98 (22 Nov 2018 22:30), Max: 99.2 (22 Nov 2018 19:00)  HR: 91 (22 Nov 2018 22:30) (56 - 92)  BP: 127/61 (22 Nov 2018 22:30) (111/61 - 134/61)  BP(mean): --  RR: 16 (22 Nov 2018 22:30) (16 - 19)  SpO2: 100% (22 Nov 2018 22:30) (97% - 100%)      PHYSICAL EXAM  GENERAL: NAD, lying comfortably in bed   HEAD:  Atraumatic, Normocephalic  EYES: EOMI b/l, PERRLA b/l, conjunctiva and sclera clear  NECK: Supple, No JVD, No LAD   CHEST/LUNG: Clear to auscultation bilaterally; No wheeze or ronchi  HEART: irregular rhythm, S1 and S2 present, No murmurs, rubs, or gallops  ABDOMEN: Soft, Nontender, Nondistended; Bowel sounds present  EXTREMITIES:  2+ Peripheral Pulses, 2+ non-pitting edema bilaterally   NEURO: AAOx3, non-focal   SKIN: No rashes or lesions

## 2018-11-22 NOTE — ED ADULT NURSE REASSESSMENT NOTE - NS ED NURSE REASSESS COMMENT FT1
19:45. Pt having bloody stool at this time. MD Rodriguez aware. VSS. NAD. Pt cleaned and provided with new diaper.

## 2018-11-22 NOTE — ED ADULT NURSE REASSESSMENT NOTE - NS ED NURSE REASSESS COMMENT FT1
19:10. Report received from MALCOLM Alvarez. Pt AAOx4, NAD, resp nonlabored, skin warm/dry, resting comfortably in bed. Pt denies headache, dizziness, chest pain, palpitations, abd pain, n/v/d, urinary symptoms, fevers, & chills at this time. Safety maintained.

## 2018-11-22 NOTE — CONSULT NOTE ADULT - SUBJECTIVE AND OBJECTIVE BOX
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 While patient has been in the ER, patient has had 2-3 stools that were bright red.  Unsure of last time INR was checked, but states she is compliant with Coumadin. Notes acid reflux sx. Denies smoking hx or PCI.    PAST MEDICAL & SURGICAL HISTORY:  B12 deficiency  Lymphedema, limb: BLE  Colon cancer: 6 yrs ago. did not require chemo  Hard of Hearing  GERD (Gastroesophageal Reflux Disease)  Gallstone  Hypertension  H/O exploratory laparotomy: for SBO?  S/P colectomy: partial colectomy due to colon ca  S/P CAREY (Total Abdominal Hysterectomy)      FAMILY HISTORY:  No pertinent family history in first degree relatives      SOCIAL HISTORY:  Smoking: [ ] Never Smoked [ ] Former Smoker (__ packs x ___ years) [ ] Current Smoker  (__ packs x ___ years)  Substance Use: [ ] Never Used [ ] Used ____  EtOH Use:  Marital Status: [ ] Single [ ]  [ ]  [ ]   Sexual History:   Occupation:  Recent Travel:  Country of Birth:  Advance Directives:    Allergies    penicillin (Unknown)    Intolerances        HOME MEDICATIONS:    REVIEW OF SYSTEMS:  Constitutional: [ ] fevers [ ] chills [ ] weight loss [ ] weight gain  HEENT: [ ] dry eyes [ ] eye irritation [ ] postnasal drip [ ] nasal congestion  CV: [ ] chest pain [ ] orthopnea [ ] palpitations [ ] murmur  Resp: [ ] cough [ ] shortness of breath [ ] dyspnea [ ] wheezing [ ] sputum [ ] hemoptysis  GI: [ ] nausea [ ] vomiting [ ] diarrhea [ ] constipation [ ] abd pain [ ] dysphagia   : [ ] dysuria [ ] nocturia [ ] hematuria [ ] increased urinary frequency  Musculoskeletal: [ ] back pain [ ] myalgias [ ] arthralgias [ ] fracture  Skin: [ ] rash [ ] itch  Neurological: [ ] headache [ ] dizziness [ ] syncope [ ] weakness [ ] numbness  Psychiatric: [ ] anxiety [ ] depression  Endocrine: [ ] diabetes [ ] thyroid problem  Hematologic/Lymphatic: [ ] anemia [ ] bleeding problem  Allergic/Immunologic: [ ] itchy eyes [ ] nasal discharge [ ] hives [ ] angioedema  [ ] All other systems negative  [ ] Unable to assess ROS because ________    OBJECTIVE:  ICU Vital Signs Last 24 Hrs  T(C): 36.7 (22 Nov 2018 19:50), Max: 37.3 (22 Nov 2018 19:00)  T(F): 98 (22 Nov 2018 19:50), Max: 99.2 (22 Nov 2018 19:00)  HR: 84 (22 Nov 2018 19:50) (56 - 84)  BP: 117/80 (22 Nov 2018 19:50) (111/61 - 134/54)  BP(mean): --  ABP: --  ABP(mean): --  RR: 16 (22 Nov 2018 20:20) (16 - 19)  SpO2: 97% (22 Nov 2018 20:20) (97% - 100%)        CAPILLARY BLOOD GLUCOSE          PHYSICAL EXAM:  General:   HEENT:   Lymph Nodes:  Neck:   Respiratory:   Cardiovascular:   Abdomen:   Extremities:   Skin:   Neurological:  Psychiatry:    LINES:     HOSPITAL MEDICATIONS:  MEDICATIONS  (STANDING):  phytonadione   Solution 5 milliGRAM(s) Oral once  prothrombin complex concentrate IVPB (KCENTRA) 1500 International Unit(s) IV Intermittent once    MEDICATIONS  (PRN):      LABS:                        7.6    8.8   )-----------( 343      ( 22 Nov 2018 17:54 )             23.4     Hgb Trend: 7.6<--  11-22    138  |  108  |  38<H>  ----------------------------<  107<H>  4.5   |  15<L>  |  1.72<H>    Ca    9.6      22 Nov 2018 17:54  Phos  2.9     11-22  Mg     1.7     11-22    TPro  6.1  /  Alb  3.4  /  TBili  0.2  /  DBili  x   /  AST  23  /  ALT  13  /  AlkPhos  97  11-22    Creatinine Trend: 1.72<--  PT/INR - ( 22 Nov 2018 17:54 )   PT: 74.4 sec;   INR: 6.12 ratio         PTT - ( 22 Nov 2018 17:54 )  PTT:28.2 sec      Venous Blood Gas:  11-22 @ 18:10  7.35/33/<50/18/23  VBG Lactate: 2.9      MICROBIOLOGY:     RADIOLOGY:  [ ] Reviewed and interpreted by me    EKG:    Assessment and Plan:        This patient is not a MICU candidate at this time. Please re-consult as appropriate.     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 While patient has been in the ER, patient has had 2-3 stools that were bright red. Patient states that she is compliant with Coumadin and her last INR level on November 4th was "normal". Patient currently feels well when laying down, but upon standing, feels lightheaded c/w symptomatic anemia. Patient's orthostatics are negative.     PAST MEDICAL & SURGICAL HISTORY:  B12 deficiency  Lymphedema, limb: BLE  Colon cancer: 6 yrs ago. did not require chemo  Hard of Hearing  GERD (Gastroesophageal Reflux Disease)  Gallstone  Hypertension  H/O exploratory laparotomy: for SBO?  S/P colectomy: partial colectomy due to colon ca  S/P CAREY (Total Abdominal Hysterectomy)      FAMILY HISTORY:  No pertinent family history in first degree relatives        Allergies    penicillin (Unknown)    Intolerances        HOME MEDICATIONS:    REVIEW OF SYSTEMS:  Constitutional: [ ] fevers [ ] chills [ ] weight loss [ ] weight gain  HEENT: [ ] dry eyes [ ] eye irritation [ ] postnasal drip [ ] nasal congestion  CV: [ ] chest pain [ ] orthopnea [ ] palpitations [ ] murmur  Resp: [ ] cough [x] shortness of breath [] dyspnea [ ] wheezing [ ] sputum [ ] hemoptysis  GI: +BRBPR  : [ ] dysuria [ ] nocturia [ ] hematuria [ ] increased urinary frequency  Musculoskeletal: [ ] back pain [ ] myalgias [ ] arthralgias [ ] fracture  Skin: [ ] rash [ ] itch  Neurological: [ ] headache [ ] dizziness [ ] syncope [ ] weakness [ ] numbness  Psychiatric: [ ] anxiety [ ] depression  Endocrine: [ ] diabetes [ ] thyroid problem  Hematologic/Lymphatic: [ ] anemia [ ] bleeding problem  Allergic/Immunologic: [ ] itchy eyes [ ] nasal discharge [ ] hives [ ] angioedema  [x] All other systems negative  [ ] Unable to assess ROS because ________    OBJECTIVE:  ICU Vital Signs Last 24 Hrs  T(C): 36.7 (22 Nov 2018 19:50), Max: 37.3 (22 Nov 2018 19:00)  T(F): 98 (22 Nov 2018 19:50), Max: 99.2 (22 Nov 2018 19:00)  HR: 84 (22 Nov 2018 19:50) (56 - 84)  BP: 117/80 (22 Nov 2018 19:50) (111/61 - 134/54)  BP(mean): --  ABP: --  ABP(mean): --  RR: 16 (22 Nov 2018 20:20) (16 - 19)  SpO2: 97% (22 Nov 2018 20:20) (97% - 100%)        CAPILLARY BLOOD GLUCOSE          PHYSICAL EXAM:  General: AOX3, no acute distress, on room air  Respiratory: CTA b/l, no wheezing or crackles   Cardiovascular: RRR, no murmurs, S1+ S2+  Abdomen: Soft, NT, ND, BS+  Extremities: Bilateral lower extremity swelling, no pitting edema  Skin: no rashes    LINES:     HOSPITAL MEDICATIONS:  MEDICATIONS  (STANDING):  phytonadione   Solution 5 milliGRAM(s) Oral once  prothrombin complex concentrate IVPB (KCENTRA) 1500 International Unit(s) IV Intermittent once    MEDICATIONS  (PRN):      LABS:                        7.6    8.8   )-----------( 343      ( 22 Nov 2018 17:54 )             23.4     Hgb Trend: 7.6<--  11-22    138  |  108  |  38<H>  ----------------------------<  107<H>  4.5   |  15<L>  |  1.72<H>    Ca    9.6      22 Nov 2018 17:54  Phos  2.9     11-22  Mg     1.7     11-22    TPro  6.1  /  Alb  3.4  /  TBili  0.2  /  DBili  x   /  AST  23  /  ALT  13  /  AlkPhos  97  11-22    Creatinine Trend: 1.72<--  PT/INR - ( 22 Nov 2018 17:54 )   PT: 74.4 sec;   INR: 6.12 ratio         PTT - ( 22 Nov 2018 17:54 )  PTT:28.2 sec      Venous Blood Gas:  11-22 @ 18:10  7.35/33/<50/18/23  VBG Lactate: 2.9      MICROBIOLOGY:     RADIOLOGY:  [ ] Reviewed and interpreted by me    EKG:    Assessment and Plan:        This patient is not a MICU candidate at this time. Please re-consult as appropriate.     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 While patient has been in the ER, patient has had 2-3 stools that were bright red. Patient states that she is compliant with Coumadin and her last INR level on November 4th was "normal". Patient currently feels well when laying down, but upon standing, feels lightheaded c/w symptomatic anemia. Patient's orthostatics are negative.     PAST MEDICAL & SURGICAL HISTORY:  B12 deficiency  Lymphedema, limb: BLE  Colon cancer: 6 yrs ago. did not require chemo  Hard of Hearing  GERD (Gastroesophageal Reflux Disease)  Gallstone  Hypertension  H/O exploratory laparotomy: for SBO?  S/P colectomy: partial colectomy due to colon ca  S/P CAREY (Total Abdominal Hysterectomy)      FAMILY HISTORY:  No pertinent family history in first degree relatives        Allergies    penicillin (Unknown)    Intolerances        HOME MEDICATIONS:    REVIEW OF SYSTEMS:  Constitutional: [ ] fevers [ ] chills [ ] weight loss [ ] weight gain  HEENT: [ ] dry eyes [ ] eye irritation [ ] postnasal drip [ ] nasal congestion  CV: [ ] chest pain [ ] orthopnea [ ] palpitations [ ] murmur  Resp: [ ] cough [x] shortness of breath [] dyspnea [ ] wheezing [ ] sputum [ ] hemoptysis  GI: +BRBPR  : [ ] dysuria [ ] nocturia [ ] hematuria [ ] increased urinary frequency  Musculoskeletal: [ ] back pain [ ] myalgias [ ] arthralgias [ ] fracture  Skin: [ ] rash [ ] itch  Neurological: [ ] headache [ ] dizziness [ ] syncope [ ] weakness [ ] numbness  Psychiatric: [ ] anxiety [ ] depression  Endocrine: [ ] diabetes [ ] thyroid problem  Hematologic/Lymphatic: [ ] anemia [ ] bleeding problem  Allergic/Immunologic: [ ] itchy eyes [ ] nasal discharge [ ] hives [ ] angioedema  [x] All other systems negative  [ ] Unable to assess ROS because ________    OBJECTIVE:  ICU Vital Signs Last 24 Hrs  T(C): 36.7 (22 Nov 2018 19:50), Max: 37.3 (22 Nov 2018 19:00)  T(F): 98 (22 Nov 2018 19:50), Max: 99.2 (22 Nov 2018 19:00)  HR: 84 (22 Nov 2018 19:50) (56 - 84)  BP: 117/80 (22 Nov 2018 19:50) (111/61 - 134/54)  BP(mean): --  ABP: --  ABP(mean): --  RR: 16 (22 Nov 2018 20:20) (16 - 19)  SpO2: 97% (22 Nov 2018 20:20) (97% - 100%)        CAPILLARY BLOOD GLUCOSE          PHYSICAL EXAM:  General: AOX3, no acute distress, on room air  Respiratory: CTA b/l, no wheezing or crackles   Cardiovascular: RRR, no murmurs, S1+ S2+  Abdomen: Soft, NT, ND, BS+  Extremities: Bilateral lower extremity swelling, no pitting edema  Skin: no rashes    LINES:     HOSPITAL MEDICATIONS:  MEDICATIONS  (STANDING):  phytonadione   Solution 5 milliGRAM(s) Oral once  prothrombin complex concentrate IVPB (KCENTRA) 1500 International Unit(s) IV Intermittent once    MEDICATIONS  (PRN):      LABS:                        7.6    8.8   )-----------( 343      ( 22 Nov 2018 17:54 )             23.4     Hgb Trend: 7.6<--  11-22    138  |  108  |  38<H>  ----------------------------<  107<H>  4.5   |  15<L>  |  1.72<H>    Ca    9.6      22 Nov 2018 17:54  Phos  2.9     11-22  Mg     1.7     11-22    TPro  6.1  /  Alb  3.4  /  TBili  0.2  /  DBili  x   /  AST  23  /  ALT  13  /  AlkPhos  97  11-22    Creatinine Trend: 1.72<--  PT/INR - ( 22 Nov 2018 17:54 )   PT: 74.4 sec;   INR: 6.12 ratio         PTT - ( 22 Nov 2018 17:54 )  PTT:28.2 sec      Venous Blood Gas:  11-22 @ 18:10  7.35/33/<50/18/23  VBG Lactate: 2.9      MICROBIOLOGY:     RADIOLOGY:  [ ] Reviewed and interpreted by me    EKG:

## 2018-11-22 NOTE — ED ADULT NURSE REASSESSMENT NOTE - NS ED NURSE REASSESS COMMENT FT1
22:15. Pt AAOx4, NAD, resting comfortably in bed. 1 unit PRBCs started as ordered by MD. Consent in chart. Risks and benefits explained to patient. Patient verbalized understanding of risks and benefits. Patient aware of possible side effects. Vital signs stable. Second RN at bedside for confirmation. Safety maintained.

## 2018-11-22 NOTE — H&P ADULT - PROBLEM SELECTOR PLAN 6
c/w Vitamin b12 1000 mcg daily Patient with history of HTN, losartan discontinued by PMD as blood pressure had been controlled Patient with history of paroxysmal afib  - rate controlled  - holding coumadin in setting of GI bleed

## 2018-11-22 NOTE — H&P ADULT - PROBLEM SELECTOR PLAN 4
Patient with history of GERD, on omeprazole 20 mg daily  - will treat GI bleed above  - c/w IV protonix Patient presenting with DIOGENES, creatinine 1.71, baseline ~1.1  - Likely in setting of volume loss secondary to GI bleed, also endorsing that she does not drink a lot of water  - s/p 500 cc bolus, will c/w IV fluids at 100 cc/hr  - avoid nephrotoxic agents, renally dose medications  - continue to monitor renal function  - monitor I's and O's

## 2018-11-22 NOTE — H&P ADULT - PROBLEM SELECTOR PLAN 5
Patient with history of HTN, losartan discontinued by PMD as blood pressure had been controlled Patient with history of paroxysmal afib  - rate controlled  - holding coumadin in setting of GI bleed Patient with history of GERD, on omeprazole 20 mg daily  - will treat GI bleed above  - c/w IV protonix

## 2018-11-23 ENCOUNTER — APPOINTMENT (OUTPATIENT)
Dept: HOME HEALTH SERVICES | Facility: HOME HEALTH | Age: 83
End: 2018-11-23

## 2018-11-23 DIAGNOSIS — D62 ACUTE POSTHEMORRHAGIC ANEMIA: ICD-10-CM

## 2018-11-23 LAB
ANION GAP SERPL CALC-SCNC: 10 MMOL/L — SIGNIFICANT CHANGE UP (ref 5–17)
ANION GAP SERPL CALC-SCNC: 14 MMOL/L — SIGNIFICANT CHANGE UP (ref 5–17)
BUN SERPL-MCNC: 29 MG/DL — HIGH (ref 7–23)
BUN SERPL-MCNC: 33 MG/DL — HIGH (ref 7–23)
CALCIUM SERPL-MCNC: 7.8 MG/DL — LOW (ref 8.4–10.5)
CALCIUM SERPL-MCNC: 8.7 MG/DL — SIGNIFICANT CHANGE UP (ref 8.4–10.5)
CHLORIDE SERPL-SCNC: 111 MMOL/L — HIGH (ref 96–108)
CHLORIDE SERPL-SCNC: 113 MMOL/L — HIGH (ref 96–108)
CO2 SERPL-SCNC: 15 MMOL/L — LOW (ref 22–31)
CO2 SERPL-SCNC: 18 MMOL/L — LOW (ref 22–31)
CREAT SERPL-MCNC: 1.34 MG/DL — HIGH (ref 0.5–1.3)
CREAT SERPL-MCNC: 1.48 MG/DL — HIGH (ref 0.5–1.3)
GLUCOSE SERPL-MCNC: 210 MG/DL — HIGH (ref 70–99)
GLUCOSE SERPL-MCNC: 80 MG/DL — SIGNIFICANT CHANGE UP (ref 70–99)
HCT VFR BLD CALC: 24 % — LOW (ref 34.5–45)
HCT VFR BLD CALC: 26.5 % — LOW (ref 34.5–45)
HCT VFR BLD CALC: 27.2 % — LOW (ref 34.5–45)
HGB BLD-MCNC: 8 G/DL — LOW (ref 11.5–15.5)
HGB BLD-MCNC: 8.9 G/DL — LOW (ref 11.5–15.5)
HGB BLD-MCNC: 9.1 G/DL — LOW (ref 11.5–15.5)
INR BLD: 1.4 RATIO — HIGH (ref 0.88–1.16)
INR BLD: 1.98 RATIO — HIGH (ref 0.88–1.16)
LACTATE SERPL-SCNC: 1.5 MMOL/L — SIGNIFICANT CHANGE UP (ref 0.7–2)
MCHC RBC-ENTMCNC: 26.1 PG — LOW (ref 27–34)
MCHC RBC-ENTMCNC: 26.6 PG — LOW (ref 27–34)
MCHC RBC-ENTMCNC: 26.7 PG — LOW (ref 27–34)
MCHC RBC-ENTMCNC: 33.3 GM/DL — SIGNIFICANT CHANGE UP (ref 32–36)
MCHC RBC-ENTMCNC: 33.5 GM/DL — SIGNIFICANT CHANGE UP (ref 32–36)
MCHC RBC-ENTMCNC: 33.8 GM/DL — SIGNIFICANT CHANGE UP (ref 32–36)
MCV RBC AUTO: 78.4 FL — LOW (ref 80–100)
MCV RBC AUTO: 79.1 FL — LOW (ref 80–100)
MCV RBC AUTO: 79.5 FL — LOW (ref 80–100)
PLATELET # BLD AUTO: 260 K/UL — SIGNIFICANT CHANGE UP (ref 150–400)
PLATELET # BLD AUTO: 273 K/UL — SIGNIFICANT CHANGE UP (ref 150–400)
PLATELET # BLD AUTO: 289 K/UL — SIGNIFICANT CHANGE UP (ref 150–400)
POTASSIUM SERPL-MCNC: 3.8 MMOL/L — SIGNIFICANT CHANGE UP (ref 3.5–5.3)
POTASSIUM SERPL-MCNC: 7.7 MMOL/L — CRITICAL HIGH (ref 3.5–5.3)
POTASSIUM SERPL-SCNC: 3.8 MMOL/L — SIGNIFICANT CHANGE UP (ref 3.5–5.3)
POTASSIUM SERPL-SCNC: 7.7 MMOL/L — CRITICAL HIGH (ref 3.5–5.3)
PROTHROM AB SERPL-ACNC: 16.1 SEC — HIGH (ref 10–12.9)
PROTHROM AB SERPL-ACNC: 23.2 SEC — HIGH (ref 10–12.9)
RBC # BLD: 3.06 M/UL — LOW (ref 3.8–5.2)
RBC # BLD: 3.34 M/UL — LOW (ref 3.8–5.2)
RBC # BLD: 3.43 M/UL — LOW (ref 3.8–5.2)
RBC # FLD: 15.1 % — HIGH (ref 10.3–14.5)
RBC # FLD: 15.3 % — HIGH (ref 10.3–14.5)
RBC # FLD: 15.8 % — HIGH (ref 10.3–14.5)
SODIUM SERPL-SCNC: 140 MMOL/L — SIGNIFICANT CHANGE UP (ref 135–145)
SODIUM SERPL-SCNC: 141 MMOL/L — SIGNIFICANT CHANGE UP (ref 135–145)
WBC # BLD: 5.8 K/UL — SIGNIFICANT CHANGE UP (ref 3.8–10.5)
WBC # BLD: 6.8 K/UL — SIGNIFICANT CHANGE UP (ref 3.8–10.5)
WBC # BLD: 7.5 K/UL — SIGNIFICANT CHANGE UP (ref 3.8–10.5)
WBC # FLD AUTO: 5.8 K/UL — SIGNIFICANT CHANGE UP (ref 3.8–10.5)
WBC # FLD AUTO: 6.8 K/UL — SIGNIFICANT CHANGE UP (ref 3.8–10.5)
WBC # FLD AUTO: 7.5 K/UL — SIGNIFICANT CHANGE UP (ref 3.8–10.5)

## 2018-11-23 PROCEDURE — 93010 ELECTROCARDIOGRAM REPORT: CPT

## 2018-11-23 PROCEDURE — 99233 SBSQ HOSP IP/OBS HIGH 50: CPT | Mod: GC

## 2018-11-23 PROCEDURE — 93970 EXTREMITY STUDY: CPT | Mod: 26

## 2018-11-23 PROCEDURE — 74176 CT ABD & PELVIS W/O CONTRAST: CPT | Mod: 26

## 2018-11-23 RX ADMIN — PANTOPRAZOLE SODIUM 40 MILLIGRAM(S): 20 TABLET, DELAYED RELEASE ORAL at 05:02

## 2018-11-23 RX ADMIN — SODIUM CHLORIDE 100 MILLILITER(S): 9 INJECTION INTRAMUSCULAR; INTRAVENOUS; SUBCUTANEOUS at 01:55

## 2018-11-23 RX ADMIN — GABAPENTIN 300 MILLIGRAM(S): 400 CAPSULE ORAL at 21:55

## 2018-11-23 RX ADMIN — PREGABALIN 1000 MICROGRAM(S): 225 CAPSULE ORAL at 12:36

## 2018-11-23 RX ADMIN — PANTOPRAZOLE SODIUM 40 MILLIGRAM(S): 20 TABLET, DELAYED RELEASE ORAL at 16:58

## 2018-11-23 RX ADMIN — LORATADINE 10 MILLIGRAM(S): 10 TABLET ORAL at 12:36

## 2018-11-23 RX ADMIN — LATANOPROST 1 DROP(S): 0.05 SOLUTION/ DROPS OPHTHALMIC; TOPICAL at 21:56

## 2018-11-23 NOTE — PROGRESS NOTE ADULT - PROBLEM SELECTOR PLAN 3
Presented w/ supra therapeutic INR to >7 with active bleeding, s/p KCentra and Vitamin K. Unclear why INR was supratherapeutic. Pt denies taking extra doses and most recent abx use was several weeks ago for UTI  - Hold Coumadin  - Monitor INR Presented w/ supra therapeutic INR to >7 with active bleeding, s/p KCentra and Vitamin K. Unclear why INR was supratherapeutic. Pt denies taking extra doses and most recent abx use was several weeks ago for UTI  - Hold Coumadin  - Monitor INR  - Will need to weigh risks vs. benefits of eventually restarting A/C. LE dopplers negative and low suspicion for acute PE at this time.

## 2018-11-23 NOTE — PROGRESS NOTE ADULT - PROBLEM SELECTOR PLAN 4
Patient presenting with DIOGENES, creatinine 1.71, baseline ~1.1  Likely in setting of volume loss secondary to GI bleed  - Now improving  - avoid nephrotoxic agents, renally dose medications  - continue to monitor renal function  - monitor I's and O's

## 2018-11-23 NOTE — PROGRESS NOTE ADULT - PROBLEM SELECTOR PLAN 1
pt with symptomatic acute blood loss anemia, with baseline Hb in 10/2018 ~8, in ED with Hb 7.6 --> 6.6, in setting of BRBPR with elevated INR.  - maintain 2 large bore IVs  - monitor vitals q4h   - maintain active type and screen   - monitor CBC q8h no pt with symptomatic acute blood loss anemia, with baseline Hb in 10/2018 ~8, in ED with Hb 7.6 --> 6.6, in setting of BRBPR with elevated INR. Hgb improved to 8.9 this afternoon and remains hemodynamically stable.   - maintain 2 large bore IVs  - monitor vitals q4h   - maintain active type and screen   - monitor CBC q8h

## 2018-11-23 NOTE — PROGRESS NOTE ADULT - SUBJECTIVE AND OBJECTIVE BOX
Khalif Tom PGY-1   [facility] Pager # [phone]  NS Pager # [phone]      Patient is a 89y old  Female who presents with a chief complaint of GI bleed (2018 22:25)      SUBJECTIVE: No acute events overnight. Patient seen and examined at bedside.    REVIEW OF SYSTEMS:  CONSTITUTIONAL: No fever, weight loss, or fatigue  RESPIRATORY: No cough or shortness of breath  CARDIOVASCULAR: No chest pain, palpitations, dizziness, or leg swelling  GASTROINTESTINAL: No abdominal or epigastric pain. No nausea, vomiting, or hematemesis; No diarrhea or constipation. No melena or hematochezia.  GENITOURINARY: No dysuria  NEUROLOGICAL: No headaches  SKIN: No itching or lesions       MEDICATIONS  (STANDING):  cyanocobalamin 1000 MICROGram(s) Oral daily  gabapentin 300 milliGRAM(s) Oral at bedtime  latanoprost 0.005% Ophthalmic Solution 1 Drop(s) Both EYES at bedtime  loratadine 10 milliGRAM(s) Oral daily  pantoprazole  Injectable 40 milliGRAM(s) IV Push two times a day  sodium chloride 0.9%. 1000 milliLiter(s) (100 mL/Hr) IV Continuous <Continuous>    MEDICATIONS  (PRN):        Objective:    Vitals: Vital Signs Last 24 Hrs  T(C): 36.6 (18 @ 12:40), Max: 37.3 (18 @ 19:00)  T(F): 97.9 (18 @ 12:40), Max: 99.2 (18 @ 19:00)  HR: 76 (18 @ 12:40) (56 - 92)  BP: 128/72 (18 @ 12:40) (97/46 - 136/55)  BP(mean): --  RR: 18 (18 @ 12:40) (16 - 19)  SpO2: 98% (18 @ 12:40) (97% - 100%)            I&O's Summary      PHYSICAL EXAM:  GENERAL: NAD  HEAD:  Atraumatic, Normocephalic  CHEST/LUNG: Clear to auscultation bilaterally; No rales or wheezing  HEART: Regular rate and rhythm; No murmurs, rubs, or gallops  ABDOMEN: Soft, nontender, nondistended  EXTREMITIES:  No clubbing, cyanosis, or edema  LYMPH: No lymphadenopathy noted  SKIN: LE  NERVOUS SYSTEM:  Alert & Oriented X3    LABS:      141  |  113<H>  |  33<H>  ----------------------------<  80  3.8   |  18<L>  |  1.48<H>      140  |  111<H>  |  29<H>  ----------------------------<  210<H>  7.7<HH>   |  15<L>  |  1.34<H>      138  |  108  |  38<H>  ----------------------------<  107<H>  4.5   |  15<L>  |  1.72<H>    Ca    8.7      2018 08:09  Ca    7.8<L>      2018 06:41  Ca    9.6      2018 17:54  Phos  2.9       Mg     1.7         TPro  6.1  /  Alb  3.4  /  TBili  0.2  /  DBili  x   /  AST  23  /  ALT  13  /  AlkPhos  97      PT/INR - ( 2018 06:41 )   PT: 23.2 sec;   INR: 1.98 ratio         PTT - ( 2018 17:54 )  PTT:28.2 sec              Urinalysis Basic - ( 2018 21:14 )    Color: Light Yellow / Appearance: Clear / S.013 / pH: x  Gluc: x / Ketone: Negative  / Bili: Negative / Urobili: Negative   Blood: x / Protein: Trace / Nitrite: Negative   Leuk Esterase: Negative / RBC: 13 /hpf / WBC 1 /hpf   Sq Epi: x / Non Sq Epi: 1 /hpf / Bacteria: Moderate                              8.9    5.8   )-----------( 260      ( 2018 11:46 )             26.5                         8.0    7.5   )-----------( 289      ( 2018 02:19 )             24.0                         6.6    7.2   )-----------( 302      ( 2018 21:14 )             20.3     CAPILLARY BLOOD GLUCOSE        RADIOLOGY:  Imaging Personally Reviewed: YES      Consultants involved in case: YES  Consultant(s) Notes Reviewed:  YES  Care Discussed with Consultants/Other Providers       Khalif Tom, PGY-1 Khalif Tom PGY-1   [facility] Pager # [phone]  NS Pager # [phone]      Patient is a 89y old  Female who presents with a chief complaint of GI bleed (2018 22:25)      SUBJECTIVE: Tele events noted overnight. EKGs reviewed. Patient has been asymptomatic overnight, denying any chest pain, SOB, palpitations, dizziness, or headache. Reports that last time she stood up she had dizziness and SOB. Denies any bloody stools or hematemesis overnight.      MEDICATIONS  (STANDING):  cyanocobalamin 1000 MICROGram(s) Oral daily  gabapentin 300 milliGRAM(s) Oral at bedtime  latanoprost 0.005% Ophthalmic Solution 1 Drop(s) Both EYES at bedtime  loratadine 10 milliGRAM(s) Oral daily  pantoprazole  Injectable 40 milliGRAM(s) IV Push two times a day  sodium chloride 0.9%. 1000 milliLiter(s) (100 mL/Hr) IV Continuous <Continuous>    MEDICATIONS  (PRN):        Objective:    Vitals: Vital Signs Last 24 Hrs  T(C): 36.6 (18 @ 12:40), Max: 37.3 (18 @ 19:00)  T(F): 97.9 (18 @ 12:40), Max: 99.2 (18 @ 19:00)  HR: 76 (18 @ 12:40) (56 - 92)  BP: 128/72 (18 @ 12:40) (97/46 - 136/55)  BP(mean): --  RR: 18 (18 @ 12:40) (16 - 19)  SpO2: 98% (18 @ 12:40) (97% - 100%)            I&O's Summary      PHYSICAL EXAM:  GENERAL: NAD,   HEAD:  Atraumatic, Normocephalic  EYES: EOMI, mild conjunctival pallor  CHEST/LUNG: Clear to auscultation bilaterally; No rales or wheezing  HEART: Regular rate and rhythm; No murmurs, rubs, or gallops  ABDOMEN: Soft, nontender, nondistended  EXTREMITIES:  No LE  edema  SKIN: LE hyperpigmentation b/l  NERVOUS SYSTEM:  Alert & Oriented X3    LABS:      141  |  113<H>  |  33<H>  ----------------------------<  80  3.8   |  18<L>  |  1.48<H>      140  |  111<H>  |  29<H>  ----------------------------<  210<H>  7.7<HH>   |  15<L>  |  1.34<H>      138  |  108  |  38<H>  ----------------------------<  107<H>  4.5   |  15<L>  |  1.72<H>    Ca    8.7      2018 08:09  Ca    7.8<L>      2018 06:41  Ca    9.6      2018 17:54  Phos  2.9       Mg     1.7         TPro  6.1  /  Alb  3.4  /  TBili  0.2  /  DBili  x   /  AST  23  /  ALT  13  /  AlkPhos  97      PT/INR - ( 2018 06:41 )   PT: 23.2 sec;   INR: 1.98 ratio         PTT - ( 2018 17:54 )  PTT:28.2 sec              Urinalysis Basic - ( 2018 21:14 )    Color: Light Yellow / Appearance: Clear / S.013 / pH: x  Gluc: x / Ketone: Negative  / Bili: Negative / Urobili: Negative   Blood: x / Protein: Trace / Nitrite: Negative   Leuk Esterase: Negative / RBC: 13 /hpf / WBC 1 /hpf   Sq Epi: x / Non Sq Epi: 1 /hpf / Bacteria: Moderate                              8.9    5.8   )-----------( 260      ( 2018 11:46 )             26.5                         8.0    7.5   )-----------( 289      ( 2018 02:19 )             24.0                         6.6    7.2   )-----------( 302      ( 2018 21:14 )             20.3     CAPILLARY BLOOD GLUCOSE        RADIOLOGY:  Imaging Personally Reviewed: YES      Consultants involved in case: YES  Consultant(s) Notes Reviewed:  YES  Care Discussed with Consultants/Other Providers       Khalif Tom, PGY-1 Khalif Tom PGY-1   [facility] Pager # [phone]  NS Pager # [phone]      Patient is a 89y old  Female who presents with a chief complaint of GI bleed (2018 22:25)      SUBJECTIVE: Tele events noted overnight. EKGs reviewed. Patient has been asymptomatic overnight, denying any chest pain, SOB, palpitations, dizziness, or headache. Reports that last time she stood up she had dizziness and SOB. Denies any bloody stools or hematemesis overnight.      MEDICATIONS  (STANDING):  cyanocobalamin 1000 MICROGram(s) Oral daily  gabapentin 300 milliGRAM(s) Oral at bedtime  latanoprost 0.005% Ophthalmic Solution 1 Drop(s) Both EYES at bedtime  loratadine 10 milliGRAM(s) Oral daily  pantoprazole  Injectable 40 milliGRAM(s) IV Push two times a day  sodium chloride 0.9%. 1000 milliLiter(s) (100 mL/Hr) IV Continuous <Continuous>    MEDICATIONS  (PRN):        Objective:    Vitals: Vital Signs Last 24 Hrs  T(C): 36.6 (18 @ 12:40), Max: 37.3 (18 @ 19:00)  T(F): 97.9 (18 @ 12:40), Max: 99.2 (18 @ 19:00)  HR: 76 (18 @ 12:40) (56 - 92)  BP: 128/72 (18 @ 12:40) (97/46 - 136/55)  BP(mean): --  RR: 18 (18 @ 12:40) (16 - 19)  SpO2: 98% (18 @ 12:40) (97% - 100%)            I&O's Summary      PHYSICAL EXAM:  GENERAL: NAD,   HEAD:  Atraumatic, Normocephalic  EYES: EOMI, mild conjunctival pallor  CHEST/LUNG: Clear to auscultation bilaterally; No rales or wheezing  HEART: Regular rate and rhythm; No murmurs, rubs, or gallops  ABDOMEN: Soft, nontender, nondistended  EXTREMITIES:  No LE  edema  SKIN: LE hyperpigmentation b/l  NERVOUS SYSTEM:  Alert & Oriented X3    LABS:      141  |  113<H>  |  33<H>  ----------------------------<  80  3.8   |  18<L>  |  1.48<H>      140  |  111<H>  |  29<H>  ----------------------------<  210<H>  7.7<HH>   |  15<L>  |  1.34<H>      138  |  108  |  38<H>  ----------------------------<  107<H>  4.5   |  15<L>  |  1.72<H>    Ca    8.7      2018 08:09  Ca    7.8<L>      2018 06:41  Ca    9.6      2018 17:54  Phos  2.9       Mg     1.7         TPro  6.1  /  Alb  3.4  /  TBili  0.2  /  DBili  x   /  AST  23  /  ALT  13  /  AlkPhos  97      PT/INR - ( 2018 06:41 )   PT: 23.2 sec;   INR: 1.98 ratio         PTT - ( 2018 17:54 )  PTT:28.2 sec              Urinalysis Basic - ( 2018 21:14 )    Color: Light Yellow / Appearance: Clear / S.013 / pH: x  Gluc: x / Ketone: Negative  / Bili: Negative / Urobili: Negative   Blood: x / Protein: Trace / Nitrite: Negative   Leuk Esterase: Negative / RBC: 13 /hpf / WBC 1 /hpf   Sq Epi: x / Non Sq Epi: 1 /hpf / Bacteria: Moderate                              8.9    5.8   )-----------( 260      ( 2018 11:46 )             26.5                         8.0    7.5   )-----------( 289      ( 2018 02:19 )             24.0                         6.6    7.2   )-----------( 302      ( 2018 21:14 )             20.3         RADIOLOGY:  Imaging Personally Reviewed: YES      Consultants involved in case: YES  Consultant(s) Notes Reviewed:  YES  Care Discussed with Consultants/Other Providers       Khalif Tom, PGY-1

## 2018-11-23 NOTE — PROGRESS NOTE ADULT - ASSESSMENT
89 year old Female with PMHx of HTN, unprovoked PE (dx 2014, on Coumadin, previously on Xarelto but switched due to severe anemia), paroxysmal afib, colon cancer (10 years ago, s/p resection), GERD, and chronic lymphedema presents with palpitations and dyspnea, found to have anemia in the setting of GI bleed with supra therapeutic INR.

## 2018-11-23 NOTE — CONSULT NOTE ADULT - SUBJECTIVE AND OBJECTIVE BOX
Patient is a 89y old  Female who presents with a chief complaint of GI bleed (2018 12:44)      HPI:    89 year old Female with PMHx of HTN, unprovoked PE (dx , on Coumadin, previously on Xarelto but switched due to severe anemia), paroxysmal afib, colon cancer (10 years ago, s/p resection), GERD, and chronic lymphedema presents with palpitations and dyspnea on exertion for since yesterday. Patient endorsing that she has been having acid reflux symptoms periodically for the past 2 weeks, and was started on Omeprazole 20 mg daily. Yesterday, she endorsed that symptoms worsened, and she was having terrible burning sensation in her epigastric area and gas pain. Patient says that she felt as if her heart was racing whenever she got up to move around, along with dyspnea and lightheadedness. Patient denies any dark stools, says that she has been having some loose stools. Patient denies fever/chills, URI symptoms, chest pain, abdominal pain, n/v or urinary symptoms. Patient endorsing that she does not drink a lot of water regularly. Patient endorsing that about 5 years ago, she felt the exact same way when she had been placed on Xarelto, and received 3 units of pRBC's when she was found to have a hemoglobin of 4.6. At that time, anticoagulation was switched to coumadin. Patient checks INR regularly, last INR was 1.39 on . Patient currently on Warfarin 4 mg daily.      In the ED, patient afebrile, HR 56-92, -134/54-80, Sa02 % on RA. Orthostatic BP: 146/84 HR:81 when lying down, /85 HR 80 when sitting and /66  when standing. In ED, patient with multiple loose stools with bright red/pink blood. INR was supra therapeutic at 7.6; patient received K centra 1500 and Vitamin K 5 mg.     No family hx of GI bleed. (2018 22:25)  Last EGD & colonoscopy 3/2012 for severe anemia with prior history of colon CA   EGD - small HH, gastritis (pathology negative)  Colon - diverticulosis , ulcerated mucosa at anastomosis with stigmata of recent bleeding (pathology negative for carcinoma)     no further bleeding since admission  no emesis or coffee ground emesis, no melena   some dyspepsia, improved with PPI  no CP or SOB, no palpitations    PAST MEDICAL & SURGICAL HISTORY:  B12 deficiency  Lymphedema, limb: BLE  Colon cancer: did not require chemo  Hard of Hearing  GERD (Gastroesophageal Reflux Disease)  Gallstone  Hypertension  H/O exploratory laparotomy: for SBO?  S/P colectomy: partial colectomy due to colon ca  S/P CAREY (Total Abdominal Hysterectomy)  Hx Shingles 2018  Hx SBO    Allergies  penicillin (Unknown)      MEDICATIONS  (STANDING):  cyanocobalamin 1000 MICROGram(s) Oral daily  gabapentin 300 milliGRAM(s) Oral at bedtime  latanoprost 0.005% Ophthalmic Solution 1 Drop(s) Both EYES at bedtime  loratadine 10 milliGRAM(s) Oral daily  pantoprazole  Injectable 40 milliGRAM(s) IV Push two times a day  sodium chloride 0.9%. 1000 milliLiter(s) (100 mL/Hr) IV Continuous <Continuous>        SOCIAL HISTORY: No tobacco, no alcohol, no illicit drugs    FAMILY HISTORY:  No pertinent family history in first degree relatives    Health Management  Last Colonoscopy -  (see HPI)      Advanced Directives: (  X   ) None    (      ) DNR    (     ) DNI    (     ) Health Care Proxy:     Review of Systems:    General:  No wt loss, fevers, chills, night sweats  CV:  No pain, palpitatioins,   Resp:  No dyspnea, cough, tachypnea, wheezing  GI:  see HPI  :  No pain, bleeding, incontinence, nocturia  Muscle:  No pain, weakness +lymphedema  Neuro:  No weakness, tingling, memory problems  Psych:  No fatigue, insomnia, mood problems, depression  Endocrine:  No polyuria, polydypsia, cold/heat intolerance  Heme:  No petechiae, ecchymosis, on AC for PE +hx colon CA  Skin:  No rash, tattoos,+lymphedema      Vital Signs Last 24 Hrs  T(C): 36.6 (2018 12:40), Max: 37.3 (2018 19:00)  T(F): 97.9 (2018 12:40), Max: 99.2 (2018 19:00)  HR: 76 (2018 12:40) (56 - 92)  BP: 128/72 (2018 12:40) (97/46 - 136/55)  BP(mean): --  RR: 18 (2018 12:40) (16 - 19)  SpO2: 98% (2018 12:40) (97% - 100%)    PHYSICAL EXAM:    Constitutional: NAD, well-developed alert and responsive. appears younger than stated age  Neck: No LAD, supple  Respiratory: CTA and P  Cardiovascular: S1 and S2, RRR, no M  Gastrointestinal: BS+, soft, obese NT/ND, neg HSM,  Extremities: b/l LE edema   Vascular: 2+ peripheral pulses  Neurological: A/O x 3, no focal deficits  Psychiatric: Normal mood, normal affect  Skin: No rashes        LABS:                        8.9    5.8   )-----------( 260      ( 2018 11:46 )             26.5     Hemoglobin: 8.0 g/dL <L> [11.5 - 15.5] ( @ 02:19)  s/p 2units PRBCs  Hemoglobin: 6.6 g/dL <LL> [11.5 - 15.5] ( @ 21:14)  Hemoglobin: 7.6 g/dL <L> [11.5 - 15.5] ( @ 17:54)            141  |  113<H>  |  33<H>  ----------------------------<  80  3.8   |  18<L>  |  1.48<H>    Ca    8.7      2018 08:09  Phos  2.9       Mg     1.7         TPro  6.1  /  Alb  3.4  /  TBili  0.2  /  DBili  x   /  AST  23  /  ALT  13  /  AlkPhos  97      PT/INR - ( 2018 06:41 )   PT: 23.2 sec;   INR: 1.98 ratio    PTT - ( 2018 17:54 )  PTT:28.2 sec    INR: 1.64  s/p Vitamin K  INR: 7.16         Urinalysis Basic - ( 2018 21:14 )    Color: Light Yellow / Appearance: Clear / S.013 / pH: x  Gluc: x / Ketone: Negative  / Bili: Negative / Urobili: Negative   Blood: x / Protein: Trace / Nitrite: Negative   Leuk Esterase: Negative / RBC: 13 /hpf / WBC 1 /hpf   Sq Epi: x / Non Sq Epi: 1 /hpf / Bacteria: Moderate        RADIOLOGY & ADDITIONAL TESTS:

## 2018-11-23 NOTE — PROGRESS NOTE ADULT - PROBLEM SELECTOR PLAN 6
Patient with history of paroxysmal afib. EKG on this admission concerning for possible Mobitz Type I.   - On tele  - Will consider Cards consult to griffin

## 2018-11-23 NOTE — PROGRESS NOTE ADULT - PROBLEM SELECTOR PLAN 2
Presented with BRBPR in setting of supratherapeutic INR (7.2), found to have symptomatic anemia. S/p 2u pRBCs, KCentra, and Vit K. INR now 1.9  VSS. Hgb 6.6->8.0->8.9  - f/u GI recs  - f/u CT A/P   - c/w protonix 40mg IV BID  - advance diet to clear liquids Presented with BRBPR in setting of supratherapeutic INR (7.2), found to have symptomatic anemia. S/p 2u pRBCs, KCentra, and Vit K. INR now 1.9  VSS. Hgb 6.6->8.0->8.9  - GI consult appreciated, defer EGD/colonoscopy for now   - f/u CT A/P   - c/w protonix 40mg IV BID  - advance diet to clear liquids  - Close CBC monitoring

## 2018-11-23 NOTE — PROGRESS NOTE ADULT - PROBLEM SELECTOR PLAN 5
Patient with history of GERD, on omeprazole 20 mg daily  - c/w IV protonix Patient with history of GERD, on omeprazole 20 mg daily at home   - c/w IV protonix

## 2018-11-23 NOTE — CONSULT NOTE ADULT - ASSESSMENT
89 y/o F hx of unprovoked PE on Coumadin (dx 2014) (previously on Xarelto, however changed to Coumadin due to intolerance), colon ca in remission, HTN, GERD, chronic lymphedema presents with dyspnea on exertion with new GI bleed today x 2 episodes with GI consulted for BRBPR in the setting of supratherapeutic INR  Coagulopathy reversal s/p PO Vitamin K and IV K centra (now trending back up)    No active/brisk GI bleed at this time  HD stable  dyspepsia, Gastritis/GERD  Need to r/o recurrence of colon CA vs ?diverticular bleed    PLAN  -AC on hold  -INR daily  -would obtain CT scan to evaluate anatomy and can evaluate for occult malignancy   -monitor CBC and BMs  -Continue BID PPI for now  -OK for PO diet (clears until able to get Ct scan, then can advance)    Discussed with house staff  Thank you for the courtesy of this consult.    Zach Baumann PA-C    [facility]  [phone]

## 2018-11-23 NOTE — CONSULT NOTE ADULT - SUBJECTIVE AND OBJECTIVE BOX
Chief Complaint: Shortness of breath    HPI:    89 y/o F w/ hx of HTN, unprovoked PE (dx , on Coumadin), paroxysmal A-fib, colon cancer s/p partial colectomy in , GERD, iron deficiency anemia, and chronic lymphedema, who initially presented with shortness of palpitations, was found to be anemic in setting of supratherapeutic INR, with report of hematochezia in ED, which the patient and family deny; GI consulted for evaluation of symptomatic anemia given and due to concern for GI bleeding. presents with palpitations and dyspnea on exertion for since yesterday. The patient endorses acute onset of shortness of breath and palpitations starting yesterday. Her symptoms were provoked whenever she stood up, but improved when she lied down. She endorses loose stools for the past 2 to 3 weeks, for which she took an OTC medication with minimal relief, however, denies bloody emesis, coffee ground emesis, bloody stools, and black tarry stools. She endorses worsening reflux symptoms over the past several weeks, for which she is currently taking omeprazole 20 mg PO BID.     In the ED, the patient was noted to orthostatic and have an INR of 6.12, for which she was given K-Centra and Vitamin K. She was transfused 2 units pRBC since her admission.    The patient last underwent upper endoscopy and colonoscopy in  for evaluation of iron deficiency anemia.     Allergies:  penicillin (Unknown)    Home Medications:    · 	latanoprost 0.005% ophthalmic solution: 1 drop(s) to each affected eye once a day (in the evening), Last Dose Taken:    · 	Vitamin B-12 1000 mcg oral tablet: 1 tab(s) orally once a day, Last Dose Taken:    · 	omeprazole 20 mg oral delayed release capsule: 1 cap(s) orally once a day (at bedtime), Last Dose Taken:    · 	warfarin 4 mg oral tablet: 1 tab(s) orally once a day, Last Dose Taken:    · 	Carafate 1 g/10 mL oral suspension: 10 milliliter(s) orally 3 times a day (before meals), Last Dose Taken:    · 	loratadine 10 mg oral tablet: 1 tab(s) orally once a day, Last Dose Taken:    · 	gabapentin 300 mg oral capsule: 1 cap(s) orally once a day (at bedtime), Last Dose Taken:      Hospital Medications:  cyanocobalamin 1000 MICROGram(s) Oral daily  gabapentin 300 milliGRAM(s) Oral at bedtime  latanoprost 0.005% Ophthalmic Solution 1 Drop(s) Both EYES at bedtime  loratadine 10 milliGRAM(s) Oral daily  pantoprazole  Injectable 40 milliGRAM(s) IV Push two times a day  sodium chloride 0.9%. 1000 milliLiter(s) IV Continuous <Continuous>    PMHX/PSHX:  B12 deficiency  Lymphedema, limb  Colon cancer  Hard of Hearing  GERD (Gastroesophageal Reflux Disease)  Gallstone  Hypertension  H/O exploratory laparotomy  S/P colectomy  S/P CAREY (Total Abdominal Hysterectomy)    Family history:     Denies family history of colon cancer/polyps, stomach cancer/polyps, pancreatic cancer/masses, liver cancer/disease, ovarian cancer and endometrial cancer.    Social History:   Tob: Denies  EtOH: Denies  Illicit Drugs: Denies    ROS:     General:  No wt loss, fevers, chills, night sweats, fatigue,   Eyes:  Good vision, no reported pain  ENT:  No sore throat, pain, runny nose, dysphagia  CV:  No pain, palpitations, hypo/hypertension  Resp:  No dyspnea, cough, tachypnea, wheezing  GI:  No pain, No nausea, No vomiting, No diarrhea, No constipation, No weight loss, No fever, No pruritis, No rectal bleeding, No tarry stools, No dysphagia,  :  No pain, bleeding, incontinence, nocturia  Skin:  No rash, tattoos, scars, edema    PHYSICAL EXAM:     GENERAL:  No acute distress  HEENT:  Normocephalic/atraumatic, no scleral icterus  CHEST:  Clear to auscultation bilaterally, no wheezes/rales/ronchi, no accessory muscle use  HEART:  Regular rate and rhythm, no murmurs/rubs/gallops  ABDOMEN:  Soft, non-tender, non-distended, normoactive bowel sounds,  no masses, no hepato-splenomegaly, no signs of chronic liver disease  EXTREMITIES: No cyanosis, clubbing, or edema  SKIN:  No rash/erythema/ecchymoses/petechiae/wounds/abscess/warm/dry  NEURO:  Alert and oriented x 3, no asterixis    Vital Signs:  Vital Signs Last 24 Hrs  T(C): 36.7 (2018 04:57), Max: 37.3 (2018 19:00)  T(F): 98.1 (2018 04:57), Max: 99.2 (2018 19:00)  HR: 72 (2018 04:57) (56 - 92)  BP: 118/63 (2018 04:57) (97/46 - 136/55)  BP(mean): --  RR: 18 (2018 04:57) (16 - 19)  SpO2: 99% (2018 04:57) (97% - 100%)    LABS:                        8.0    7.5   )-----------( 289      ( 2018 02:19 )             24.0     Mean Cell Volume: 78.4 fl (- @ 02:19)        141  |  113<H>  |  33<H>  ----------------------------<  80  3.8   |  18<L>  |  1.48<H>    Ca    8.7      2018 08:09  Phos  2.9       Mg     1.7         TPro  6.1  /  Alb  3.4  /  TBili  0.2  /  DBili  x   /  AST  23  /  ALT  13  /  AlkPhos  97      LIVER FUNCTIONS - ( 2018 17:54 )  Alb: 3.4 g/dL / Pro: 6.1 g/dL / ALK PHOS: 97 U/L / ALT: 13 U/L / AST: 23 U/L / GGT: x           PT/INR - ( 2018 06:41 )   PT: 23.2 sec;   INR: 1.98 ratio        PTT - ( 2018 17:54 )  PTT:28.2 sec  Urinalysis Basic - ( 2018 21:14 )    Color: Light Yellow / Appearance: Clear / S.013 / pH: x  Gluc: x / Ketone: Negative  / Bili: Negative / Urobili: Negative   Blood: x / Protein: Trace / Nitrite: Negative   Leuk Esterase: Negative / RBC: 13 /hpf / WBC 1 /hpf   Sq Epi: x / Non Sq Epi: 1 /hpf / Bacteria: Moderate               8.0    7.5   )-----------( 289      ( 2018 02:19 )             24.0                         6.6    7.2   )-----------( 302      ( 2018 21:14 )             20.3                         7.6    8.8   )-----------( 343      ( 2018 17:54 )             23.4     Imaging:    < from: Xray Chest 2 Views PA/Lat (18 @ 18:07) >  FINDINGS/  IMPRESSION:   No focal consolidation, pleural effusion, pneumothorax.    Cardiac silhouette is unremarkable. The aorta is markedly uncoiled.    Degenerative spine changes.    < end of copied text >    Procedures:    < from: Upper Endoscopy (12 @ 15:59) >  Findings:  A small hiatus hernia was present. The esophagus was normal.Localized mildly erythematous mucosa with no bleeding was found in the gastric antrum. Biopsies were taken with a cold forceps for histology. No ulcers or mass lesions seen.The examined duodenum was normal. Biopsies were taken with a cold forceps for histology.                                                                                                        Impression:            - Hiatus hernia.  - Gastric mucosal abnormality characterized by erythema. This was biopsied.  - Normal examined duodenum. This was biopsied.    Recommendation:        - Await pathology results.  PPI  Plan for colonoscopy on Monday.    < end of copied text >    < from: Colonoscopy (12 @ 15:08) >  Findings:  Discontinuous areas of bleeding ulcerated mucosa with stigmata of recent bleeding were present at the anastomosis. Biopsies were taken with a cold forceps for histology. Estimated blood loss was minimal. One hemostatic clip was successfully placed. There was no bleeding at the end of the procedure. No mass lesions seen.Internal hemorrhoids were found during retroflexion.                                                                                  Impression:            - Preparation of the colon was fair.  - Mucosal ulceration. This was biopsied.  - Internal hemorrhoids.  - One hemostatic clip was successfully placed.    Recommendation:        - Await pathology results.  - To visualize the small bowel, perform video capsule endoscopy as outpatient.    < end of copied text >

## 2018-11-24 ENCOUNTER — TRANSCRIPTION ENCOUNTER (OUTPATIENT)
Age: 83
End: 2018-11-24

## 2018-11-24 DIAGNOSIS — Z71.89 OTHER SPECIFIED COUNSELING: ICD-10-CM

## 2018-11-24 LAB
-  AMIKACIN: SIGNIFICANT CHANGE UP
-  AMOXICILLIN/CLAVULANIC ACID: SIGNIFICANT CHANGE UP
-  AMPICILLIN/SULBACTAM: SIGNIFICANT CHANGE UP
-  AMPICILLIN: SIGNIFICANT CHANGE UP
-  AZTREONAM: SIGNIFICANT CHANGE UP
-  CEFAZOLIN: SIGNIFICANT CHANGE UP
-  CEFEPIME: SIGNIFICANT CHANGE UP
-  CEFOXITIN: SIGNIFICANT CHANGE UP
-  CEFTRIAXONE: SIGNIFICANT CHANGE UP
-  CIPROFLOXACIN: SIGNIFICANT CHANGE UP
-  ERTAPENEM: SIGNIFICANT CHANGE UP
-  GENTAMICIN: SIGNIFICANT CHANGE UP
-  IMIPENEM: SIGNIFICANT CHANGE UP
-  LEVOFLOXACIN: SIGNIFICANT CHANGE UP
-  MEROPENEM: SIGNIFICANT CHANGE UP
-  NITROFURANTOIN: SIGNIFICANT CHANGE UP
-  PIPERACILLIN/TAZOBACTAM: SIGNIFICANT CHANGE UP
-  TIGECYCLINE: SIGNIFICANT CHANGE UP
-  TOBRAMYCIN: SIGNIFICANT CHANGE UP
-  TRIMETHOPRIM/SULFAMETHOXAZOLE: SIGNIFICANT CHANGE UP
ANION GAP SERPL CALC-SCNC: 9 MMOL/L — SIGNIFICANT CHANGE UP (ref 5–17)
BUN SERPL-MCNC: 27 MG/DL — HIGH (ref 7–23)
CALCIUM SERPL-MCNC: 8.7 MG/DL — SIGNIFICANT CHANGE UP (ref 8.4–10.5)
CHLORIDE SERPL-SCNC: 113 MMOL/L — HIGH (ref 96–108)
CO2 SERPL-SCNC: 19 MMOL/L — LOW (ref 22–31)
CREAT SERPL-MCNC: 1.5 MG/DL — HIGH (ref 0.5–1.3)
CULTURE RESULTS: SIGNIFICANT CHANGE UP
GLUCOSE SERPL-MCNC: 80 MG/DL — SIGNIFICANT CHANGE UP (ref 70–99)
HCT VFR BLD CALC: 28.8 % — LOW (ref 34.5–45)
HGB BLD-MCNC: 9.9 G/DL — LOW (ref 11.5–15.5)
MAGNESIUM SERPL-MCNC: 1.8 MG/DL — SIGNIFICANT CHANGE UP (ref 1.6–2.6)
MCHC RBC-ENTMCNC: 27.8 PG — SIGNIFICANT CHANGE UP (ref 27–34)
MCHC RBC-ENTMCNC: 34.5 GM/DL — SIGNIFICANT CHANGE UP (ref 32–36)
MCV RBC AUTO: 80.5 FL — SIGNIFICANT CHANGE UP (ref 80–100)
METHOD TYPE: SIGNIFICANT CHANGE UP
ORGANISM # SPEC MICROSCOPIC CNT: SIGNIFICANT CHANGE UP
ORGANISM # SPEC MICROSCOPIC CNT: SIGNIFICANT CHANGE UP
PHOSPHATE SERPL-MCNC: 2.8 MG/DL — SIGNIFICANT CHANGE UP (ref 2.5–4.5)
PLATELET # BLD AUTO: 260 K/UL — SIGNIFICANT CHANGE UP (ref 150–400)
POTASSIUM SERPL-MCNC: 3.7 MMOL/L — SIGNIFICANT CHANGE UP (ref 3.5–5.3)
POTASSIUM SERPL-SCNC: 3.7 MMOL/L — SIGNIFICANT CHANGE UP (ref 3.5–5.3)
RBC # BLD: 3.57 M/UL — LOW (ref 3.8–5.2)
RBC # FLD: 15.1 % — HIGH (ref 10.3–14.5)
SODIUM SERPL-SCNC: 141 MMOL/L — SIGNIFICANT CHANGE UP (ref 135–145)
SPECIMEN SOURCE: SIGNIFICANT CHANGE UP
WBC # BLD: 7.8 K/UL — SIGNIFICANT CHANGE UP (ref 3.8–10.5)
WBC # FLD AUTO: 7.8 K/UL — SIGNIFICANT CHANGE UP (ref 3.8–10.5)

## 2018-11-24 PROCEDURE — 99233 SBSQ HOSP IP/OBS HIGH 50: CPT | Mod: GC

## 2018-11-24 RX ADMIN — GABAPENTIN 300 MILLIGRAM(S): 400 CAPSULE ORAL at 22:04

## 2018-11-24 RX ADMIN — LORATADINE 10 MILLIGRAM(S): 10 TABLET ORAL at 13:36

## 2018-11-24 RX ADMIN — LATANOPROST 1 DROP(S): 0.05 SOLUTION/ DROPS OPHTHALMIC; TOPICAL at 22:04

## 2018-11-24 RX ADMIN — PANTOPRAZOLE SODIUM 40 MILLIGRAM(S): 20 TABLET, DELAYED RELEASE ORAL at 05:27

## 2018-11-24 RX ADMIN — PREGABALIN 1000 MICROGRAM(S): 225 CAPSULE ORAL at 13:36

## 2018-11-24 RX ADMIN — PANTOPRAZOLE SODIUM 40 MILLIGRAM(S): 20 TABLET, DELAYED RELEASE ORAL at 17:00

## 2018-11-24 NOTE — PROGRESS NOTE ADULT - SUBJECTIVE AND OBJECTIVE BOX
INTERVAL HPI / OVERNIGHT EVENTS:  Pt without further BRBPR.  Tolerating Po.    MEDICATIONS  (STANDING):  cyanocobalamin 1000 MICROGram(s) Oral daily  gabapentin 300 milliGRAM(s) Oral at bedtime  latanoprost 0.005% Ophthalmic Solution 1 Drop(s) Both EYES at bedtime  loratadine 10 milliGRAM(s) Oral daily  pantoprazole  Injectable 40 milliGRAM(s) IV Push two times a day    MEDICATIONS  (PRN):      Allergies    penicillin (Unknown)    Intolerances      Review of Systems:    General:  No wt loss, fevers, chills, night sweats, fatigue  ENT:  No sore throat, pain, runny nose, dysphagia  CV:  No chest pain, palpitations, LUONG  Resp:  No dyspnea, cough, tachypnea, wheezing  Neuro:  No focal weakness, headache, vision changes  Heme:  No petechiae, ecchymosis, easy bruisability      Vital Signs Last 24 Hrs  T(C): 36.5 (2018 12:22), Max: 36.8 (2018 20:13)  T(F): 97.7 (2018 12:22), Max: 98.3 (2018 04:11)  HR: 59 (2018 12:22) (59 - 66)  BP: 149/62 (2018 12:22) (117/62 - 149/62)  BP(mean): --  RR: 18 (2018 12:22) (18 - 18)  SpO2: 98% (2018 12:22) (97% - 100%)    PHYSICAL EXAM:    Constitutional: NAD, well-developed elderly female.  Neck: No LAD, supple  Respiratory: clear to auscultation b/l no rales, rhonchi, wheezing  Cardiovascular: S1 and S2, RRR, no murmur  Gastrointestinal: +BS x4, soft, NT/ND, neg HSM,  Extremities: No peripheral edema, neg clubbing, cyanosis  Vascular: 2+ peripheral pulses  Neurological: A/O x 3, no focal deficits  Psychiatric: Normal mood, normal affect  Skin: No rashes, anicteric      LABS:                        9.9    7.8   )-----------( 260      ( 2018 12:38 )             28.8     11-24    141  |  113<H>  |  27<H>  ----------------------------<  80  3.7   |  19<L>  |  1.50<H>    Ca    8.7      2018 06:49  Phos  2.8       Mg     1.8         TPro  6.1  /  Alb  3.4  /  TBili  0.2  /  DBili  x   /  AST  23  /  ALT  13  /  AlkPhos  97      PT/INR - ( 2018 20:43 )   PT: 16.1 sec;   INR: 1.40 ratio         PTT - ( 2018 17:54 )  PTT:28.2 sec  Urinalysis Basic - ( 2018 21:14 )    Color: Light Yellow / Appearance: Clear / S.013 / pH: x  Gluc: x / Ketone: Negative  / Bili: Negative / Urobili: Negative   Blood: x / Protein: Trace / Nitrite: Negative   Leuk Esterase: Negative / RBC: 13 /hpf / WBC 1 /hpf   Sq Epi: x / Non Sq Epi: 1 /hpf / Bacteria: Moderate      LIVER FUNCTIONS - ( 2018 17:54 )  Alb: 3.4 g/dL / Pro: 6.1 g/dL / ALK PHOS: 97 U/L / ALT: 13 U/L / AST: 23 U/L / GGT: x             RADIOLOGY & ADDITIONAL TESTS:    EXAM:  CT ABDOMEN AND PELVIS                            PROCEDURE DATE:  2018            INTERPRETATION:  CLINICAL INFORMATION: Colon cancer status post   resection. Elevated INR.    COMPARISON: CT abdomen/pelvis on 2017.    PROCEDURE:   CT of the Abdomen and Pelvis was performed without intravenous contrast.   Intravenous contrast: None.  Oral contrast: None.  Sagittal and coronal reformats were performed.    FINDINGS:    LOWER CHEST: Trace bilateral pleural effusions. Cardiomegaly with left   atrial enlargement.    LIVER: Within normal limits.  BILE DUCTS: No intrahepatic balloon dilatation. The common bile duct is   mildly dilated measuring up to 11 mm, unchanged. It is likely related to   periampullary duodenal diverticulum.  GALLBLADDER: Within normal limits. Coarse calcifications are noted   adjacent to the gallbladder, unchanged.  SPLEEN: Within normal limits.  PANCREAS: Within normal limits.  ADRENALS: Within normal limits.  KIDNEYS/URETERS: Bilateral renal cysts. No hydronephrosis.    BLADDER: Within normal limits.  REPRODUCTIVE ORGANS: Uterus and adnexa are within normal limits.    BOWEL: No bowel obstruction. Colonic diverticulosis. Right hemicolectomy.   Multiple colonic anastomotic sutures noted. Periampullary duodenal   diverticulum.  PERITONEUM: Trace free fluid in the pelvis.  VESSELS:  Atherosclerotic changes.  RETROPERITONEUM: No lymphadenopathy. No collections.   ABDOMINAL WALL: Within normal limits.  BONES: Internal fixation of the left proximal femur. Degenerative   changes. Mild scoliosis.    IMPRESSION:  No retroperitoneal hematoma or fluid collection.    No bowel obstruction.

## 2018-11-24 NOTE — DISCHARGE NOTE ADULT - MEDICATION SUMMARY - MEDICATIONS TO TAKE
I will START or STAY ON the medications listed below when I get home from the hospital:    gabapentin 300 mg oral capsule  -- 1 cap(s) by mouth once a day (at bedtime)  -- Indication: For pain    loratadine 10 mg oral tablet  -- 1 tab(s) by mouth once a day  -- Indication: For Allergies    Carafate 1 g/10 mL oral suspension  -- 10 milliliter(s) by mouth 3 times a day (before meals)  -- Indication: For Ulcer    latanoprost 0.005% ophthalmic solution  -- 1 drop(s) to each affected eye once a day (in the evening)  -- Indication: For Eye drops    omeprazole 20 mg oral delayed release capsule  -- 1 cap(s) by mouth once a day (at bedtime)  -- Indication: For Gastric ulcer    Vitamin B-12 1000 mcg oral tablet  -- 1 tab(s) by mouth once a day  -- Indication: For B12 deficiency

## 2018-11-24 NOTE — DISCHARGE NOTE ADULT - CARE PLAN
Principal Discharge DX:	GI bleed  Goal:	Monitor  Assessment and plan of treatment:	You were found to have intestinal bleeding when you arrived to the hospital. This was the cause of your dizziness and shortness of breath. Your blood count level was found to be low and you were transfused 2 units of blood. Your INR level was also found to be very high and you were given medications to reverse the effect of Coumadin, which decreased your INR to a normal level. You were seen by the gastroenterology doctors, who recommended a CT scan of your belly. This scan did not show any significant findings....  Secondary Diagnosis:	Supratherapeutic INR  Goal:	Treated  Assessment and plan of treatment:	When you came into the hospital, your INR was very high. You were given medications to bring the INR level down. We held your Coumadin during this admission, and on discharge, the decision was made to.... Principal Discharge DX:	GI bleed  Goal:	Monitor  Assessment and plan of treatment:	You were found to have intestinal bleeding when you arrived to the hospital. This was the cause of your dizziness and shortness of breath. Your blood count level was found to be low and you were transfused 2 units of blood. Your INR level was also found to be very high and you were given medications to reverse the effect of Coumadin, which decreased your INR to a normal level. You were seen by the gastroenterology doctors, who recommended a CT scan of your belly. This scan did not show any significant findings. Your hemoglobin approproately responded to the blood transfusion. Please continue to follow up with your PCP.  Secondary Diagnosis:	Supratherapeutic INR  Goal:	Treated  Assessment and plan of treatment:	When you came into the hospital, your INR was very high. You were given medications to bring the INR level down. We held your Coumadin during this admission, and on discharge, the decision was made discontinue your warfarin given you have now been supratherapeutic multiple times with significant decrease in your hemoglobin.  You additionally did not want to take the warfarin given the adverse effects. Please talk to your PCP about anticoagulation risks and benefits given your hx of DVT and paroxysmal AFib.

## 2018-11-24 NOTE — DISCHARGE NOTE ADULT - CARE PROVIDER_API CALL
Misha Thomas), Internal Medicine  [address] [place] [address]  Phone: [phone]  Fax: [phone]

## 2018-11-24 NOTE — PROGRESS NOTE ADULT - PROBLEM SELECTOR PLAN 2
Presented with BRBPR in setting of supratherapeutic INR (7.1) and found to have symptomatic anemia. Pt does have remote hx of colon Ca s/p resection >10yrs.   VSS. Hgb 6.6->8.0->8.9->9.1  - No overt GI bleeding at this time  - f/u CT A/P final read  - f/u GI recs   - c/w protonix 40mg IV BID  - Tolerating full diet  - CBC q12h  - Active T&S Presented with BRBPR in setting of supratherapeutic INR (7.1) and found to have symptomatic anemia. Pt does have remote hx of colon Ca s/p resection >10yrs.   VSS. Hgb 6.6->8.0->8.9->9.1  ctap neg for acute process or hematoma  - No overt GI bleeding at this time  - f/u GI recs   - c/w protonix 40mg IV BID  - Tolerating full diet  - CBC q12h  - Active T&S

## 2018-11-24 NOTE — DISCHARGE NOTE ADULT - HOSPITAL COURSE
89 year old female with PMHx of HTN, unprovoked PE (dx 2014, on Coumadin, previously on Xarelto but switched due to severe anemia), paroxysmal afib, colon cancer (10 years ago, s/p resection), GERD, and chronic lymphedema presented with palpitations and dyspnea, in the ED she was found to be anemic to 6.6 in the setting of BRBPR with supra therapeutic INR >7. INR was reversed with KCentra and Vitamin K. Pt was transfused 2 units of pRBCs w/ appropriate rise in hgb. Pt remained HD stable throughout and was evaluated by GI, who recommended CT A/P Non-con, which showed no significant findings.... 89 year old female with PMHx of HTN, unprovoked PE (dx 2014, on Coumadin, previously on Xarelto but switched due to severe anemia), paroxysmal afib, colon cancer (10 years ago, s/p resection), GERD, and chronic lymphedema presented with palpitations and dyspnea, in the ED she was found to be anemic to 6.6 in the setting of BRBPR with supra therapeutic INR >7. INR was reversed with KCentra and Vitamin K. Pt was transfused 2 units of pRBCs w/ appropriate rise in hgb. Pt remained HD stable throughout and was evaluated by GI, who recommended CT A/P Non-con, which showed no significant findings. Patient hgb remain stable and was monitored without need for EGD/ colonoscopy. Pt experience some loose bowel movements but was found to be negative for c diff, these continued to decline in frequency. Risks/ benefits of anticoagulation was discussed in regards to anticoagulation and pt elected to cease taking warfarin as she has had multiple complications now.

## 2018-11-24 NOTE — PROGRESS NOTE ADULT - SUBJECTIVE AND OBJECTIVE BOX
Khalif Tom PGY-1   [facility] Pager # [phone]  [facility] Pager # [phone]      Patient is a 89y old  Female who presents with a chief complaint of GI bleed (2018 14:14)      SUBJECTIVE: No acute events overnight. H/H and VS stable. Had BM x1 overnight w/o melena or overt blood. Tolerating full diet w/o N/V. No dizziness on standing, palpitation, CP, or SOB.        MEDICATIONS  (STANDING):  cyanocobalamin 1000 MICROGram(s) Oral daily  gabapentin 300 milliGRAM(s) Oral at bedtime  latanoprost 0.005% Ophthalmic Solution 1 Drop(s) Both EYES at bedtime  loratadine 10 milliGRAM(s) Oral daily  pantoprazole  Injectable 40 milliGRAM(s) IV Push two times a day    MEDICATIONS  (PRN):    Objective:    Vitals: Vital Signs Last 24 Hrs  T(C): 36.8 (18 @ 04:11), Max: 36.8 (18 @ 20:13)  T(F): 98.3 (18 @ 04:11), Max: 98.3 (18 @ 04:11)  HR: 61 (18 @ 04:11) (61 - 76)  BP: 117/62 (18 @ 04:11) (117/62 - 128/72)  BP(mean): --  RR: 18 (18 @ 04:11) (18 - 18)  SpO2: 98% (18 @ 04:11) (97% - 100%)            I&O's Summary    2018 07:01  -  2018 07:00  --------------------------------------------------------  IN: 0 mL / OUT: 0 mL / NET: 0 mL      PHYSICAL EXAM:  GENERAL: NAD  HEAD:  Atraumatic, Normocephalic  EYES: EOMI, mild conjunctival pallor  CHEST/LUNG: Clear to auscultation bilaterally; No rales or wheezing  HEART: Regular rate and rhythm; No murmurs, rubs, or gallops  ABDOMEN: Soft, nontender, nondistended  EXTREMITIES:  mild LE nonpitting edema b/l  SKIN: no rashes noted  NERVOUS SYSTEM:  Alert & Oriented X3    LABS:      141  |  113<H>  |  27<H>  ----------------------------<  80  3.7   |  19<L>  |  1.50<H>      141  |  113<H>  |  33<H>  ----------------------------<  80  3.8   |  18<L>  |  1.48<H>      140  |  111<H>  |  29<H>  ----------------------------<  210<H>  7.7<HH>   |  15<L>  |  1.34<H>    Ca    8.7      2018 06:49  Ca    8.7      2018 08:09  Ca    7.8<L>      2018 06:41  Phos  2.8       Mg     1.8         TPro  6.1  /  Alb  3.4  /  TBili  0.2  /  DBili  x   /  AST  23  /  ALT  13  /  AlkPhos  97  -    PT/INR - ( 2018 20:43 )   PT: 16.1 sec;   INR: 1.40 ratio         PTT - ( 2018 17:54 )  PTT:28.2 sec              Urinalysis Basic - ( 2018 21:14 )    Color: Light Yellow / Appearance: Clear / S.013 / pH: x  Gluc: x / Ketone: Negative  / Bili: Negative / Urobili: Negative   Blood: x / Protein: Trace / Nitrite: Negative   Leuk Esterase: Negative / RBC: 13 /hpf / WBC 1 /hpf   Sq Epi: x / Non Sq Epi: 1 /hpf / Bacteria: Moderate                              9.1    6.8   )-----------( 273      ( 2018 20:43 )             27.2                         8.9    5.8   )-----------( 260      ( 2018 11:46 )             26.5                         8.0    7.5   )-----------( 289      ( 2018 02:19 )             24.0     CAPILLARY BLOOD GLUCOSE        RADIOLOGY:  Imaging Personally Reviewed: YES      Consultants involved in case: YES  Consultant(s) Notes Reviewed:  YES  Care Discussed with Consultants/Other Providers       Khalif Tom, PGY-1 Khalif Tom PGY-1   [facility] Pager # [phone]  [facility] Pager # [phone]      Patient is a 89y old  Female who presents with a chief complaint of GI bleed (2018 14:14)    SUBJECTIVE: No acute events overnight. H/H and VS stable. Had BM x1 overnight w/o melena or overt blood. Tolerating full diet w/o N/V. No dizziness on standing, palpitation, CP, or SOB.      Objective:  Vitals: Vital Signs Last 24 Hrs  T(C): 36.8 (18 @ 04:11), Max: 36.8 (18 @ 20:13)  T(F): 98.3 (18 @ 04:11), Max: 98.3 (18 @ 04:11)  HR: 61 (18 @ 04:11) (61 - 76)  BP: 117/62 (18 @ 04:11) (117/62 - 128/72)  BP(mean): --  RR: 18 (18 @ 04:11) (18 - 18)  SpO2: 98% (18 @ 04:11) (97% - 100%)            I&O's Summary    2018 07:01  -  2018 07:00  --------------------------------------------------------  IN: 0 mL / OUT: 0 mL / NET: 0 mL    PHYSICAL EXAM:  GENERAL: NAD, elderly, appears much younger than stated age   HEAD:  Atraumatic, Normocephalic  EYES: EOMI, mild conjunctival pallor  CHEST/LUNG: Clear to auscultation bilaterally; No rales or wheezing  HEART: Regular rate and rhythm; No murmurs, rubs, or gallops  ABDOMEN: Soft, nontender, nondistended  EXTREMITIES:  mild LE nonpitting edema b/l  SKIN: no rashes noted  NERVOUS SYSTEM:  Alert & Oriented X3    LABS:      141  |  113<H>  |  27<H>  ----------------------------<  80  3.7   |  19<L>  |  1.50<H>  11    141  |  113<H>  |  33<H>  ----------------------------<  80  3.8   |  18<L>  |  1.48<H>  11-23    140  |  111<H>  |  29<H>  ----------------------------<  210<H>  7.7<HH>   |  15<L>  |  1.34<H>    Ca    8.7      2018 06:49  Ca    8.7      2018 08:09  Ca    7.8<L>      2018 06:41  Phos  2.8       Mg     1.8         TPro  6.1  /  Alb  3.4  /  TBili  0.2  /  DBili  x   /  AST  23  /  ALT  13  /  AlkPhos  97      PT/INR - ( 2018 20:43 )   PT: 16.1 sec;   INR: 1.40 ratio         PTT - ( 2018 17:54 )  PTT:28.2 sec              Urinalysis Basic - ( 2018 21:14 )    Color: Light Yellow / Appearance: Clear / S.013 / pH: x  Gluc: x / Ketone: Negative  / Bili: Negative / Urobili: Negative   Blood: x / Protein: Trace / Nitrite: Negative   Leuk Esterase: Negative / RBC: 13 /hpf / WBC 1 /hpf   Sq Epi: x / Non Sq Epi: 1 /hpf / Bacteria: Moderate                          9.1    6.8   )-----------( 273      ( 2018 20:43 )             27.2                         8.9    5.8   )-----------( 260      ( 2018 11:46 )             26.5                         8.0    7.5   )-----------( 289      ( 2018 02:19 )             24.0     CAPILLARY BLOOD GLUCOSE        RADIOLOGY:  Imaging Personally Reviewed: YES  Consultants involved in case: YES  Consultant(s) Notes Reviewed:  YES  Care Discussed with Consultants/Other Providers       Khalif Tom, PGY-1    MEDICATIONS  (STANDING):  cyanocobalamin 1000 MICROGram(s) Oral daily  gabapentin 300 milliGRAM(s) Oral at bedtime  latanoprost 0.005% Ophthalmic Solution 1 Drop(s) Both EYES at bedtime  loratadine 10 milliGRAM(s) Oral daily  pantoprazole  Injectable 40 milliGRAM(s) IV Push two times a day    MEDICATIONS  (PRN):

## 2018-11-24 NOTE — PROGRESS NOTE ADULT - ASSESSMENT
89 year old Female with PMHx of HTN, unprovoked PE (dx 2014, on Coumadin, previously on Xarelto but switched due to severe anemia), paroxysmal afib, colon cancer (10 years ago, s/p resection), GERD, and chronic lymphedema presents with palpitations and dyspnea, in the ED found to be anemic to 6.6 in the setting of BRBPR with supra therapeutic INR >7. 89 y/o F PMHx HTN, unprovoked PE (dx 2014, on Coumadin, previously on Xarelto but switched due to severe anemia), paroxysmal afib, colon cancer (10 years ago, s/p hemicolectomy), GERD, and chronic lymphedema presents with palpitations and dyspnea adm w/ acute blood loss anemia to 6.6 in the setting of BRBPR with supra therapeutic INR >7, now s/p PRBC. w/ DIOGENES.

## 2018-11-24 NOTE — PROGRESS NOTE ADULT - PROBLEM SELECTOR PLAN 6
Patient with history of paroxysmal afib. EKG on this admission concerning for possible Mobitz Type I.   - On tele Patient with history of paroxysmal afib. EKG w/ possible Mobitz Type I.   - On tele  - rate controlled not on bb

## 2018-11-24 NOTE — PROGRESS NOTE ADULT - PROBLEM SELECTOR PLAN 3
Resolved. Presented w/ supra therapeutic INR >7 with active bleeding, s/p KCentra and Vitamin K. Unclear why INR was supratherapeutic. Pt denies taking extra doses and most recent abx use was several weeks ago for UTI.  - INR now stable and subtherapeutic  - Hold Coumadin  - Monitor INR  - Will need to weigh risks vs. benefits of eventually restarting A/C. LE dopplers negative and low suspicion for acute PE at this time. Resolved. Presented w/ supra therapeutic INR >7 with active bleeding, s/p KCentra and Vitamin K. Unclear why INR was supratherapeutic. Pt denies taking extra doses and most recent abx use was several weeks ago for UTI.  - INR now stable and subtherapeutic  - Hold Coumadin given gib  - Monitor INR  - Will need to weigh risks vs. benefits of eventually restarting A/C. LE dopplers negative and low suspicion for acute PE at this time.

## 2018-11-24 NOTE — PROGRESS NOTE ADULT - ASSESSMENT
89 y/o F hx of unprovoked PE on Coumadin (dx 2014) (previously on Xarelto, however changed to Coumadin due to intolerance), colon ca in remission, HTN, GERD, chronic lymphedema presents with dyspnea on exertion with new GI bleed today x 2 episodes with GI consulted for BRBPR in the setting of supratherapeutic INR  Coagulopathy reversal s/p PO Vitamin K and IV K centra (now trending back up).      No active/brisk GI bleed at this time.  CT without obvious lesions.  No clinical signs of obstruction.    1) Continue to monitor CBC  2) Advance diet as tolerated.  3) No plans for invasive GI procedures at this time.

## 2018-11-24 NOTE — DISCHARGE NOTE ADULT - PATIENT PORTAL LINK FT
You can access the [facility] Patient Portal, offered by [facility], by registering with the following website: [email or web address]

## 2018-11-24 NOTE — PROGRESS NOTE ADULT - PROBLEM SELECTOR PLAN 4
Patient presenting with DIOGENES, baseline Cr ~1.1  Likely in setting of volume loss secondary to GI bleed  - Continue to monitor renal function  - Avoid nephrotoxic agents, renally dose medications  - Monitor I's and O's

## 2018-11-24 NOTE — DISCHARGE NOTE ADULT - PLAN OF CARE
Monitor You were found to have intestinal bleeding when you arrived to the hospital. This was the cause of your dizziness and shortness of breath. Your blood count level was found to be low and you were transfused 2 units of blood. Your INR level was also found to be very high and you were given medications to reverse the effect of Coumadin, which decreased your INR to a normal level. You were seen by the gastroenterology doctors, who recommended a CT scan of your belly. This scan did not show any significant findings.... Treated When you came into the hospital, your INR was very high. You were given medications to bring the INR level down. We held your Coumadin during this admission, and on discharge, the decision was made to.... You were found to have intestinal bleeding when you arrived to the hospital. This was the cause of your dizziness and shortness of breath. Your blood count level was found to be low and you were transfused 2 units of blood. Your INR level was also found to be very high and you were given medications to reverse the effect of Coumadin, which decreased your INR to a normal level. You were seen by the gastroenterology doctors, who recommended a CT scan of your belly. This scan did not show any significant findings. Your hemoglobin approproately responded to the blood transfusion. Please continue to follow up with your PCP. When you came into the hospital, your INR was very high. You were given medications to bring the INR level down. We held your Coumadin during this admission, and on discharge, the decision was made discontinue your warfarin given you have now been supratherapeutic multiple times with significant decrease in your hemoglobin.  You additionally did not want to take the warfarin given the adverse effects. Please talk to your PCP about anticoagulation risks and benefits given your hx of DVT and paroxysmal AFib.

## 2018-11-24 NOTE — PROGRESS NOTE ADULT - PROBLEM SELECTOR PLAN 1
Presented with symptomatic acute blood loss anemia. Baseline Hb in 10/2018 ~8.   In ED with Hb 7.6 --> 6.6 in setting of BRBPR with elevated INR. Received 2u pRBCs.   - maintain 2 large bore IVs  - maintain active type and screen   - monitor CBC q8h Presented with symptomatic acute blood loss anemia. Baseline Hb in 10/2018 ~8.   In ED with Hb 7.6 --> 6.6 in setting of BRBPR with elevated INR. Received 2u pRBCs.   - maintain 2 large bore IVs  - maintain active type and screen   - monitor CBC q12h now  - tolerating po now

## 2018-11-25 LAB
ANION GAP SERPL CALC-SCNC: 9 MMOL/L — SIGNIFICANT CHANGE UP (ref 5–17)
APTT BLD: 30.6 SEC — SIGNIFICANT CHANGE UP (ref 27.5–36.3)
BUN SERPL-MCNC: 25 MG/DL — HIGH (ref 7–23)
C DIFF GDH STL QL: NEGATIVE — SIGNIFICANT CHANGE UP
C DIFF GDH STL QL: SIGNIFICANT CHANGE UP
CALCIUM SERPL-MCNC: 8.6 MG/DL — SIGNIFICANT CHANGE UP (ref 8.4–10.5)
CHLORIDE SERPL-SCNC: 114 MMOL/L — HIGH (ref 96–108)
CO2 SERPL-SCNC: 20 MMOL/L — LOW (ref 22–31)
CREAT SERPL-MCNC: 1.36 MG/DL — HIGH (ref 0.5–1.3)
GLUCOSE SERPL-MCNC: 73 MG/DL — SIGNIFICANT CHANGE UP (ref 70–99)
INR BLD: 1.36 RATIO — HIGH (ref 0.88–1.16)
INR BLD: 1.4 RATIO — HIGH (ref 0.88–1.16)
MAGNESIUM SERPL-MCNC: 1.8 MG/DL — SIGNIFICANT CHANGE UP (ref 1.6–2.6)
PHOSPHATE SERPL-MCNC: 2.7 MG/DL — SIGNIFICANT CHANGE UP (ref 2.5–4.5)
POTASSIUM SERPL-MCNC: 3.5 MMOL/L — SIGNIFICANT CHANGE UP (ref 3.5–5.3)
POTASSIUM SERPL-SCNC: 3.5 MMOL/L — SIGNIFICANT CHANGE UP (ref 3.5–5.3)
PROTHROM AB SERPL-ACNC: 15.6 SEC — HIGH (ref 10–12.9)
PROTHROM AB SERPL-ACNC: 15.9 SEC — HIGH (ref 10–13.1)
SODIUM SERPL-SCNC: 143 MMOL/L — SIGNIFICANT CHANGE UP (ref 135–145)

## 2018-11-25 PROCEDURE — 99233 SBSQ HOSP IP/OBS HIGH 50: CPT | Mod: GC

## 2018-11-25 RX ADMIN — PANTOPRAZOLE SODIUM 40 MILLIGRAM(S): 20 TABLET, DELAYED RELEASE ORAL at 05:35

## 2018-11-25 RX ADMIN — GABAPENTIN 300 MILLIGRAM(S): 400 CAPSULE ORAL at 21:40

## 2018-11-25 RX ADMIN — PANTOPRAZOLE SODIUM 40 MILLIGRAM(S): 20 TABLET, DELAYED RELEASE ORAL at 17:52

## 2018-11-25 RX ADMIN — LORATADINE 10 MILLIGRAM(S): 10 TABLET ORAL at 14:31

## 2018-11-25 RX ADMIN — LATANOPROST 1 DROP(S): 0.05 SOLUTION/ DROPS OPHTHALMIC; TOPICAL at 21:40

## 2018-11-25 RX ADMIN — PREGABALIN 1000 MICROGRAM(S): 225 CAPSULE ORAL at 14:30

## 2018-11-25 NOTE — PROGRESS NOTE ADULT - PROBLEM SELECTOR PLAN 5
Patient with history of GERD, on omeprazole 20 mg daily at home   - c/w IV protonix 40mg BID, may switch to oral tomorrow Patient with history of GERD, on omeprazole 20 mg daily at home   - c/w IV protonix 40mg BID, f/u GI when to switch to po

## 2018-11-25 NOTE — PROGRESS NOTE ADULT - PROBLEM SELECTOR PLAN 1
RESOLVED:  Presented with symptomatic acute blood loss anemia. Baseline Hb in 10/2018 ~8.   In ED with Hb 7.6 --> 6.6 in setting of BRBPR with elevated INR. Received 2u pRBCs.   - maintain 2 large bore IVs  - maintain active type and screen   - monitor CBC q24hr  - tolerating po well resolving  Presented with symptomatic acute blood loss anemia. Baseline Hb in 10/2018 ~8.   In ED with Hb 7.6 --> 6.6 in setting of BRBPR with elevated INR. Received 2u pRBCs.   - maintain 2 large bore IVs  - maintain active type and screen   - monitor CBC q24hr  - tolerating po well  - GI following

## 2018-11-25 NOTE — PHYSICAL THERAPY INITIAL EVALUATION ADULT - ADDITIONAL COMMENTS
lives w/ lives w/alone and has HHA 10 hours day 7 days ( 2 5 hour aides), in condo 4 steps to enter w/ handrails and then on one level, has rolling walker and cane, glasses all the time JOHN B/L hearing aides [place]

## 2018-11-25 NOTE — PROGRESS NOTE ADULT - PROBLEM SELECTOR PLAN 10
DVT PPx: SCD's in setting of GI bleed  Fall and aspiration precautions   Dispo: Pending PT    NICOLE Rice MD-PGY2  Internal Medicine Department  Pager: [phone] / [phone]

## 2018-11-25 NOTE — PROGRESS NOTE ADULT - ASSESSMENT
89 y/o F PMHx HTN, unprovoked PE (dx 2014, on Coumadin, previously on Xarelto but switched due to severe anemia), paroxysmal afib, colon cancer (10 years ago, s/p hemicolectomy), GERD, and chronic lymphedema presents with palpitations and dyspnea adm w/ acute blood loss anemia to 6.6 in the setting of BRBPR with supra therapeutic INR >7, now s/p PRBC. w/ DIOGENES. 89 y/o F PMHx HTN, unprovoked PE (dx 2014, on Coumadin, previously on Xarelto but switched due to severe anemia), paroxysmal afib, colon cancer (10 years ago, s/p hemicolectomy), GERD, and chronic lymphedema presents with palpitations and dyspnea adm w/ acute blood loss anemia to 6.6 in the setting of BRBPR with supra therapeutic INR >7, now s/p PRBC. w/ DIOGENES improving.

## 2018-11-25 NOTE — PHYSICAL THERAPY INITIAL EVALUATION ADULT - GENERAL OBSERVATIONS, REHAB EVAL
Rec'd in Rec'd in bed, + IV Lock, anxious and worried regarding her BP, and post nasal drip, NAD< agreeable to PT

## 2018-11-25 NOTE — PROGRESS NOTE ADULT - PROBLEM SELECTOR PLAN 2
RESOLVED:  Presented with BRBPR in setting of supratherapeutic INR (7.1) and found to have symptomatic anemia. Pt does have remote hx of colon Ca s/p resection >10yrs.   VSS.   ctap neg for acute process or hematoma  - No overt GI bleeding at this time  - f/u GI recs   - c/w protonix 40mg IV BID, consider d/c tomorrow  - Tolerating full diet  - CBC q24hr  - Active T&S as above  ctap neg for acute process or hematoma  - No overt GI bleeding at this time  - f/u GI recs - no plans for intervention now and pt does not want  - c/w protonix 40mg IV BID - f/u when to switch to PO per GI  - Tolerating full diet  - CBC q24hr  - Active T&S

## 2018-11-25 NOTE — PROGRESS NOTE ADULT - PROBLEM SELECTOR PLAN 3
Resolved. Presented w/ supra therapeutic INR >7 with active bleeding, s/p KCentra and Vitamin K. Unclear why INR was supratherapeutic. Pt denies taking extra doses and most recent abx use was several weeks ago for UTI.  - INR now stable and subtherapeutic  - Hold Coumadin given gib  - Monitor INR  - Will need to weigh risks vs. benefits of eventually restarting A/C. LE dopplers negative and low suspicion for acute PE at this time. Resolved. Presented w/ supra therapeutic INR >7 with active bleeding, s/p KCentra and Vitamin K. in setting of recently incr coumadin 4mg daily (from 4 alternating w/ 3 mg)  - INR now stable and subtherapeutic  - Hold Coumadin given gib  - Monitor INR  - Will need to weigh risks vs. benefits of eventually restarting A/C. LE dopplers negative and low suspicion for acute PE at this time.  - f/u GI when is pt ready to restart coumadin

## 2018-11-25 NOTE — PROGRESS NOTE ADULT - PROBLEM SELECTOR PLAN 4
Patient presenting with DIOGENES, baseline Cr ~1.1  Likely in setting of volume loss secondary to GI bleed  - Continue to monitor renal function  - Avoid nephrotoxic agents, renally dose medications  - Monitor I's and O's Patient presenting with DIOGENES, baseline Cr ~1.1, now improving w/ po intake  Likely in setting of volume loss secondary to GI bleed  - Continue to monitor renal function  - Avoid nephrotoxic agents, renally dose medications  - Monitor I's and O's

## 2018-11-25 NOTE — PROGRESS NOTE ADULT - PROBLEM SELECTOR PLAN 6
Patient with history of paroxysmal afib. EKG w/ possible Mobitz Type I.   - On tele  - rate controlled not on bb Patient with history of paroxysmal afib. EKG w/ possible Mobitz Type I.   - On tele  - rate controlled not on bb  - holding a/c as above

## 2018-11-25 NOTE — PROGRESS NOTE ADULT - SUBJECTIVE AND OBJECTIVE BOX
KEN GIFFORD  89y  Female      Patient is a 89y old  Female who presents with a chief complaint of GI bleed (24 Nov 2018 12:57)      INTERVAL HPI/OVERNIGHT EVENTS:    Medications:  cyanocobalamin 1000 MICROGram(s) Oral daily  gabapentin 300 milliGRAM(s) Oral at bedtime  latanoprost 0.005% Ophthalmic Solution 1 Drop(s) Both EYES at bedtime  loratadine 10 milliGRAM(s) Oral daily  pantoprazole  Injectable 40 milliGRAM(s) IV Push two times a day    T(C): 36.7 (11-25-18 @ 04:48), Max: 37.1 (11-24-18 @ 20:35)  HR: 51 (11-25-18 @ 04:48) (51 - 68)  BP: 114/65 (11-25-18 @ 04:48) (114/65 - 149/62)  RR: 18 (11-25-18 @ 04:48) (18 - 18)  SpO2: 96% (11-25-18 @ 04:48) (96% - 98%)  Wt(kg): --Vital Signs Last 24 Hrs  T(C): 36.7 (25 Nov 2018 04:48), Max: 37.1 (24 Nov 2018 20:35)  T(F): 98 (25 Nov 2018 04:48), Max: 98.7 (24 Nov 2018 20:35)  HR: 51 (25 Nov 2018 04:48) (51 - 68)  BP: 114/65 (25 Nov 2018 04:48) (114/65 - 149/62)  BP(mean): --  RR: 18 (25 Nov 2018 04:48) (18 - 18)  SpO2: 96% (25 Nov 2018 04:48) (96% - 98%)    PHYSICAL EXAM:        Consultant(s) Notes Reviewed:  [x ] YES  [ ] NO  Care Discussed with Consultants/Other Providers [ x] YES  [ ] NO    LABS:                        9.9    7.8   )-----------( 260      ( 24 Nov 2018 12:38 )             28.8     11-25    143  |  114<H>  |  25<H>  ----------------------------<  73  3.5   |  20<L>  |  1.36<H>    Ca    8.6      25 Nov 2018 06:25  Phos  2.7     11-25  Mg     1.8     11-25      PT/INR - ( 23 Nov 2018 20:43 )   PT: 16.1 sec;   INR: 1.40 ratio       RADIOLOGY & ADDITIONAL TESTS:  < from: CT Abdomen and Pelvis No Cont (11.23.18 @ 18:10) >  IMPRESSION:  No retroperitoneal hematoma or fluid collection.    No bowel obstruction.    < end of copied text >    HEALTH ISSUES - PROBLEM Dx:  Advance care planning: Advance care planning  Acute blood loss anemia: Acute blood loss anemia  Atrial fibrillation: Atrial fibrillation  Suspected pulmonary embolism  Suspected deep vein thrombosis  Need for prophylactic measure: Need for prophylactic measure  B12 deficiency: B12 deficiency  Hypertension: Hypertension  GERD (Gastroesophageal Reflux Disease): GERD (Gastroesophageal Reflux Disease)  DIOGENES (acute kidney injury): DIOGENES (acute kidney injury)  Supratherapeutic INR: Supratherapeutic INR  GI bleed: GI bleed KEN GIFFORD  89y  Female      Patient is a 89y old  Female who presents with a chief complaint of GI bleed (24 Nov 2018 12:57)      INTERVAL HPI/OVERNIGHT EVENTS:  pt's diarrhea resolved last night after avoiding wheat  -pt reports that she feels well, with no more diarrhea or BRBPR  -pt denies fevers, chills, chest pain, palpitations, SOB, LUONG, n/v/d/c    Medications:  cyanocobalamin 1000 MICROGram(s) Oral daily  gabapentin 300 milliGRAM(s) Oral at bedtime  latanoprost 0.005% Ophthalmic Solution 1 Drop(s) Both EYES at bedtime  loratadine 10 milliGRAM(s) Oral daily  pantoprazole  Injectable 40 milliGRAM(s) IV Push two times a day    T(C): 36.7 (11-25-18 @ 04:48), Max: 37.1 (11-24-18 @ 20:35)  HR: 51 (11-25-18 @ 04:48) (51 - 68)  BP: 114/65 (11-25-18 @ 04:48) (114/65 - 149/62)  RR: 18 (11-25-18 @ 04:48) (18 - 18)  SpO2: 96% (11-25-18 @ 04:48) (96% - 98%)  Wt(kg): --Vital Signs Last 24 Hrs  T(C): 36.7 (25 Nov 2018 04:48), Max: 37.1 (24 Nov 2018 20:35)  T(F): 98 (25 Nov 2018 04:48), Max: 98.7 (24 Nov 2018 20:35)  HR: 51 (25 Nov 2018 04:48) (51 - 68)  BP: 114/65 (25 Nov 2018 04:48) (114/65 - 149/62)  BP(mean): --  RR: 18 (25 Nov 2018 04:48) (18 - 18)  SpO2: 96% (25 Nov 2018 04:48) (96% - 98%)    PHYSICAL EXAM:  GENERAL: NAD, elderly, appears much younger than stated age   HEAD:  Atraumatic, Normocephalic  EYES: EOMI, mild conjunctival pallor  CHEST/LUNG: Clear to auscultation bilaterally; No rales or wheezing  HEART: Regular rate and rhythm; No murmurs, rubs, or gallops  ABDOMEN: Soft, nontender, nondistended  EXTREMITIES:  mild LE nonpitting edema b/l  SKIN: no rashes noted  NERVOUS SYSTEM:  Alert & Oriented X3    Consultant(s) Notes Reviewed:  [x ] YES  [ ] NO  Care Discussed with Consultants/Other Providers [ x] YES  [ ] NO    LABS:                        9.9    7.8   )-----------( 260      ( 24 Nov 2018 12:38 )             28.8     11-25    143  |  114<H>  |  25<H>  ----------------------------<  73  3.5   |  20<L>  |  1.36<H>    Ca    8.6      25 Nov 2018 06:25  Phos  2.7     11-25  Mg     1.8     11-25      PT/INR - ( 23 Nov 2018 20:43 )   PT: 16.1 sec;   INR: 1.40 ratio       RADIOLOGY & ADDITIONAL TESTS:  < from: CT Abdomen and Pelvis No Cont (11.23.18 @ 18:10) >  IMPRESSION:  No retroperitoneal hematoma or fluid collection.    No bowel obstruction.    < end of copied text >    HEALTH ISSUES - PROBLEM Dx:  Advance care planning: Advance care planning  Acute blood loss anemia: Acute blood loss anemia  Atrial fibrillation: Atrial fibrillation  Suspected pulmonary embolism  Suspected deep vein thrombosis  Need for prophylactic measure: Need for prophylactic measure  B12 deficiency: B12 deficiency  Hypertension: Hypertension  GERD (Gastroesophageal Reflux Disease): GERD (Gastroesophageal Reflux Disease)  DIOGENES (acute kidney injury): DIOGENES (acute kidney injury)  Supratherapeutic INR: Supratherapeutic INR  GI bleed: GI bleed AYAKASPIKE KEN  89y  Female      Patient is a 89y old  Female who presents with a chief complaint of GI bleed (24 Nov 2018 12:57)      INTERVAL HPI/OVERNIGHT EVENTS:  pt's diarrhea resolved last night after avoiding wheat  -pt reports that she feels well, with no more diarrhea or BRBPR  -pt denies fevers, chills, chest pain, palpitations, SOB, LUONG, n/v/d/c    T(C): 36.7 (11-25-18 @ 04:48), Max: 37.1 (11-24-18 @ 20:35)  HR: 51 (11-25-18 @ 04:48) (51 - 68)  BP: 114/65 (11-25-18 @ 04:48) (114/65 - 149/62)  RR: 18 (11-25-18 @ 04:48) (18 - 18)  SpO2: 96% (11-25-18 @ 04:48) (96% - 98%)  Wt(kg): --Vital Signs Last 24 Hrs  T(C): 36.7 (25 Nov 2018 04:48), Max: 37.1 (24 Nov 2018 20:35)  T(F): 98 (25 Nov 2018 04:48), Max: 98.7 (24 Nov 2018 20:35)  HR: 51 (25 Nov 2018 04:48) (51 - 68)  BP: 114/65 (25 Nov 2018 04:48) (114/65 - 149/62)  BP(mean): --  RR: 18 (25 Nov 2018 04:48) (18 - 18)  SpO2: 96% (25 Nov 2018 04:48) (96% - 98%)    PHYSICAL EXAM:  GENERAL: NAD, elderly, appears much younger than stated age   HEAD:  Atraumatic, Normocephalic  EYES: EOMI, mild conjunctival pallor  CHEST/LUNG: Clear to auscultation bilaterally; No rales or wheezing  HEART: Regular rate and rhythm; No murmurs, rubs, or gallops  ABDOMEN: Soft, nontender, nondistended  EXTREMITIES:  mild LE nonpitting edema b/l  SKIN: no rashes noted  NERVOUS SYSTEM:  Alert & Oriented X3    Consultant(s) Notes Reviewed:  [x ] YES  [ ] NO  Care Discussed with Consultants/Other Providers [ x] YES  [ ] NO    LABS:                        9.9    7.8   )-----------( 260      ( 24 Nov 2018 12:38 )             28.8     11-25    143  |  114<H>  |  25<H>  ----------------------------<  73  3.5   |  20<L>  |  1.36<H>    Ca    8.6      25 Nov 2018 06:25  Phos  2.7     11-25  Mg     1.8     11-25      PT/INR - ( 23 Nov 2018 20:43 )   PT: 16.1 sec;   INR: 1.40 ratio       RADIOLOGY & ADDITIONAL TESTS:  < from: CT Abdomen and Pelvis No Cont (11.23.18 @ 18:10) >  IMPRESSION:  No retroperitoneal hematoma or fluid collection.    No bowel obstruction.    < end of copied text >    MEDICATIONS  (STANDING):  cyanocobalamin 1000 MICROGram(s) Oral daily  gabapentin 300 milliGRAM(s) Oral at bedtime  latanoprost 0.005% Ophthalmic Solution 1 Drop(s) Both EYES at bedtime  loratadine 10 milliGRAM(s) Oral daily  pantoprazole  Injectable 40 milliGRAM(s) IV Push two times a day    MEDICATIONS  (PRN):

## 2018-11-25 NOTE — PHYSICAL THERAPY INITIAL EVALUATION ADULT - PLANNED THERAPY INTERVENTIONS, PT EVAL
Goal: To negotiate 4 steps w/ handrail independnetly/strengthening/gait training/transfer training/balance training

## 2018-11-26 LAB
ALBUMIN SERPL ELPH-MCNC: 2.4 G/DL — LOW (ref 3.3–5)
ALP SERPL-CCNC: 65 U/L — SIGNIFICANT CHANGE UP (ref 40–120)
ALT FLD-CCNC: 10 U/L — SIGNIFICANT CHANGE UP (ref 10–45)
ANION GAP SERPL CALC-SCNC: 8 MMOL/L — SIGNIFICANT CHANGE UP (ref 5–17)
AST SERPL-CCNC: 12 U/L — SIGNIFICANT CHANGE UP (ref 10–40)
BASOPHILS # BLD AUTO: 0.01 K/UL — SIGNIFICANT CHANGE UP (ref 0–0.2)
BASOPHILS NFR BLD AUTO: 0.2 % — SIGNIFICANT CHANGE UP (ref 0–2)
BILIRUB SERPL-MCNC: 0.3 MG/DL — SIGNIFICANT CHANGE UP (ref 0.2–1.2)
BUN SERPL-MCNC: 25 MG/DL — HIGH (ref 7–23)
CALCIUM SERPL-MCNC: 8.5 MG/DL — SIGNIFICANT CHANGE UP (ref 8.4–10.5)
CHLORIDE SERPL-SCNC: 114 MMOL/L — HIGH (ref 96–108)
CO2 SERPL-SCNC: 21 MMOL/L — LOW (ref 22–31)
CREAT SERPL-MCNC: 1.27 MG/DL — SIGNIFICANT CHANGE UP (ref 0.5–1.3)
EOSINOPHIL # BLD AUTO: 0.29 K/UL — SIGNIFICANT CHANGE UP (ref 0–0.5)
EOSINOPHIL NFR BLD AUTO: 5.1 % — SIGNIFICANT CHANGE UP (ref 0–6)
GLUCOSE SERPL-MCNC: 73 MG/DL — SIGNIFICANT CHANGE UP (ref 70–99)
HCT VFR BLD CALC: 27.4 % — LOW (ref 34.5–45)
HGB BLD-MCNC: 8.7 G/DL — LOW (ref 11.5–15.5)
IMM GRANULOCYTES NFR BLD AUTO: 0.3 % — SIGNIFICANT CHANGE UP (ref 0–1.5)
LYMPHOCYTES # BLD AUTO: 0.94 K/UL — LOW (ref 1–3.3)
LYMPHOCYTES # BLD AUTO: 16.4 % — SIGNIFICANT CHANGE UP (ref 13–44)
MAGNESIUM SERPL-MCNC: 1.6 MG/DL — SIGNIFICANT CHANGE UP (ref 1.6–2.6)
MCHC RBC-ENTMCNC: 25.7 PG — LOW (ref 27–34)
MCHC RBC-ENTMCNC: 31.8 GM/DL — LOW (ref 32–36)
MCV RBC AUTO: 81.1 FL — SIGNIFICANT CHANGE UP (ref 80–100)
MONOCYTES # BLD AUTO: 0.49 K/UL — SIGNIFICANT CHANGE UP (ref 0–0.9)
MONOCYTES NFR BLD AUTO: 8.5 % — SIGNIFICANT CHANGE UP (ref 2–14)
NEUTROPHILS # BLD AUTO: 3.99 K/UL — SIGNIFICANT CHANGE UP (ref 1.8–7.4)
NEUTROPHILS NFR BLD AUTO: 69.5 % — SIGNIFICANT CHANGE UP (ref 43–77)
PHOSPHATE SERPL-MCNC: 2.6 MG/DL — SIGNIFICANT CHANGE UP (ref 2.5–4.5)
PLATELET # BLD AUTO: 240 K/UL — SIGNIFICANT CHANGE UP (ref 150–400)
POTASSIUM SERPL-MCNC: 3.6 MMOL/L — SIGNIFICANT CHANGE UP (ref 3.5–5.3)
POTASSIUM SERPL-SCNC: 3.6 MMOL/L — SIGNIFICANT CHANGE UP (ref 3.5–5.3)
PROT SERPL-MCNC: 4.4 G/DL — LOW (ref 6–8.3)
RBC # BLD: 3.38 M/UL — LOW (ref 3.8–5.2)
RBC # FLD: 17.3 % — HIGH (ref 10.3–14.5)
SODIUM SERPL-SCNC: 143 MMOL/L — SIGNIFICANT CHANGE UP (ref 135–145)
WBC # BLD: 5.74 K/UL — SIGNIFICANT CHANGE UP (ref 3.8–10.5)
WBC # FLD AUTO: 5.74 K/UL — SIGNIFICANT CHANGE UP (ref 3.8–10.5)

## 2018-11-26 PROCEDURE — 99233 SBSQ HOSP IP/OBS HIGH 50: CPT | Mod: GC

## 2018-11-26 RX ORDER — PANTOPRAZOLE SODIUM 20 MG/1
40 TABLET, DELAYED RELEASE ORAL
Qty: 0 | Refills: 0 | Status: DISCONTINUED | OUTPATIENT
Start: 2018-11-26 | End: 2018-11-28

## 2018-11-26 RX ADMIN — LATANOPROST 1 DROP(S): 0.05 SOLUTION/ DROPS OPHTHALMIC; TOPICAL at 21:35

## 2018-11-26 RX ADMIN — LORATADINE 10 MILLIGRAM(S): 10 TABLET ORAL at 14:24

## 2018-11-26 RX ADMIN — PANTOPRAZOLE SODIUM 40 MILLIGRAM(S): 20 TABLET, DELAYED RELEASE ORAL at 17:15

## 2018-11-26 RX ADMIN — PANTOPRAZOLE SODIUM 40 MILLIGRAM(S): 20 TABLET, DELAYED RELEASE ORAL at 05:50

## 2018-11-26 RX ADMIN — PREGABALIN 1000 MICROGRAM(S): 225 CAPSULE ORAL at 14:24

## 2018-11-26 RX ADMIN — GABAPENTIN 300 MILLIGRAM(S): 400 CAPSULE ORAL at 21:35

## 2018-11-26 NOTE — PROGRESS NOTE ADULT - ASSESSMENT
89 y/o F hx of unprovoked PE on Coumadin (dx 2014) (previously on Xarelto, however changed to Coumadin due to intolerance), colon ca in remission, HTN, GERD, chronic lymphedema presents with dyspnea on exertion with new GI bleed today x 2 episodes with GI consulted for BRBPR in the setting of supratherapeutic INR  Coagulopathy reversal s/p PO Vitamin K and IV K centra     No active/brisk GI bleed at this time.  CT without obvious lesions.  No clinical signs of obstruction.  Patient does not want any invasive work-up    - Continue to monitor CBC  - PO diet (regular)  - No plans for invasive GI procedures at this time.  - Change to PO PPI (BID)  - Will d/w Medicine team regarding AC ?plan to restart given age and INR monitoring    Zach Baumann PA-C    [facility]  [phone] 89 y/o F hx of unprovoked PE on Coumadin (dx 2014) (previously on Xarelto, however changed to Coumadin due to intolerance), colon ca in remission, HTN, GERD, chronic lymphedema presents with dyspnea on exertion with new GI bleed today x 2 episodes with GI consulted for BRBPR in the setting of supratherapeutic INR  Coagulopathy reversal s/p PO Vitamin K and IV K centra     No active/brisk GI bleed at this time.  CT without obvious lesions.  No clinical signs of obstruction.  Patient does not want any invasive work-up    - Continue to monitor CBC  - PO diet (regular)  - No plans for invasive GI procedures at this time.  - Change to PO PPI (BID)  - Will d/w Medicine team regarding AC ?plan to restart given age and INR monitoring    Zach Baumann PA-C    [facility]  [phone]

## 2018-11-26 NOTE — PROGRESS NOTE ADULT - PROBLEM SELECTOR PLAN 5
Patient with history of GERD, on omeprazole 20 mg daily at home   - c/w IV protonix 40mg BID, f/u GI when to switch to po

## 2018-11-26 NOTE — PROGRESS NOTE ADULT - PROBLEM SELECTOR PLAN 4
Patient presenting with DIOGENES, baseline Cr ~1.1, now improving w/ po intake  Likely in setting of volume loss secondary to GI bleed  - Continue to monitor renal function  - Avoid nephrotoxic agents, renally dose medications  - Monitor I's and O's

## 2018-11-26 NOTE — PROGRESS NOTE ADULT - PROBLEM SELECTOR PLAN 1
resolving  Presented with symptomatic acute blood loss anemia. Baseline Hb in 10/2018 ~8.   In ED with Hb 7.6 --> 6.6 in setting of BRBPR with elevated INR. Received 2u pRBCs.   - maintain 2 large bore IVs  - maintain active type and screen   - monitor CBC q24hr  - tolerating po well  - GI following

## 2018-11-26 NOTE — PROGRESS NOTE ADULT - PROBLEM SELECTOR PLAN 2
as above  ctap neg for acute process or hematoma  - No overt GI bleeding at this time  - f/u GI recs - no plans for intervention now and pt does not want  - c/w protonix 40mg IV BID - f/u when to switch to PO per GI  - Tolerating full diet  - CBC q24hr  - Active T&S

## 2018-11-26 NOTE — PROGRESS NOTE ADULT - PROBLEM SELECTOR PLAN 3
Resolved. Presented w/ supra therapeutic INR >7 with active bleeding, s/p KCentra and Vitamin K. in setting of recently incr coumadin 4mg daily (from 4 alternating w/ 3 mg)  - INR now stable and subtherapeutic  - Hold Coumadin given gib  - Monitor INR  - Will need to weigh risks vs. benefits of eventually restarting A/C. LE dopplers negative and low suspicion for acute PE at this time.  - f/u GI when is pt ready to restart coumadin

## 2018-11-26 NOTE — PROGRESS NOTE ADULT - PROBLEM SELECTOR PLAN 10
DVT PPx: SCD's in setting of GI bleed  Fall and aspiration precautions   Dispo: Pending PT    NICOLE Rice MD-PGY2  Internal Medicine Department  Pager: [phone] / [phone] DVT PPx: SCD's in setting of GI bleed  Fall and aspiration precautions   Dispo: Pending PT

## 2018-11-26 NOTE — PROGRESS NOTE ADULT - SUBJECTIVE AND OBJECTIVE BOX
Patient is a 89y old  Female who presents with a chief complaint of GI bleed (25 Nov 2018 08:03)      SUBJECTIVE / OVERNIGHT EVENTS:    REVIEW OF SYSTEMS:  CONSTITUTIONAL: No weakness, fevers or chills  EYES/ENT: No visual changes;  No vertigo or throat pain   NECK: No pain or stiffness  RESPIRATORY: No cough, wheezing, hemoptysis; No shortness of breath  CARDIOVASCULAR: No chest pain or palpitations  GASTROINTESTINAL: No abdominal pain, nausea, vomiting, or diarrhea. No melena or hematochezia.  GENITOURINARY: No dysuria, frequency or hematuria  NEUROLOGICAL: No numbness or weakness  SKIN: No itching, burning, rashes, or lesions   All other review of systems is negative unless indicated above.    MEDICATIONS  (STANDING):  cyanocobalamin 1000 MICROGram(s) Oral daily  gabapentin 300 milliGRAM(s) Oral at bedtime  latanoprost 0.005% Ophthalmic Solution 1 Drop(s) Both EYES at bedtime  loratadine 10 milliGRAM(s) Oral daily  pantoprazole  Injectable 40 milliGRAM(s) IV Push two times a day    MEDICATIONS  (PRN):      T(C): 36.6 (11-26-18 @ 05:08), Max: 37.1 (11-25-18 @ 20:55)  HR: 64 (11-26-18 @ 05:08) (59 - 75)  BP: 111/57 (11-26-18 @ 05:08) (111/57 - 154/67)  RR: 18 (11-26-18 @ 05:08) (18 - 18)  SpO2: 97% (11-26-18 @ 05:08) (97% - 98%)    CAPILLARY BLOOD GLUCOSE        I&O's Summary    24 Nov 2018 07:01  -  25 Nov 2018 07:00  --------------------------------------------------------  IN: 720 mL / OUT: 0 mL / NET: 720 mL        GENERAL: No acute distress, well-developed  HEAD:  Atraumatic, Normocephalic  ENT: EOMI, PERRLA, No JVD, moist mucosa  CHEST/LUNG: Clear to auscultation bilaterally  BACK: No spinal tenderness  HEART: Regular rate and rhythm; No murmurs, rubs, or gallops  ABDOMEN: Soft, Nontender, Nondistended; Bowel sounds present  EXTREMITIES:  No clubbing, cyanosis, or edema  PSYCH: Nl behavior, nl affect  NEUROLOGY: AAOx3, non-focal, cranial nerves intact  SKIN: Normal color, No rashes or lesions    LABS:                        9.9    7.8   )-----------( 260      ( 24 Nov 2018 12:38 )             28.8     11-25    143  |  114<H>  |  25<H>  ----------------------------<  73  3.5   |  20<L>  |  1.36<H>    Ca    8.6      25 Nov 2018 06:25  Phos  2.7     11-25  Mg     1.8     11-25      PT/INR - ( 25 Nov 2018 13:24 )   PT: 15.6 sec;   INR: 1.36 ratio         PTT - ( 25 Nov 2018 13:24 )  PTT:30.6 sec        RADIOLOGY & ADDITIONAL TESTS:  Imaging Personally Reviewed:  Consultant(s) Notes Reviewed:    Care Discussed with Consultants/Other Providers: Patient is a 89y old  Female who presents with a chief complaint of GI bleed (25 Nov 2018 08:03)      SUBJECTIVE / OVERNIGHT EVENTS: No overnight events. 6 episodes of loose stool during day 11/25, 2 overnight. 11/24 C diff negative     REVIEW OF SYSTEMS:  CONSTITUTIONAL: No weakness, fevers or chills  EYES/ENT: No visual changes;  No vertigo or throat pain   NECK: No pain or stiffness  RESPIRATORY: No cough, wheezing, hemoptysis; No shortness of breath  CARDIOVASCULAR: No chest pain or palpitations  GASTROINTESTINAL: No abdominal pain, nausea, vomiting, or diarrhea. No melena or hematochezia.  GENITOURINARY: No dysuria, frequency or hematuria  NEUROLOGICAL: No numbness or weakness  SKIN: No itching, burning, rashes, or lesions   All other review of systems is negative unless indicated above.    MEDICATIONS  (STANDING):  cyanocobalamin 1000 MICROGram(s) Oral daily  gabapentin 300 milliGRAM(s) Oral at bedtime  latanoprost 0.005% Ophthalmic Solution 1 Drop(s) Both EYES at bedtime  loratadine 10 milliGRAM(s) Oral daily  pantoprazole  Injectable 40 milliGRAM(s) IV Push two times a day    MEDICATIONS  (PRN):      T(C): 36.6 (11-26-18 @ 05:08), Max: 37.1 (11-25-18 @ 20:55)  HR: 64 (11-26-18 @ 05:08) (59 - 75)  BP: 111/57 (11-26-18 @ 05:08) (111/57 - 154/67)  RR: 18 (11-26-18 @ 05:08) (18 - 18)  SpO2: 97% (11-26-18 @ 05:08) (97% - 98%)    CAPILLARY BLOOD GLUCOSE    I&O's Summary    24 Nov 2018 07:01  -  25 Nov 2018 07:00  --------------------------------------------------------  IN: 720 mL / OUT: 0 mL / NET: 720 mL    GENERAL: No acute distress, well-developed  HEAD:  Atraumatic, Normocephalic  ENT: EOMI, No JVD, moist mucosa  CHEST/LUNG: Clear to auscultation bilaterally  HEART: Regular rate and rhythm; No murmurs, rubs, or gallops  ABDOMEN: Soft, Nontender, Nondistended; Bowel sounds present  EXTREMITIES:  2+ edema b/l tender to palpation   PSYCH: Nl behavior, nl affect  NEUROLOGY: alert and oriented   SKIN: Normal color, No rashes or lesions    LABS:                        9.9    7.8   )-----------( 260      ( 24 Nov 2018 12:38 )             28.8     11-25    143  |  114<H>  |  25<H>  ----------------------------<  73  3.5   |  20<L>  |  1.36<H>    Ca    8.6      25 Nov 2018 06:25  Phos  2.7     11-25  Mg     1.8     11-25      PT/INR - ( 25 Nov 2018 13:24 )   PT: 15.6 sec;   INR: 1.36 ratio         PTT - ( 25 Nov 2018 13:24 )  PTT:30.6 sec        RADIOLOGY & ADDITIONAL TESTS:  Imaging Personally Reviewed:  Consultant(s) Notes Reviewed:    Care Discussed with Consultants/Other Providers:

## 2018-11-26 NOTE — PROGRESS NOTE ADULT - PROBLEM SELECTOR PLAN 6
Patient with history of paroxysmal afib. EKG w/ possible Mobitz Type I.   - On tele  - rate controlled not on bb  - holding a/c as above

## 2018-11-26 NOTE — PROGRESS NOTE ADULT - ASSESSMENT
89 y/o F PMHx HTN, unprovoked PE (dx 2014, on Coumadin, previously on Xarelto but switched due to severe anemia), paroxysmal afib, colon cancer (10 years ago, s/p hemicolectomy), GERD, and chronic lymphedema presents with palpitations and dyspnea adm w/ acute blood loss anemia to 6.6 in the setting of BRBPR with supra therapeutic INR >7, now s/p PRBC. w/ DIOGENES improving.

## 2018-11-26 NOTE — PROGRESS NOTE ADULT - SUBJECTIVE AND OBJECTIVE BOX
Patient is a 89y old  Female who presented with a chief complaint of GI bleed (26 Nov 2018 06:28)      INTERVAL HPI/OVERNIGHT EVENTS:  no further rectal bleeding, no melena  no abdominal pain, nausea or vomiting  tolerating PO diet (solids)  no CP or SOB    MEDICATIONS  (STANDING):  cyanocobalamin 1000 MICROGram(s) Oral daily  gabapentin 300 milliGRAM(s) Oral at bedtime  latanoprost 0.005% Ophthalmic Solution 1 Drop(s) Both EYES at bedtime  loratadine 10 milliGRAM(s) Oral daily  pantoprazole  Injectable 40 milliGRAM(s) IV Push two times a day      Allergies  penicillin (Unknown)      Review of Systems:  General:  No wt loss, fevers, chills, night sweats, fatigue  ENT:  No sore throat, pain, runny nose, dysphagia  CV:  No chest pain, palpitations, LUONG  Resp:  No dyspnea, cough, tachypnea, wheezing  Neuro:  No focal weakness, headache, vision changes  Heme:  No petechiae, ecchymosis, easy bruisability    Vital Signs Last 24 Hrs  T(C): 36.6 (26 Nov 2018 05:08), Max: 37.1 (25 Nov 2018 20:55)  T(F): 97.8 (26 Nov 2018 05:08), Max: 98.8 (25 Nov 2018 20:55)  HR: 64 (26 Nov 2018 05:08) (59 - 75)  BP: 111/57 (26 Nov 2018 05:08) (111/57 - 154/67)  BP(mean): 89 (25 Nov 2018 20:55) (89 - 89)  RR: 18 (26 Nov 2018 05:08) (18 - 18)  SpO2: 97% (26 Nov 2018 05:08) (97% - 98%)    PHYSICAL EXAM:  Constitutional: NAD, well-developed elderly female. eating breakfast  Neck: No LAD, supple  Respiratory: clear to auscultation b/l no rales, rhonchi, wheezing  Cardiovascular: S1 and S2, RRR, no murmur  Gastrointestinal: +BS x4, soft, NT/ND, neg HSM,  Extremities: No peripheral edema, neg clubbing, cyanosis  Vascular: 2+ peripheral pulses  Neurological: A/O x 3, no focal deficits  Psychiatric: Normal mood, normal affect  Skin: No rashes, anicteric    LABS:                        8.7    5.74  )-----------( 240      ( 26 Nov 2018 09:00 )             27.4     11-26    143  |  114<H>  |  25<H>  ----------------------------<  73  3.6   |  21<L>  |  1.27    Ca    8.5      26 Nov 2018 06:37  Phos  2.6     11-26  Mg     1.6     11-26    TPro  4.4<L>  /  Alb  2.4<L>  /  TBili  0.3  /  DBili  x   /  AST  12  /  ALT  10  /  AlkPhos  65  11-26    PT/INR - ( 25 Nov 2018 13:24 )   PT: 15.6 sec;   INR: 1.36 ratio    PTT - ( 25 Nov 2018 13:24 )  PTT:30.6 sec    C. difficile [facility] &amp; toxins A/B by EIA . (11.24.18 @ 23:18)    Clostridium difficile [facility] Toxins A&amp;B, EIA:   Negative    Clostridium difficile [facility] Interpretation: Negative for toxigenic C. Difficile.     RADIOLOGY & ADDITIONAL TESTS:

## 2018-11-27 LAB
ANION GAP SERPL CALC-SCNC: 10 MMOL/L — SIGNIFICANT CHANGE UP (ref 5–17)
BUN SERPL-MCNC: 27 MG/DL — HIGH (ref 7–23)
CALCIUM SERPL-MCNC: 8.4 MG/DL — SIGNIFICANT CHANGE UP (ref 8.4–10.5)
CHLORIDE SERPL-SCNC: 116 MMOL/L — HIGH (ref 96–108)
CO2 SERPL-SCNC: 20 MMOL/L — LOW (ref 22–31)
CREAT SERPL-MCNC: 1.23 MG/DL — SIGNIFICANT CHANGE UP (ref 0.5–1.3)
GLUCOSE SERPL-MCNC: 78 MG/DL — SIGNIFICANT CHANGE UP (ref 70–99)
HCT VFR BLD CALC: 25.7 % — LOW (ref 34.5–45)
HGB BLD-MCNC: 8.5 G/DL — LOW (ref 11.5–15.5)
INR BLD: 1.28 RATIO — HIGH (ref 0.88–1.16)
MAGNESIUM SERPL-MCNC: 1.6 MG/DL — SIGNIFICANT CHANGE UP (ref 1.6–2.6)
MCHC RBC-ENTMCNC: 26.3 PG — LOW (ref 27–34)
MCHC RBC-ENTMCNC: 33.1 GM/DL — SIGNIFICANT CHANGE UP (ref 32–36)
MCV RBC AUTO: 79.6 FL — LOW (ref 80–100)
PHOSPHATE SERPL-MCNC: 2.5 MG/DL — SIGNIFICANT CHANGE UP (ref 2.5–4.5)
PLATELET # BLD AUTO: 213 K/UL — SIGNIFICANT CHANGE UP (ref 150–400)
POTASSIUM SERPL-MCNC: 3.7 MMOL/L — SIGNIFICANT CHANGE UP (ref 3.5–5.3)
POTASSIUM SERPL-SCNC: 3.7 MMOL/L — SIGNIFICANT CHANGE UP (ref 3.5–5.3)
PROTHROM AB SERPL-ACNC: 14.5 SEC — HIGH (ref 10–13.1)
RBC # BLD: 3.23 M/UL — LOW (ref 3.8–5.2)
RBC # FLD: 18.1 % — HIGH (ref 10.3–14.5)
SODIUM SERPL-SCNC: 146 MMOL/L — HIGH (ref 135–145)
WBC # BLD: 6.08 K/UL — SIGNIFICANT CHANGE UP (ref 3.8–10.5)
WBC # FLD AUTO: 6.08 K/UL — SIGNIFICANT CHANGE UP (ref 3.8–10.5)

## 2018-11-27 PROCEDURE — 99232 SBSQ HOSP IP/OBS MODERATE 35: CPT | Mod: GC

## 2018-11-27 RX ORDER — WARFARIN SODIUM 2.5 MG/1
1 TABLET ORAL
Qty: 0 | Refills: 0 | COMMUNITY

## 2018-11-27 RX ADMIN — PREGABALIN 1000 MICROGRAM(S): 225 CAPSULE ORAL at 12:55

## 2018-11-27 RX ADMIN — GABAPENTIN 300 MILLIGRAM(S): 400 CAPSULE ORAL at 21:04

## 2018-11-27 RX ADMIN — PANTOPRAZOLE SODIUM 40 MILLIGRAM(S): 20 TABLET, DELAYED RELEASE ORAL at 17:22

## 2018-11-27 RX ADMIN — PANTOPRAZOLE SODIUM 40 MILLIGRAM(S): 20 TABLET, DELAYED RELEASE ORAL at 05:30

## 2018-11-27 RX ADMIN — LORATADINE 10 MILLIGRAM(S): 10 TABLET ORAL at 12:55

## 2018-11-27 RX ADMIN — LATANOPROST 1 DROP(S): 0.05 SOLUTION/ DROPS OPHTHALMIC; TOPICAL at 21:04

## 2018-11-27 NOTE — PROGRESS NOTE ADULT - PROBLEM SELECTOR PLAN 8
c/w Vitamin B12 1000 mcg daily

## 2018-11-27 NOTE — PROGRESS NOTE ADULT - SUBJECTIVE AND OBJECTIVE BOX
Patient is a 89y old  Female who presents with a chief complaint of GI bleed (26 Nov 2018 10:32)      SUBJECTIVE / OVERNIGHT EVENTS: pt resting comfortably in bed. No pain in no distress. Denies N/V, CP, SOB. 1 loose bowel movement overnight , decreased from previous.     MEDICATIONS  (STANDING):  cyanocobalamin 1000 MICROGram(s) Oral daily  gabapentin 300 milliGRAM(s) Oral at bedtime  latanoprost 0.005% Ophthalmic Solution 1 Drop(s) Both EYES at bedtime  loratadine 10 milliGRAM(s) Oral daily  pantoprazole    Tablet 40 milliGRAM(s) Oral two times a day    MEDICATIONS  (PRN):  T(C): 36.6 (11-27-18 @ 04:30), Max: 37.1 (11-27-18 @ 00:23)  HR: 60 (11-27-18 @ 04:30) (54 - 61)  BP: 125/79 (11-27-18 @ 04:30) (125/79 - 138/64)  RR: 18 (11-27-18 @ 04:30) (18 - 18)  SpO2: 97% (11-27-18 @ 04:30) (96% - 100%)    CAPILLARY BLOOD GLUCOSE  I&O's Summary    26 Nov 2018 07:01  -  27 Nov 2018 07:00  --------------------------------------------------------  IN: 960 mL / OUT: 275 mL / NET: 685 mL    GENERAL: No acute distress, well-developed  HEAD:  Atraumatic, Normocephalic  ENT: EOMI, No JVD, moist mucosa  CHEST/LUNG: CTA anteriorly  HEART: Regular rate and rhythm; No murmurs, rubs, or gallops  ABDOMEN: Soft, Nontender, Nondistended; Bowel sounds present  EXTREMITIES:  2+ edema, tender to palpation b/l   PSYCH: Nl behavior, nl affect  NEUROLOGY: AAOx3, non-focal, cranial nerves intact  SKIN: Normal color, No rashes or lesions    LABS:                        8.7    5.74  )-----------( 240      ( 26 Nov 2018 09:00 )             27.4     11-27    146<H>  |  116<H>  |  27<H>  ----------------------------<  78  3.7   |  20<L>  |  1.23    Ca    8.4      27 Nov 2018 06:12  Phos  2.5     11-27  Mg     1.6     11-27    TPro  4.4<L>  /  Alb  2.4<L>  /  TBili  0.3  /  DBili  x   /  AST  12  /  ALT  10  /  AlkPhos  65  11-26    PT/INR - ( 25 Nov 2018 13:24 )   PT: 15.6 sec;   INR: 1.36 ratio         PTT - ( 25 Nov 2018 13:24 )  PTT:30.6 sec        RADIOLOGY & ADDITIONAL TESTS:  Imaging Personally Reviewed:  Consultant(s) Notes Reviewed:    Care Discussed with Consultants/Other Providers:

## 2018-11-27 NOTE — PROGRESS NOTE ADULT - ASSESSMENT
89 y/o F hx of unprovoked PE on Coumadin (dx 2014) (previously on Xarelto, however changed to Coumadin due to intolerance), colon ca in remission, HTN, GERD, chronic lymphedema presents with dyspnea on exertion with new GI bleed today x 2 episodes with GI consulted for BRBPR in the setting of supratherapeutic INR  Coagulopathy reversal s/p PO Vitamin K and IV K centra     No active/brisk GI bleed at this time.  CT without obvious lesions.  No clinical signs of obstruction.  Patient does not want any invasive work-up    - Continue to monitor CBC  - PO diet (regular)  - No plans for invasive GI procedures at this time.  - PO PPI (BID)  - If needs to resume AC from Medical/ CV standpoint, then would wait 7 days (from time of resolution of bleed; therefore could resume 12/1)    Zach Baumann PA-C    [facility]  [phone]

## 2018-11-27 NOTE — PROGRESS NOTE ADULT - PROBLEM SELECTOR PLAN 9
Pt wishes to be FULL CODE.  In the event pt can not make decisions for herself, she states she would like her grandson Andreas to make decisions for her.
DVT PPx: SCD's in setting of GI bleed  fall and aspiration precautions   Discussed with pt - wishes to be full code at this time.
Pt wishes to be FULL CODE.  In the event pt can not make decisions for herself, she states she would like her grandson Andreas to make decisions for her.

## 2018-11-27 NOTE — PROGRESS NOTE ADULT - PROBLEM SELECTOR PROBLEM 9
Advance care planning
Need for prophylactic measure

## 2018-11-27 NOTE — PROGRESS NOTE ADULT - SUBJECTIVE AND OBJECTIVE BOX
Patient is a 89y old  Female who presented with a chief complaint of GI bleed (27 Nov 2018 07:41)      INTERVAL HPI/OVERNIGHT EVENTS:  brown stool yesterday  no abdominal pain  tolerating PO diet    MEDICATIONS  (STANDING):  cyanocobalamin 1000 MICROGram(s) Oral daily  gabapentin 300 milliGRAM(s) Oral at bedtime  latanoprost 0.005% Ophthalmic Solution 1 Drop(s) Both EYES at bedtime  loratadine 10 milliGRAM(s) Oral daily  pantoprazole    Tablet 40 milliGRAM(s) Oral two times a day      Allergies  penicillin (Unknown)      Review of Systems:  General:  No wt loss, fevers, chills, night sweats, fatigue  ENT:  No sore throat, pain, runny nose, dysphagia  CV:  No chest pain, palpitations, LUONG  Resp:  No dyspnea, cough, tachypnea, wheezing  Neuro:  No focal weakness, headache, vision changes  Heme:  No petechiae, ecchymosis, easy bruisability    Vital Signs Last 24 Hrs  T(C): 36.6 (27 Nov 2018 04:30), Max: 37.1 (27 Nov 2018 00:23)  T(F): 97.9 (27 Nov 2018 04:30), Max: 98.8 (27 Nov 2018 00:23)  HR: 60 (27 Nov 2018 04:30) (54 - 61)  BP: 125/79 (27 Nov 2018 04:30) (125/79 - 138/64)  BP(mean): --  RR: 18 (27 Nov 2018 04:30) (18 - 18)  SpO2: 97% (27 Nov 2018 04:30) (96% - 100%)    PHYSICAL EXAM:  Constitutional: NAD, well-developed elderly female. eating breakfast  Neck: No LAD, supple  Respiratory: clear to auscultation b/l no rales, rhonchi, wheezing  Cardiovascular: S1 and S2, RRR, no murmur  Gastrointestinal: +BS x4, soft, NT/ND, neg HSM,  Extremities: No peripheral edema, neg clubbing, cyanosis  Vascular: 2+ peripheral pulses  Neurological: A/O x 3, no focal deficits  Psychiatric: Normal mood, normal affect  Skin: No rashes, anicteric    LABS:                        8.5    6.08  )-----------( 213      ( 27 Nov 2018 07:58 )             25.7     Hemoglobin: 8.7 g/dL <L> [11.5 - 15.5] (11-26 @ 09:00)  Hemoglobin: 9.9 g/dL <L> [11.5 - 15.5] (11-24 @ 12:38)        11-27    146<H>  |  116<H>  |  27<H>  ----------------------------<  78  3.7   |  20<L>  |  1.23    Ca    8.4      27 Nov 2018 06:12  Phos  2.5     11-27  Mg     1.6     11-27    TPro  4.4<L>  /  Alb  2.4<L>  /  TBili  0.3  /  DBili  x   /  AST  12  /  ALT  10  /  AlkPhos  65  11-26    PT/INR - ( 27 Nov 2018 09:15 )   PT: 14.5 sec;   INR: 1.28 ratio         PTT - ( 25 Nov 2018 13:24 )  PTT:30.6 sec      RADIOLOGY & ADDITIONAL TESTS: Patient is a 89y old  Female who presented with a chief complaint of GI bleed (27 Nov 2018 07:41)    INTERVAL HPI/OVERNIGHT EVENTS:  brown stool yesterday  no abdominal pain  tolerating PO diet    MEDICATIONS  (STANDING):  cyanocobalamin 1000 MICROGram(s) Oral daily  gabapentin 300 milliGRAM(s) Oral at bedtime  latanoprost 0.005% Ophthalmic Solution 1 Drop(s) Both EYES at bedtime  loratadine 10 milliGRAM(s) Oral daily  pantoprazole    Tablet 40 milliGRAM(s) Oral two times a day    Allergies  penicillin (Unknown)    Review of Systems:  General:  No wt loss, fevers, chills, night sweats, fatigue  ENT:  No sore throat, pain, runny nose, dysphagia  CV:  No chest pain, palpitations, LUONG  Resp:  No dyspnea, cough, tachypnea, wheezing  Neuro:  No focal weakness, headache, vision changes  Heme:  No petechiae, ecchymosis, easy bruisability    Vital Signs Last 24 Hrs  T(C): 36.6 (27 Nov 2018 04:30), Max: 37.1 (27 Nov 2018 00:23)  T(F): 97.9 (27 Nov 2018 04:30), Max: 98.8 (27 Nov 2018 00:23)  HR: 60 (27 Nov 2018 04:30) (54 - 61)  BP: 125/79 (27 Nov 2018 04:30) (125/79 - 138/64)  BP(mean): --  RR: 18 (27 Nov 2018 04:30) (18 - 18)  SpO2: 97% (27 Nov 2018 04:30) (96% - 100%)    PHYSICAL EXAM:  Constitutional: NAD, well-developed elderly female. eating breakfast  Neck: No LAD, supple  Respiratory: clear to auscultation b/l no rales, rhonchi, wheezing  Cardiovascular: S1 and S2, RRR, no murmur  Gastrointestinal: +BS x4, soft, NT/ND, neg HSM,  Extremities: No peripheral edema, neg clubbing, cyanosis  Vascular: 2+ peripheral pulses  Neurological: A/O x 3, no focal deficits  Psychiatric: Normal mood, normal affect  Skin: No rashes, anicteric    LABS:                        8.5    6.08  )-----------( 213      ( 27 Nov 2018 07:58 )             25.7     Hemoglobin: 8.7 g/dL <L> [11.5 - 15.5] (11-26 @ 09:00)  Hemoglobin: 9.9 g/dL <L> [11.5 - 15.5] (11-24 @ 12:38)    11-27    146<H>  |  116<H>  |  27<H>  ----------------------------<  78  3.7   |  20<L>  |  1.23    Ca    8.4      27 Nov 2018 06:12  Phos  2.5     11-27  Mg     1.6     11-27    TPro  4.4<L>  /  Alb  2.4<L>  /  TBili  0.3  /  DBili  x   /  AST  12  /  ALT  10  /  AlkPhos  65  11-26    PT/INR - ( 27 Nov 2018 09:15 )   PT: 14.5 sec;   INR: 1.28 ratio    PTT - ( 25 Nov 2018 13:24 )  PTT:30.6 sec    RADIOLOGY & ADDITIONAL TESTS:

## 2018-11-28 ENCOUNTER — APPOINTMENT (OUTPATIENT)
Age: 83
End: 2018-11-28

## 2018-11-28 VITALS
DIASTOLIC BLOOD PRESSURE: 60 MMHG | OXYGEN SATURATION: 96 % | SYSTOLIC BLOOD PRESSURE: 120 MMHG | TEMPERATURE: 97.1 F | RESPIRATION RATE: 15 BRPM | HEART RATE: 71 BPM

## 2018-11-28 VITALS
OXYGEN SATURATION: 99 % | DIASTOLIC BLOOD PRESSURE: 67 MMHG | TEMPERATURE: 99 F | RESPIRATION RATE: 18 BRPM | HEART RATE: 63 BPM | SYSTOLIC BLOOD PRESSURE: 121 MMHG

## 2018-11-28 PROCEDURE — 87086 URINE CULTURE/COLONY COUNT: CPT

## 2018-11-28 PROCEDURE — 99239 HOSP IP/OBS DSCHRG MGMT >30: CPT

## 2018-11-28 PROCEDURE — 80053 COMPREHEN METABOLIC PANEL: CPT

## 2018-11-28 PROCEDURE — 86900 BLOOD TYPING SEROLOGIC ABO: CPT

## 2018-11-28 PROCEDURE — 36430 TRANSFUSION BLD/BLD COMPNT: CPT

## 2018-11-28 PROCEDURE — 96374 THER/PROPH/DIAG INJ IV PUSH: CPT

## 2018-11-28 PROCEDURE — 82435 ASSAY OF BLOOD CHLORIDE: CPT

## 2018-11-28 PROCEDURE — 93005 ELECTROCARDIOGRAM TRACING: CPT

## 2018-11-28 PROCEDURE — 84484 ASSAY OF TROPONIN QUANT: CPT

## 2018-11-28 PROCEDURE — 87324 CLOSTRIDIUM AG IA: CPT

## 2018-11-28 PROCEDURE — 81001 URINALYSIS AUTO W/SCOPE: CPT

## 2018-11-28 PROCEDURE — 82272 OCCULT BLD FECES 1-3 TESTS: CPT

## 2018-11-28 PROCEDURE — 82330 ASSAY OF CALCIUM: CPT

## 2018-11-28 PROCEDURE — 84295 ASSAY OF SERUM SODIUM: CPT

## 2018-11-28 PROCEDURE — 84100 ASSAY OF PHOSPHORUS: CPT

## 2018-11-28 PROCEDURE — 99285 EMERGENCY DEPT VISIT HI MDM: CPT | Mod: 25

## 2018-11-28 PROCEDURE — 71046 X-RAY EXAM CHEST 2 VIEWS: CPT

## 2018-11-28 PROCEDURE — 86850 RBC ANTIBODY SCREEN: CPT

## 2018-11-28 PROCEDURE — 93970 EXTREMITY STUDY: CPT

## 2018-11-28 PROCEDURE — 83605 ASSAY OF LACTIC ACID: CPT

## 2018-11-28 PROCEDURE — 86901 BLOOD TYPING SEROLOGIC RH(D): CPT

## 2018-11-28 PROCEDURE — 85610 PROTHROMBIN TIME: CPT

## 2018-11-28 PROCEDURE — P9016: CPT

## 2018-11-28 PROCEDURE — 87186 SC STD MICRODIL/AGAR DIL: CPT

## 2018-11-28 PROCEDURE — 85027 COMPLETE CBC AUTOMATED: CPT

## 2018-11-28 PROCEDURE — 96361 HYDRATE IV INFUSION ADD-ON: CPT

## 2018-11-28 PROCEDURE — 80048 BASIC METABOLIC PNL TOTAL CA: CPT

## 2018-11-28 PROCEDURE — 85730 THROMBOPLASTIN TIME PARTIAL: CPT

## 2018-11-28 PROCEDURE — 82803 BLOOD GASES ANY COMBINATION: CPT

## 2018-11-28 PROCEDURE — 86923 COMPATIBILITY TEST ELECTRIC: CPT

## 2018-11-28 PROCEDURE — 82565 ASSAY OF CREATININE: CPT

## 2018-11-28 PROCEDURE — 97161 PT EVAL LOW COMPLEX 20 MIN: CPT

## 2018-11-28 PROCEDURE — 85014 HEMATOCRIT: CPT

## 2018-11-28 PROCEDURE — 74176 CT ABD & PELVIS W/O CONTRAST: CPT

## 2018-11-28 PROCEDURE — 83735 ASSAY OF MAGNESIUM: CPT

## 2018-11-28 PROCEDURE — 82947 ASSAY GLUCOSE BLOOD QUANT: CPT

## 2018-11-28 PROCEDURE — 84132 ASSAY OF SERUM POTASSIUM: CPT

## 2018-11-28 PROCEDURE — 87449 NOS EACH ORGANISM AG IA: CPT

## 2018-11-28 RX ADMIN — PANTOPRAZOLE SODIUM 40 MILLIGRAM(S): 20 TABLET, DELAYED RELEASE ORAL at 05:09

## 2018-11-28 NOTE — PROGRESS NOTE ADULT - ATTENDING COMMENTS
Vidya Mckinney MD, FACP, FACG, AGAF  [facility]  [phone]
d/c today, total time 40 minutes
GIB with acute blood loss anemia, resolved, CBC stable.  Would d/c off anticoagulation for now, f/u with PMD, total d/c time 45 minutes.
GIB with acute blood loss anemia, appears to have resolved.  Coumadin held and INR reversed, at this point would not restart coumadin given risk of bleed.
Patient seen and examined. s/p 2 U pRBC w/ appropriate response (Hgb 6.6-->8.9) and no further evidence of bleeding this afternoon. She remains hemodynamically stable. GI consult appreciated. Will pursue CT A/P first to look for ?underlying malignancy (given h/o colon CA) and ?visualization of diverticulosis (although imaging may be somewhat limited as cannot use in the setting of DIOGENES on CKD III). Will cautiously advance diet and closely monitor CBC. May still need EGD and/or colonoscopy if evidence of continued bleeding. Off A/C and will need to weigh risks/benefits of restarting.     Plan d/w patient and medicine resident (Dr. Rice).    Clay Bustamante M.D.  Hospitalist  Pager: [phone]
acute blood loss anemia, hgb stable s/p 2 u prbc without recurrent bleeding. f/u gi, c/w ppi for now, monitor cbc q12h, monitor Cr closely given arlene and consider ivf gentle hydration if worsens. PT eval.
hgb stable, w/o further ep of bleeding, HD stable, GI following and no plan for intervention  f/u GI when pt can restart coumadin at lower dose for afib. I d/w pt risk of stroke vs. risk of bleed, and she prefers to c/w coumadin dosed at lower dose.  PT eval pending

## 2018-11-28 NOTE — PROGRESS NOTE ADULT - PROBLEM SELECTOR PROBLEM 4
DIOGENES (acute kidney injury)
DIOGENES (acute kidney injury)
Hypertension
DIOGENES (acute kidney injury)

## 2018-11-28 NOTE — PROGRESS NOTE ADULT - ASSESSMENT
89 y/o F PMHx HTN, unprovoked PE (dx 2014, on Coumadin, previously on Xarelto but switched due to severe anemia), paroxysmal afib, colon cancer (10 years ago, s/p hemicolectomy), GERD, and chronic lymphedema presents with palpitations and dyspnea adm w/ acute blood loss anemia to 6.6 in the setting of BRBPR with supra therapeutic INR >7, now s/p PRBC. w/ improving DIOGENES

## 2018-11-28 NOTE — PROGRESS NOTE ADULT - SUBJECTIVE AND OBJECTIVE BOX
Patient is a 89y old  Female who presents with a chief complaint of GI bleed (27 Nov 2018 10:54)      SUBJECTIVE / OVERNIGHT EVENTS:    REVIEW OF SYSTEMS:  CONSTITUTIONAL: No weakness, fevers or chills  EYES/ENT: No visual changes;  No vertigo or throat pain   NECK: No pain or stiffness  RESPIRATORY: No cough, wheezing, hemoptysis; No shortness of breath  CARDIOVASCULAR: No chest pain or palpitations  GASTROINTESTINAL: No abdominal pain, nausea, vomiting, or diarrhea. No melena or hematochezia.  GENITOURINARY: No dysuria, frequency or hematuria  NEUROLOGICAL: No numbness or weakness  SKIN: No itching, burning, rashes, or lesions   All other review of systems is negative unless indicated above.    MEDICATIONS  (STANDING):  cyanocobalamin 1000 MICROGram(s) Oral daily  gabapentin 300 milliGRAM(s) Oral at bedtime  latanoprost 0.005% Ophthalmic Solution 1 Drop(s) Both EYES at bedtime  loratadine 10 milliGRAM(s) Oral daily  pantoprazole    Tablet 40 milliGRAM(s) Oral two times a day    MEDICATIONS  (PRN):      T(C): 37.1 (11-28-18 @ 04:57), Max: 37.1 (11-28-18 @ 04:57)  HR: 63 (11-28-18 @ 04:57) (61 - 66)  BP: 121/67 (11-28-18 @ 04:57) (111/55 - 141/69)  RR: 18 (11-28-18 @ 04:57) (18 - 18)  SpO2: 99% (11-28-18 @ 04:57) (96% - 100%)    CAPILLARY BLOOD GLUCOSE        I&O's Summary    26 Nov 2018 07:01  -  27 Nov 2018 07:00  --------------------------------------------------------  IN: 960 mL / OUT: 275 mL / NET: 685 mL    27 Nov 2018 07:01  -  28 Nov 2018 06:21  --------------------------------------------------------  IN: 480 mL / OUT: 0 mL / NET: 480 mL    GENERAL: No acute distress, well-developed  HEAD:  Atraumatic, Normocephalic  ENT: EOMI, PERRLA, No JVD, moist mucosa  CHEST/LUNG: Clear to auscultation bilaterally  BACK: No spinal tenderness  HEART: Regular rate and rhythm; No murmurs, rubs, or gallops  ABDOMEN: Soft, Nontender, Nondistended; Bowel sounds present  EXTREMITIES:  No clubbing, cyanosis, or edema  PSYCH: Nl behavior, nl affect  NEUROLOGY: AAOx3, non-focal, cranial nerves intact  SKIN: Normal color, No rashes or lesions    LABS:                        8.5    6.08  )-----------( 213      ( 27 Nov 2018 07:58 )             25.7     11-27    146<H>  |  116<H>  |  27<H>  ----------------------------<  78  3.7   |  20<L>  |  1.23    Ca    8.4      27 Nov 2018 06:12  Phos  2.5     11-27  Mg     1.6     11-27    TPro  4.4<L>  /  Alb  2.4<L>  /  TBili  0.3  /  DBili  x   /  AST  12  /  ALT  10  /  AlkPhos  65  11-26    PT/INR - ( 27 Nov 2018 09:15 )   PT: 14.5 sec;   INR: 1.28 ratio                 RADIOLOGY & ADDITIONAL TESTS:  Imaging Personally Reviewed:  Consultant(s) Notes Reviewed:    Care Discussed with Consultants/Other Providers: Patient is a 89y old  Female who presents with a chief complaint of GI bleed (27 Nov 2018 10:54)      SUBJECTIVE / OVERNIGHT EVENTS: No over night events. Resting well in no distress.    MEDICATIONS  (STANDING):  cyanocobalamin 1000 MICROGram(s) Oral daily  gabapentin 300 milliGRAM(s) Oral at bedtime  latanoprost 0.005% Ophthalmic Solution 1 Drop(s) Both EYES at bedtime  loratadine 10 milliGRAM(s) Oral daily  pantoprazole    Tablet 40 milliGRAM(s) Oral two times a day    MEDICATIONS  (PRN):      T(C): 37.1 (11-28-18 @ 04:57), Max: 37.1 (11-28-18 @ 04:57)  HR: 63 (11-28-18 @ 04:57) (61 - 66)  BP: 121/67 (11-28-18 @ 04:57) (111/55 - 141/69)  RR: 18 (11-28-18 @ 04:57) (18 - 18)  SpO2: 99% (11-28-18 @ 04:57) (96% - 100%)    CAPILLARY BLOOD GLUCOSE        I&O's Summary    26 Nov 2018 07:01  -  27 Nov 2018 07:00  --------------------------------------------------------  IN: 960 mL / OUT: 275 mL / NET: 685 mL    27 Nov 2018 07:01  -  28 Nov 2018 06:21  --------------------------------------------------------  IN: 480 mL / OUT: 0 mL / NET: 480 mL    GENERAL: No acute distress, well-developed  HEAD:  Atraumatic, Normocephalic  ENT: EOMI,, No JVD, moist mucosa  CHEST/LUNG: CTA anteriorly   HEART: Regular rate and rhythm; No murmurs, rubs, or gallops  ABDOMEN: Soft, Nontender, Nondistended; Bowel sounds present  EXTREMITIES:  Edema + B/L   PSYCH: Nl behavior, nl affect  NEUROLOGY: Alert oriented   SKIN: Normal color, No rashes or lesions    LABS:                        8.5    6.08  )-----------( 213      ( 27 Nov 2018 07:58 )             25.7     11-27    146<H>  |  116<H>  |  27<H>  ----------------------------<  78  3.7   |  20<L>  |  1.23    Ca    8.4      27 Nov 2018 06:12  Phos  2.5     11-27  Mg     1.6     11-27    TPro  4.4<L>  /  Alb  2.4<L>  /  TBili  0.3  /  DBili  x   /  AST  12  /  ALT  10  /  AlkPhos  65  11-26    PT/INR - ( 27 Nov 2018 09:15 )   PT: 14.5 sec;   INR: 1.28 ratio                 RADIOLOGY & ADDITIONAL TESTS:  Imaging Personally Reviewed:  Consultant(s) Notes Reviewed:    Care Discussed with Consultants/Other Providers:

## 2018-11-28 NOTE — PROGRESS NOTE ADULT - PROBLEM SELECTOR PLAN 7
Patient with history of HTN, losartan discontinued by PMD as blood pressure had been controlled  - Continue monitoring; stable at this time
DVT PPx: SCD's in setting of GI bleed  Fall and aspiration precautions   Dispo: Pending PT
Patient with history of HTN, losartan discontinued by PMD as blood pressure had been controlled  - Continue monitoring; stable at this time
Patient with history of HTN, losartan discontinued by PMD as blood pressure had been controlled  - Continue monitoring; stable at this time
Patient with history of HTN, losartan discontinued by PMD as blood pressure had been controlled  - Continue monitoring  - Orthostatic on admission 2/2 GI bleed. Will repeat orthostatics
Patient with history of HTN, losartan discontinued by PMD as blood pressure had been controlled  - Continue monitoring; stable at this time

## 2018-11-28 NOTE — PROGRESS NOTE ADULT - PROBLEM SELECTOR PROBLEM 5
GERD (Gastroesophageal Reflux Disease)
B12 deficiency
GERD (Gastroesophageal Reflux Disease)

## 2018-11-28 NOTE — PROGRESS NOTE ADULT - PROBLEM SELECTOR PROBLEM 1
Acute blood loss anemia
Acute blood loss anemia
DIOGENES (acute kidney injury)
Acute blood loss anemia

## 2018-11-28 NOTE — PROGRESS NOTE ADULT - PROBLEM SELECTOR PROBLEM 3
Atrial fibrillation
Supratherapeutic INR

## 2018-11-28 NOTE — PROGRESS NOTE ADULT - PROBLEM SELECTOR PLAN 6
Pt wishes to be FULL CODE.  In the event pt can not make decisions for herself, she states she would like her grandson Andreas to make decisions for her.

## 2018-11-28 NOTE — PROGRESS NOTE ADULT - PROVIDER SPECIALTY LIST ADULT
Gastroenterology
Gastroenterology
Internal Medicine
Gastroenterology

## 2018-11-28 NOTE — PROGRESS NOTE ADULT - PROBLEM SELECTOR PLAN 4
Patient with history of HTN, losartan discontinued by PMD as blood pressure had been controlled  - Continue monitoring; stable at this time

## 2018-11-28 NOTE — PROGRESS NOTE ADULT - PROBLEM SELECTOR PROBLEM 6
Atrial fibrillation
Advance care planning
Atrial fibrillation

## 2018-11-29 ENCOUNTER — MEDICATION RENEWAL (OUTPATIENT)
Age: 83
End: 2018-11-29

## 2018-12-04 ENCOUNTER — APPOINTMENT (OUTPATIENT)
Dept: HOME HEALTH SERVICES | Facility: HOME HEALTH | Age: 83
End: 2018-12-04

## 2018-12-04 ENCOUNTER — LABORATORY RESULT (OUTPATIENT)
Age: 83
End: 2018-12-04

## 2018-12-05 ENCOUNTER — APPOINTMENT (OUTPATIENT)
Dept: HOME HEALTH SERVICES | Facility: HOME HEALTH | Age: 83
End: 2018-12-05
Payer: MEDICARE

## 2018-12-05 VITALS
RESPIRATION RATE: 16 BRPM | HEART RATE: 81 BPM | SYSTOLIC BLOOD PRESSURE: 105 MMHG | TEMPERATURE: 98.9 F | OXYGEN SATURATION: 97 % | DIASTOLIC BLOOD PRESSURE: 70 MMHG

## 2018-12-05 DIAGNOSIS — Z86.69 PERSONAL HISTORY OF OTHER DISEASES OF THE NERVOUS SYSTEM AND SENSE ORGANS: ICD-10-CM

## 2018-12-05 DIAGNOSIS — B02.29 OTHER POSTHERPETIC NERVOUS SYSTEM INVOLVEMENT: ICD-10-CM

## 2018-12-05 PROCEDURE — 99496 TRANSJ CARE MGMT HIGH F2F 7D: CPT

## 2018-12-05 RX ORDER — ACETAMINOPHEN, GUAIFENESIN, AND PHENYLEPHRINE HYDROCHLORIDE 650; 400; 10 MG/20ML; MG/20ML; MG/20ML
10-650-400 SOLUTION ORAL
Qty: 1 | Refills: 0 | Status: COMPLETED | COMMUNITY
Start: 2018-10-23 | End: 2018-12-05

## 2018-12-05 RX ORDER — FLUOCINONIDE 0.5 MG/G
0.05 CREAM TOPICAL TWICE DAILY
Qty: 1 | Refills: 0 | Status: COMPLETED | COMMUNITY
Start: 2018-08-28 | End: 2018-12-05

## 2018-12-05 RX ORDER — LORATADINE 10 MG/1
10 TABLET ORAL DAILY
Qty: 90 | Refills: 3 | Status: COMPLETED | COMMUNITY
Start: 2018-05-30 | End: 2018-12-05

## 2018-12-05 RX ORDER — GABAPENTIN 300 MG/1
300 CAPSULE ORAL
Qty: 90 | Refills: 3 | Status: COMPLETED | COMMUNITY
Start: 2018-10-23 | End: 2018-12-05

## 2018-12-07 ENCOUNTER — TRANSCRIPTION ENCOUNTER (OUTPATIENT)
Age: 83
End: 2018-12-07

## 2018-12-08 ENCOUNTER — TRANSCRIPTION ENCOUNTER (OUTPATIENT)
Age: 83
End: 2018-12-08

## 2018-12-10 ENCOUNTER — TRANSCRIPTION ENCOUNTER (OUTPATIENT)
Age: 83
End: 2018-12-10

## 2018-12-10 ENCOUNTER — APPOINTMENT (OUTPATIENT)
Age: 83
End: 2018-12-10

## 2018-12-10 VITALS
RESPIRATION RATE: 16 BRPM | OXYGEN SATURATION: 98 % | TEMPERATURE: 99.2 F | DIASTOLIC BLOOD PRESSURE: 70 MMHG | HEART RATE: 85 BPM | SYSTOLIC BLOOD PRESSURE: 116 MMHG

## 2018-12-14 ENCOUNTER — TRANSCRIPTION ENCOUNTER (OUTPATIENT)
Age: 83
End: 2018-12-14

## 2018-12-18 ENCOUNTER — MEDICATION RENEWAL (OUTPATIENT)
Age: 83
End: 2018-12-18

## 2018-12-18 ENCOUNTER — TRANSCRIPTION ENCOUNTER (OUTPATIENT)
Age: 83
End: 2018-12-18

## 2018-12-31 ENCOUNTER — APPOINTMENT (OUTPATIENT)
Dept: HOME HEALTH SERVICES | Facility: HOME HEALTH | Age: 83
End: 2018-12-31
Payer: MEDICARE

## 2018-12-31 VITALS
RESPIRATION RATE: 16 BRPM | TEMPERATURE: 97.2 F | HEART RATE: 78 BPM | DIASTOLIC BLOOD PRESSURE: 70 MMHG | SYSTOLIC BLOOD PRESSURE: 125 MMHG | OXYGEN SATURATION: 97 %

## 2018-12-31 DIAGNOSIS — K92.2 GASTROINTESTINAL HEMORRHAGE, UNSPECIFIED: ICD-10-CM

## 2018-12-31 PROCEDURE — 99350 HOME/RES VST EST HIGH MDM 60: CPT

## 2018-12-31 RX ORDER — FLUTICASONE PROPIONATE 50 UG/1
50 SPRAY, METERED NASAL DAILY
Qty: 1 | Refills: 1 | Status: COMPLETED | COMMUNITY
Start: 2018-04-13 | End: 2018-12-31

## 2019-01-01 NOTE — ED PROVIDER NOTE - PMH
B12 deficiency    Colon cancer  6 yrs ago. did not require chemo  Gallstone    GERD (Gastroesophageal Reflux Disease)    Hard of Hearing    Hypertension    Lymphedema, limb  BLE none

## 2019-01-04 ENCOUNTER — TRANSCRIPTION ENCOUNTER (OUTPATIENT)
Age: 84
End: 2019-01-04

## 2019-01-04 NOTE — H&P ADULT - NSHPSOCIALHISTORY_GEN_ALL_CORE
Calling back for lab results  
Patient in shock that she is pregnant. Has had no symptoms other than fatigue which she attributed to her lack of taking thyroid medication. Reviewed that she was not using any protection, and therefore, a pregnancy could happen. Patient wants to take a home pregnancy \"just to make sure\". Will call back with results. Patient offered an appointment today or Monday if she is needing to discuss things further as this was unplanned, she is going through a divorce, and with a new partner for about 1 month. Patient declined at this time and is going to talk to her boyfriend.   Will start PNV.   
Patient lives alone, has home health aid that comes 7 days a week. Patient denies smoking, alcohol or drug use.

## 2019-01-06 ENCOUNTER — TRANSCRIPTION ENCOUNTER (OUTPATIENT)
Age: 84
End: 2019-01-06

## 2019-01-22 ENCOUNTER — MEDICATION RENEWAL (OUTPATIENT)
Age: 84
End: 2019-01-22

## 2019-02-05 ENCOUNTER — APPOINTMENT (OUTPATIENT)
Dept: HOME HEALTH SERVICES | Facility: HOME HEALTH | Age: 84
End: 2019-02-05

## 2019-02-28 ENCOUNTER — TRANSCRIPTION ENCOUNTER (OUTPATIENT)
Age: 84
End: 2019-02-28

## 2019-03-01 ENCOUNTER — APPOINTMENT (OUTPATIENT)
Dept: HOME HEALTH SERVICES | Facility: HOME HEALTH | Age: 84
End: 2019-03-01

## 2019-03-01 ENCOUNTER — TRANSCRIPTION ENCOUNTER (OUTPATIENT)
Age: 84
End: 2019-03-01

## 2019-03-01 VITALS
DIASTOLIC BLOOD PRESSURE: 80 MMHG | HEART RATE: 60 BPM | OXYGEN SATURATION: 94 % | TEMPERATURE: 97.3 F | SYSTOLIC BLOOD PRESSURE: 130 MMHG | RESPIRATION RATE: 18 BRPM

## 2019-03-13 ENCOUNTER — APPOINTMENT (OUTPATIENT)
Dept: HOME HEALTH SERVICES | Facility: HOME HEALTH | Age: 84
End: 2019-03-13
Payer: MEDICARE

## 2019-03-13 VITALS
TEMPERATURE: 97.3 F | OXYGEN SATURATION: 95 % | RESPIRATION RATE: 17 BRPM | SYSTOLIC BLOOD PRESSURE: 125 MMHG | DIASTOLIC BLOOD PRESSURE: 70 MMHG | HEART RATE: 73 BPM

## 2019-03-13 PROCEDURE — 99497 ADVNCD CARE PLAN 30 MIN: CPT

## 2019-03-13 PROCEDURE — 99349 HOME/RES VST EST MOD MDM 40: CPT | Mod: 25

## 2019-03-13 RX ORDER — CHLORHEXIDINE GLUCONATE 4 %
5 LIQUID (ML) TOPICAL
Qty: 30 | Refills: 0 | Status: COMPLETED | COMMUNITY
Start: 2018-12-05 | End: 2019-03-13

## 2019-03-13 NOTE — COUNSELING
[Overweight (BMI 25.1 - 29.9)] : overweight - BMI 25.1-29.9 [Weight counseling provided] : Weight counseling provided [Mediterranean diet recommended] : Mediterranean diet recommended [Medical/Nutritional supplementation as prescribed] : medical/nutritional supplementation as prescribed [Non - Smoker] : non-smoker [Medical alert] : medical alert [Use assistive device to avoid falls] : use assistive device to avoid falls [Remove clutter and unsafe carpeting to avoid falls] : remove clutter and unsafe carpeting to avoid falls [Use grab bars] : use grab bars [Smoke/CO Detectors] : smoke/CO detectors [Not Indicated] : not indicated [Improve pain control] : improve pain control [Decrease stress] : decrease stress [Decrease hospital use] : decrease hospital use [Minimize unnecessary interventions] : minimize unnecessary interventions [Maintain functional ability] : maintain functional ability [Discussed disease trajectory with patient/caregiver] : discussed disease trajectory with patient/caregiver [Likely to achieve goals/desired outcomes] : likely to achieve goals/desired outcomes [Patient/Caregiver has ___ understanding of disease process] : patient/caregiver has [unfilled] understanding of disease process [Advanced Directives discussed: ____] : Advanced directives discussed: [unfilled] [Annual Discussion and review of: ___] : Annual discussion and review of [unfilled] [Completed Medical Orders for Life-Sustaining Treatment] : completed medical orders for life-sustaining treatment [DNR] : Code Status: DNR [Limited] : Treatment Guidelines: Limited [DNI] : Intubation: DNI [[facility]: _____[facility]last [facility]: [unfilled] [_____] : HCP: [unfilled] [FreeTextEntry5] : will take vaccines

## 2019-03-13 NOTE — HISTORY OF PRESENT ILLNESS
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 \par \par interval events- patient ate a lot of salty food on her birthday and developed leg swelling, lasix was re-started.\par \par patient is doing well today, leg swelling has resolved. worried about skin pads behind her knees thinking they\par might be water. reports white spots seen through both eyes. also has chronic nasal congestion due to sinusitis and allergies. \par reports persistent sleep problems- melatonin not helping at all, wishes ambien 10 mg to be re-prescribed, it was last prescribed by former PCP Dr Munoz more than a year ago. she uses it only as needed, and has used it for over 10 years without any side effects\par no other symptoms\par Appetite is good, no swallowing problems, gastric burning has resolved, taking caltrate and omeprazole\par BM is usually regular, not on any standing laxatives, does not like miralax as it gives her stomach burning, last BM was yesterday\par Ambulates with walker, no recent falls\par Memory and Mood is  reported to be good, denies prior h/o depression or anxiety. \par patient is not regular with medication intake, sometimes forgets, only has HHA during the day\par skin- reports chronic itchiness upper back, hydrocortisone helps\par \par OA of right knee and occasional sciatica, still will take Percocet on rare occasion for severe sciatic pain- on average once or twice a year\par \par lyphedema legs b/l- refuses to use compression stockings or lasix. stable with no recent worsening.\par ---------\par lives alone with 24/7 HHA\par malik chilel is involved in care\par

## 2019-03-13 NOTE — PHYSICAL EXAM
[No Acute Distress] : no acute distress [Well Nourished] : well nourished [Well Developed] : well developed [Normal Voice/Communication] : normal voice communication [PERRL] : pupils equal, round and reactive to light [EOMI] : extra ocular movement intact [Normal Outer Ear/Nose] : the ears and nose were normal in appearance [Normal Oropharynx] : the oropharynx was normal [No JVD] : no jugular venous distention [Supple] : the neck was supple [No LAD] : no lymphadenopathy [No Respiratory Distress] : no respiratory distress [Clear to Auscultation] : lungs were clear to auscultation bilaterally [Normal Rate] : heart rate was normal  [Regular Rhythm] : with a regular rhythm [Normal S1, S2] : normal S1 and S2 [No Murmurs] : no murmurs heard [No Carotid Bruit] : No carotid bruit [Normal Bowel Sounds] : normal bowel sounds [Non Tender] : non-tender [Soft] : abdomen soft [Not Distended] : not distended [No Spinal Tenderness] : no spinal tenderness [Kyphosis] :  kyphosis present [No Joint Swelling] : no joint swelling seen [No Rash] : no rash [No Skin Lesions] : no skin lesions [Cranial Nerves Intact] : cranial nerves 2-12 were intact [No Motor Deficits] : the motor exam was normal [No Gross Sensory Deficits] : no gross sensory deficits [Normal Affect] : the affect was normal [Normal Mood] : the mood was normal [No CVA Tenderness] : no ~M costovertebral angle tenderness [de-identified] : pleasant, calm, well dressed [de-identified] : b/l lymphedema, 2+ nonpitting swelling- chronic  stable [de-identified] : unsteady gait, uses walker, no paravertebral or CVA tenderness, [de-identified] : upper back blackish pigmentation with no openigns or sores, skin dry [de-identified] : oriented to place, person, recent memory is affected, remote intact

## 2019-03-13 NOTE — REASON FOR VISIT
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

## 2019-03-13 NOTE — REVIEW OF SYSTEMS
[Vision Problems] : vision problems [Lower Ext Edema] : lower extremity edema [Incontinence] : incontinence [Negative] : Neurological [FreeTextEntry3] : wears glasses

## 2019-03-15 LAB
ALBUMIN SERPL ELPH-MCNC: 3.6 G/DL
ALP BLD-CCNC: 78 U/L
ALT SERPL-CCNC: 13 U/L
ANION GAP SERPL CALC-SCNC: 15 MMOL/L
AST SERPL-CCNC: 17 U/L
BASOPHILS # BLD AUTO: 0.03 K/UL
BASOPHILS NFR BLD AUTO: 0.5 %
BILIRUB SERPL-MCNC: 0.2 MG/DL
BUN SERPL-MCNC: 37 MG/DL
CALCIUM SERPL-MCNC: 9.8 MG/DL
CHLORIDE SERPL-SCNC: 105 MMOL/L
CO2 SERPL-SCNC: 25 MMOL/L
CREAT SERPL-MCNC: 1.44 MG/DL
EOSINOPHIL # BLD AUTO: 0.24 K/UL
EOSINOPHIL NFR BLD AUTO: 3.8 %
FOLATE SERPL-MCNC: 8.9 NG/ML
HBA1C MFR BLD HPLC: 4.8 %
HCT VFR BLD CALC: 26.8 %
HGB BLD-MCNC: 7.9 G/DL
IMM GRANULOCYTES NFR BLD AUTO: 0.6 %
LYMPHOCYTES # BLD AUTO: 0.72 K/UL
LYMPHOCYTES NFR BLD AUTO: 11.4 %
MAN DIFF?: NORMAL
MCHC RBC-ENTMCNC: 26.3 PG
MCHC RBC-ENTMCNC: 29.5 GM/DL
MCV RBC AUTO: 89.3 FL
MONOCYTES # BLD AUTO: 0.56 K/UL
MONOCYTES NFR BLD AUTO: 8.9 %
NEUTROPHILS # BLD AUTO: 4.73 K/UL
NEUTROPHILS NFR BLD AUTO: 74.8 %
PLATELET # BLD AUTO: 322 K/UL
POTASSIUM SERPL-SCNC: 3.9 MMOL/L
PROT SERPL-MCNC: 5.6 G/DL
RBC # BLD: 3 M/UL
RBC # FLD: 19.4 %
SODIUM SERPL-SCNC: 145 MMOL/L
TSH SERPL-ACNC: 1.5 UIU/ML
VIT B12 SERPL-MCNC: 233 PG/ML
WBC # FLD AUTO: 6.32 K/UL

## 2019-03-29 ENCOUNTER — MEDICATION RENEWAL (OUTPATIENT)
Age: 84
End: 2019-03-29

## 2019-04-16 ENCOUNTER — APPOINTMENT (OUTPATIENT)
Dept: HOME HEALTH SERVICES | Facility: HOME HEALTH | Age: 84
End: 2019-04-16

## 2019-04-30 ENCOUNTER — TRANSCRIPTION ENCOUNTER (OUTPATIENT)
Age: 84
End: 2019-04-30

## 2019-05-23 ENCOUNTER — MEDICATION RENEWAL (OUTPATIENT)
Age: 84
End: 2019-05-23

## 2019-05-30 ENCOUNTER — APPOINTMENT (OUTPATIENT)
Dept: HOME HEALTH SERVICES | Facility: HOME HEALTH | Age: 84
End: 2019-05-30
Payer: MEDICARE

## 2019-05-30 VITALS
TEMPERATURE: 98 F | SYSTOLIC BLOOD PRESSURE: 120 MMHG | OXYGEN SATURATION: 96 % | HEART RATE: 63 BPM | DIASTOLIC BLOOD PRESSURE: 70 MMHG | RESPIRATION RATE: 16 BRPM

## 2019-05-30 DIAGNOSIS — Z86.69 PERSONAL HISTORY OF OTHER DISEASES OF THE NERVOUS SYSTEM AND SENSE ORGANS: ICD-10-CM

## 2019-05-30 DIAGNOSIS — Z87.19 PERSONAL HISTORY OF OTHER DISEASES OF THE DIGESTIVE SYSTEM: ICD-10-CM

## 2019-05-30 PROCEDURE — 99349 HOME/RES VST EST MOD MDM 40: CPT

## 2019-05-30 NOTE — HISTORY OF PRESENT ILLNESS
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 \par \par interval events- none significant.\par \par patient is having worsening of nasal congestion with sore throat and ear bloackage. her eyes are itchy. no other symptoms. she has similar symptoms this time of the year due to seasonal allergies.\par no other symptoms\par denies fever, chills, nausea, vomiting, SOB, pain, eye discharge,\par Appetite is good, no swallowing problems, gastric burning has resolved, taking caltrate and omeprazole\par BM has been a problem janes when taking iron tabs, which she prefers to skip. taking senna and fleet enema without relief\par Ambulates with walker, no recent falls\par Memory and Mood is  reported to be good, denies prior h/o depression or anxiety. \par patient is not regular with medication intake, sometimes forgets, only has HHA during the day\par skin- reports chronic itchiness upper back, hydrocortisone helps, stable with no worsening\par \par OA of right knee and occasional sciatica, still will take Percocet on rare occasion for severe sciatic pain- on average once or twice a year\par \par lyphedema legs b/l- refuses to use compression stockings or lasix. stable with no recent worsening.\par ---------\par lives alone with 24/7 HHA\par malik chilel is involved in care\par

## 2019-05-30 NOTE — COUNSELING
[Overweight (BMI 25.1 - 29.9)] : overweight - BMI 25.1-29.9 [Weight counseling provided] : Weight counseling provided [Mediterranean diet recommended] : Mediterranean diet recommended [Medical/Nutritional supplementation as prescribed] : medical/nutritional supplementation as prescribed [Non - Smoker] : non-smoker [Medical alert] : medical alert [Use assistive device to avoid falls] : use assistive device to avoid falls [Remove clutter and unsafe carpeting to avoid falls] : remove clutter and unsafe carpeting to avoid falls [Use grab bars] : use grab bars [Smoke/CO Detectors] : smoke/CO detectors [Not Indicated] : not indicated [Improve pain control] : improve pain control [Decrease stress] : decrease stress [Decrease hospital use] : decrease hospital use [Minimize unnecessary interventions] : minimize unnecessary interventions [Maintain functional ability] : maintain functional ability [Discussed disease trajectory with patient/caregiver] : discussed disease trajectory with patient/caregiver [Likely to achieve goals/desired outcomes] : likely to achieve goals/desired outcomes [Patient/Caregiver has ___ understanding of disease process] : patient/caregiver has [unfilled] understanding of disease process [Advanced Directives discussed: ____] : Advanced directives discussed: [unfilled] [Annual Discussion and review of: ___] : Annual discussion and review of [unfilled] [Completed Medical Orders for Life-Sustaining Treatment] : completed medical orders for life-sustaining treatment [DNR] : Code Status: DNR [Limited] : Treatment Guidelines: Limited [DNI] : Intubation: DNI [[facility]: _____] [facility]last [facility] date: [unfilled] [_____] : HCP: [unfilled] [FreeTextEntry5] : will take vaccines

## 2019-05-30 NOTE — REASON FOR VISIT
[Follow-Up] : a follow-up visit [Formal Caregiver] : formal caregiver [Pre-Visit Preparation] : pre-visit preparation was done [Intercurrent Specialty/Sub-specialty Visits] : the patient has intercurrent specialty/sub-specialty visits [FreeTextEntry3] : PT,

## 2019-05-30 NOTE — PHYSICAL EXAM
[No Acute Distress] : no acute distress [Well Nourished] : well nourished [Well Developed] : well developed [Normal Voice/Communication] : normal voice communication [PERRL] : pupils equal, round and reactive to light [EOMI] : extra ocular movement intact [No JVD] : no jugular venous distention [Supple] : the neck was supple [No LAD] : no lymphadenopathy [No Respiratory Distress] : no respiratory distress [Clear to Auscultation] : lungs were clear to auscultation bilaterally [Normal Rate] : heart rate was normal  [Regular Rhythm] : with a regular rhythm [Normal S1, S2] : normal S1 and S2 [No Murmurs] : no murmurs heard [No Carotid Bruit] : No carotid bruit [Normal Bowel Sounds] : normal bowel sounds [Non Tender] : non-tender [Soft] : abdomen soft [Not Distended] : not distended [No CVA Tenderness] : no ~M costovertebral angle tenderness [No Spinal Tenderness] : no spinal tenderness [Kyphosis] :  kyphosis present [No Joint Swelling] : no joint swelling seen [No Rash] : no rash [No Skin Lesions] : no skin lesions [Cranial Nerves Intact] : cranial nerves 2-12 were intact [No Motor Deficits] : the motor exam was normal [No Gross Sensory Deficits] : no gross sensory deficits [Normal Affect] : the affect was normal [Normal Mood] : the mood was normal [No Accessory Muscle Use] : no accessory muscle use [de-identified] : pleasant, calm, well dressed [de-identified] : erythema postpharyngeal, no pus [de-identified] : b/l lymphedema, 2+ nonpitting swelling- chronic  stable [de-identified] : unsteady gait, uses walker, no paravertebral or CVA tenderness, [de-identified] : upper back blackish pigmentation with no openigns or sores, skin dry [de-identified] : oriented to place, person, recent memory is affected, remote intact

## 2019-06-24 ENCOUNTER — INPATIENT (INPATIENT)
Facility: HOSPITAL | Age: 84
LOS: 3 days | Discharge: HOME HEALTH | End: 2019-06-28
Attending: HOSPITALIST | Admitting: HOSPITALIST
Payer: MEDICARE

## 2019-06-24 ENCOUNTER — APPOINTMENT (OUTPATIENT)
Dept: HOME HEALTH SERVICES | Facility: HOME HEALTH | Age: 84
End: 2019-06-24

## 2019-06-24 ENCOUNTER — TRANSCRIPTION ENCOUNTER (OUTPATIENT)
Age: 84
End: 2019-06-24

## 2019-06-24 VITALS
HEART RATE: 110 BPM | SYSTOLIC BLOOD PRESSURE: 112 MMHG | RESPIRATION RATE: 26 BRPM | OXYGEN SATURATION: 100 % | DIASTOLIC BLOOD PRESSURE: 40 MMHG

## 2019-06-24 DIAGNOSIS — Z29.9 ENCOUNTER FOR PROPHYLACTIC MEASURES, UNSPECIFIED: ICD-10-CM

## 2019-06-24 DIAGNOSIS — R06.02 SHORTNESS OF BREATH: ICD-10-CM

## 2019-06-24 DIAGNOSIS — Z98.89 OTHER SPECIFIED POSTPROCEDURAL STATES: Chronic | ICD-10-CM

## 2019-06-24 DIAGNOSIS — D62 ACUTE POSTHEMORRHAGIC ANEMIA: ICD-10-CM

## 2019-06-24 DIAGNOSIS — I48.0 PAROXYSMAL ATRIAL FIBRILLATION: ICD-10-CM

## 2019-06-24 DIAGNOSIS — D64.9 ANEMIA, UNSPECIFIED: ICD-10-CM

## 2019-06-24 DIAGNOSIS — K21.9 GASTRO-ESOPHAGEAL REFLUX DISEASE WITHOUT ESOPHAGITIS: ICD-10-CM

## 2019-06-24 DIAGNOSIS — N17.9 ACUTE KIDNEY FAILURE, UNSPECIFIED: ICD-10-CM

## 2019-06-24 DIAGNOSIS — I10 ESSENTIAL (PRIMARY) HYPERTENSION: ICD-10-CM

## 2019-06-24 LAB
ALBUMIN SERPL ELPH-MCNC: 3.7 G/DL — SIGNIFICANT CHANGE UP (ref 3.3–5)
ALP SERPL-CCNC: 71 U/L — SIGNIFICANT CHANGE UP (ref 40–120)
ALT FLD-CCNC: 8 U/L — SIGNIFICANT CHANGE UP (ref 4–33)
ANION GAP SERPL CALC-SCNC: 19 MMO/L — HIGH (ref 7–14)
ANISOCYTOSIS BLD QL: SLIGHT — SIGNIFICANT CHANGE UP
APTT BLD: 28 SEC — SIGNIFICANT CHANGE UP (ref 27.5–36.3)
AST SERPL-CCNC: 13 U/L — SIGNIFICANT CHANGE UP (ref 4–32)
BASE EXCESS BLDV CALC-SCNC: -2.1 MMOL/L — SIGNIFICANT CHANGE UP
BASOPHILS # BLD AUTO: 0.02 K/UL — SIGNIFICANT CHANGE UP (ref 0–0.2)
BASOPHILS NFR BLD AUTO: 0.3 % — SIGNIFICANT CHANGE UP (ref 0–2)
BASOPHILS NFR SPEC: 0 % — SIGNIFICANT CHANGE UP (ref 0–2)
BILIRUB SERPL-MCNC: 0.2 MG/DL — SIGNIFICANT CHANGE UP (ref 0.2–1.2)
BLASTS # FLD: 0 % — SIGNIFICANT CHANGE UP (ref 0–0)
BLD GP AB SCN SERPL QL: NEGATIVE — SIGNIFICANT CHANGE UP
BLOOD GAS VENOUS - CREATININE: 1.78 MG/DL — HIGH (ref 0.5–1.3)
BLOOD GAS VENOUS - FIO2: 40 — SIGNIFICANT CHANGE UP
BUN SERPL-MCNC: 35 MG/DL — HIGH (ref 7–23)
CALCIUM SERPL-MCNC: 9.6 MG/DL — SIGNIFICANT CHANGE UP (ref 8.4–10.5)
CHLORIDE BLDV-SCNC: 109 MMOL/L — HIGH (ref 96–108)
CHLORIDE SERPL-SCNC: 101 MMOL/L — SIGNIFICANT CHANGE UP (ref 98–107)
CO2 SERPL-SCNC: 20 MMOL/L — LOW (ref 22–31)
CREAT SERPL-MCNC: 1.68 MG/DL — HIGH (ref 0.5–1.3)
ELLIPTOCYTES BLD QL SMEAR: SLIGHT — SIGNIFICANT CHANGE UP
EOSINOPHIL # BLD AUTO: 0.21 K/UL — SIGNIFICANT CHANGE UP (ref 0–0.5)
EOSINOPHIL NFR BLD AUTO: 3.2 % — SIGNIFICANT CHANGE UP (ref 0–6)
EOSINOPHIL NFR FLD: 1.8 % — SIGNIFICANT CHANGE UP (ref 0–6)
FERRITIN SERPL-MCNC: 7.3 NG/ML — LOW (ref 15–150)
GAS PNL BLDV: 138 MMOL/L — SIGNIFICANT CHANGE UP (ref 136–146)
GIANT PLATELETS BLD QL SMEAR: PRESENT — SIGNIFICANT CHANGE UP
GLUCOSE BLDV-MCNC: 115 MG/DL — HIGH (ref 70–99)
GLUCOSE SERPL-MCNC: 127 MG/DL — HIGH (ref 70–99)
HCO3 BLDV-SCNC: 22 MMOL/L — SIGNIFICANT CHANGE UP (ref 20–27)
HCT VFR BLD CALC: 19.9 % — CRITICAL LOW (ref 34.5–45)
HCT VFR BLD CALC: 24.1 % — LOW (ref 34.5–45)
HCT VFR BLDV CALC: 24 % — LOW (ref 34.5–45)
HGB BLD-MCNC: 5.8 G/DL — CRITICAL LOW (ref 11.5–15.5)
HGB BLD-MCNC: 7.6 G/DL — LOW (ref 11.5–15.5)
HGB BLDV-MCNC: 7.7 G/DL — LOW (ref 11.5–15.5)
HYPOCHROMIA BLD QL: SIGNIFICANT CHANGE UP
IMM GRANULOCYTES NFR BLD AUTO: 0.8 % — SIGNIFICANT CHANGE UP (ref 0–1.5)
INR BLD: 1.02 — SIGNIFICANT CHANGE UP (ref 0.88–1.17)
IRON SATN MFR SERPL: 10 UG/DL — LOW (ref 30–160)
IRON SATN MFR SERPL: 432 UG/DL — SIGNIFICANT CHANGE UP (ref 140–530)
LACTATE BLDV-MCNC: 5.5 MMOL/L — CRITICAL HIGH (ref 0.5–2)
LYMPHOCYTES # BLD AUTO: 0.51 K/UL — LOW (ref 1–3.3)
LYMPHOCYTES # BLD AUTO: 7.7 % — LOW (ref 13–44)
LYMPHOCYTES NFR SPEC AUTO: 2.6 % — LOW (ref 13–44)
MCHC RBC-ENTMCNC: 21.5 PG — LOW (ref 27–34)
MCHC RBC-ENTMCNC: 24.2 PG — LOW (ref 27–34)
MCHC RBC-ENTMCNC: 28.9 % — LOW (ref 32–36)
MCHC RBC-ENTMCNC: 31.5 % — LOW (ref 32–36)
MCV RBC AUTO: 74.4 FL — LOW (ref 80–100)
MCV RBC AUTO: 76.8 FL — LOW (ref 80–100)
METAMYELOCYTES # FLD: 0 % — SIGNIFICANT CHANGE UP (ref 0–1)
MICROCYTES BLD QL: SIGNIFICANT CHANGE UP
MONOCYTES # BLD AUTO: 0.38 K/UL — SIGNIFICANT CHANGE UP (ref 0–0.9)
MONOCYTES NFR BLD AUTO: 5.8 % — SIGNIFICANT CHANGE UP (ref 2–14)
MONOCYTES NFR BLD: 0.9 % — LOW (ref 2–9)
MYELOCYTES NFR BLD: 0 % — SIGNIFICANT CHANGE UP (ref 0–0)
NEUTROPHIL AB SER-ACNC: 92.9 % — HIGH (ref 43–77)
NEUTROPHILS # BLD AUTO: 5.42 K/UL — SIGNIFICANT CHANGE UP (ref 1.8–7.4)
NEUTROPHILS NFR BLD AUTO: 82.2 % — HIGH (ref 43–77)
NEUTS BAND # BLD: 0 % — SIGNIFICANT CHANGE UP (ref 0–6)
NRBC # FLD: 0 K/UL — SIGNIFICANT CHANGE UP (ref 0–0)
NRBC # FLD: 0 K/UL — SIGNIFICANT CHANGE UP (ref 0–0)
OB PNL STL: POSITIVE — SIGNIFICANT CHANGE UP
OTHER - HEMATOLOGY %: 0 — SIGNIFICANT CHANGE UP
OVALOCYTES BLD QL SMEAR: SLIGHT — SIGNIFICANT CHANGE UP
PCO2 BLDV: 33 MMHG — LOW (ref 41–51)
PH BLDV: 7.43 PH — SIGNIFICANT CHANGE UP (ref 7.32–7.43)
PLATELET # BLD AUTO: 231 K/UL — SIGNIFICANT CHANGE UP (ref 150–400)
PLATELET # BLD AUTO: 267 K/UL — SIGNIFICANT CHANGE UP (ref 150–400)
PLATELET COUNT - ESTIMATE: NORMAL — SIGNIFICANT CHANGE UP
PMV BLD: 8.9 FL — SIGNIFICANT CHANGE UP (ref 7–13)
PMV BLD: 9.2 FL — SIGNIFICANT CHANGE UP (ref 7–13)
PO2 BLDV: 18 MMHG — LOW (ref 35–40)
POIKILOCYTOSIS BLD QL AUTO: SLIGHT — SIGNIFICANT CHANGE UP
POLYCHROMASIA BLD QL SMEAR: SLIGHT — SIGNIFICANT CHANGE UP
POTASSIUM BLDV-SCNC: 3.7 MMOL/L — SIGNIFICANT CHANGE UP (ref 3.4–4.5)
POTASSIUM SERPL-MCNC: 3.6 MMOL/L — SIGNIFICANT CHANGE UP (ref 3.5–5.3)
POTASSIUM SERPL-SCNC: 3.6 MMOL/L — SIGNIFICANT CHANGE UP (ref 3.5–5.3)
PROMYELOCYTES # FLD: 0 % — SIGNIFICANT CHANGE UP (ref 0–0)
PROT SERPL-MCNC: 6.1 G/DL — SIGNIFICANT CHANGE UP (ref 6–8.3)
PROTHROM AB SERPL-ACNC: 11.6 SEC — SIGNIFICANT CHANGE UP (ref 9.8–13.1)
RBC # BLD: 2.7 M/UL — LOW (ref 3.8–5.2)
RBC # BLD: 3.14 M/UL — LOW (ref 3.8–5.2)
RBC # FLD: 17.1 % — HIGH (ref 10.3–14.5)
RBC # FLD: 18.2 % — HIGH (ref 10.3–14.5)
RETICS #: 47 K/UL — SIGNIFICANT CHANGE UP (ref 25–125)
RETICS/RBC NFR: 1.8 % — SIGNIFICANT CHANGE UP (ref 0.5–2.5)
RH IG SCN BLD-IMP: POSITIVE — SIGNIFICANT CHANGE UP
RH IG SCN BLD-IMP: POSITIVE — SIGNIFICANT CHANGE UP
SAO2 % BLDV: 19.6 % — LOW (ref 60–85)
SCHISTOCYTES BLD QL AUTO: SLIGHT — SIGNIFICANT CHANGE UP
SODIUM SERPL-SCNC: 140 MMOL/L — SIGNIFICANT CHANGE UP (ref 135–145)
TARGETS BLD QL SMEAR: SLIGHT — SIGNIFICANT CHANGE UP
TROPONIN T, HIGH SENSITIVITY: 40 NG/L — SIGNIFICANT CHANGE UP (ref ?–14)
TROPONIN T, HIGH SENSITIVITY: 47 NG/L — SIGNIFICANT CHANGE UP (ref ?–14)
UIBC SERPL-MCNC: 422.4 UG/DL — HIGH (ref 110–370)
VARIANT LYMPHS # BLD: 1.8 % — SIGNIFICANT CHANGE UP
WBC # BLD: 6.59 K/UL — SIGNIFICANT CHANGE UP (ref 3.8–10.5)
WBC # BLD: 7.17 K/UL — SIGNIFICANT CHANGE UP (ref 3.8–10.5)
WBC # FLD AUTO: 6.59 K/UL — SIGNIFICANT CHANGE UP (ref 3.8–10.5)
WBC # FLD AUTO: 7.17 K/UL — SIGNIFICANT CHANGE UP (ref 3.8–10.5)

## 2019-06-24 PROCEDURE — 71045 X-RAY EXAM CHEST 1 VIEW: CPT | Mod: 26

## 2019-06-24 PROCEDURE — 99222 1ST HOSP IP/OBS MODERATE 55: CPT | Mod: GC

## 2019-06-24 PROCEDURE — 99223 1ST HOSP IP/OBS HIGH 75: CPT | Mod: GC

## 2019-06-24 RX ORDER — SODIUM CHLORIDE 9 MG/ML
1000 INJECTION INTRAMUSCULAR; INTRAVENOUS; SUBCUTANEOUS
Refills: 0 | Status: DISCONTINUED | OUTPATIENT
Start: 2019-06-24 | End: 2019-06-24

## 2019-06-24 RX ORDER — SODIUM CHLORIDE 9 MG/ML
500 INJECTION INTRAMUSCULAR; INTRAVENOUS; SUBCUTANEOUS ONCE
Refills: 0 | Status: COMPLETED | OUTPATIENT
Start: 2019-06-24 | End: 2019-06-24

## 2019-06-24 RX ORDER — FERROUS SULFATE 325(65) MG
325 TABLET ORAL DAILY
Refills: 0 | Status: DISCONTINUED | OUTPATIENT
Start: 2019-06-24 | End: 2019-06-25

## 2019-06-24 RX ORDER — FLUTICASONE PROPIONATE 50 MCG
1 SPRAY, SUSPENSION NASAL
Refills: 0 | Status: DISCONTINUED | OUTPATIENT
Start: 2019-06-24 | End: 2019-06-28

## 2019-06-24 RX ORDER — PANTOPRAZOLE SODIUM 20 MG/1
40 TABLET, DELAYED RELEASE ORAL
Refills: 0 | Status: DISCONTINUED | OUTPATIENT
Start: 2019-06-24 | End: 2019-06-27

## 2019-06-24 RX ORDER — GABAPENTIN 400 MG/1
1 CAPSULE ORAL
Qty: 0 | Refills: 0 | DISCHARGE

## 2019-06-24 RX ADMIN — PANTOPRAZOLE SODIUM 40 MILLIGRAM(S): 20 TABLET, DELAYED RELEASE ORAL at 17:39

## 2019-06-24 RX ADMIN — SODIUM CHLORIDE 1000 MILLILITER(S): 9 INJECTION INTRAMUSCULAR; INTRAVENOUS; SUBCUTANEOUS at 10:19

## 2019-06-24 RX ADMIN — SODIUM CHLORIDE 75 MILLILITER(S): 9 INJECTION INTRAMUSCULAR; INTRAVENOUS; SUBCUTANEOUS at 17:39

## 2019-06-24 RX ADMIN — Medication 1 SPRAY(S): at 17:39

## 2019-06-24 RX ADMIN — SODIUM CHLORIDE 500 MILLILITER(S): 9 INJECTION INTRAMUSCULAR; INTRAVENOUS; SUBCUTANEOUS at 11:30

## 2019-06-24 NOTE — H&P ADULT - NSHPSOCIALHISTORY_GEN_ALL_CORE
Marital Status:  (   )    (   ) Single    (   )    ( X )   Occupation: Retired  Lives with: (X  ) alone  (  ) children   (  ) spouse   (  ) parents  (X  ) other: Aide    Substance Use (street drugs): ( X ) never used  (  ) other:  Tobacco Usage:  ( X  ) never smoked   (   ) former smoker   (   ) current smoker  (     ) pack year  (        ) last cigarette date  Alcohol Usage: Denies Marital Status:  (   )    (   ) Single    (   )    ( X )   Occupation: Retired  Lives with: (X  ) alone  (  ) children   (  ) spouse   (  ) parents  (X  ) other: Aide 7 days a week, 5 hrs a day     Substance Use (street drugs): ( X ) never used  (  ) other:  Tobacco Usage:  ( X  ) never smoked   (   ) former smoker   (   ) current smoker  (     ) pack year  (        ) last cigarette date  Alcohol Usage: Denies

## 2019-06-24 NOTE — ED PROCEDURE NOTE - US CPT CODES
10409 US Chest (PTX, Pleural Effussion/CHF vs COPD)
28443 Echocardiography Transthoracic with Image 2D (Echo/FAST)

## 2019-06-24 NOTE — CONSULT NOTE ADULT - SUBJECTIVE AND OBJECTIVE BOX
GI HPI:  Patient is a 89y old  Female who presents with a chief complaint of  anemia (24 Jun 2019 14:31)  89 year old woman with PMH of HTN, unprovoked PE (dx 2014, not on AC ) , paroxysmal atrial fibrillation not on AC , colon cancer  s/p resection  many years ago, SBO s/p Exp lap, perforated umbilical hernia s/p Sx ) , GERD, and chronic lymphedema presenting with SOB and palpitations. Pt mentions being in her usual state of health when she started to develop SOB with exertion last week. She also mentions being paler thus she decided to see her PCP . Pt mentioned her PCP said her hemoglobin was low but recommended iron tabs. Today pt started to experience palpitations thus decided to come to the ED. Pt mentions eating well with no diarrhea, melena, hematochezia, fevers, recent travel, trauma abdominal pain, n/v, edema, rashes, sick contacts, CP, or cough. Pt mentions having sinus congestion. Of note, patient endorses constipation, states that she uses a "fleet" enema daily or every other day.   Pt was given 1 unit pRBC with a second unit hanging. Pt also given 500cc bolus (24 Jun 2019 14:31)      PAST MEDICAL & SURGICAL HISTORY  B12 deficiency  Lymphedema, limb: BLE  Colon cancer: 6 yrs ago. did not require chemo  Hard of Hearing  GERD (Gastroesophageal Reflux Disease)  Gallstone  Hypertension  H/O exploratory laparotomy: for SBO?  S/P colectomy: partial colectomy due to colon ca  S/P CAREY (Total Abdominal Hysterectomy)      FAMILY HISTORY:  FAMILY HISTORY:  No pertinent family history in first degree relatives      SOCIAL HISTORY:  smoker:  denies   Alcohol: denies   Drug: denies     ALLERGIES:  penicillin (Unknown)      MEDICATIONS:  MEDICATIONS  (STANDING):  fluticasone propionate 50 MICROgram(s)/spray Nasal Spray 1 Spray(s) Both Nostrils two times a day  pantoprazole  Injectable 40 milliGRAM(s) IV Push two times a day    MEDICATIONS  (PRN):      HOME MEDICATIONS:  Home Medications:  Carafate 1 g/10 mL oral suspension: 10 milliliter(s) orally 3 times a day (before meals) (22 Nov 2018 22:53)  Lasix 20 mg oral tablet: 1 tab(s) orally once a day (24 Jun 2019 14:37)  latanoprost 0.005% ophthalmic solution: 1 drop(s) to each affected eye once a day (in the evening) (22 Nov 2018 22:53)  loratadine 10 mg oral tablet: 1 tab(s) orally once a day (22 Nov 2018 22:53)  omeprazole 20 mg oral delayed release capsule: 1 cap(s) orally once a day (at bedtime) (22 Nov 2018 22:53)  Vitamin B-12 1000 mcg oral tablet: 1 tab(s) orally once a day (22 Nov 2018 22:53)      ROS:     REVIEW OF SYSTEMS  General:  No fevers  Eyes:  No reported pain   ENT:  No sore throat   NECK: No stiffness   CV:  No chest pain   Resp:  c/o  shortness of breath  GI:  See HPI  :  No dysuria  Muscle:  No weakness  Neuro:  No tingling  Endocrine:  No polyuria  Heme:  No ecchymosis          VITALS:   T(F): 98.1 (06-24 @ 16:34), Max: 98.4 (06-24 @ 12:45)  HR: 69 (06-24 @ 16:34) (63 - 110)  BP: 132/63 (06-24 @ 16:34) (99/56 - 156/51)  BP(mean): --  RR: 18 (06-24 @ 16:34) (16 - 26)  SpO2: 99% (06-24 @ 16:34) (98% - 100%)    I&O's Summary      PHYSICAL EXAM:  Gen: comfortable   EYES: No scleral icterus   LUNG: Clear to auscultation bilaterally;  HEART: RRR; s1 and s2 heard   ABDOMEN: Soft, +BS, no abd distension , no Abdominal Tenderness, No guarding, No García Sign , + midline scar   Neuro: AAO x 3  Ext: no edema     LABS:                        5.8    6.59  )-----------( 267      ( 24 Jun 2019 09:15 )             19.9     PT/INR - ( 24 Jun 2019 09:15 )  INR: 1.02          PTT - ( 24 Jun 2019 09:15 )  PTT:28.0   LIVER FUNCTIONS - ( 24 Jun 2019 09:15 )  Alb: 3.7 g/dL / Pro: 6.1 g/dL / ALK PHOS: 71 u/L / ALT: 8 u/L / AST: 13 u/L / GGT: x           06-24    140  |  101  |  35<H>  ----------------------------<  127<H>  3.6   |  20<L>  |  1.68<H>    Ca    9.6      24 Jun 2019 09:15              Previous EGD: 2012 gastritis ,  no celiac disease , no h.pylori     Previous colonoscopy 2012 , colonic anastomotic ulcer  s/p clip and biopsy path - Minute strips of colonic mucosa with features consistent  with anastomosis site, negative for carcinoma        RADIOLOGY:    < from: CT Abdomen and Pelvis No Cont (11.23.18 @ 18:10) >  FINDINGS:    LOWER CHEST: Trace bilateral pleural effusions. Cardiomegaly with left   atrial enlargement.    LIVER: Within normal limits.  BILE DUCTS: No intrahepatic balloon dilatation. The common bile duct is   mildly dilated measuring up to 11 mm, unchanged. It is likely related to   periampullary duodenal diverticulum.  GALLBLADDER: Within normal limits. Coarse calcifications are noted   adjacent to the gallbladder, unchanged.  SPLEEN: Within normal limits.  PANCREAS: Within normal limits.  ADRENALS: Within normal limits.  KIDNEYS/URETERS: Bilateral renal cysts. No hydronephrosis.    BLADDER: Within normal limits.  REPRODUCTIVE ORGANS: Uterus and adnexa are within normal limits.    BOWEL: No bowel obstruction. Colonic diverticulosis. Right hemicolectomy.   Multiple colonic anastomotic sutures noted. Periampullary duodenal   diverticulum.  PERITONEUM: Trace free fluid in the pelvis.  VESSELS:  Atherosclerotic changes.  RETROPERITONEUM: No lymphadenopathy. No collections.   ABDOMINAL WALL: Within normal limits.  BONES: Internal fixation of the left proximal femur. Degenerative   changes. Mild scoliosis.    IMPRESSION:  No retroperitoneal hematoma or fluid collection.    No bowel obstruction.          < end of copied text >

## 2019-06-24 NOTE — CONSULT NOTE ADULT - ATTENDING COMMENTS
Requested to evaluate patient regarding microcytic iron deficiency anemia. Admitted with symptomatic anemia. No overt GI bleeding. Discussed endoscopic assessment with patient including upper endoscopy and colonoscopy. Patient refusing endoscopic evaluation at this time. Continue to monitor serial hemoglobin/hematocrit in conjunction with iron repletion. Avoid aspirin/NSAIDs. Pantoprazole 40 mg q.d. empirically.

## 2019-06-24 NOTE — H&P ADULT - PROBLEM SELECTOR PLAN 6
-Rate controlled  -Holding AC in setting of bleed -Rate controlled  -Has been off AC x6 months, will continue to monitor off AC in setting of symptomatic anemia and likely GIB.

## 2019-06-24 NOTE — CONSULT NOTE ADULT - ASSESSMENT
89 year old woman with PMH of HTN, unprovoked PE (dx 2014, not on AC ) , paroxysmal atrial fibrillation not on AC , colon cancer  s/p resection  many years ago, SBO s/p Exp lap, perforated umbilical hernia s/p Sx ) , GERD, and chronic lymphedema presenting with SOB and palpitations. Pt mentions being in her usual state of health when she started to develop SOB with exertion last week. She also mentions being paler thus she decided to see her PCP . Pt mentioned her PCP said her hemoglobin was low but recommended iron tabs. Today pt started to experience palpitations thus decided to come to the ED. Pt mentions eating well with no diarrhea, melena, hematochezia, fevers, recent travel, trauma abdominal pain, n/v, edema, rashes, sick contacts, CP, or cough. Pt mentions having sinus congestion. Of note, patient endorses constipation, states that she uses a "fleet" enema daily or every other day.   Pt was given 1 unit pRBC with a second unit hanging. Pt also given 500cc bolus (24 Jun 2019 14:31)   Prior EGD and colonoscopy in 2012 for iron def anemia remarkable only for colonic anastomotic ulcer     #) Symptomatic Acute on chronic anemia  - microcytic , possible iron def   Hb down trended from 8.5 to 5.5   vital signs stable   No overt gi bleed   R/o Occult GI bleed - D. D includes PUD vs colonic anastomotic ulcers vs others including malignancy   Rec   Regular diet   Transfuse 2 unit prbc   Monitor cbc q12 and transfuse to keep Hb > 7-8   Risks and benefits reviewed with the patient regarding  EGD / colonoscopy +/- VCE for anemia work up .  Pt refused any endoscopic work up at this time   Supportive care as per primary   Discussed with attending

## 2019-06-24 NOTE — H&P ADULT - NSHPPHYSICALEXAM_GEN_ALL_CORE
Vital Signs Last 24 Hrs  T(C): 36.7 (24 Jun 2019 14:04), Max: 36.9 (24 Jun 2019 12:45)  T(F): 98.1 (24 Jun 2019 14:04), Max: 98.4 (24 Jun 2019 12:45)  HR: 85 (24 Jun 2019 14:04) (63 - 110)  BP: 154/67 (24 Jun 2019 14:04) (99/56 - 154/67)  BP(mean): --  RR: 18 (24 Jun 2019 14:04) (16 - 26)  SpO2: 100% (24 Jun 2019 14:04) (100% - 100%)    PHYSICAL EXAM:  GENERAL: NAD, well-groomed, well-developed  HEAD:  Atraumatic, Normocephalic  EYES: EOMI, PERRLA, conjunctiva and sclera clear  ENMT: No tonsillar erythema, exudates, or enlargement; dry mucous membranes,   NECK: Supple, No JVD,   CHEST/LUNG: Clear to ausculation bilaterally; No rales, rhonchi, wheezing, or rubs  HEART: Regular rate and rhythm; No murmurs, rubs, or gallops  ABDOMEN: Soft, Nontender, Nondistended; Bowel sounds present  EXTREMITIES:  2+ Peripheral Pulses, No clubbing or cyanosis +1 pitting edema bilaterally  LYMPH: No lymphadenopathy noted  SKIN: No rashes or lesions  NERVOUS SYSTEM:  Alert & Oriented X3, Motor Strength 5/5 B/L upper and lower extremities;

## 2019-06-24 NOTE — H&P ADULT - ASSESSMENT
89 year old woman with PMH of HTN, unprovoked PE (dx 2014, not on AC given recent hx of GIB 7 months ago), paroxysmal atrial fibrillation, colon cancer (10 years ago, s/p resection), GERD, and chronic lymphedema presenting with SOB and palpitations found to have low hemoglobin concerning for GIB, unclear if upper or lower. Cannot rule out malignancy. Low suspicion for medication side effect, infection, and PE given no hypoxia or recent changes to medications.

## 2019-06-24 NOTE — ED PROVIDER NOTE - ATTENDING CONTRIBUTION TO CARE
Alexander: 89 yof with hx of PE switched from Xarelto to coumadin due to GI bleed, then also stopped appx 6 months ago. Pt takes water pill as per aide. Denies hx of irregular hb. Pt states that for the last one week she had been having increasing dyspnea on exertion. No increased swelling in legs, no cp, no abd pain, no nausea, no vomiting. Usually constipated. EMS noted bradycardia, hypotension. BPs as per chart usually 130s. Pt is slightly pale, clear lungs, hr irregular and tachy 110s. BP now 90s. 2L 95%. abd soft non tender, alert oriented x 2.5. no edema, moving all ext well. EKG afib 100s. (1 previous EKG with afib noted). Labs show anemia hg less than 6. Also mild DIOGENES. Transfuse, occult sent. Spoke with Dr. Wesley, admit to hospitalist.

## 2019-06-24 NOTE — ED PROVIDER NOTE - PROGRESS NOTE DETAILS
Jonathan Weil, PGY2 - markedly anemic, likely explains her symptoms. BP and pulse ox wnl. Will transfuse and admit. Colten bautista.

## 2019-06-24 NOTE — H&P ADULT - NSICDXPASTMEDICALHX_GEN_ALL_CORE_FT
PAST MEDICAL HISTORY:  B12 deficiency     Colon cancer 6 yrs ago. did not require chemo    Gallstone     GERD (Gastroesophageal Reflux Disease)     Hard of Hearing     Hypertension     Lymphedema, limb BLE

## 2019-06-24 NOTE — H&P ADULT - PROBLEM SELECTOR PLAN 4
-c/w pantoprazole -Currently controlled  -Holding lasix in the setting of episode of hypotension -Currently controlled  -Holding lasix in the setting of episode of hypotension, will add back tomorrow

## 2019-06-24 NOTE — ED PROCEDURE NOTE - PROCEDURE ADDITIONAL DETAILS
Limited cardiac ultrasound shows preserved LV function with no pericardial effusion, no right heart dilation, no RV strain. Mitral regurg noted. See images and report in chart.  Levar [id number] Limited cardiac ultrasound shows mildly decreased LV function with no pericardial effusion, no right heart dilation, no RV strain. Mitral regurg noted. See images and report in chart.  Levar [id number]

## 2019-06-24 NOTE — ED PROVIDER NOTE - CLINICAL SUMMARY MEDICAL DECISION MAKING FREE TEXT BOX
Jonathan Weil, PGY2 - Patient presented borderline hypotensive, in a-fib w/o rvr, normal pulse ox, O2 started by EMS weaned down. Bedside echo and lung ultrasound unrevealing, no RV strain, no pulm edema, no large effusion. Will obtain CTA chest to r/o PE given history, trial small fluid bolus.

## 2019-06-24 NOTE — H&P ADULT - PROBLEM SELECTOR PLAN 1
-Most likely GIB given hx and + FOBT. Most likely UGIB given pt denies hematochezia.   -s/p 2 units pRBC  -c/w IVF 75cc/hr  -Keep active T/S  -CBC q6hr  -PPI IV BID   -GI consult  -Keep hgb >7 -Most likely GIB given hx and + FOBT. Most likely UGIB given pt denies hematochezia.   -s/p 2 units pRBC  -Keep active T/S  -CBC q6hr  -PPI IV BID   -GI consulted, awaiting recs. CLD for now, will make NPO after midnight. Patient refused EGD during last admission in November  -Keep hgb >7  -Add on iron studies, reticulocyte count. Unable to add on B12/folate, will check in AM, although will likely not be accurate as they are s/p PRBC txn

## 2019-06-24 NOTE — H&P ADULT - PROBLEM SELECTOR PROBLEM 3
Essential hypertension DIOGENES (acute kidney injury) Partial Purse String (Simple) Text: Given the location of the defect and the characteristics of the surrounding skin a simple purse string closure was deemed most appropriate.  Undermining was performed circumfirentially around the surgical defect.  A purse string suture was then placed and tightened. Wound tension only allowed a partial closure of the circular defect.

## 2019-06-24 NOTE — ED ADULT NURSE NOTE - OBJECTIVE STATEMENT
pt is a 89 yr old female c/o sob with chest discomfort since last night. pt biba from home with aide. pt states pain radiating with deep breathing to back. denies dizzines, ha, fever, chills, recent travel. pt treated for pe in past. #20 g placed in right wrist, labs sent. ekg done, noted HR 110s-80s afib, bp low-stable. md moya in progress, will ctm

## 2019-06-24 NOTE — H&P ADULT - PROBLEM SELECTOR PLAN 5
DVT: Holding AC in the setting of active bleeding, SCD for now  Diet: NPO  Dispo: Pending GI recs -c/w pantoprazole -holding home carafate as on protonix 40 IV BID

## 2019-06-24 NOTE — H&P ADULT - NSHPREVIEWOFSYSTEMS_GEN_ALL_CORE
CONSTITUTIONAL: No weakness, fevers or chills  EYES: No visual changes; No blurry vision  ENT:  No pain or stiffness; No vertigo or throat pain  RESPIRATORY: No cough, wheezing, hemoptysis; + shortness of breath  CARDIOVASCULAR: No chest pain + palpitations  GASTROINTESTINAL: No abdominal or epigastric pain. No nausea, vomiting, or hematemesis; No diarrhea or constipation. No melena or hematochezia.  GENITOURINARY: No dysuria, frequency or hematuria  NEUROLOGICAL: No numbness, No HA  SKIN: No itching, rashes  MSK: no joint pain, no muscle pain

## 2019-06-24 NOTE — H&P ADULT - PROBLEM SELECTOR PLAN 3
-Currently controlled  -Holding lasix in the setting of episode of hypotension -Most likely pre renal given ABLA  -will repeat s/p pRBC and IVF  -f/u urine lytes  -Bladder scan -Most likely pre renal given ABLA  -will repeat BMP s/p pRBC and IVF  -f/u urine lytes  -Bladder scan

## 2019-06-24 NOTE — H&P ADULT - HISTORY OF PRESENT ILLNESS
89 year old woman with PMH of HTN, unprovoked PE (dx 2014, not on AC given recent hx of GIB 7 months ago), paroxysmal atrial fibrillation, colon cancer (10 years ago, s/p resection), GERD, and chronic lymphedema presenting with SOB and palpitations. Pt mentions being in her usual state of health when she started to develop SOB last week at rest. She also mentions being paler thus she decided to see her PCP given her hx of GIB she was concerned she was losing blood. Pt mentioned her PCP said her hemoglobin was low but recommended iron tabs. Today pt started to experience palpitations thus decided to come to the ED. Pt mentions eating well with no diarrhea, melena, hematochezia, fevers, recent travel, trauma abdominal pain, n/v, edema, rashes, sick contacts, CP, or cough. Pt mentions having sinus congestion.     Pt was given 1 unit pRBC with a second unit hanging. Pt also given 500cc bolus 89 year old woman with PMH of HTN, unprovoked PE (dx 2014, not on AC given recent hx of GIB 7 months ago), paroxysmal atrial fibrillation, colon cancer (10 years ago, s/p resection), GERD, and chronic lymphedema presenting with SOB and palpitations. Pt mentions being in her usual state of health when she started to develop SOB with exertion last week. She also mentions being paler thus she decided to see her PCP given her hx of GIB she was concerned she was losing blood. Pt mentioned her PCP said her hemoglobin was low but recommended iron tabs. Today pt started to experience palpitations thus decided to come to the ED. Pt mentions eating well with no diarrhea, melena, hematochezia, fevers, recent travel, trauma abdominal pain, n/v, edema, rashes, sick contacts, CP, or cough. Pt mentions having sinus congestion. Of note, patient endorses constipation, states that she uses a "fleet" enema daily or every other day.     Pt was given 1 unit pRBC with a second unit hanging. Pt also given 500cc bolus

## 2019-06-24 NOTE — H&P ADULT - NSICDXPASTSURGICALHX_GEN_ALL_CORE_FT
PAST SURGICAL HISTORY:  H/O exploratory laparotomy for SBO?    S/P colectomy partial colectomy due to colon ca    S/P CAREY (Total Abdominal Hysterectomy)

## 2019-06-24 NOTE — H&P ADULT - ATTENDING COMMENTS
89F with PMH of unprovoked PE (dx 2014, not on AC given recent hx of GIB 7 months ago), paroxysmal atrial fibrillation, colon cancer (10 years ago, s/p resection), GERD, and chronic lymphedema presenting with SOB and palpitations found to have symptomatic anemia.   #Anemia - s/p 2U PRBCs, will trend CBC q6-8h. Likely source is UGIB (hx of GIB in Nov), endorses "acid reflux" and dark stools when she has diarrhea. Patient refused EGD in the past, but GI consulted. Will start on protonix 40 IV BID and CLD and NPO after midnight. Check iron studies.   #DIOGENES - likely pre-renal in setting of blood loss anemia and hypotension. Holding lasix today, will add back tomorrow if appropriate. Monitor BMP, avoid nephrotoxins. S/p gentle IVF hydration and 2U PRBCs, will hold off further IVF at this time.   #Afib - off AC x6 months, rate controlled at this time. Monitor on tele

## 2019-06-24 NOTE — ED PROCEDURE NOTE - PROCEDURE ADDITIONAL DETAILS
Limited lung ultrasound shows no pleural effusion, no B lines anterior or laterally and bilateral pleural motion noted. Normal lung ultrasound. See images and report in chart.  Levar [id number]

## 2019-06-24 NOTE — ED ADULT NURSE NOTE - NSIMPLEMENTINTERV_GEN_ALL_ED
Implemented All Fall with Harm Risk Interventions:  [place] to call system. Call bell, personal items and telephone within reach. Instruct patient to call for assistance. Room bathroom lighting operational. Non-slip footwear when patient is off stretcher. Physically safe environment: no spills, clutter or unnecessary equipment. Stretcher in lowest position, wheels locked, appropriate side rails in place. Provide visual cue, wrist band, yellow gown, etc. Monitor gait and stability. Monitor for mental status changes and reorient to person, place, and time. Review medications for side effects contributing to fall risk. Reinforce activity limits and safety measures with patient and family. Provide visual clues: red socks.

## 2019-06-24 NOTE — H&P ADULT - PROBLEM SELECTOR PLAN 2
-Most likely from acute blood loss anemia  -Cannot rule out PE/DVT  -Wells score 3 given HR and hx of PE  -f/u doppler to rule out DVT -Most likely from acute blood loss anemia  -Cannot rule out PE/DVT  -Wells score 3 given HR and hx of PE, however SOB most likely 2/2 symptomatic anemia   -f/u LE doppler to rule out DVT

## 2019-06-24 NOTE — H&P ADULT - NSHPLABSRESULTS_GEN_ALL_CORE
5.8    6.59  )-----------( 267      ( 24 Jun 2019 09:15 )             19.9   06-24    140  |  101  |  35<H>  ----------------------------<  127<H>  3.6   |  20<L>  |  1.68<H>    Ca    9.6      24 Jun 2019 09:15    TPro  6.1  /  Alb  3.7  /  TBili  0.2  /  DBili  x   /  AST  13  /  ALT  8   /  AlkPhos  71  06-24      PT/INR - ( 24 Jun 2019 09:15 )   PT: 11.6 SEC;   INR: 1.02     PTT - ( 24 Jun 2019 09:15 )  PTT:28.0 SEC    Troponin T, High Sensitivity (06.24.19 @ 11:30)    Troponin T, High Sensitivity: 40    Occult Blood, Feces (06.24.19 @ 10:17)    Occult Blood: POSITIVE: ** Results**    Blood Gas Venous - Lactate: 5.5: Please note updated reference range. mmol/L (06.24.19 @ 09:15)    Troponin T, High Sensitivity (06.24.19 @ 09:15)    Troponin T, High Sensitivity: 47:    < from: Xray Chest 1 View- PORTABLE-Urgent (06.24.19 @ 09:55) >    IMPRESSION:  Slight right hemidiaphragm elevation.  Clear lungs. No pleural effusions or pneumothorax.  Cardiac and mediastinal silhouettes within normal limits.  Generous osteopenia and spine and right shoulder degenerative change.    ECG: NSR

## 2019-06-24 NOTE — ED PROVIDER NOTE - OBJECTIVE STATEMENT
89F BIBA for shortness of breath for 1 week. Dyspnea has been gradually worsening. Associated w/ generalized fatigue. No chest pain/abd pain/n/v/d/melena/hematochezia. States it feels somewhat similar to when she was anemic d/t a bleed while on AC for a PE.

## 2019-06-25 DIAGNOSIS — Z71.89 OTHER SPECIFIED COUNSELING: ICD-10-CM

## 2019-06-25 LAB
ANION GAP SERPL CALC-SCNC: 10 MMO/L — SIGNIFICANT CHANGE UP (ref 7–14)
APTT BLD: 27.4 SEC — LOW (ref 27.5–36.3)
BUN SERPL-MCNC: 31 MG/DL — HIGH (ref 7–23)
CALCIUM SERPL-MCNC: 9 MG/DL — SIGNIFICANT CHANGE UP (ref 8.4–10.5)
CHLORIDE SERPL-SCNC: 110 MMOL/L — HIGH (ref 98–107)
CO2 SERPL-SCNC: 22 MMOL/L — SIGNIFICANT CHANGE UP (ref 22–31)
CREAT ?TM UR-MCNC: 64.9 MG/DL — SIGNIFICANT CHANGE UP
CREAT SERPL-MCNC: 1.39 MG/DL — HIGH (ref 0.5–1.3)
FOLATE SERPL-MCNC: 19.4 NG/ML — SIGNIFICANT CHANGE UP (ref 4.7–20)
GLUCOSE SERPL-MCNC: 70 MG/DL — SIGNIFICANT CHANGE UP (ref 70–99)
HCT VFR BLD CALC: 22.2 % — LOW (ref 34.5–45)
HCT VFR BLD CALC: 24.8 % — LOW (ref 34.5–45)
HGB BLD-MCNC: 7.1 G/DL — LOW (ref 11.5–15.5)
HGB BLD-MCNC: 7.8 G/DL — LOW (ref 11.5–15.5)
INR BLD: 1.16 — SIGNIFICANT CHANGE UP (ref 0.88–1.17)
LACTATE SERPL-SCNC: 1.3 MMOL/L — SIGNIFICANT CHANGE UP (ref 0.5–2)
MAGNESIUM SERPL-MCNC: 1.9 MG/DL — SIGNIFICANT CHANGE UP (ref 1.6–2.6)
MCHC RBC-ENTMCNC: 24.2 PG — LOW (ref 27–34)
MCHC RBC-ENTMCNC: 24.7 PG — LOW (ref 27–34)
MCHC RBC-ENTMCNC: 31.5 % — LOW (ref 32–36)
MCHC RBC-ENTMCNC: 32 % — SIGNIFICANT CHANGE UP (ref 32–36)
MCV RBC AUTO: 77 FL — LOW (ref 80–100)
MCV RBC AUTO: 77.1 FL — LOW (ref 80–100)
NRBC # FLD: 0 K/UL — SIGNIFICANT CHANGE UP (ref 0–0)
NRBC # FLD: 0 K/UL — SIGNIFICANT CHANGE UP (ref 0–0)
PHOSPHATE SERPL-MCNC: 2.8 MG/DL — SIGNIFICANT CHANGE UP (ref 2.5–4.5)
PLATELET # BLD AUTO: 204 K/UL — SIGNIFICANT CHANGE UP (ref 150–400)
PLATELET # BLD AUTO: 208 K/UL — SIGNIFICANT CHANGE UP (ref 150–400)
PMV BLD: 8.3 FL — SIGNIFICANT CHANGE UP (ref 7–13)
PMV BLD: 9.1 FL — SIGNIFICANT CHANGE UP (ref 7–13)
POTASSIUM SERPL-MCNC: 3.9 MMOL/L — SIGNIFICANT CHANGE UP (ref 3.5–5.3)
POTASSIUM SERPL-SCNC: 3.9 MMOL/L — SIGNIFICANT CHANGE UP (ref 3.5–5.3)
PROTHROM AB SERPL-ACNC: 12.9 SEC — SIGNIFICANT CHANGE UP (ref 9.8–13.1)
RBC # BLD: 2.88 M/UL — LOW (ref 3.8–5.2)
RBC # BLD: 3.22 M/UL — LOW (ref 3.8–5.2)
RBC # FLD: 17 % — HIGH (ref 10.3–14.5)
RBC # FLD: 17.3 % — HIGH (ref 10.3–14.5)
SODIUM SERPL-SCNC: 142 MMOL/L — SIGNIFICANT CHANGE UP (ref 135–145)
SODIUM UR-SCNC: 54 MMOL/L — SIGNIFICANT CHANGE UP
VIT B12 SERPL-MCNC: 202 PG/ML — SIGNIFICANT CHANGE UP (ref 200–900)
WBC # BLD: 5.79 K/UL — SIGNIFICANT CHANGE UP (ref 3.8–10.5)
WBC # BLD: 6.16 K/UL — SIGNIFICANT CHANGE UP (ref 3.8–10.5)
WBC # FLD AUTO: 5.79 K/UL — SIGNIFICANT CHANGE UP (ref 3.8–10.5)
WBC # FLD AUTO: 6.16 K/UL — SIGNIFICANT CHANGE UP (ref 3.8–10.5)

## 2019-06-25 PROCEDURE — 93010 ELECTROCARDIOGRAM REPORT: CPT

## 2019-06-25 PROCEDURE — 99497 ADVNCD CARE PLAN 30 MIN: CPT

## 2019-06-25 PROCEDURE — 93970 EXTREMITY STUDY: CPT | Mod: 26

## 2019-06-25 PROCEDURE — 99232 SBSQ HOSP IP/OBS MODERATE 35: CPT | Mod: GC

## 2019-06-25 PROCEDURE — 99233 SBSQ HOSP IP/OBS HIGH 50: CPT

## 2019-06-25 RX ORDER — PREGABALIN 225 MG/1
1000 CAPSULE ORAL DAILY
Refills: 0 | Status: DISCONTINUED | OUTPATIENT
Start: 2019-06-25 | End: 2019-06-25

## 2019-06-25 RX ORDER — DOCUSATE SODIUM 100 MG
100 CAPSULE ORAL THREE TIMES A DAY
Refills: 0 | Status: DISCONTINUED | OUTPATIENT
Start: 2019-06-25 | End: 2019-06-27

## 2019-06-25 RX ORDER — PREGABALIN 225 MG/1
1000 CAPSULE ORAL DAILY
Refills: 0 | Status: DISCONTINUED | OUTPATIENT
Start: 2019-06-25 | End: 2019-06-28

## 2019-06-25 RX ORDER — SENNA PLUS 8.6 MG/1
2 TABLET ORAL AT BEDTIME
Refills: 0 | Status: DISCONTINUED | OUTPATIENT
Start: 2019-06-25 | End: 2019-06-27

## 2019-06-25 RX ORDER — IRON SUCROSE 20 MG/ML
200 INJECTION, SOLUTION INTRAVENOUS ONCE
Refills: 0 | Status: COMPLETED | OUTPATIENT
Start: 2019-06-25 | End: 2019-06-25

## 2019-06-25 RX ORDER — SUCRALFATE 1 G
1 TABLET ORAL
Refills: 0 | Status: DISCONTINUED | OUTPATIENT
Start: 2019-06-25 | End: 2019-06-28

## 2019-06-25 RX ADMIN — Medication 1 SPRAY(S): at 21:10

## 2019-06-25 RX ADMIN — Medication 1 GRAM(S): at 18:08

## 2019-06-25 RX ADMIN — IRON SUCROSE 110 MILLIGRAM(S): 20 INJECTION, SOLUTION INTRAVENOUS at 19:02

## 2019-06-25 RX ADMIN — PANTOPRAZOLE SODIUM 40 MILLIGRAM(S): 20 TABLET, DELAYED RELEASE ORAL at 06:16

## 2019-06-25 RX ADMIN — SENNA PLUS 2 TABLET(S): 8.6 TABLET ORAL at 18:08

## 2019-06-25 RX ADMIN — PREGABALIN 1000 MICROGRAM(S): 225 CAPSULE ORAL at 14:31

## 2019-06-25 RX ADMIN — Medication 325 MILLIGRAM(S): at 12:36

## 2019-06-25 RX ADMIN — PANTOPRAZOLE SODIUM 40 MILLIGRAM(S): 20 TABLET, DELAYED RELEASE ORAL at 18:10

## 2019-06-25 RX ADMIN — Medication 100 MILLIGRAM(S): at 18:08

## 2019-06-25 NOTE — PROGRESS NOTE ADULT - SUBJECTIVE AND OBJECTIVE BOX
GI HPI:  Patient is a 89y old  Female who presents with a chief complaint of Acute blood loss anemia (25 Jun 2019 12:57)  GI following the patient for Anemia   Interval Events :   None   NO overt gi bleed symptoms       PAST MEDICAL & SURGICAL HISTORY  B12 deficiency  Lymphedema, limb: BLE  Colon cancer: 6 yrs ago. did not require chemo  Hard of Hearing  GERD (Gastroesophageal Reflux Disease)  Gallstone  Hypertension  H/O exploratory laparotomy: for SBO?  S/P colectomy: partial colectomy due to colon ca  S/P CAREY (Total Abdominal Hysterectomy)    ALLERGIES:  penicillin (Unknown)      MEDICATIONS:  MEDICATIONS  (STANDING):  cyanocobalamin Injectable 1000 MICROGram(s) IntraMuscular daily  docusate sodium 100 milliGRAM(s) Oral three times a day  fluticasone propionate 50 MICROgram(s)/spray Nasal Spray 1 Spray(s) Both Nostrils two times a day  pantoprazole  Injectable 40 milliGRAM(s) IV Push two times a day  senna 2 Tablet(s) Oral at bedtime  sucralfate suspension 1 Gram(s) Oral two times a day    MEDICATIONS  (PRN):      HOME MEDICATIONS:  Home Medications:  Carafate 1 g/10 mL oral suspension: 10 milliliter(s) orally 3 times a day (before meals) (22 Nov 2018 22:53)  Lasix 20 mg oral tablet: 1 tab(s) orally once a day (24 Jun 2019 14:37)  latanoprost 0.005% ophthalmic solution: 1 drop(s) to each affected eye once a day (in the evening) (22 Nov 2018 22:53)  loratadine 10 mg oral tablet: 1 tab(s) orally once a day (22 Nov 2018 22:53)  omeprazole 20 mg oral delayed release capsule: 1 cap(s) orally once a day (at bedtime) (22 Nov 2018 22:53)  Vitamin B-12 1000 mcg oral tablet: 1 tab(s) orally once a day (22 Nov 2018 22:53)      ROS:     REVIEW OF SYSTEMS  General:  No fevers  Eyes:  No reported pain   ENT:  No sore throat   NECK: No stiffness   CV:  No chest pain   Resp:  No shortness of breath  GI:  See HPI  :  No dysuria  Muscle:  No weakness  Neuro:  No tingling  Endocrine:  No polyuria  Heme:  No ecchymosis          VITALS:   T(F): 98 (06-25 @ 12:32), Max: 98.9 (06-25 @ 00:31)  HR: 52 (06-25 @ 12:32) (52 - 110)  BP: 132/54 (06-25 @ 12:32) (99/56 - 165/65)  BP(mean): --  RR: 18 (06-25 @ 12:32) (16 - 26)  SpO2: 100% (06-25 @ 12:32) (98% - 100%)    I&O's Summary      PHYSICAL EXAM:  Gen: comfortable   EYES: No scleral icterus   LUNG: Clear to auscultation bilaterally;  HEART: RRR; s1 and s2 heard   ABDOMEN: Soft, +BS, no abd distension , no Abdominal Tenderness, No guarding, No García Sign , + midline scar   Neuro: AAO x 3  Ext: no edema     LABS:                        7.8    5.79  )-----------( 204      ( 25 Jun 2019 16:30 )             24.8     PT/INR - ( 25 Jun 2019 07:22 )  INR: 1.16          PTT - ( 25 Jun 2019 07:22 )  PTT:27.4<L>  LIVER FUNCTIONS - ( 24 Jun 2019 09:15 )  Alb: 3.7 g/dL / Pro: 6.1 g/dL / ALK PHOS: 71 u/L / ALT: 8 u/L / AST: 13 u/L / GGT: x           06-25    142  |  110<H>  |  31<H>  ----------------------------<  70  3.9   |  22  |  1.39<H>    Ca    9.0      25 Jun 2019 07:22  Phos  2.8     06-25  Mg     1.9     06-25          RADIOLOGY:    < from: US Duplex Venous Lower Ext Complete, Bilateral (06.25.19 @ 11:57) >    IMPRESSION:     No evidence of deep venous thrombosis in either lower extremity.    < end of copied text >  < from: Xray Chest 1 View- PORTABLE-Urgent (06.24.19 @ 09:55) >    IMPRESSION:  Slight right hemidiaphragm elevation.    Clear lungs. No pleural effusions or pneumothorax.    Cardiac and mediastinal silhouettes within normal limits.    Trachea midline.    Generous osteopenia and spine and right shoulder degenerative change.    < end of copied text >

## 2019-06-25 NOTE — PHYSICAL THERAPY INITIAL EVALUATION ADULT - PERTINENT HX OF CURRENT PROBLEM, REHAB EVAL
Pt. admitted for acute blood loss anemia. Per radiology report, venous doppler bilateral LE revealed no evidence of deep venous thrombosis in either lower extremity. PMH of HTN, unprovoked PE (dx 2014), paroxysmal atrial fibrillation, colon cancer (10 years ago, s/p resection), GERD, and chronic lymphedema.

## 2019-06-25 NOTE — PROGRESS NOTE ADULT - PROBLEM SELECTOR PLAN 3
Most likely pre renal given anemia   Improving   continue to monitor   Avoid nephrotoxins   -Bladder scan

## 2019-06-25 NOTE — PROGRESS NOTE ADULT - ATTENDING COMMENTS
Monitor serial hemoglobin/hematocrit. Iron repletion. Avoid aspirin/NSAIDs. Empiric pantoprazole 40 mg q.d. Endoscopic evaluation discussed the patient declines endoscopic assessment.

## 2019-06-25 NOTE — PHYSICAL THERAPY INITIAL EVALUATION ADULT - ADDITIONAL COMMENTS
Pt. reports owning DME of straight cane, rolling walker. Pt. has HHA for 7 days/week 5 hours/day. Pt. states she does not leave her home often.     Pt. was left supine in bed post PT Evaluation, NAD, all lines intact, call ngo within reach. MALCOLM Koroma made aware of pt. status and participation in PT.

## 2019-06-25 NOTE — PROGRESS NOTE ADULT - PROBLEM SELECTOR PLAN 6
-Rate controlled  -Has been off AC x6 months, will continue to monitor off AC in setting of symptomatic anemia and likely GIB.

## 2019-06-25 NOTE — PROGRESS NOTE ADULT - PROBLEM SELECTOR PLAN 1
Symptomatic Iron deficiency  anemia suspect occult UGIB given + FOBT and denies hematochezia.   Iron studies cw Iron deficiency Anemia ( Ferratin 7, IBC- 422)   Also low B12 , low retic count   s/p 2 units pRBC. CBC post transfusion  7.1  FU CBC , transfuse if less than 7   -PPI IV BID , ferrous sulfate  Will add B12   Appreciate GI- Pt refusing endoscopy or colonoscopy    As per pt, she has been anemic since age 25 and used to receive B12 shots as outpt

## 2019-06-25 NOTE — PROGRESS NOTE ADULT - SUBJECTIVE AND OBJECTIVE BOX
Patient is a 89y old  Female who presents with a chief complaint of Acute blood loss anemia (24 Jun 2019 19:55)      SUBJECTIVE / OVERNIGHT EVENTS: Pt seen and examined by me   Feels much better after the blood tranfusion   Was very  symptomatic prior to coming in- was SOB, had palpitations     MEDICATIONS  (STANDING):  docusate sodium 100 milliGRAM(s) Oral three times a day  ferrous    sulfate 325 milliGRAM(s) Oral daily  fluticasone propionate 50 MICROgram(s)/spray Nasal Spray 1 Spray(s) Both Nostrils two times a day  pantoprazole  Injectable 40 milliGRAM(s) IV Push two times a day  senna 2 Tablet(s) Oral at bedtime    MEDICATIONS  (PRN):        CAPILLARY BLOOD GLUCOSE  Vital Signs Last 24 Hrs  T(C): 36.7 (25 Jun 2019 12:32), Max: 37.2 (25 Jun 2019 00:31)  T(F): 98 (25 Jun 2019 12:32), Max: 98.9 (25 Jun 2019 00:31)  HR: 52 (25 Jun 2019 12:32) (52 - 85)  BP: 132/54 (25 Jun 2019 12:32) (117/59 - 165/65)  BP(mean): --  RR: 18 (25 Jun 2019 12:32) (16 - 20)  SpO2: 100% (25 Jun 2019 12:32) (98% - 100%)      I&O's Summary      PHYSICAL EXAM:  GENERAL: NAD, thin, very pleasant   HEAD:  Atraumatic, Normocephalic  EYES: EOMI, PERRLA, conjunctiva and sclera clear  NECK: Supple prominent neck veins   CHEST/LUNG: Clear to auscultation bilaterally; No wheeze  HEART: Regular rate and rhythm; No murmurs, rubs, or gallops  ABDOMEN: Soft, Nontender, Nondistended; Bowel sounds present  EXTREMITIES:  2+ Peripheral Pulses, No clubbing, cyanosis,, Bilateral LE edema   PSYCH: AAOx3  NEUROLOGY: non-focal  SKIN: No rashes or lesions    LABS:                        7.1    6.16  )-----------( 208      ( 25 Jun 2019 07:22 )             22.2     06-25    142  |  110<H>  |  31<H>  ----------------------------<  70  3.9   |  22  |  1.39<H>    Ca    9.0      25 Jun 2019 07:22  Phos  2.8     06-25  Mg     1.9     06-25    TPro  6.1  /  Alb  3.7  /  TBili  0.2  /  DBili  x   /  AST  13  /  ALT  8   /  AlkPhos  71  06-24    PT/INR - ( 25 Jun 2019 07:22 )   PT: 12.9 SEC;   INR: 1.16          PTT - ( 25 Jun 2019 07:22 )  PTT:27.4 SEC          RADIOLOGY & ADDITIONAL TESTS:    Imaging Personally Reviewed:    Consultant(s) Notes Reviewed:      Care Discussed with Consultants/Other Providers:

## 2019-06-25 NOTE — PROGRESS NOTE ADULT - PROBLEM SELECTOR PLAN 2
-Most likely from acute blood loss anemia  -Wells score 3 given HR and hx of PE, however SOB most likely 2/2 symptomatic anemia   Saturation 100%  on RA   SOB improving with PRBC   -f/u LE doppler to rule out DVT

## 2019-06-25 NOTE — PHYSICAL THERAPY INITIAL EVALUATION ADULT - CRITERIA FOR SKILLED THERAPEUTIC INTERVENTIONS
therapy frequency/anticipated discharge recommendation/impairments found/rehab potential/predicted duration of therapy intervention

## 2019-06-26 ENCOUNTER — TRANSCRIPTION ENCOUNTER (OUTPATIENT)
Age: 84
End: 2019-06-26

## 2019-06-26 LAB
ANION GAP SERPL CALC-SCNC: 12 MMO/L — SIGNIFICANT CHANGE UP (ref 7–14)
BUN SERPL-MCNC: 25 MG/DL — HIGH (ref 7–23)
CALCIUM SERPL-MCNC: 8.8 MG/DL — SIGNIFICANT CHANGE UP (ref 8.4–10.5)
CHLORIDE SERPL-SCNC: 109 MMOL/L — HIGH (ref 98–107)
CO2 SERPL-SCNC: 22 MMOL/L — SIGNIFICANT CHANGE UP (ref 22–31)
CREAT SERPL-MCNC: 1.28 MG/DL — SIGNIFICANT CHANGE UP (ref 0.5–1.3)
GLUCOSE SERPL-MCNC: 68 MG/DL — LOW (ref 70–99)
HCT VFR BLD CALC: 22.7 % — LOW (ref 34.5–45)
HCT VFR BLD CALC: 33.1 % — LOW (ref 34.5–45)
HGB BLD-MCNC: 10.6 G/DL — LOW (ref 11.5–15.5)
HGB BLD-MCNC: 7 G/DL — CRITICAL LOW (ref 11.5–15.5)
MAGNESIUM SERPL-MCNC: 1.8 MG/DL — SIGNIFICANT CHANGE UP (ref 1.6–2.6)
MCHC RBC-ENTMCNC: 23.7 PG — LOW (ref 27–34)
MCHC RBC-ENTMCNC: 24.7 PG — LOW (ref 27–34)
MCHC RBC-ENTMCNC: 30.8 % — LOW (ref 32–36)
MCHC RBC-ENTMCNC: 32 % — SIGNIFICANT CHANGE UP (ref 32–36)
MCV RBC AUTO: 76.9 FL — LOW (ref 80–100)
MCV RBC AUTO: 77.2 FL — LOW (ref 80–100)
NRBC # FLD: 0 K/UL — SIGNIFICANT CHANGE UP (ref 0–0)
NRBC # FLD: 0.03 K/UL — SIGNIFICANT CHANGE UP (ref 0–0)
PLATELET # BLD AUTO: 197 K/UL — SIGNIFICANT CHANGE UP (ref 150–400)
PLATELET # BLD AUTO: 202 K/UL — SIGNIFICANT CHANGE UP (ref 150–400)
PMV BLD: 8.7 FL — SIGNIFICANT CHANGE UP (ref 7–13)
PMV BLD: 9.2 FL — SIGNIFICANT CHANGE UP (ref 7–13)
POTASSIUM SERPL-MCNC: 3.7 MMOL/L — SIGNIFICANT CHANGE UP (ref 3.5–5.3)
POTASSIUM SERPL-SCNC: 3.7 MMOL/L — SIGNIFICANT CHANGE UP (ref 3.5–5.3)
RBC # BLD: 2.95 M/UL — LOW (ref 3.8–5.2)
RBC # BLD: 4.29 M/UL — SIGNIFICANT CHANGE UP (ref 3.8–5.2)
RBC # FLD: 17.2 % — HIGH (ref 10.3–14.5)
RBC # FLD: 17.8 % — HIGH (ref 10.3–14.5)
SODIUM SERPL-SCNC: 143 MMOL/L — SIGNIFICANT CHANGE UP (ref 135–145)
WBC # BLD: 6.72 K/UL — SIGNIFICANT CHANGE UP (ref 3.8–10.5)
WBC # BLD: 7.3 K/UL — SIGNIFICANT CHANGE UP (ref 3.8–10.5)
WBC # FLD AUTO: 6.72 K/UL — SIGNIFICANT CHANGE UP (ref 3.8–10.5)
WBC # FLD AUTO: 7.3 K/UL — SIGNIFICANT CHANGE UP (ref 3.8–10.5)

## 2019-06-26 PROCEDURE — 99233 SBSQ HOSP IP/OBS HIGH 50: CPT

## 2019-06-26 RX ORDER — FUROSEMIDE 40 MG
20 TABLET ORAL ONCE
Refills: 0 | Status: COMPLETED | OUTPATIENT
Start: 2019-06-26 | End: 2019-06-26

## 2019-06-26 RX ORDER — LATANOPROST 0.05 MG/ML
1 SOLUTION/ DROPS OPHTHALMIC; TOPICAL AT BEDTIME
Refills: 0 | Status: DISCONTINUED | OUTPATIENT
Start: 2019-06-26 | End: 2019-06-28

## 2019-06-26 RX ADMIN — Medication 20 MILLIGRAM(S): at 14:27

## 2019-06-26 RX ADMIN — Medication 1 GRAM(S): at 17:12

## 2019-06-26 RX ADMIN — PANTOPRAZOLE SODIUM 40 MILLIGRAM(S): 20 TABLET, DELAYED RELEASE ORAL at 19:04

## 2019-06-26 RX ADMIN — PANTOPRAZOLE SODIUM 40 MILLIGRAM(S): 20 TABLET, DELAYED RELEASE ORAL at 06:29

## 2019-06-26 RX ADMIN — Medication 1 SPRAY(S): at 22:03

## 2019-06-26 RX ADMIN — LATANOPROST 1 DROP(S): 0.05 SOLUTION/ DROPS OPHTHALMIC; TOPICAL at 22:02

## 2019-06-26 RX ADMIN — Medication 1 GRAM(S): at 06:29

## 2019-06-26 RX ADMIN — PREGABALIN 1000 MICROGRAM(S): 225 CAPSULE ORAL at 11:21

## 2019-06-26 NOTE — DISCHARGE NOTE PROVIDER - CARE PROVIDER_API CALL
Misha Thomas (MD)  Internal Medicine  [address] [place] [address], [place] [address]  Phone: [phone]  Fax: [phone]  Follow Up Time:

## 2019-06-26 NOTE — DISCHARGE NOTE PROVIDER - NSFOLLOWUPCLINICS_GEN_ALL_ED_FT
[facility]  Gastroenterology  [address] [place] [id number]  Phone: [phone]  Fax:   Follow Up Time:

## 2019-06-26 NOTE — PROGRESS NOTE ADULT - PROBLEM SELECTOR PLAN 1
Symptomatic Iron deficiency  anemia suspect occult UGIB given + FOBT and denies hematochezia( pt with previous history of colon cancer 10 years back)  Iron studies cw Iron deficiency Anemia ( Ferratin 7, IBC- 422)   Also low B12 , low retic count   s/p 2 units pRBC. CBC post transfusion  7.1 -->7.8-->7   Plan to transfuse  2 units PRBC with Lasix 20 mg in between units   continue PPI IV BID , ferrous sulfate   On IM  B12  Appreciate GI- Pt refusing endoscopy or colonoscopy    As per pt, she has been anemic since age 25 and used to receive B12 shots as outpt  Curbsided Heme- Advise IV Iron depending on Iron deficit Symptomatic Iron deficiency  anemia suspect occult UGIB given + FOBT and denies hematochezia( pt with previous history of colon cancer 10 years back)  Iron studies cw Iron deficiency Anemia ( Ferratin 7, IBC- 422)   Also low B12 , low retic count   s/p 2 units pRBC. CBC post transfusion  7.1 -->7.8-->7   Plan to transfuse  2 units PRBC with Lasix 20 mg in between units   continue PPI IV BID , ferrous sulfate   On IM  B12  Appreciate GI- Pt refusing endoscopy or colonoscopy . Pt continues to refuse intervention. I explained to her that she will continue to have occult blood loss with  possible ongoing needs  for transfusion if the source is not controlled.  As per pt, she has been anemic since age 25 and used to receive B12 shots as outpt  Curbsided Heme- Advise IV Iron depending on Iron deficit

## 2019-06-26 NOTE — PROGRESS NOTE ADULT - SUBJECTIVE AND OBJECTIVE BOX
Patient is a 89y old  Female who presents with a chief complaint of Acute blood loss anemia (24 Jun 2019 19:55)      SUBJECTIVE / OVERNIGHT EVENTS: Pt seen and examined by me   Was very  symptomatic prior to coming in- was SOB, had palpitations     MEDICATIONS  (STANDING):  cyanocobalamin Injectable 1000 MICROGram(s) IntraMuscular daily  docusate sodium 100 milliGRAM(s) Oral three times a day  fluticasone propionate 50 MICROgram(s)/spray Nasal Spray 1 Spray(s) Both Nostrils two times a day  pantoprazole  Injectable 40 milliGRAM(s) IV Push two times a day  senna 2 Tablet(s) Oral at bedtime  sucralfate suspension 1 Gram(s) Oral two times a day    MEDICATIONS  (PRN):        CAPILLARY BLOOD GLUCOSE    Vital Signs Last 24 Hrs  T(C): 36.7 (26 Jun 2019 06:28), Max: 36.7 (25 Jun 2019 12:32)  T(F): 98 (26 Jun 2019 06:28), Max: 98 (25 Jun 2019 12:32)  HR: 55 (26 Jun 2019 06:28) (52 - 80)  BP: 132/54 (26 Jun 2019 06:28) (119/61 - 132/54)  BP(mean): --  RR: 16 (26 Jun 2019 06:28) (16 - 18)  SpO2: 97% (26 Jun 2019 06:28) (97% - 100%)    I&O's Summary      PHYSICAL EXAM:  GENERAL: NAD, thin, very pleasant   HEAD:  Atraumatic, Normocephalic  EYES: EOMI, PERRLA, conjunctiva and sclera clear  NECK: Supple prominent neck veins   CHEST/LUNG: Clear to auscultation bilaterally; No wheeze  HEART: Regular rate and rhythm; No murmurs, rubs, or gallops  ABDOMEN: Soft, Nontender, Nondistended; Bowel sounds present  EXTREMITIES:  2+ Peripheral Pulses, No clubbing, cyanosis,, Bilateral LE edema   PSYCH: AAOx3  NEUROLOGY: non-focal  SKIN: No rashes or lesions    LABS:                                       7.0    6.72  )-----------( 202      ( 26 Jun 2019 05:30 )             22.7      06-26    143  |  109<H>  |  25<H>  ----------------------------<  68<L>  3.7   |  22  |  1.28    Ca    8.8      26 Jun 2019 05:30  Phos  2.8     06-25  Mg     1.8     06-26    TPro  6.1  /  Alb  3.7  /  TBili  0.2  /  DBili  x   /  AST  13  /  ALT  8   /  AlkPhos  71  06-24      RADIOLOGY & ADDITIONAL TESTS:    Imaging Personally Reviewed:    Consultant(s) Notes Reviewed:      Care Discussed with Consultants/Other Providers: Patient is a 89y old  Female who presents with a chief complaint of Acute blood loss anemia (24 Jun 2019 19:55)      SUBJECTIVE / OVERNIGHT EVENTS: Pt seen and examined by me   clinically improving though decrease in HH this AM   Was very  symptomatic prior to coming in- was SOB, had palpitations   Had a normal BM this AM. denies melena or hematochezia     MEDICATIONS  (STANDING):  cyanocobalamin Injectable 1000 MICROGram(s) IntraMuscular daily  docusate sodium 100 milliGRAM(s) Oral three times a day  fluticasone propionate 50 MICROgram(s)/spray Nasal Spray 1 Spray(s) Both Nostrils two times a day  pantoprazole  Injectable 40 milliGRAM(s) IV Push two times a day  senna 2 Tablet(s) Oral at bedtime  sucralfate suspension 1 Gram(s) Oral two times a day    MEDICATIONS  (PRN):        CAPILLARY BLOOD GLUCOSE    Vital Signs Last 24 Hrs  T(C): 36.7 (26 Jun 2019 06:28), Max: 36.7 (25 Jun 2019 12:32)  T(F): 98 (26 Jun 2019 06:28), Max: 98 (25 Jun 2019 12:32)  HR: 55 (26 Jun 2019 06:28) (52 - 80)  BP: 132/54 (26 Jun 2019 06:28) (119/61 - 132/54)  BP(mean): --  RR: 16 (26 Jun 2019 06:28) (16 - 18)  SpO2: 97% (26 Jun 2019 06:28) (97% - 100%)    I&O's Summary      PHYSICAL EXAM:  GENERAL: NAD, thin, very pleasant   HEAD:  Atraumatic, Normocephalic  EYES: EOMI, PERRLA, conjunctiva and sclera clear  NECK: Supple prominent neck veins   CHEST/LUNG: Clear to auscultation bilaterally; No wheeze  HEART: Regular rate and rhythm; No murmurs, rubs, or gallops  ABDOMEN: Soft, Nontender, Nondistended; Bowel sounds present  EXTREMITIES:  2+ Peripheral Pulses, No clubbing, cyanosis,, Bilateral LE edema   PSYCH: AAOx3  NEUROLOGY: non-focal  SKIN: No rashes or lesions    LABS:                                       7.0    6.72  )-----------( 202      ( 26 Jun 2019 05:30 )             22.7      06-26    143  |  109<H>  |  25<H>  ----------------------------<  68<L>  3.7   |  22  |  1.28    Ca    8.8      26 Jun 2019 05:30  Phos  2.8     06-25  Mg     1.8     06-26    TPro  6.1  /  Alb  3.7  /  TBili  0.2  /  DBili  x   /  AST  13  /  ALT  8   /  AlkPhos  71  06-24      RADIOLOGY & ADDITIONAL TESTS:    Imaging Personally Reviewed:    Consultant(s) Notes Reviewed:      Care Discussed with Consultants/Other Providers:

## 2019-06-26 NOTE — CHART NOTE - NSCHARTNOTEFT_GEN_A_CORE
Gastroenterology Note   GI following the patient for anemia, no overt gi bleed.   Pt refusing endoscopic evaluation for anemia   Please recall GI if patient agrees for endoscopic evaluation or if patient develops overt GI bleed and or significant drop in Hb   Monitor CBC and transfuse to keep Hb> 7-8   Protonix 40 daily  Avoid NSAIDS

## 2019-06-26 NOTE — DISCHARGE NOTE PROVIDER - NSDCHHNEEDSERVICE_GEN_ALL_CORE
Medication teaching and assessment Rehabilitation services/Medication teaching and assessment Rehabilitation services/Medication teaching and assessment/Observation and assessment

## 2019-06-26 NOTE — DISCHARGE NOTE NURSING/CASE MANAGEMENT/SOCIAL WORK - NSDCDPATPORTLINK_GEN_ALL_CORE
You can access the [facility] Patient Portal, offered by [facility], by registering with the following website: [email or web address]

## 2019-06-26 NOTE — PROGRESS NOTE ADULT - PROBLEM SELECTOR PLAN 2
-Most likely from acute blood loss anemia  -Wells score 3 given HR and hx of PE, however SOB most likely 2/2 symptomatic anemia   Saturation 100%  on RA   SOB improving with PRBC   LE doppler negative for DVT

## 2019-06-26 NOTE — DISCHARGE NOTE PROVIDER - HOSPITAL COURSE
89 year old woman with PMHx of HTN, unprovoked PE (dx 2014, not on AC given recent hx of GIB 7 months ago), paroxysmal atrial fibrillation, colon cancer (10 years ago, s/p resection), GERD, and chronic lymphedema presented with SOB and palpitations found to have low hemoglobin concerning for GIB, unclear if upper or lower. Cannot rule out malignancy. Low suspicion for medication side effect, infection, and PE given no hypoxia or recent changes to medications.        1. Acute blood loss anemia.      -Symptomatic Iron deficiency anemia suspect occult UGIB given + FOBT and denies hematochezia (pt with previous history of colon cancer 10 years back)    -Iron studies consistent with Iron deficiency Anemia (Ferritin 7, TIBC 422)     -Also low B12, low retic count     -s/p a total of 4 units pRBC. H&H now stable at 10.6/32.2     -continue PPI BID, ferrous sulfate     -On IM B12    -GI following: Pt refusing endoscopy or colonoscopy. Pt continues to refuse intervention.     -Curbsided Heme- Advise IV Iron depending on Iron deficit. s/p IV iron.        2. Shortness of breath.      -Most likely from acute blood loss anemia    -Wells score 3 given HR and hx of PE, however SOB most likely 2/2 symptomatic anemia     -Saturation 100%  on RA     -improved after pRBC     -LE doppler negative for DVT.             3. DIOGENES (acute kidney injury).      -Most likely pre renal given anemia     -Improving     -Avoid nephrotoxins.     -Lasix was on hold, then resumed            4. Essential hypertension.      -Currently controlled    -Lasix resumed             5. Gastroesophageal reflux disease, esophagitis presence not specified.      -on Protonix and Carafate. 89 year old woman with PMHx of HTN, unprovoked PE (dx 2014, not on AC given recent hx of GIB 7 months ago), paroxysmal atrial fibrillation, colon cancer (10 years ago, s/p resection), GERD, and chronic lymphedema presented with SOB and palpitations found to have low hemoglobin concerning for GIB, unclear if upper or lower. Cannot rule out malignancy. Low suspicion for medication side effect, infection, and PE given no hypoxia or recent changes to medications.        1. Acute blood loss anemia.      -Symptomatic Iron deficiency anemia suspect occult UGIB given + FOBT and denies hematochezia (pt with previous history of colon cancer 10 years back)    -Iron studies consistent with Iron deficiency Anemia (Ferritin 7, TIBC 422)     -Also low B12, low retic count     -s/p a total of 4 units pRBC. H&H now stable at 10.6/32.2     -continue PPI BID, ferrous sulfate     -On IM B12    -GI following: Pt refusing endoscopy or colonoscopy. Pt continues to refuse intervention.     -Curbsided Heme- Advise IV Iron depending on Iron deficit. s/p IV iron x 1 dose.        2. Shortness of breath.      -Most likely from acute blood loss anemia    -Wells score 3 given HR and hx of PE, however SOB most likely 2/2 symptomatic anemia     -Saturation 100%  on RA     -improved after pRBC     -LE doppler negative for DVT.             3. DIOGENES (acute kidney injury).      -Most likely pre renal given anemia     -Improving     -Avoid nephrotoxins.     -Lasix was on hold, then resumed            4. Essential hypertension.      -Currently controlled    -Lasix resumed             5. Gastroesophageal reflux disease, esophagitis presence not specified.      -on Protonix and Carafate. 89 year old woman with PMHx of HTN, unprovoked PE (dx 2014, not on AC given recent hx of GIB 7 months ago), paroxysmal atrial fibrillation, colon cancer (10 years ago, s/p resection), GERD, and chronic lymphedema presented with SOB and palpitations found to have low hemoglobin concerning for GIB, unclear if upper or lower. Cannot rule out malignancy. Low suspicion for medication side effect, infection, and PE given no hypoxia or recent changes to medications.        1. Acute blood loss anemia.      -Symptomatic Iron deficiency anemia suspect occult UGIB given + FOBT and denies hematochezia (pt with previous history of colon cancer 10 years back)    -Iron studies consistent with Iron deficiency Anemia (Ferritin 7, TIBC 422)     -Also low B12, low retic count     -s/p a total of 4 units pRBC. H&H now stable at 10.6/32.2     -continue PPI BID, ferrous sulfate     -On IM B12    -GI following: Pt refusing endoscopy or colonoscopy. Pt continues to refuse intervention.     -Curbsided Heme- Advise IV Iron depending on Iron deficit. s/p IV iron x 1 dose.        2. Shortness of breath.      -Most likely from acute blood loss anemia    -Wells score 3 given HR and hx of PE, however SOB most likely 2/2 symptomatic anemia     -Saturation 100%  on RA     -improved after pRBC     -LE doppler negative for DVT.             3. DIOGENES (acute kidney injury).      -Most likely pre renal given anemia     -Improving     -Avoid nephrotoxins.     -Lasix was on hold, then resumed            4. Essential hypertension.      -Currently controlled    -Lasix resumed             5. Gastroesophageal reflux disease, esophagitis presence not specified.      -on Protonix and Carafate.             As per Dr. Watson, patient is medically stable for discharge home with home PT.

## 2019-06-26 NOTE — DISCHARGE NOTE NURSING/CASE MANAGEMENT/SOCIAL WORK - NSDCPEPT PROEDMA_GEN_ALL_CORE
Carin Nicholas, a 87 y.o. female presents to the ED found on floor of apartment by neighbor.  Pt states her legs have been feeling weak and they gave out on her and she fell.  C/o R leg pain, R shoulder pain, bilateral hip pain and back pain.  Pt denies hitting her head or LOC/dizziness.      Chief Complaint   Patient presents with    Back Pain     patient have multiple medical complaints, patient states that she's been having pain for a while and is gradually getting worst    Hip Pain    Abdominal Pain     Review of patient's allergies indicates:  No Known Allergies  Past Medical History:   Diagnosis Date    Arthritis     Diabetes mellitus, type 2     Hyperlipidemia     Hypertension     Hypothyroidism     Rheumatoid arthritis        
No

## 2019-06-26 NOTE — DISCHARGE NOTE PROVIDER - NSDCCPCAREPLAN_GEN_ALL_CORE_FT
PRINCIPAL DISCHARGE DIAGNOSIS  Diagnosis: Acute blood loss anemia  Assessment and Plan of Treatment: You refused endoscopic evaluation. You received multiple blood transfusions on this admission. Continue B12 injections, ferrous sulfate,  Protonix, and Carafate. Follow up with your primary care physician within 1-2 weeks to monitor your blood counts. Follow up with GI within 1-2 weeks, clinic located at [address] [place] [address], [address] [address]; call [phone] for appointment.      SECONDARY DISCHARGE DIAGNOSES  Diagnosis: Shortness of breath  Assessment and Plan of Treatment: Likely secondary to anemia. Improved after blood transfusions. Continue B12 injections, ferrous sulfate,  Protonix, and Carafate. Follow up with your primary care physician within 1-2 weeks to monitor your blood counts. Follow up with GI within 1-2 weeks, clinic located at [address] [place] [address], [facility] [address], [place] [address]; call [phone] for appointment.    Diagnosis: DIOGENES (acute kidney injury)  Assessment and Plan of Treatment: DIOGENES (acute kidney injury)    Diagnosis: Essential hypertension  Assessment and Plan of Treatment: Essential hypertension    Diagnosis: Gastroesophageal reflux disease, esophagitis presence not specified  Assessment and Plan of Treatment: Gastroesophageal reflux disease, esophagitis presence not specified PRINCIPAL DISCHARGE DIAGNOSIS  Diagnosis: Acute blood loss anemia  Assessment and Plan of Treatment: You refused endoscopic evaluation. You received multiple blood transfusions on this admission. Continue B12 injections, ferrous sulfate,  Protonix, and Carafate. Follow up with your primary care physician within 1-2 weeks to monitor your blood counts. Follow up with GI within 1-2 weeks, clinic located at [address] [place] [address], [place] [address], [place] [address]; call [phone] for appointment.      SECONDARY DISCHARGE DIAGNOSES  Diagnosis: Shortness of breath  Assessment and Plan of Treatment: Likely secondary to anemia. Improved after blood transfusions. Continue B12 injections, ferrous sulfate,  Protonix, and Carafate. Follow up with your primary care physician within 1-2 weeks to monitor your blood counts. Follow up with GI within 1-2 weeks, clinic located at [address] [place] [address], [facility] [address], [place] [address]; call [phone] for appointment.    Diagnosis: DIOGENES (acute kidney injury)  Assessment and Plan of Treatment: Improving. Avoid nephrotoxic drugs such as nonsteroidal anti-inflammatory agents (NSAIDs). Please follow up with your primary care physician within 1-2 weeks to monitor your kidney function, continue with low protein and potassium diet.    Diagnosis: Essential hypertension  Assessment and Plan of Treatment: Low sodium and fat diet. Continue Lasix. Follow up with your primary care physician within 1-2 weeks.    Diagnosis: Gastroesophageal reflux disease, esophagitis presence not specified  Assessment and Plan of Treatment: Avoid fatty, fried foods, acidic foods such as tomatoes, lime and chocolate. Continue Protonix and Carafate as prescribed. PRINCIPAL DISCHARGE DIAGNOSIS  Diagnosis: Acute blood loss anemia  Assessment and Plan of Treatment: You refused endoscopic evaluation. You received multiple blood transfusions on this admission. Continue B12, ferrous sulfate,  Protonix, and Carafate. Follow up with your primary care physician within 1-2 weeks to monitor your blood counts. Follow up with GI within 1-2 weeks, clinic located at [address] [place] [address], [place] [address], [place] [address]; call [phone] for appointment.      SECONDARY DISCHARGE DIAGNOSES  Diagnosis: Shortness of breath  Assessment and Plan of Treatment: Likely secondary to anemia. Improved after blood transfusions. Continue B12, ferrous sulfate,  Protonix, and Carafate. Follow up with your primary care physician within 1-2 weeks to monitor your blood counts. Follow up with GI within 1-2 weeks, clinic located at [address] [facility], [facility] [address], [place] [address]; call [phone] for appointment.    Diagnosis: DIOGENES (acute kidney injury)  Assessment and Plan of Treatment: Improving. Avoid nephrotoxic drugs such as nonsteroidal anti-inflammatory agents (NSAIDs). Please follow up with your primary care physician within 1-2 weeks to monitor your kidney function, continue with low protein and potassium diet.    Diagnosis: Essential hypertension  Assessment and Plan of Treatment: Low sodium and fat diet. Continue Lasix. Follow up with your primary care physician within 1-2 weeks.    Diagnosis: Gastroesophageal reflux disease, esophagitis presence not specified  Assessment and Plan of Treatment: Avoid fatty, fried foods, acidic foods such as tomatoes, lime and chocolate. Continue Protonix and Carafate as prescribed.

## 2019-06-27 DIAGNOSIS — R19.7 DIARRHEA, UNSPECIFIED: ICD-10-CM

## 2019-06-27 LAB
ANION GAP SERPL CALC-SCNC: 13 MMO/L — SIGNIFICANT CHANGE UP (ref 7–14)
BLD GP AB SCN SERPL QL: NEGATIVE — SIGNIFICANT CHANGE UP
BUN SERPL-MCNC: 21 MG/DL — SIGNIFICANT CHANGE UP (ref 7–23)
CALCIUM SERPL-MCNC: 9.2 MG/DL — SIGNIFICANT CHANGE UP (ref 8.4–10.5)
CHLORIDE SERPL-SCNC: 107 MMOL/L — SIGNIFICANT CHANGE UP (ref 98–107)
CO2 SERPL-SCNC: 22 MMOL/L — SIGNIFICANT CHANGE UP (ref 22–31)
CREAT SERPL-MCNC: 1.32 MG/DL — HIGH (ref 0.5–1.3)
GLUCOSE SERPL-MCNC: 75 MG/DL — SIGNIFICANT CHANGE UP (ref 70–99)
HCT VFR BLD CALC: 32.2 % — LOW (ref 34.5–45)
HGB BLD-MCNC: 10.6 G/DL — LOW (ref 11.5–15.5)
MAGNESIUM SERPL-MCNC: 1.8 MG/DL — SIGNIFICANT CHANGE UP (ref 1.6–2.6)
MCHC RBC-ENTMCNC: 25.4 PG — LOW (ref 27–34)
MCHC RBC-ENTMCNC: 32.9 % — SIGNIFICANT CHANGE UP (ref 32–36)
MCV RBC AUTO: 77.2 FL — LOW (ref 80–100)
NRBC # FLD: 0 K/UL — SIGNIFICANT CHANGE UP (ref 0–0)
PHOSPHATE SERPL-MCNC: 2.8 MG/DL — SIGNIFICANT CHANGE UP (ref 2.5–4.5)
PLATELET # BLD AUTO: 198 K/UL — SIGNIFICANT CHANGE UP (ref 150–400)
PMV BLD: 8.9 FL — SIGNIFICANT CHANGE UP (ref 7–13)
POTASSIUM SERPL-MCNC: 3.6 MMOL/L — SIGNIFICANT CHANGE UP (ref 3.5–5.3)
POTASSIUM SERPL-SCNC: 3.6 MMOL/L — SIGNIFICANT CHANGE UP (ref 3.5–5.3)
RBC # BLD: 4.17 M/UL — SIGNIFICANT CHANGE UP (ref 3.8–5.2)
RBC # FLD: 17.4 % — HIGH (ref 10.3–14.5)
RH IG SCN BLD-IMP: POSITIVE — SIGNIFICANT CHANGE UP
SODIUM SERPL-SCNC: 142 MMOL/L — SIGNIFICANT CHANGE UP (ref 135–145)
WBC # BLD: 6.63 K/UL — SIGNIFICANT CHANGE UP (ref 3.8–10.5)
WBC # FLD AUTO: 6.63 K/UL — SIGNIFICANT CHANGE UP (ref 3.8–10.5)

## 2019-06-27 PROCEDURE — 99232 SBSQ HOSP IP/OBS MODERATE 35: CPT

## 2019-06-27 RX ORDER — FERROUS SULFATE 325(65) MG
1 TABLET ORAL
Qty: 30 | Refills: 0
Start: 2019-06-27 | End: 2019-07-26

## 2019-06-27 RX ORDER — FERROUS SULFATE 325(65) MG
325 TABLET ORAL DAILY
Refills: 0 | Status: DISCONTINUED | OUTPATIENT
Start: 2019-06-27 | End: 2019-06-28

## 2019-06-27 RX ORDER — FLUTICASONE PROPIONATE 50 MCG
1 SPRAY, SUSPENSION NASAL
Qty: 1 | Refills: 0
Start: 2019-06-27 | End: 2019-07-03

## 2019-06-27 RX ORDER — OMEPRAZOLE 10 MG/1
1 CAPSULE, DELAYED RELEASE ORAL
Qty: 0 | Refills: 0 | DISCHARGE

## 2019-06-27 RX ORDER — FUROSEMIDE 40 MG
20 TABLET ORAL DAILY
Refills: 0 | Status: DISCONTINUED | OUTPATIENT
Start: 2019-06-27 | End: 2019-06-28

## 2019-06-27 RX ORDER — PANTOPRAZOLE SODIUM 20 MG/1
40 TABLET, DELAYED RELEASE ORAL
Refills: 0 | Status: DISCONTINUED | OUTPATIENT
Start: 2019-06-27 | End: 2019-06-28

## 2019-06-27 RX ORDER — ONDANSETRON 8 MG/1
4 TABLET, FILM COATED ORAL ONCE
Refills: 0 | Status: COMPLETED | OUTPATIENT
Start: 2019-06-27 | End: 2019-06-27

## 2019-06-27 RX ORDER — PANTOPRAZOLE SODIUM 20 MG/1
1 TABLET, DELAYED RELEASE ORAL
Qty: 30 | Refills: 0
Start: 2019-06-27

## 2019-06-27 RX ADMIN — PANTOPRAZOLE SODIUM 40 MILLIGRAM(S): 20 TABLET, DELAYED RELEASE ORAL at 06:01

## 2019-06-27 RX ADMIN — Medication 1 GRAM(S): at 06:01

## 2019-06-27 RX ADMIN — PANTOPRAZOLE SODIUM 40 MILLIGRAM(S): 20 TABLET, DELAYED RELEASE ORAL at 21:05

## 2019-06-27 RX ADMIN — LATANOPROST 1 DROP(S): 0.05 SOLUTION/ DROPS OPHTHALMIC; TOPICAL at 21:05

## 2019-06-27 RX ADMIN — Medication 325 MILLIGRAM(S): at 11:30

## 2019-06-27 RX ADMIN — Medication 1 GRAM(S): at 17:29

## 2019-06-27 RX ADMIN — ONDANSETRON 4 MILLIGRAM(S): 8 TABLET, FILM COATED ORAL at 17:29

## 2019-06-27 RX ADMIN — Medication 20 MILLIGRAM(S): at 11:30

## 2019-06-27 RX ADMIN — PREGABALIN 1000 MICROGRAM(S): 225 CAPSULE ORAL at 11:31

## 2019-06-27 NOTE — PROGRESS NOTE ADULT - PROBLEM SELECTOR PLAN 1
Multiple episodes of diarrhea yday ,none so far  Pt was consitpated at home, takes laxatives  Will continue to monitor

## 2019-06-27 NOTE — PROGRESS NOTE ADULT - PROBLEM SELECTOR PLAN 2
Symptomatic Iron deficiency  anemia suspect occult UGIB given + FOBT and denies hematochezia( pt with previous history of colon cancer 10 years back)  Iron studies cw Iron deficiency Anemia ( Ferratin 7, IBC- 422)   Also low B12 , low retic count   s/p 2 units pRBC. CBC post transfusion  7.1 -->7.8-->7   S/p 4 units PRBC this admission   continue PPI IV BID , ferrous sulfate   On IM  B12  Appreciate GI- Pt refusing endoscopy or colonoscopy . Pt continues to refuse intervention. I explained to her that she will continue to have occult blood loss with  possible ongoing needs  for transfusion if the source is not controlled.  As per pt, she has been anemic since age 25 and used to receive B12 shots as outpt  Curbsided Heme- Advise IV Iron depending on Iron deficit but may not need as pt received 4 units PRBC which will provide sufficient iron

## 2019-06-27 NOTE — PROGRESS NOTE ADULT - SUBJECTIVE AND OBJECTIVE BOX
Patient is a 89y old  Female who presents with a chief complaint of Acute blood loss anemia (24 Jun 2019 19:55)      SUBJECTIVE / OVERNIGHT EVENTS: Pt seen and examined by me   Reported  loose BM starting yday after lunch, felt weak   No diarrhea this AM   Feels better       MEDICATIONS  (STANDING):  cyanocobalamin Injectable 1000 MICROGram(s) IntraMuscular daily  ferrous    sulfate 325 milliGRAM(s) Oral daily  fluticasone propionate 50 MICROgram(s)/spray Nasal Spray 1 Spray(s) Both Nostrils two times a day  latanoprost 0.005% Ophthalmic Solution 1 Drop(s) Both EYES at bedtime  pantoprazole  Injectable 40 milliGRAM(s) IV Push two times a day  sucralfate suspension 1 Gram(s) Oral two times a day    MEDICATIONS  (PRN):        CAPILLARY BLOOD GLUCOSE    Vital Signs Last 24 Hrs  T(C): 36.7 (27 Jun 2019 05:59), Max: 37 (26 Jun 2019 21:07)  T(F): 98 (27 Jun 2019 05:59), Max: 98.6 (26 Jun 2019 21:07)  HR: 85 (27 Jun 2019 05:59) (55 - 94)  BP: 121/55 (27 Jun 2019 05:59) (121/55 - 165/64)  BP(mean): --  RR: 16 (27 Jun 2019 05:59) (16 - 18)  SpO2: 98% (27 Jun 2019 05:59) (97% - 100%)  I&O's Summary      PHYSICAL EXAM:  GENERAL: NAD, thin, very pleasant   HEAD:  Atraumatic, Normocephalic  EYES: EOMI, PERRLA, conjunctiva and sclera clear  NECK: Supple prominent neck veins   CHEST/LUNG: Clear to auscultation bilaterally; No wheeze  HEART: Regular rate and rhythm; No murmurs, rubs, or gallops  ABDOMEN: Soft, Nontender, Nondistended; Bowel sounds present  EXTREMITIES:  2+ Peripheral Pulses, No clubbing, cyanosis,, Bilateral LE edema   PSYCH: AAOx3  NEUROLOGY: non-focal  SKIN: No rashes or lesions    LABS:                                                     10.6   6.63  )-----------( 198      ( 27 Jun 2019 07:00 )             32.2       06-27    142  |  107  |  21  ----------------------------<  75  3.6   |  22  |  1.32<H>    Ca    9.2      27 Jun 2019 07:00  Phos  2.8     06-27  Mg     1.8     06-27      RADIOLOGY & ADDITIONAL TESTS:    Imaging Personally Reviewed:    Consultant(s) Notes Reviewed:      Care Discussed with Consultants/Other Providers:

## 2019-06-27 NOTE — PROGRESS NOTE ADULT - PROBLEM SELECTOR PLAN 4
Most likely pre renal given anemia (Baseline creatinine 1.0-1.2  Improving   continue to monitor   Avoid nephrotoxins

## 2019-06-28 VITALS
TEMPERATURE: 98 F | HEART RATE: 62 BPM | SYSTOLIC BLOOD PRESSURE: 139 MMHG | OXYGEN SATURATION: 98 % | DIASTOLIC BLOOD PRESSURE: 77 MMHG | RESPIRATION RATE: 17 BRPM

## 2019-06-28 LAB
ANION GAP SERPL CALC-SCNC: 14 MMO/L — SIGNIFICANT CHANGE UP (ref 7–14)
BUN SERPL-MCNC: 24 MG/DL — HIGH (ref 7–23)
CALCIUM SERPL-MCNC: 9.1 MG/DL — SIGNIFICANT CHANGE UP (ref 8.4–10.5)
CHLORIDE SERPL-SCNC: 107 MMOL/L — SIGNIFICANT CHANGE UP (ref 98–107)
CO2 SERPL-SCNC: 20 MMOL/L — LOW (ref 22–31)
CREAT SERPL-MCNC: 1.42 MG/DL — HIGH (ref 0.5–1.3)
GLUCOSE SERPL-MCNC: 84 MG/DL — SIGNIFICANT CHANGE UP (ref 70–99)
HCT VFR BLD CALC: 34.7 % — SIGNIFICANT CHANGE UP (ref 34.5–45)
HGB BLD-MCNC: 10.9 G/DL — LOW (ref 11.5–15.5)
MAGNESIUM SERPL-MCNC: 1.7 MG/DL — SIGNIFICANT CHANGE UP (ref 1.6–2.6)
MCHC RBC-ENTMCNC: 24.8 PG — LOW (ref 27–34)
MCHC RBC-ENTMCNC: 31.4 % — LOW (ref 32–36)
MCV RBC AUTO: 78.9 FL — LOW (ref 80–100)
NRBC # FLD: 0 K/UL — SIGNIFICANT CHANGE UP (ref 0–0)
PLATELET # BLD AUTO: 208 K/UL — SIGNIFICANT CHANGE UP (ref 150–400)
PMV BLD: 8.9 FL — SIGNIFICANT CHANGE UP (ref 7–13)
POTASSIUM SERPL-MCNC: 4 MMOL/L — SIGNIFICANT CHANGE UP (ref 3.5–5.3)
POTASSIUM SERPL-SCNC: 4 MMOL/L — SIGNIFICANT CHANGE UP (ref 3.5–5.3)
RBC # BLD: 4.4 M/UL — SIGNIFICANT CHANGE UP (ref 3.8–5.2)
RBC # FLD: 18.5 % — HIGH (ref 10.3–14.5)
SODIUM SERPL-SCNC: 141 MMOL/L — SIGNIFICANT CHANGE UP (ref 135–145)
WBC # BLD: 7.02 K/UL — SIGNIFICANT CHANGE UP (ref 3.8–10.5)
WBC # FLD AUTO: 7.02 K/UL — SIGNIFICANT CHANGE UP (ref 3.8–10.5)

## 2019-06-28 PROCEDURE — 99239 HOSP IP/OBS DSCHRG MGMT >30: CPT

## 2019-06-28 RX ORDER — FUROSEMIDE 40 MG
1 TABLET ORAL
Qty: 0 | Refills: 0 | DISCHARGE

## 2019-06-28 RX ADMIN — PREGABALIN 1000 MICROGRAM(S): 225 CAPSULE ORAL at 11:52

## 2019-06-28 RX ADMIN — Medication 20 MILLIGRAM(S): at 05:44

## 2019-06-28 RX ADMIN — PANTOPRAZOLE SODIUM 40 MILLIGRAM(S): 20 TABLET, DELAYED RELEASE ORAL at 05:44

## 2019-06-28 RX ADMIN — LATANOPROST 1 DROP(S): 0.05 SOLUTION/ DROPS OPHTHALMIC; TOPICAL at 21:46

## 2019-06-28 RX ADMIN — Medication 1 GRAM(S): at 05:44

## 2019-06-28 RX ADMIN — Medication 325 MILLIGRAM(S): at 11:53

## 2019-06-28 NOTE — PROGRESS NOTE ADULT - PROBLEM SELECTOR PLAN 2
Symptomatic Iron deficiency  anemia suspect occult UGIB given + FOBT and denies hematochezia( pt with previous history of colon cancer 10 years back)  Iron studies cw Iron deficiency Anemia ( Ferratin 7, IBC- 422)   Also low B12 , low retic count   s/p 2 units pRBC. CBC post transfusion  7.1 -->7.8-->7   S/p 4 units PRBC this admission   continue PPI IV BID , ferrous sulfate   On IM  B12  Appreciate GI- Pt refusing endoscopy or colonoscopy . Pt continues to refuse intervention. I explained to her that she will continue to have occult blood loss with  possible ongoing needs  for transfusion if the source is not controlled.  As per pt, she has been anemic since age 25 and used to receive B12 shots as outpt  Curbsided Heme- Advise IV Iron depending on Iron deficit but may not need as pt received 4 units PRBC which will provide sufficient iron Symptomatic Iron deficiency  anemia suspect occult UGIB given + FOBT and denies hematochezia( pt with previous history of colon cancer 10 years back)  Iron studies cw Iron deficiency Anemia ( Ferratin 7, IBC- 422)   Also low B12 , low retic count   S/p 4 units PRBC this admission   continue PPI ferrous sulfate   On IM  B12 x 5 days  Appreciate GI- Pt refusing endoscopy or colonoscopy . Pt continues to refuse intervention. I explained to her that she will continue to have occult blood loss with  possible ongoing needs  for transfusion if the source is not controlled.  As per pt, she has been anemic since age 25 and used to receive B12 shots as outpt  Curbsided Heme- Advise IV Iron depending on Iron deficit but may not need as pt received 4 units PRBC which will provide sufficient iron

## 2019-06-28 NOTE — PROGRESS NOTE ADULT - PROBLEM SELECTOR PROBLEM 4
DIOGENES (acute kidney injury)
Essential hypertension
Essential hypertension
DIOGENES (acute kidney injury)

## 2019-06-28 NOTE — PROGRESS NOTE ADULT - PROBLEM SELECTOR PLAN 8
Pt lives alone, both her children are dead but her grandchildren and great grand children live close by and help her out. She also has a HHA for 5 hours a day.  I discussed her code status- she states her grandchildren know what to do when the time comes, and that they would not want her to suffer. However at this time, she is hesitant to decide on a DNR as she needs to speak with her family.
DVT: Holding AC in the setting of active bleeding, SCD for now
DVT: Holding AC in the setting of active bleeding, SCD for now
Pt lives alone, both her children are dead but her grandchildren and great grand children live close by and help her out. She also has a HHA for 5 hours a day.  I discussed her code status- she states her grandchildren know what to do when the time comes, and that they would not want her to suffer. However at this time, she is hesitant to decide on a DNR as she needs to speak with her family. Face to face time spent in ACP 30 mins

## 2019-06-28 NOTE — PROGRESS NOTE ADULT - PROBLEM SELECTOR PROBLEM 6
Paroxysmal atrial fibrillation
Gastroesophageal reflux disease, esophagitis presence not specified
Gastroesophageal reflux disease, esophagitis presence not specified
Paroxysmal atrial fibrillation

## 2019-06-28 NOTE — PROGRESS NOTE ADULT - PROBLEM SELECTOR PLAN 9
Pt lives alone, both her children are dead but her grandchildren and great grand children live close by and help her out. She also has a HHA for 5 hours a day.  I discussed her code status- she states her grandchildren know what to do when the time comes, and that they would not want her to suffer. However at this time, she is hesitant to decide on a DNR as she needs to speak with her family when she gets discharged  Dispo - if no further diarrhea,plan for dc  DC planning 38 mins  Advise CHANDA HENDRICKSON
Pt lives alone, both her children are dead but her grandchildren and great grand children live close by and help her out. She also has a HHA for 5 hours a day.  I discussed her code status- she states her grandchildren know what to do when the time comes, and that they would not want her to suffer. However at this time, she is hesitant to decide on a DNR as she needs to speak with her family when she gets discharged  Dispo - if no further diarrhea,plan for dc  DC planning 38 mins  Advise CHANDA HENDRICKSON

## 2019-06-28 NOTE — PROGRESS NOTE ADULT - PROBLEM SELECTOR PLAN 7
DVT: Holding AC in the setting of active bleeding, SCD for now
-Rate controlled  -Has been off AC x6 months, will continue to monitor off AC in setting of symptomatic anemia and likely GIB.
-Rate controlled  -Has been off AC x6 months, will continue to monitor off AC in setting of symptomatic anemia and likely GIB.
DVT: Holding AC in the setting of active bleeding, SCD for now

## 2019-06-28 NOTE — PROGRESS NOTE ADULT - ASSESSMENT
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 No overt GI bleed.   Hb upon presentation was 5.5   Prior EGD and colonoscopy in 2012 for iron def anemia remarkable only for colonic anastomotic ulcer     #) Symptomatic Acute on chronic anemia  - microcytic , possible iron def   Hb down trended from 8.5 to 5.5 s/p 2 unit Hb improved to 7.1   vital signs stable   No overt gi bleed   R/o Occult GI bleed - D. D includes PUD vs colonic anastomotic ulcers vs others including malignancy   Rec   Regular diet   Monitor cbc q12 and transfuse to keep Hb > 7-8   Risks and benefits reviewed with the patient regarding  EGD / colonoscopy +/- VCE for anemia work up .  Pt refused any endoscopic work up at this time   Protonix 40 daily   Supportive care as per primary   Discussed with attending
89 year old woman with PMH of HTN, unprovoked PE (dx 2014, not on AC given recent hx of GIB 7 months ago), paroxysmal atrial fibrillation, colon cancer (10 years ago, s/p resection), GERD, and chronic lymphedema presenting with SOB and palpitations found to have low hemoglobin concerning for GIB, unclear if upper or lower. Cannot rule out malignancy. Low suspicion for medication side effect, infection, and PE given no hypoxia or recent changes to medications.

## 2019-06-28 NOTE — PROGRESS NOTE ADULT - SUBJECTIVE AND OBJECTIVE BOX
Patient is a 89y old  Female who presents with a chief complaint of Acute blood loss anemia (24 Jun 2019 19:55)      SUBJECTIVE / OVERNIGHT EVENTS: Pt seen and examined by me         MEDICATIONS  (STANDING):  cyanocobalamin Injectable 1000 MICROGram(s) IntraMuscular daily  ferrous    sulfate 325 milliGRAM(s) Oral daily  fluticasone propionate 50 MICROgram(s)/spray Nasal Spray 1 Spray(s) Both Nostrils two times a day  furosemide    Tablet 20 milliGRAM(s) Oral daily  latanoprost 0.005% Ophthalmic Solution 1 Drop(s) Both EYES at bedtime  pantoprazole    Tablet 40 milliGRAM(s) Oral before breakfast  sucralfate suspension 1 Gram(s) Oral two times a day    MEDICATIONS  (PRN):      CAPILLARY BLOOD GLUCOSE    Vital Signs Last 24 Hrs  T(C): 36.7 (27 Jun 2019 05:59), Max: 37 (26 Jun 2019 21:07)  T(F): 98 (27 Jun 2019 05:59), Max: 98.6 (26 Jun 2019 21:07)  HR: 85 (27 Jun 2019 05:59) (55 - 94)  BP: 121/55 (27 Jun 2019 05:59) (121/55 - 165/64)  BP(mean): --  RR: 16 (27 Jun 2019 05:59) (16 - 18)  SpO2: 98% (27 Jun 2019 05:59) (97% - 100%)  I&O's Summary      PHYSICAL EXAM:  GENERAL: NAD, thin, very pleasant   HEAD:  Atraumatic, Normocephalic  EYES: EOMI, PERRLA, conjunctiva and sclera clear  NECK: Supple prominent neck veins   CHEST/LUNG: Clear to auscultation bilaterally; No wheeze  HEART: Regular rate and rhythm; No murmurs, rubs, or gallops  ABDOMEN: Soft, Nontender, Nondistended; Bowel sounds present  EXTREMITIES:  2+ Peripheral Pulses, No clubbing, cyanosis,, Bilateral LE edema   PSYCH: AAOx3  NEUROLOGY: non-focal  SKIN: No rashes or lesions    LABS:                                                         10.9   7.02  )-----------( 208      ( 28 Jun 2019 06:00 )             34.7       06-28    141  |  107  |  24<H>  ----------------------------<  84  4.0   |  20<L>  |  1.42<H>    Ca    9.1      28 Jun 2019 06:00  Phos  2.8     06-27  Mg     1.7     06-28      RADIOLOGY & ADDITIONAL TESTS:    Imaging Personally Reviewed:    Consultant(s) Notes Reviewed:      Care Discussed with Consultants/Other Providers: Patient is a 89y old  Female who presents with a chief complaint of Acute blood loss anemia (24 Jun 2019 19:55)      SUBJECTIVE / OVERNIGHT EVENTS: Pt seen and examined by me   Feels well  Had 2 episodes of loose BM yesterday and one small loose BM this AM   Otherwise feels well   Denies abdominal pain/nausea/vomiting  Tolerating PO       MEDICATIONS  (STANDING):  cyanocobalamin Injectable 1000 MICROGram(s) IntraMuscular daily  ferrous    sulfate 325 milliGRAM(s) Oral daily  fluticasone propionate 50 MICROgram(s)/spray Nasal Spray 1 Spray(s) Both Nostrils two times a day  furosemide    Tablet 20 milliGRAM(s) Oral daily  latanoprost 0.005% Ophthalmic Solution 1 Drop(s) Both EYES at bedtime  pantoprazole    Tablet 40 milliGRAM(s) Oral before breakfast  sucralfate suspension 1 Gram(s) Oral two times a day    MEDICATIONS  (PRN):      CAPILLARY BLOOD GLUCOSE    Vital Signs Last 24 Hrs  T(C): 36.7 (27 Jun 2019 05:59), Max: 37 (26 Jun 2019 21:07)  T(F): 98 (27 Jun 2019 05:59), Max: 98.6 (26 Jun 2019 21:07)  HR: 85 (27 Jun 2019 05:59) (55 - 94)  BP: 121/55 (27 Jun 2019 05:59) (121/55 - 165/64)  BP(mean): --  RR: 16 (27 Jun 2019 05:59) (16 - 18)  SpO2: 98% (27 Jun 2019 05:59) (97% - 100%)  I&O's Summary      PHYSICAL EXAM:  GENERAL: NAD, thin, very pleasant   HEAD:  Atraumatic, Normocephalic  EYES: EOMI, PERRLA, conjunctiva and sclera clear  NECK: Supple prominent neck veins   CHEST/LUNG: Clear to auscultation bilaterally; No wheeze  HEART: Regular rate and rhythm; No murmurs, rubs, or gallops  ABDOMEN: Soft, Nontender, Nondistended; Bowel sounds present  EXTREMITIES:  2+ Peripheral Pulses, No clubbing, cyanosis,, Bilateral LE edema   PSYCH: AAOx3  NEUROLOGY: non-focal  SKIN: No rashes or lesions    LABS:                                                         10.9   7.02  )-----------( 208      ( 28 Jun 2019 06:00 )             34.7       06-28    141  |  107  |  24<H>  ----------------------------<  84  4.0   |  20<L>  |  1.42<H>    Ca    9.1      28 Jun 2019 06:00  Phos  2.8     06-27  Mg     1.7     06-28      RADIOLOGY & ADDITIONAL TESTS:    Imaging Personally Reviewed:    Consultant(s) Notes Reviewed:      Care Discussed with Consultants/Other Providers:

## 2019-06-28 NOTE — PROGRESS NOTE ADULT - PROBLEM SELECTOR PLAN 3
-Most likely from acute blood loss anemia  -Wells score 3 given HR and hx of PE, however SOB most likely 2/2 symptomatic anemia   Saturation 100%  on RA   SOB improving with PRBC   LE doppler negative for DVT Most likely from acute blood loss anemia  Wells score 3 given HR and hx of PE, however SOB most likely 2/2 symptomatic anemia   Saturation 100%  on RA   SOB improving with PRBC   LE doppler negative for DVT

## 2019-06-28 NOTE — PROGRESS NOTE ADULT - PROBLEM SELECTOR PLAN 4
Most likely pre renal given anemia (Baseline creatinine 1.0-1.2  Improving   continue to monitor   Avoid nephrotoxins Most likely pre renal given anemia (Baseline creatinine 1.0-1.2  Fluctuating creatinine- slight increase likely in the setting of diarrhea, resumption of home lasix and poor PO   continue to monitor   Avoid nephrotoxins  Will hold Lasix , restart on dc  encourage PO

## 2019-06-28 NOTE — PROGRESS NOTE ADULT - PROBLEM SELECTOR PROBLEM 5
Essential hypertension
Essential hypertension
Gastroesophageal reflux disease, esophagitis presence not specified
Gastroesophageal reflux disease, esophagitis presence not specified

## 2019-06-28 NOTE — PROGRESS NOTE ADULT - PROBLEM SELECTOR PLAN 1
Multiple episodes of diarrhea  on 6/26 overnight   Pt was constipated at home, takes laxatives  Will continue to monitor Multiple episodes of diarrhea  on 6/26 overnight   Pt was constipated at home, takes laxatives   2 episodes yday and 1 this AM  Denies N/V/Abdominal pain   Abdominal exam WNL,no clinical signs of dehydration Will continue to monitor  Encourage PO hydration

## 2019-06-28 NOTE — PROGRESS NOTE ADULT - PROBLEM SELECTOR PROBLEM 3
DIOGENES (acute kidney injury)
Shortness of breath
DIOGENES (acute kidney injury)
Shortness of breath

## 2019-06-28 NOTE — PROGRESS NOTE ADULT - PROBLEM SELECTOR PROBLEM 7
Prophylactic measure
Paroxysmal atrial fibrillation
Paroxysmal atrial fibrillation
Prophylactic measure

## 2019-07-01 ENCOUNTER — APPOINTMENT (OUTPATIENT)
Age: 84
End: 2019-07-01

## 2019-07-01 VITALS
HEART RATE: 74 BPM | SYSTOLIC BLOOD PRESSURE: 106 MMHG | TEMPERATURE: 98 F | OXYGEN SATURATION: 95 % | DIASTOLIC BLOOD PRESSURE: 60 MMHG | RESPIRATION RATE: 16 BRPM

## 2019-07-01 RX ORDER — BENZONATATE 100 MG/1
100 CAPSULE ORAL
Qty: 30 | Refills: 0 | Status: DISCONTINUED | COMMUNITY
Start: 2019-05-30 | End: 2019-07-01

## 2019-07-01 RX ORDER — OMEPRAZOLE 20 MG/1
20 CAPSULE, DELAYED RELEASE ORAL
Qty: 90 | Refills: 1 | Status: DISCONTINUED | COMMUNITY
Start: 2018-06-07 | End: 2019-07-01

## 2019-07-02 ENCOUNTER — APPOINTMENT (OUTPATIENT)
Dept: HOME HEALTH SERVICES | Facility: HOME HEALTH | Age: 84
End: 2019-07-02
Payer: MEDICARE

## 2019-07-02 VITALS
DIASTOLIC BLOOD PRESSURE: 60 MMHG | SYSTOLIC BLOOD PRESSURE: 95 MMHG | HEART RATE: 62 BPM | RESPIRATION RATE: 17 BRPM | OXYGEN SATURATION: 98 %

## 2019-07-02 DIAGNOSIS — Z87.09 PERSONAL HISTORY OF OTHER DISEASES OF THE RESPIRATORY SYSTEM: ICD-10-CM

## 2019-07-02 DIAGNOSIS — Z87.19 PERSONAL HISTORY OF OTHER DISEASES OF THE DIGESTIVE SYSTEM: ICD-10-CM

## 2019-07-02 PROCEDURE — 99496 TRANSJ CARE MGMT HIGH F2F 7D: CPT

## 2019-07-02 NOTE — CURRENT MEDS
[High Risk Medications Reviewed and Reconciled (Beers Criteria)] : high risk medications reviewed and reconciled [Medication and Allergies Reconciled] : medication and allergies reconciled [Reviewed patient reported medication adherence from Comprehensive Assessment] : Reviewed patient reported medication adherence from comprehensive assessment [Compliant with medications] : Patient is compliant with medications

## 2019-07-02 NOTE — COUNSELING
[Decrease stress] : decrease stress [Improve pain control] : improve pain control [Decrease hospital use] : decrease hospital use [Minimize unnecessary interventions] : minimize unnecessary interventions [Discussed disease trajectory with patient/caregiver] : discussed disease trajectory with patient/caregiver [Maintain functional ability] : maintain functional ability [Patient/Caregiver has ___ understanding of disease process] : patient/caregiver has [unfilled] understanding of disease process [Likely to achieve goals/desired outcomes] : likely to achieve goals/desired outcomes [Advanced Directives discussed: ____] : Advanced directives discussed: [unfilled] [Annual Discussion and review of: ___] : Annual discussion and review of [unfilled] [Completed Medical Orders for Life-Sustaining Treatment] : completed medical orders for life-sustaining treatment [DNR] : Code Status: DNR [Limited] : Treatment Guidelines: Limited [DNI] : Intubation: DNI [[facility]: _____] [facility] [unfilled] [_____] : HCP: [unfilled] [Overweight (BMI 25.1 - 29.9)] : overweight - BMI 25.1-29.9 [Weight counseling provided] : Weight counseling provided [Mediterranean diet recommended] : Mediterranean diet recommended [Medical/Nutritional supplementation as prescribed] : medical/nutritional supplementation as prescribed [Non - Smoker] : non-smoker [Medical alert] : medical alert [Use assistive device to avoid falls] : use assistive device to avoid falls [Remove clutter and unsafe carpeting to avoid falls] : remove clutter and unsafe carpeting to avoid falls [Use grab bars] : use grab bars [Smoke/CO Detectors] : smoke/CO detectors [Not Indicated] : not indicated [FreeTextEntry5] : will take vaccines

## 2019-07-02 NOTE — HISTORY OF PRESENT ILLNESS
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 she admitted to [facility] on 6/24/19 for SOB 2/2 Acute Blood Loss Anemia. Received 3 units of PRBC and DC home on 6/28/19. refused work-up including endoscopy/colonoscopy. lasix was reduced due to DIOGENES to three times a week\par \par patient states that her breathing problems have resolved after receiving transfusion. anemia has been her life long problem, and she needs transfusion every 2-3 years. she used to follow hemaotlogist. she does not wish any work-up. she has restarted taking iron and vit B12. no other problems\par denies fever, chills, nausea, vomiting, SOB, pain, eye discharge,\par Appetite is good, no swallowing problems, gastric burning has resolved, taking caltrate and pantoprazole\par BM has been stable, goes daily.  taking senna and fleet enema without relief\par Ambulates with walker, no recent falls\par has memory issues, recent worse than remote, complicated by hearing loss, wears hearing aides\par behavior and Mood is  reported to be good, denies prior h/o depression or anxiety. \par patient is not regular with medication intake, sometimes forgets, only has HHA during the day\par skin- reports chronic itchiness upper back, hydrocortisone helps, stable with no worsening\par \par OA of right knee and occasional sciatica, still will take Percocet on rare occasion for severe sciatic pain- on average once or twice a year\par \par lyphedema legs b/l- refuses to use compression stockings or lasix. stable with no recent worsening.\par ---------\par lives alone with 24/7 HHA\par malik chilel is involved in care\par

## 2019-07-02 NOTE — PHYSICAL EXAM
[No Acute Distress] : no acute distress [Well Nourished] : well nourished [Well Developed] : well developed [PERRL] : pupils equal, round and reactive to light [EOMI] : extra ocular movement intact [Normal Voice/Communication] : normal voice communication [No JVD] : no jugular venous distention [Supple] : the neck was supple [No LAD] : no lymphadenopathy [No Respiratory Distress] : no respiratory distress [No Accessory Muscle Use] : no accessory muscle use [Clear to Auscultation] : lungs were clear to auscultation bilaterally [Normal Rate] : heart rate was normal  [Regular Rhythm] : with a regular rhythm [No Murmurs] : no murmurs heard [Normal S1, S2] : normal S1 and S2 [Normal Bowel Sounds] : normal bowel sounds [No Carotid Bruit] : No carotid bruit [Non Tender] : non-tender [Soft] : abdomen soft [Not Distended] : not distended [No CVA Tenderness] : no ~M costovertebral angle tenderness [No Spinal Tenderness] : no spinal tenderness [No Joint Swelling] : no joint swelling seen [No Rash] : no rash [Kyphosis] :  kyphosis present [No Skin Lesions] : no skin lesions [Cranial Nerves Intact] : cranial nerves 2-12 were intact [No Motor Deficits] : the motor exam was normal [No Gross Sensory Deficits] : no gross sensory deficits [Normal Affect] : the affect was normal [Normal Mood] : the mood was normal [Normal Oropharynx] : the oropharynx was normal [de-identified] : pleasant, calm, well dressed [de-identified] : unsteady gait, uses walker, no paravertebral or CVA tenderness, [de-identified] : b/l lymphedema, 2+ nonpitting swelling- chronic  stable [de-identified] : oriented to place, person, not to time or date, recent memory is affected, remote intact

## 2019-07-02 NOTE — REVIEW OF SYSTEMS
[Incontinence] : incontinence [Lower Ext Edema] : lower extremity edema [Vision Problems] : vision problems [Negative] : Integumentary [FreeTextEntry3] : wears glasses

## 2019-07-02 NOTE — REASON FOR VISIT
[Post Hospitalization] : a post hospitalization visit [Formal Caregiver] : formal caregiver [Pre-Visit Preparation] : pre-visit preparation was done [Intercurrent Specialty/Sub-specialty Visits] : the patient has intercurrent specialty/sub-specialty visits [FreeTextEntry3] : CHAI FOWLER

## 2019-07-08 LAB
ALBUMIN SERPL ELPH-MCNC: 3.6 G/DL
ALP BLD-CCNC: 74 U/L
ALT SERPL-CCNC: 11 U/L
ANION GAP SERPL CALC-SCNC: 13 MMOL/L
AST SERPL-CCNC: 19 U/L
BASOPHILS # BLD AUTO: 0.04 K/UL
BASOPHILS NFR BLD AUTO: 0.7 %
BILIRUB SERPL-MCNC: 0.4 MG/DL
BUN SERPL-MCNC: 34 MG/DL
CALCIUM SERPL-MCNC: 9.4 MG/DL
CHLORIDE SERPL-SCNC: 106 MMOL/L
CO2 SERPL-SCNC: 23 MMOL/L
CREAT SERPL-MCNC: 1.41 MG/DL
EOSINOPHIL # BLD AUTO: 0.3 K/UL
EOSINOPHIL NFR BLD AUTO: 5.4 %
HCT VFR BLD CALC: 35.9 %
HGB BLD-MCNC: 10.6 G/DL
IMM GRANULOCYTES NFR BLD AUTO: 0.5 %
LYMPHOCYTES # BLD AUTO: 0.76 K/UL
LYMPHOCYTES NFR BLD AUTO: 13.7 %
MAN DIFF?: NORMAL
MCHC RBC-ENTMCNC: 25.2 PG
MCHC RBC-ENTMCNC: 29.5 GM/DL
MCV RBC AUTO: 85.5 FL
MONOCYTES # BLD AUTO: 0.56 K/UL
MONOCYTES NFR BLD AUTO: 10.1 %
NEUTROPHILS # BLD AUTO: 3.87 K/UL
NEUTROPHILS NFR BLD AUTO: 69.6 %
PLATELET # BLD AUTO: 263 K/UL
POTASSIUM SERPL-SCNC: 4 MMOL/L
PROT SERPL-MCNC: 5.7 G/DL
RBC # BLD: 4.2 M/UL
RBC # FLD: 21.2 %
SODIUM SERPL-SCNC: 142 MMOL/L
WBC # FLD AUTO: 5.56 K/UL

## 2019-07-09 ENCOUNTER — APPOINTMENT (OUTPATIENT)
Dept: HOME HEALTH SERVICES | Facility: HOME HEALTH | Age: 84
End: 2019-07-09

## 2019-07-25 ENCOUNTER — MEDICATION RENEWAL (OUTPATIENT)
Age: 84
End: 2019-07-25

## 2019-07-30 ENCOUNTER — APPOINTMENT (OUTPATIENT)
Dept: HOME HEALTH SERVICES | Facility: HOME HEALTH | Age: 84
End: 2019-07-30

## 2019-08-06 ENCOUNTER — LABORATORY RESULT (OUTPATIENT)
Age: 84
End: 2019-08-06

## 2019-08-07 LAB
BASOPHILS # BLD AUTO: 0.03 K/UL
BASOPHILS NFR BLD AUTO: 0.5 %
EOSINOPHIL # BLD AUTO: 0.24 K/UL
EOSINOPHIL NFR BLD AUTO: 4.1 %
HCT VFR BLD CALC: 31 %
HGB BLD-MCNC: 9.2 G/DL
IMM GRANULOCYTES NFR BLD AUTO: 0.7 %
LYMPHOCYTES # BLD AUTO: 0.61 K/UL
LYMPHOCYTES NFR BLD AUTO: 10.5 %
MAN DIFF?: NORMAL
MCHC RBC-ENTMCNC: 25.9 PG
MCHC RBC-ENTMCNC: 29.7 GM/DL
MCV RBC AUTO: 87.3 FL
MONOCYTES # BLD AUTO: 0.5 K/UL
MONOCYTES NFR BLD AUTO: 8.6 %
NEUTROPHILS # BLD AUTO: 4.41 K/UL
NEUTROPHILS NFR BLD AUTO: 75.6 %
PLATELET # BLD AUTO: 243 K/UL
RBC # BLD: 3.55 M/UL
RBC # FLD: 23.4 %
WBC # FLD AUTO: 5.83 K/UL

## 2019-08-08 ENCOUNTER — MEDICATION RENEWAL (OUTPATIENT)
Age: 84
End: 2019-08-08

## 2019-08-12 ENCOUNTER — TRANSCRIPTION ENCOUNTER (OUTPATIENT)
Age: 84
End: 2019-08-12

## 2019-08-12 ENCOUNTER — MESSAGE (OUTPATIENT)
Age: 84
End: 2019-08-12

## 2019-08-13 NOTE — ED PROVIDER NOTE - RESPIRATORY, MLM
HPI     DLS: 02/19/2019 Dr. Gallego    Patient states her vision has been stable since her last visit. (-)flashes   or floaters.       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 Wet AMD OU  Prior q 3 month injections in [place]  Stable since 7/18 8/19 - increased SRF, SRF    Resume q3 months injections OS today    Avastin OS       2. Dry AMD OU  AREDS/AG    3. PCIOL     4. PVD OU    5. Afib  On coumadin      3 months OCT    Risks, benefits, and alternatives to treatment discussed in detail with the patient.  The patient voiced understanding and wished to proceed with the procedure    Injection Procedure Note:  Diagnosis: Wet AMD OS    Patient Identified and Time Out complete  Topical Proparacaine and Betadine.  Inject Avastin OS at 6:00 @ 3.5-4mm posterior to limbus  Post Operative Dx: Same  Complications: None  Follow up as above.          
Breath sounds clear and equal bilaterally.

## 2019-08-14 ENCOUNTER — APPOINTMENT (OUTPATIENT)
Dept: HOME HEALTH SERVICES | Facility: HOME HEALTH | Age: 84
End: 2019-08-14
Payer: MEDICARE

## 2019-08-14 VITALS
DIASTOLIC BLOOD PRESSURE: 75 MMHG | OXYGEN SATURATION: 97 % | HEART RATE: 65 BPM | SYSTOLIC BLOOD PRESSURE: 125 MMHG | RESPIRATION RATE: 17 BRPM

## 2019-08-14 PROCEDURE — 99349 HOME/RES VST EST MOD MDM 40: CPT

## 2019-08-14 NOTE — COUNSELING
[Improve pain control] : improve pain control [Decrease stress] : decrease stress [Decrease hospital use] : decrease hospital use [Minimize unnecessary interventions] : minimize unnecessary interventions [Maintain functional ability] : maintain functional ability [Discussed disease trajectory with patient/caregiver] : discussed disease trajectory with patient/caregiver [Patient/Caregiver has ___ understanding of disease process] : patient/caregiver has [unfilled] understanding of disease process [Likely to achieve goals/desired outcomes] : likely to achieve goals/desired outcomes [Advanced Directives discussed: ____] : Advanced directives discussed: [unfilled] [Annual Discussion and review of: ___] : Annual discussion and review of [unfilled] [Completed Medical Orders for Life-Sustaining Treatment] : completed medical orders for life-sustaining treatment [DNR] : Code Status: DNR [Limited] : Treatment Guidelines: Limited [DNI] : Intubation: DNI [[facility]: _____] [facility]last verification date: [unfilled] [_____] : HCP: [unfilled] [Overweight (BMI 25.1 - 29.9)] : overweight - BMI 25.1-29.9 [Weight counseling provided] : Weight counseling provided [Mediterranean diet recommended] : Mediterranean diet recommended [Medical/Nutritional supplementation as prescribed] : medical/nutritional supplementation as prescribed [Non - Smoker] : non-smoker [Medical alert] : medical alert [Remove clutter and unsafe carpeting to avoid falls] : remove clutter and unsafe carpeting to avoid falls [Use assistive device to avoid falls] : use assistive device to avoid falls [Use grab bars] : use grab bars [Smoke/CO Detectors] : smoke/CO detectors [Not Indicated] : not indicated [FreeTextEntry5] : will take vaccines

## 2019-08-14 NOTE — REASON FOR VISIT
[Acute] : an acute visit [Formal Caregiver] : formal caregiver [Intercurrent Specialty/Sub-specialty Visits] : the patient has intercurrent specialty/sub-specialty visits [Pre-Visit Preparation] : pre-visit preparation was done [FreeTextEntry1] : for leg swelling [FreeTextEntry3] : MTT

## 2019-08-14 NOTE — REVIEW OF SYSTEMS
breastfeeding exclusively [Vision Problems] : vision problems [Lower Ext Edema] : lower extremity edema [Incontinence] : incontinence [Negative] : Neurological [FreeTextEntry3] : wears glasses

## 2019-08-14 NOTE — HISTORY OF PRESENT ILLNESS
[Patient] : patient [FreeTextEntry1] : unsteady gait [FreeTextEntry2] : This is a 89-year-old female with a past medical history of hypertensive heart disease with afib and unprovoked PE on Coumadin, lymphedema b/l LE, status post falls with left hip fracture and sciatica, chronic kidney disease with anemia, postherpetic neuralgia, last ED visit 10/2018 for GI upset, discharged without any change in medications, seen for acute visit for c/o leg swelling over the weekend, refused MTT visit.\par \par patient states that her legs are not doing good, and she is concerned about having diabetic foot with amputation as it happened to one of her friends. she describes her feet to have multiple bruises at dorsum, thinks it might be related to her shoes as they are too tight. she has been taking lasix daily this week without improvement in bruising. swelling has been better. she still able to feel and move feet without problems. no other issues.\par denies fever, chills, nausea, vomiting, SOB, pain, eye discharge,\par Appetite is good, no swallowing problems, gastric burning has resolved, taking caltrate and omeprazole\par BM has been a problem janes when taking iron tabs, which she prefers to skip. taking senna and fleet enema without relief\par Ambulates with walker, no recent falls\par Memory and Mood is  reported to be good, denies prior h/o depression or anxiety. \par patient is not regular with medication intake, sometimes forgets, only has HHA during the day\par skin- no issues except for senile bruising\par \par OA of right knee and occasional sciatica, still will take Percocet on rare occasion for severe sciatic pain- on average once or twice a year\par \par lyphedema legs b/l- refuses to use compression stockings or lasix. stable with no recent worsening.\par ---------\hyacinth lives alone with 24/7 HHA\par malik chilel is involved in care\par

## 2019-08-14 NOTE — PHYSICAL EXAM
[No Acute Distress] : no acute distress [Well Nourished] : well nourished [Well Developed] : well developed [Normal Voice/Communication] : normal voice communication [PERRL] : pupils equal, round and reactive to light [EOMI] : extra ocular movement intact [Normal Oropharynx] : the oropharynx was normal [No JVD] : no jugular venous distention [Supple] : the neck was supple [No LAD] : no lymphadenopathy [No Respiratory Distress] : no respiratory distress [Clear to Auscultation] : lungs were clear to auscultation bilaterally [No Accessory Muscle Use] : no accessory muscle use [Normal Rate] : heart rate was normal  [Regular Rhythm] : with a regular rhythm [Normal S1, S2] : normal S1 and S2 [No Murmurs] : no murmurs heard [No Carotid Bruit] : No carotid bruit [Normal Bowel Sounds] : normal bowel sounds [Non Tender] : non-tender [Soft] : abdomen soft [Not Distended] : not distended [No Spinal Tenderness] : no spinal tenderness [No CVA Tenderness] : no ~M costovertebral angle tenderness [Kyphosis] :  kyphosis present [No Joint Swelling] : no joint swelling seen [No Rash] : no rash [No Skin Lesions] : no skin lesions [Cranial Nerves Intact] : cranial nerves 2-12 were intact [No Motor Deficits] : the motor exam was normal [No Gross Sensory Deficits] : no gross sensory deficits [Normal Affect] : the affect was normal [Normal Mood] : the mood was normal [de-identified] : pleasant, calm, well dressed [de-identified] : b/l lymphedema, 2+ nonpitting swelling- chronic  stable [de-identified] : unsteady gait, uses walker, no paravertebral or CVA tenderness, [de-identified] : multipe scattered bruising on dorsal aspect on both feet, variable distribution, hard to palpate pulse due to lymphedema, no tenderness [de-identified] : oriented to place, person, recent memory is affected, remote intact

## 2019-08-17 ENCOUNTER — TRANSCRIPTION ENCOUNTER (OUTPATIENT)
Age: 84
End: 2019-08-17

## 2019-08-21 ENCOUNTER — TRANSCRIPTION ENCOUNTER (OUTPATIENT)
Age: 84
End: 2019-08-21

## 2019-09-04 ENCOUNTER — APPOINTMENT (OUTPATIENT)
Dept: HOME HEALTH SERVICES | Facility: HOME HEALTH | Age: 84
End: 2019-09-04
Payer: MEDICARE

## 2019-09-04 VITALS
DIASTOLIC BLOOD PRESSURE: 70 MMHG | TEMPERATURE: 97.8 F | OXYGEN SATURATION: 98 % | HEART RATE: 68 BPM | RESPIRATION RATE: 17 BRPM | SYSTOLIC BLOOD PRESSURE: 120 MMHG

## 2019-09-04 DIAGNOSIS — M53.3 SACROCOCCYGEAL DISORDERS, NOT ELSEWHERE CLASSIFIED: ICD-10-CM

## 2019-09-04 LAB
BASOPHILS # BLD AUTO: 0.03 K/UL
BASOPHILS NFR BLD AUTO: 0.4 %
EOSINOPHIL # BLD AUTO: 0.2 K/UL
EOSINOPHIL NFR BLD AUTO: 3 %
HCT VFR BLD CALC: 29.7 %
HGB BLD-MCNC: 9.2 G/DL
IMM GRANULOCYTES NFR BLD AUTO: 0.6 %
LYMPHOCYTES # BLD AUTO: 0.53 K/UL
LYMPHOCYTES NFR BLD AUTO: 7.9 %
MAN DIFF?: NORMAL
MCHC RBC-ENTMCNC: 27.3 PG
MCHC RBC-ENTMCNC: 31 GM/DL
MCV RBC AUTO: 88.1 FL
MONOCYTES # BLD AUTO: 0.48 K/UL
MONOCYTES NFR BLD AUTO: 7.2 %
NEUTROPHILS # BLD AUTO: 5.42 K/UL
NEUTROPHILS NFR BLD AUTO: 80.9 %
PLATELET # BLD AUTO: 238 K/UL
RBC # BLD: 3.37 M/UL
RBC # FLD: 20.4 %
WBC # FLD AUTO: 6.7 K/UL

## 2019-09-04 PROCEDURE — 99349 HOME/RES VST EST MOD MDM 40: CPT

## 2019-09-04 NOTE — PHYSICAL EXAM
[No Acute Distress] : no acute distress [Well Nourished] : well nourished [Well Developed] : well developed [PERRL] : pupils equal, round and reactive to light [Normal Voice/Communication] : normal voice communication [Normal Oropharynx] : the oropharynx was normal [EOMI] : extra ocular movement intact [No JVD] : no jugular venous distention [Supple] : the neck was supple [No Respiratory Distress] : no respiratory distress [No LAD] : no lymphadenopathy [Clear to Auscultation] : lungs were clear to auscultation bilaterally [No Accessory Muscle Use] : no accessory muscle use [Normal Rate] : heart rate was normal  [Regular Rhythm] : with a regular rhythm [Normal S1, S2] : normal S1 and S2 [No Murmurs] : no murmurs heard [No Carotid Bruit] : No carotid bruit [Normal Bowel Sounds] : normal bowel sounds [Non Tender] : non-tender [Soft] : abdomen soft [Not Distended] : not distended [No CVA Tenderness] : no ~M costovertebral angle tenderness [Kyphosis] :  kyphosis present [No Spinal Tenderness] : no spinal tenderness [No Rash] : no rash [No Joint Swelling] : no joint swelling seen [No Skin Lesions] : no skin lesions [Cranial Nerves Intact] : cranial nerves 2-12 were intact [No Motor Deficits] : the motor exam was normal [Normal Affect] : the affect was normal [No Gross Sensory Deficits] : no gross sensory deficits [Normal Mood] : the mood was normal [de-identified] : pleasant, calm, well dressed [de-identified] : b/l lymphedema, 2+ nonpitting swelling- chronic  stable [de-identified] : unsteady gait, uses walker, no paravertebral or CVA tenderness, [de-identified] : multiple scattered bruising on dorsal aspect on both feet, variable distribution, hard to palpate pulse due to lymphedema, no tenderness- same as before, no changes, feet cold to touch [de-identified] : oriented to place, person, recent memory is affected, remote intact

## 2019-09-04 NOTE — REVIEW OF SYSTEMS
[Vision Problems] : vision problems [Incontinence] : incontinence [Lower Ext Edema] : lower extremity edema [Negative] : Neurological [FreeTextEntry3] : wears glasses

## 2019-09-04 NOTE — HISTORY OF PRESENT ILLNESS
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 she keeps on banging her legs and feet against chair. bruises on her feet continues, comes and goes as per HHA, improves with massage local. patient has no pain or numbness at feet, does not interfere with walking. no other issues.\par denies fever, chills, nausea, vomiting, SOB, pain, eye discharge,\par Appetite is good, no swallowing problems, gastric burning has resolved, taking caltrate and omeprazole\par BM has been a problem janes when taking iron tabs, which she prefers to skip. taking senna and fleet enema without relief\par Ambulates with walker, no recent falls\par Memory and Mood is  reported to be good, denies prior h/o depression or anxiety. \par patient is not regular with medication intake, sometimes forgets, only has HHA during the day\par skin- no issues except for senile bruising\par \par OA of right knee and occasional sciatica, still will take Percocet on rare occasion for severe sciatic pain- on average once or twice a year\par \par lyphedema legs b/l- refuses to use compression stockings or lasix. stable with no recent worsening.\par ---------\par lives alone with 24/7 HHA\par malik chilel is involved in care\par

## 2019-09-04 NOTE — REASON FOR VISIT
[Follow-Up] : a follow-up visit [Formal Caregiver] : formal caregiver [Pre-Visit Preparation] : pre-visit preparation was done [Intercurrent Specialty/Sub-specialty Visits] : the patient has intercurrent specialty/sub-specialty visits [FreeTextEntry3] : MTT

## 2019-09-04 NOTE — COUNSELING
[Improve pain control] : improve pain control [Decrease stress] : decrease stress [Decrease hospital use] : decrease hospital use [Minimize unnecessary interventions] : minimize unnecessary interventions [Maintain functional ability] : maintain functional ability [Discussed disease trajectory with patient/caregiver] : discussed disease trajectory with patient/caregiver [Likely to achieve goals/desired outcomes] : likely to achieve goals/desired outcomes [Patient/Caregiver has ___ understanding of disease process] : patient/caregiver has [unfilled] understanding of disease process [Advanced Directives discussed: ____] : Advanced directives discussed: [unfilled] [Annual Discussion and review of: ___] : Annual discussion and review of [unfilled] [DNR] : Code Status: DNR [Completed Medical Orders for Life-Sustaining Treatment] : completed medical orders for life-sustaining treatment [Limited] : Treatment Guidelines: Limited [DNI] : Intubation: DNI [[facility]: _____[facility] [unfilled] [_____] : HCP: [unfilled] [Overweight (BMI 25.1 - 29.9)] : overweight - BMI 25.1-29.9 [Weight counseling provided] : Weight counseling provided [Mediterranean diet recommended] : Mediterranean diet recommended [Medical/Nutritional supplementation as prescribed] : medical/nutritional supplementation as prescribed [Non - Smoker] : non-smoker [Medical alert] : medical alert [Use assistive device to avoid falls] : use assistive device to avoid falls [Remove clutter and unsafe carpeting to avoid falls] : remove clutter and unsafe carpeting to avoid falls [Use grab bars] : use grab bars [Smoke/CO Detectors] : smoke/CO detectors [Not Indicated] : not indicated [FreeTextEntry5] : will take vaccines

## 2019-09-19 ENCOUNTER — MEDICATION RENEWAL (OUTPATIENT)
Age: 84
End: 2019-09-19

## 2019-09-20 NOTE — PHYSICAL THERAPY INITIAL EVALUATION ADULT - PERTINENT HX OF CURRENT PROBLEM, REHAB EVAL
89 y/o Female  admitted on 11/22/18 with PMHx of HTN, unprovoked PE (dx 2014, on Coumadin, previously on Xarelto but switched due to severe anemia), paroxysmal afib, colon cancer (10 years ago, s/p resection), GERD, and chronic lymphedema presents with palpitations and dyspnea, found to have anemia in the setting of GI bleed with supra therapeutic INR. Sepsis 2/2 UTI  - continue Rocephin. As per ID, if pt can tolerate po, change to Vantin to complete course  -leukocytosis trending upward- wbc- 10.43->11.05->13.57  - tylenol prn for fevers  urine cx shows gram neg rods, multi-drug resistant  f/u blood cx

## 2019-10-08 LAB
BASOPHILS # BLD AUTO: 0.02 K/UL
BASOPHILS NFR BLD AUTO: 0.3 %
EOSINOPHIL # BLD AUTO: 0.13 K/UL
EOSINOPHIL NFR BLD AUTO: 1.9 %
HCT VFR BLD CALC: 31.2 %
HGB BLD-MCNC: 9.7 G/DL
IMM GRANULOCYTES NFR BLD AUTO: 0.4 %
LYMPHOCYTES # BLD AUTO: 0.68 K/UL
LYMPHOCYTES NFR BLD AUTO: 10.1 %
MAN DIFF?: NORMAL
MCHC RBC-ENTMCNC: 29.3 PG
MCHC RBC-ENTMCNC: 31.1 GM/DL
MCV RBC AUTO: 94.3 FL
MONOCYTES # BLD AUTO: 0.54 K/UL
MONOCYTES NFR BLD AUTO: 8 %
NEUTROPHILS # BLD AUTO: 5.33 K/UL
NEUTROPHILS NFR BLD AUTO: 79.3 %
PLATELET # BLD AUTO: 241 K/UL
RBC # BLD: 3.31 M/UL
RBC # FLD: 13.9 %
WBC # FLD AUTO: 6.73 K/UL

## 2019-10-24 ENCOUNTER — MEDICATION RENEWAL (OUTPATIENT)
Age: 84
End: 2019-10-24

## 2019-11-12 LAB
BASOPHILS # BLD AUTO: 0.02 K/UL
BASOPHILS NFR BLD AUTO: 0.3 %
EOSINOPHIL # BLD AUTO: 0.16 K/UL
EOSINOPHIL NFR BLD AUTO: 2.4 %
HCT VFR BLD CALC: 29.8 %
HGB BLD-MCNC: 9.3 G/DL
IMM GRANULOCYTES NFR BLD AUTO: 0.5 %
LYMPHOCYTES # BLD AUTO: 0.67 K/UL
LYMPHOCYTES NFR BLD AUTO: 10.1 %
MAN DIFF?: NORMAL
MCHC RBC-ENTMCNC: 28.9 PG
MCHC RBC-ENTMCNC: 31.2 GM/DL
MCV RBC AUTO: 92.5 FL
MONOCYTES # BLD AUTO: 0.68 K/UL
MONOCYTES NFR BLD AUTO: 10.3 %
NEUTROPHILS # BLD AUTO: 5.05 K/UL
NEUTROPHILS NFR BLD AUTO: 76.4 %
PLATELET # BLD AUTO: 248 K/UL
RBC # BLD: 3.22 M/UL
RBC # FLD: 13.2 %
WBC # FLD AUTO: 6.61 K/UL

## 2019-11-18 ENCOUNTER — MEDICATION RENEWAL (OUTPATIENT)
Age: 84
End: 2019-11-18

## 2019-11-19 ENCOUNTER — APPOINTMENT (OUTPATIENT)
Dept: HOME HEALTH SERVICES | Facility: HOME HEALTH | Age: 84
End: 2019-11-19
Payer: MEDICARE

## 2019-11-19 VITALS
DIASTOLIC BLOOD PRESSURE: 80 MMHG | TEMPERATURE: 98.2 F | SYSTOLIC BLOOD PRESSURE: 120 MMHG | RESPIRATION RATE: 17 BRPM | OXYGEN SATURATION: 97 % | HEART RATE: 65 BPM

## 2019-11-19 PROCEDURE — 99349 HOME/RES VST EST MOD MDM 40: CPT | Mod: 25

## 2019-11-19 PROCEDURE — 90662 IIV NO PRSV INCREASED AG IM: CPT

## 2019-11-19 PROCEDURE — G0008: CPT

## 2019-11-19 NOTE — COUNSELING
[Improve pain control] : improve pain control [Decrease stress] : decrease stress [Decrease hospital use] : decrease hospital use [Minimize unnecessary interventions] : minimize unnecessary interventions [Discussed disease trajectory with patient/caregiver] : discussed disease trajectory with patient/caregiver [Maintain functional ability] : maintain functional ability [Likely to achieve goals/desired outcomes] : likely to achieve goals/desired outcomes [Patient/Caregiver has ___ understanding of disease process] : patient/caregiver has [unfilled] understanding of disease process [Advanced Directives discussed: ____] : Advanced directives discussed: [unfilled] [Annual Discussion and review of: ___] : Annual discussion and review of [unfilled] [Completed Medical Orders for Life-Sustaining Treatment] : completed medical orders for life-sustaining treatment [DNR] : Code Status: DNR [Limited] : Treatment Guidelines: Limited [DNI] : Intubation: DNI [[facility]: [facility]last [facility] date: [unfilled] [_____] : HCP: [unfilled] [Overweight (BMI 25.1 - 29.9)] : overweight - BMI 25.1-29.9 [Weight counseling provided] : Weight counseling provided [Mediterranean diet recommended] : Mediterranean diet recommended [Medical/Nutritional supplementation as prescribed] : medical/nutritional supplementation as prescribed [Non - Smoker] : non-smoker [Medical alert] : medical alert [Use assistive device to avoid falls] : use assistive device to avoid falls [Remove clutter and unsafe carpeting to avoid falls] : remove clutter and unsafe carpeting to avoid falls [Use grab bars] : use grab bars [Smoke/CO Detectors] : smoke/CO detectors [Not Indicated] : not indicated [FreeTextEntry5] : will take vaccines

## 2019-11-19 NOTE — PHYSICAL EXAM
[No Acute Distress] : no acute distress [Well Nourished] : well nourished [Well Developed] : well developed [Normal Voice/Communication] : normal voice communication [PERRL] : pupils equal, round and reactive to light [EOMI] : extra ocular movement intact [Normal Oropharynx] : the oropharynx was normal [No JVD] : no jugular venous distention [Supple] : the neck was supple [Clear to Auscultation] : lungs were clear to auscultation bilaterally [No Respiratory Distress] : no respiratory distress [No LAD] : no lymphadenopathy [No Accessory Muscle Use] : no accessory muscle use [Normal Rate] : heart rate was normal  [Regular Rhythm] : with a regular rhythm [Normal S1, S2] : normal S1 and S2 [No Murmurs] : no murmurs heard [No Carotid Bruit] : No carotid bruit [Normal Bowel Sounds] : normal bowel sounds [Soft] : abdomen soft [Non Tender] : non-tender [Not Distended] : not distended [No Spinal Tenderness] : no spinal tenderness [No CVA Tenderness] : no ~M costovertebral angle tenderness [Kyphosis] :  kyphosis present [No Joint Swelling] : no joint swelling seen [No Rash] : no rash [No Skin Lesions] : no skin lesions [Cranial Nerves Intact] : cranial nerves 2-12 were intact [No Motor Deficits] : the motor exam was normal [No Gross Sensory Deficits] : no gross sensory deficits [Normal Affect] : the affect was normal [Normal Mood] : the mood was normal [de-identified] : pleasant, calm, well dressed [de-identified] : b/l lymphedema, 2+ nonpitting swelling- chronic  stable [de-identified] : unsteady gait, uses walker, no paravertebral or CVA tenderness, [de-identified] : chronic lymphedema bilateral, feet cold to touch [de-identified] : oriented to place, person, recent memory is affected, remote intact

## 2019-11-19 NOTE — HISTORY OF PRESENT ILLNESS
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 \par denies fever, chills, nausea, vomiting, SOB, pain, eye discharge,\par Appetite is good, no swallowing problems, gastric burning has resolved, taking caltrate and omeprazole\par BM has been a problem janes when taking iron tabs, which she prefers to skip. taking senna and fleet enema without relief\par Ambulates with walker, no recent falls\par Memory and Mood is  reported to be good, denies prior h/o depression or anxiety. \par patient is not regular with medication intake, sometimes forgets, only has HHA during the day\par skin- no issues except for senile bruising\par chronic LE edema is stable with no worsening, reports dry itchy skin, using moisturizers with relief\par \par OA of right knee and occasional sciatica, will take Percocet on rare occasion for severe sciatic pain- on average once or twice a year\par \par lyphedema legs b/l- refuses to use compression stockings or lasix. stable with no recent worsening.\par ---------\par lives alone with 24/7 HHA\par malik chilel is involved in care\par

## 2019-11-19 NOTE — LETTER HEADER
[Care Plan reviewed and provided to patient/caregiver] : Care plan reviewed and provided to patient/caregiver [Patient/Caregiver agrees to have other providers send summary of their care to this office] : Patient/caregiver agrees to have other providers send summary of their care to this office [Care Plan reviewed every ___ weeks] : Care plan reviewed every [unfilled] weeks [Care Plan managed/Care coordinated by: ___] : Care plan managed/Care coordinated by: [unfilled]

## 2019-11-25 ENCOUNTER — APPOINTMENT (OUTPATIENT)
Dept: HOME HEALTH SERVICES | Facility: HOME HEALTH | Age: 84
End: 2019-11-25

## 2019-11-25 VITALS
OXYGEN SATURATION: 98 % | RESPIRATION RATE: 16 BRPM | TEMPERATURE: 98.2 F | SYSTOLIC BLOOD PRESSURE: 120 MMHG | HEART RATE: 68 BPM | DIASTOLIC BLOOD PRESSURE: 76 MMHG

## 2019-12-12 ENCOUNTER — MEDICATION RENEWAL (OUTPATIENT)
Age: 84
End: 2019-12-12

## 2019-12-18 NOTE — DISCHARGE NOTE PROVIDER - CARE PROVIDERS DIRECT ADDRESSES
Patient was wanting to know if she was cleared to return to work?  DB/  
She picked up her letter already.  
,[email or web address]

## 2020-02-06 ENCOUNTER — APPOINTMENT (OUTPATIENT)
Dept: HOME HEALTH SERVICES | Facility: HOME HEALTH | Age: 85
End: 2020-02-06
Payer: MEDICARE

## 2020-02-06 VITALS
OXYGEN SATURATION: 97 % | DIASTOLIC BLOOD PRESSURE: 70 MMHG | RESPIRATION RATE: 18 BRPM | HEART RATE: 67 BPM | TEMPERATURE: 97.4 F | SYSTOLIC BLOOD PRESSURE: 115 MMHG

## 2020-02-06 PROCEDURE — 99349 HOME/RES VST EST MOD MDM 40: CPT

## 2020-02-06 NOTE — HISTORY OF PRESENT ILLNESS
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 she wishes to be referred to another podiatrist as not happy with [facility]- also its out of network.\par denies fever, chills, nausea, vomiting, SOB, pain, eye discharge. \par Appetite is good, no swallowing problems,  taking caltrate and omeprazole\par BM has been a problem janes when taking iron tabs, which she prefers to skip. taking senna and fleet enema as needed, reports a lot of flatus especially at night\par Ambulates with walker, no recent falls\par Memory and Mood is  reported to be good, denies prior h/o depression or anxiety. \par patient is not regular with medication intake, sometimes forgets, only has HHA during the day\par skin- no issues except for senile bruising\par chronic LE edema is stable with no worsening, reports dry itchy skin, using moisturizers with relief\par \par OA of right knee and occasional sciatica, will take Percocet on rare occasion for severe sciatic pain- on average once or twice a year\par \par lyphedema legs b/l- refuses to use compression stockings or lasix. stable with no recent worsening.\par ---------\par lives alone with 24/7 HHA\par malik chilel is involved in care\par

## 2020-02-06 NOTE — COUNSELING
[Improve pain control] : improve pain control [Decrease stress] : decrease stress [Decrease hospital use] : decrease hospital use [Minimize unnecessary interventions] : minimize unnecessary interventions [Maintain functional ability] : maintain functional ability [Discussed disease trajectory with patient/caregiver] : discussed disease trajectory with patient/caregiver [Patient/Caregiver has ___ understanding of disease process] : patient/caregiver has [unfilled] understanding of disease process [Likely to achieve goals/desired outcomes] : likely to achieve goals/desired outcomes [Advanced Directives discussed: ____] : Advanced directives discussed: [unfilled] [Annual Discussion and review of: ___] : Annual discussion and review of [unfilled] [DNR] : Code Status: DNR [Completed Medical Orders for Life-Sustaining Treatment] : completed medical orders for life-sustaining treatment [Limited] : Treatment Guidelines: Limited [[facility]: _____] [facility]/last verification date: [unfilled] [DNI] : Intubation: DNI [_____] : HCP: [unfilled] [Overweight (BMI 25.1 - 29.9)] : overweight - BMI 25.1-29.9 [Weight counseling provided] : Weight counseling provided [Mediterranean diet recommended] : Mediterranean diet recommended [Medical/Nutritional supplementation as prescribed] : medical/nutritional supplementation as prescribed [Non - Smoker] : non-smoker [Medical alert] : medical alert [Use assistive device to avoid falls] : use assistive device to avoid falls [Remove clutter and unsafe carpeting to avoid falls] : remove clutter and unsafe carpeting to avoid falls [Use grab bars] : use grab bars [Smoke/CO Detectors] : smoke/CO detectors [Not Indicated] : not indicated [FreeTextEntry5] : will take vaccines

## 2020-02-06 NOTE — PHYSICAL EXAM
[No Acute Distress] : no acute distress [Well Nourished] : well nourished [Well Developed] : well developed [PERRL] : pupils equal, round and reactive to light [Normal Voice/Communication] : normal voice communication [EOMI] : extra ocular movement intact [Normal Oropharynx] : the oropharynx was normal [No JVD] : no jugular venous distention [Supple] : the neck was supple [No LAD] : no lymphadenopathy [No Respiratory Distress] : no respiratory distress [Clear to Auscultation] : lungs were clear to auscultation bilaterally [No Accessory Muscle Use] : no accessory muscle use [Normal Rate] : heart rate was normal  [Regular Rhythm] : with a regular rhythm [Normal S1, S2] : normal S1 and S2 [No Murmurs] : no murmurs heard [No Carotid Bruit] : No carotid bruit [Normal Bowel Sounds] : normal bowel sounds [Non Tender] : non-tender [Soft] : abdomen soft [Not Distended] : not distended [No CVA Tenderness] : no ~M costovertebral angle tenderness [No Spinal Tenderness] : no spinal tenderness [Kyphosis] :  kyphosis present [No Joint Swelling] : no joint swelling seen [No Rash] : no rash [No Skin Lesions] : no skin lesions [Cranial Nerves Intact] : cranial nerves 2-12 were intact [No Motor Deficits] : the motor exam was normal [No Gross Sensory Deficits] : no gross sensory deficits [Normal Mood] : the mood was normal [Normal Affect] : the affect was normal [de-identified] : pleasant, calm, well dressed [de-identified] : b/l lymphedema, 2+ nonpitting swelling- chronic  stable [de-identified] : unsteady gait, uses walker, no paravertebral or CVA tenderness, [de-identified] : chronic lymphedema bilateral, feet cold to touch [de-identified] : oriented to place, person, recent memory is affected, remote intact

## 2020-02-06 NOTE — REASON FOR VISIT
[Follow-Up] : a follow-up visit [Formal Caregiver] : formal caregiver [Intercurrent Specialty/Sub-specialty Visits] : the patient has intercurrent specialty/sub-specialty visits [Pre-Visit Preparation] : pre-visit preparation was done [FreeTextEntry3] : MTT

## 2020-02-07 LAB
ALBUMIN SERPL ELPH-MCNC: 4 G/DL
ALP BLD-CCNC: 76 U/L
ALT SERPL-CCNC: 11 U/L
ANION GAP SERPL CALC-SCNC: 14 MMOL/L
AST SERPL-CCNC: 15 U/L
BASOPHILS # BLD AUTO: 0.02 K/UL
BASOPHILS NFR BLD AUTO: 0.3 %
BILIRUB SERPL-MCNC: 0.4 MG/DL
BUN SERPL-MCNC: 33 MG/DL
CALCIUM SERPL-MCNC: 9.7 MG/DL
CHLORIDE SERPL-SCNC: 106 MMOL/L
CO2 SERPL-SCNC: 23 MMOL/L
CREAT SERPL-MCNC: 1.22 MG/DL
EOSINOPHIL # BLD AUTO: 0.12 K/UL
EOSINOPHIL NFR BLD AUTO: 1.7 %
ESTIMATED AVERAGE GLUCOSE: 103 MG/DL
FOLATE SERPL-MCNC: 16.8 NG/ML
HBA1C MFR BLD HPLC: 5.2 %
HCT VFR BLD CALC: 32.3 %
HGB BLD-MCNC: 10.2 G/DL
IMM GRANULOCYTES NFR BLD AUTO: 0.7 %
LYMPHOCYTES # BLD AUTO: 0.63 K/UL
LYMPHOCYTES NFR BLD AUTO: 9 %
MAN DIFF?: NORMAL
MCHC RBC-ENTMCNC: 28.7 PG
MCHC RBC-ENTMCNC: 31.6 GM/DL
MCV RBC AUTO: 90.7 FL
MONOCYTES # BLD AUTO: 0.55 K/UL
MONOCYTES NFR BLD AUTO: 7.8 %
NEUTROPHILS # BLD AUTO: 5.66 K/UL
NEUTROPHILS NFR BLD AUTO: 80.5 %
PLATELET # BLD AUTO: 279 K/UL
POTASSIUM SERPL-SCNC: 4.7 MMOL/L
PROT SERPL-MCNC: 6.2 G/DL
RBC # BLD: 3.56 M/UL
RBC # FLD: 14.5 %
SODIUM SERPL-SCNC: 144 MMOL/L
TSH SERPL-ACNC: 1.68 UIU/ML
VIT B12 SERPL-MCNC: 304 PG/ML
WBC # FLD AUTO: 7.03 K/UL

## 2020-02-11 NOTE — H&P ADULT - PROBLEM SELECTOR PROBLEM 6
Problem: PHYSICAL THERAPY ADULT  Goal: Performs mobility at highest level of function for planned discharge setting  See evaluation for individualized goals  Description  Treatment/Interventions: Functional transfer training, LE strengthening/ROM, Endurance training, Patient/family training, Equipment eval/education, Bed mobility, Spoke to nursing, Spoke to case management(w/c training)  Equipment Recommended: Wheelchair       See flowsheet documentation for full assessment, interventions and recommendations  Note:   Prognosis: Fair  Problem List: Decreased strength, Decreased endurance, Impaired balance, Decreased mobility, Pain  Assessment: Pt is 71 y o  female seen for PT evaluation s/p admit [facility] on 2/5/2020 w/ Sepsis ([facility] [place] [address] )  PT consulted to assess pt's functional mobility and d/c needs  Order placed for PT eval and tx, w/ up and OOB as tolerated order  Comorbidities affecting pt's physical performance at time of assessment include: paraplegia, L ischium decubitius ulcer, anemia, neurogenic bladder, open wound R hip, osteopenia, closed dislocation of R hip  Pt presents w/ R hip open wound w/ undelrying OM and dislocation of R femur- Per ortho, no plan for procedure or flap to cover at thuis time-no restrictions  Per Bipin Corbett from general sx, request reposition w/ no therapy restrictions and Dana Olsen from wound care "limit OOB to 1-2 hours at a time, 2-3 times/day w/ air cushion  PTA, pt was at rehab since Nov  Prior to rehab, pt was living alone at home in one level apt w/ 0 AMY and IND w/ all w/c mobility, transfers, and ADLs  Personal factors affecting pt at time of IE include: inability to ambulate household distances, inability to navigate community distances, limited home support, positive fall history, inability to perform IADLs and inability to perform ADLs   Please find objective findings from PT assessment regarding body systems outlined above with impairments and limitations including weakness, impaired balance, decreased endurance, impaired coordination, pain, decreased activity tolerance, decreased functional mobility tolerance and fall risk  Pt performed bed mobility at [address] [place] [address] and beasy board transfer from B to drop arm recliner at [facility]  The following objective measures performed on IE also reveal limitations: Modified [place]: 5 (severe disability)  Pt's clinical presentation is currently unstable/unpredictable seen in pt's presentation of recent admission for sepsis requiring medical attention, recent decline in function as compared to baseline, fall risk, pain, multiple lines, increased reliance on assistance for functional mobility as compared to baseline  Pt to benefit from continued PT tx to address deficits as defined above and maximize level of functional independent mobility and consistency  From PT/mobility standpoint, recommendation at time of d/c would be STR pending progress in order to facilitate return to PLOF  Recommendation: Post acute IP rehab     PT - OK to Discharge: Yes    See flowsheet documentation for full assessment  B12 deficiency Hypertension Atrial fibrillation

## 2020-03-05 DIAGNOSIS — Z60.2 PROBLEMS RELATED TO LIVING ALONE: ICD-10-CM

## 2020-03-05 DIAGNOSIS — Z78.9 OTHER SPECIFIED HEALTH STATUS: ICD-10-CM

## 2020-03-05 SDOH — SOCIAL STABILITY - SOCIAL INSECURITY: PROBLEMS RELATED TO LIVING ALONE: Z60.2

## 2020-04-30 ENCOUNTER — APPOINTMENT (OUTPATIENT)
Dept: HOME HEALTH SERVICES | Facility: HOME HEALTH | Age: 85
End: 2020-04-30
Payer: MEDICARE

## 2020-04-30 DIAGNOSIS — J30.2 OTHER SEASONAL ALLERGIC RHINITIS: ICD-10-CM

## 2020-04-30 PROCEDURE — 99214 OFFICE O/P EST MOD 30 MIN: CPT | Mod: 95

## 2020-04-30 NOTE — PHYSICAL EXAM
[No Acute Distress] : no acute distress [Well Developed] : well developed [Well Nourished] : well nourished [Normal Voice/Communication] : normal voice communication [EOMI] : extra ocular movement intact [Normal Oropharynx] : the oropharynx was normal [Supple] : the neck was supple [No JVD] : no jugular venous distention [No LAD] : no lymphadenopathy [No Respiratory Distress] : no respiratory distress [No Accessory Muscle Use] : no accessory muscle use [Non Tender] : non-tender [No CVA Tenderness] : no ~M costovertebral angle tenderness [Soft] : abdomen soft [Not Distended] : not distended [Kyphosis] :  kyphosis present [No Rash] : no rash [No Joint Swelling] : no joint swelling seen [No Skin Lesions] : no skin lesions [No Motor Deficits] : the motor exam was normal [Cranial Nerves Intact] : cranial nerves 2-12 were intact [No Gross Sensory Deficits] : no gross sensory deficits [Normal Mood] : the mood was normal [Normal Affect] : the affect was normal [de-identified] : pleasant, calm, well dressed [de-identified] : b/l lymphedema, 2+ nonpitting swelling- chronic  stable [de-identified] : oriented to place, person, recent memory is affected, remote intact [de-identified] : unsteady gait, uses walker, no paravertebral or CVA tenderness, [de-identified] : chronic lymphedema bilateral, feet cold to touch

## 2020-04-30 NOTE — COUNSELING
[Decrease hospital use] : decrease hospital use [Improve pain control] : improve pain control [Decrease stress] : decrease stress [Maintain functional ability] : maintain functional ability [Minimize unnecessary interventions] : minimize unnecessary interventions [Likely to achieve goals/desired outcomes] : likely to achieve goals/desired outcomes [Discussed disease trajectory with patient/caregiver] : discussed disease trajectory with patient/caregiver [Patient/Caregiver has ___ understanding of disease process] : patient/caregiver has [unfilled] understanding of disease process [Completed Medical Orders for Life-Sustaining Treatment] : completed medical orders for life-sustaining treatment [Annual Discussion and review of: ___] : Annual discussion and review of [unfilled] [Advanced Directives discussed: ____] : Advanced directives discussed: [unfilled] [DNR] : Code Status: DNR [Limited] : Treatment Guidelines: Limited [DNI] : Intubation: DNI [[facility]: _____[facility]last [facility] date: [unfilled] [_____] : HCP: [unfilled] [Weight counseling provided] : Weight counseling provided [Overweight (BMI 25.1 - 29.9)] : overweight - BMI 25.1-29.9 [Mediterranean diet recommended] : Mediterranean diet recommended [Medical/Nutritional supplementation as prescribed] : medical/nutritional supplementation as prescribed [Non - Smoker] : non-smoker [Medical alert] : medical alert [Use assistive device to avoid falls] : use assistive device to avoid falls [Remove clutter and unsafe carpeting to avoid falls] : remove clutter and unsafe carpeting to avoid falls [Use grab bars] : use grab bars [Smoke/CO Detectors] : smoke/CO detectors [Not Indicated] : not indicated [FreeTextEntry5] : will take vaccines

## 2020-04-30 NOTE — REASON FOR VISIT
[Follow-Up] : a follow-up visit [Pre-Visit Preparation] : pre-visit preparation was done [Formal Caregiver] : formal caregiver [Intercurrent Specialty/Sub-specialty Visits] : the patient has no intercurrent specialty/sub-specialty visits

## 2020-04-30 NOTE — REVIEW OF SYSTEMS
[Incontinence] : incontinence [Lower Ext Edema] : lower extremity edema [Vision Problems] : vision problems [Negative] : Neurological [FreeTextEntry3] : wears glasses

## 2020-04-30 NOTE — LETTER HEADER
[Care Plan reviewed every ___ weeks] : Care plan reviewed every [unfilled] weeks [Care Plan reviewed and provided to patient/caregiver] : Care plan reviewed and provided to patient/caregiver [Patient/Caregiver agrees to have other providers send summary of their care to this office] : Patient/caregiver agrees to have other providers send summary of their care to this office [Care Plan managed/Care coordinated by: ___] : Care plan managed/Care coordinated by: [unfilled]

## 2020-04-30 NOTE — HISTORY OF PRESENT ILLNESS
[Patient] : patient [FreeTextEntry1] : unsteady gait [FreeTextEntry2] : This is a 89-year-old female with a past medical history of hypertensive heart disease with afib and unprovoked PE on Coumadin, lymphedema b/l LE, status post falls with left hip fracture and sciatica, chronic kidney disease with anemia, postherpetic neuralgia, last ED visit 10/2018 for GI upset, discharged without any change in medications, seen for follow-up visit.\par no significant interval events.\par Patient is being seen for a visit provided via telehealth real-time audio visual technology.\par Patient was located at their home at the time of the visit.\par The [facility] clinician, Misha Thomas, was located remotely at their home in [place] at the time of the visit. \par The patient and the [facility] clinician participated in the telehealth encounter.\par Other participants included: REYES Urena\par \par Patient and her representative consents to the use of telehealth. All questions related to telehealth answered\par \par \par patient has been doing well overall. her seasonal allergies has been acting up due to spring season, flonase or nasal saline does not help. she has seasonal allergies life long and went to multiple sinus/allergy specialist with no relief from medications prescribed. also requesting blood work to be done to check hb.\par denies fever, chills, nausea, vomiting, SOB, pain, eye discharge. \par Appetite is good, no swallowing problems,  taking caltrate and omeprazole\par BM has been a problem janes when taking iron tabs, which she prefers to skip. taking senna and fleet enema as needed, reports a lot of flatus especially at night\par Ambulates with walker, no recent falls\par Memory and Mood is  reported to be good, denies prior h/o depression or anxiety. \par patient is not regular with medication intake, sometimes forgets, only has HHA during the day\par skin- no issues except for senile bruising\par chronic LE edema is stable with no worsening, reports dry itchy skin, using moisturizers with relief, purplish spots on feet are fading.\par \par OA of right knee and occasional sciatica, will take Percocet on rare occasion for severe sciatic pain- on average once or twice a year\par \par lyphedema legs b/l- refuses to use compression stockings or lasix. stable with no recent worsening.\par ---------\hyacinth lives alone with 24/7 HHA\hyacinth chilel is involved in care\par

## 2020-04-30 NOTE — CURRENT MEDS
[High Risk Medications Reviewed and Reconciled (Beers Criteria)] : high risk medications reviewed and reconciled [Medication and Allergies Reconciled] : medication and allergies reconciled [Compliant with medications] : Patient is compliant with medications [Reviewed patient reported medication adherence from Comprehensive Assessment] : Reviewed patient reported medication adherence from comprehensive assessment

## 2020-05-01 LAB
ALBUMIN SERPL ELPH-MCNC: 3.6 G/DL
ALP BLD-CCNC: 74 U/L
ALT SERPL-CCNC: 11 U/L
ANION GAP SERPL CALC-SCNC: 15 MMOL/L
AST SERPL-CCNC: 17 U/L
BASOPHILS # BLD AUTO: 0.02 K/UL
BASOPHILS NFR BLD AUTO: 0.3 %
BILIRUB SERPL-MCNC: 0.3 MG/DL
BUN SERPL-MCNC: 30 MG/DL
CALCIUM SERPL-MCNC: 9.7 MG/DL
CHLORIDE SERPL-SCNC: 105 MMOL/L
CO2 SERPL-SCNC: 23 MMOL/L
CREAT SERPL-MCNC: 1.33 MG/DL
EOSINOPHIL # BLD AUTO: 0.22 K/UL
EOSINOPHIL NFR BLD AUTO: 3.1 %
HCT VFR BLD CALC: 29.8 %
HGB BLD-MCNC: 9 G/DL
IMM GRANULOCYTES NFR BLD AUTO: 0.7 %
LYMPHOCYTES # BLD AUTO: 0.68 K/UL
LYMPHOCYTES NFR BLD AUTO: 9.6 %
MAN DIFF?: NORMAL
MCHC RBC-ENTMCNC: 27.4 PG
MCHC RBC-ENTMCNC: 30.2 GM/DL
MCV RBC AUTO: 90.9 FL
MONOCYTES # BLD AUTO: 0.48 K/UL
MONOCYTES NFR BLD AUTO: 6.8 %
NEUTROPHILS # BLD AUTO: 5.6 K/UL
NEUTROPHILS NFR BLD AUTO: 79.5 %
PLATELET # BLD AUTO: 249 K/UL
POTASSIUM SERPL-SCNC: 4.4 MMOL/L
PROT SERPL-MCNC: 5.6 G/DL
RBC # BLD: 3.28 M/UL
RBC # FLD: 14.8 %
SODIUM SERPL-SCNC: 143 MMOL/L
TSH SERPL-ACNC: 1.68 UIU/ML
WBC # FLD AUTO: 7.05 K/UL

## 2020-05-25 ENCOUNTER — TRANSCRIPTION ENCOUNTER (OUTPATIENT)
Age: 85
End: 2020-05-25

## 2020-05-26 ENCOUNTER — APPOINTMENT (OUTPATIENT)
Dept: HOME HEALTH SERVICES | Facility: HOME HEALTH | Age: 85
End: 2020-05-26

## 2020-05-28 ENCOUNTER — APPOINTMENT (OUTPATIENT)
Dept: HOME HEALTH SERVICES | Facility: HOME HEALTH | Age: 85
End: 2020-05-28
Payer: MEDICARE

## 2020-05-28 PROCEDURE — 99349 HOME/RES VST EST MOD MDM 40: CPT | Mod: 95

## 2020-05-28 NOTE — REVIEW OF SYSTEMS
[Lower Ext Edema] : lower extremity edema [Vision Problems] : vision problems [Incontinence] : incontinence [Negative] : Integumentary [FreeTextEntry3] : wears glasses

## 2020-05-28 NOTE — COUNSELING
[Improve pain control] : improve pain control [Decrease hospital use] : decrease hospital use [Decrease stress] : decrease stress [Maintain functional ability] : maintain functional ability [Minimize unnecessary interventions] : minimize unnecessary interventions [Discussed disease trajectory with patient/caregiver] : discussed disease trajectory with patient/caregiver [Likely to achieve goals/desired outcomes] : likely to achieve goals/desired outcomes [Patient/Caregiver has ___ understanding of disease process] : patient/caregiver has [unfilled] understanding of disease process [Advanced Directives discussed: ____] : Advanced directives discussed: [unfilled] [Completed Medical Orders for Life-Sustaining Treatment] : completed medical orders for life-sustaining treatment [Annual Discussion and review of: ___] : Annual discussion and review of [unfilled] [DNR] : Code Status: DNR [Limited] : Treatment Guidelines: Limited [DNI] : Intubation: DNI [_____] : HCP: [unfilled] [[facility]: _____[facility]last [facility]: [unfilled] [Overweight (BMI 25.1 - 29.9)] : overweight - BMI 25.1-29.9 [Weight counseling provided] : Weight counseling provided [Medical/Nutritional supplementation as prescribed] : medical/nutritional supplementation as prescribed [Mediterranean diet recommended] : Mediterranean diet recommended [Medical alert] : medical alert [Non - Smoker] : non-smoker [Use assistive device to avoid falls] : use assistive device to avoid falls [Smoke/CO Detectors] : smoke/CO detectors [Remove clutter and unsafe carpeting to avoid falls] : remove clutter and unsafe carpeting to avoid falls [Use grab bars] : use grab bars [Not Indicated] : not indicated [FreeTextEntry5] : will take vaccines

## 2020-05-28 NOTE — CURRENT MEDS
[Medication and Allergies Reconciled] : medication and allergies reconciled [Compliant with medications] : Patient is compliant with medications [Reviewed patient reported medication adherence from Comprehensive Assessment] : Reviewed patient reported medication adherence from comprehensive assessment [High Risk Medications Reviewed and Reconciled (Beers Criteria)] : high risk medications reviewed and reconciled

## 2020-05-28 NOTE — REASON FOR VISIT
[Acute] : an acute visit [Pre-Visit Preparation] : pre-visit preparation was done [Formal Caregiver] : formal caregiver [Intercurrent Specialty/Sub-specialty Visits] : the patient has no intercurrent specialty/sub-specialty visits [FreeTextEntry1] : for wrist swelling

## 2020-05-28 NOTE — PHYSICAL EXAM
[No Acute Distress] : no acute distress [Well Nourished] : well nourished [EOMI] : extra ocular movement intact [Well Developed] : well developed [Normal Voice/Communication] : normal voice communication [Normal Oropharynx] : the oropharynx was normal [Supple] : the neck was supple [No JVD] : no jugular venous distention [No LAD] : no lymphadenopathy [No Accessory Muscle Use] : no accessory muscle use [No Respiratory Distress] : no respiratory distress [Not Distended] : not distended [Non Tender] : non-tender [Soft] : abdomen soft [Kyphosis] :  kyphosis present [No CVA Tenderness] : no ~M costovertebral angle tenderness [No Joint Swelling] : no joint swelling seen [No Skin Lesions] : no skin lesions [No Rash] : no rash [Cranial Nerves Intact] : cranial nerves 2-12 were intact [No Motor Deficits] : the motor exam was normal [No Gross Sensory Deficits] : no gross sensory deficits [Normal Affect] : the affect was normal [Normal Mood] : the mood was normal [de-identified] : pleasant, calm, well dressed [de-identified] : b/l lymphedema, 2+ nonpitting swelling- chronic  stable [de-identified] : unsteady gait, uses walker, no paravertebral or CVA tenderness, no wrist swelling or movement problem. some extra skin noticed medial side of right wrist as compared to right. right thumb osteoarthritic changes wit no acute signs of inflammation [de-identified] : chronic lymphedema bilateral, feet cold to touch [de-identified] : oriented to place, person, recent memory is affected, remote intact

## 2020-05-28 NOTE — HISTORY OF PRESENT ILLNESS
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, lymphedema b/l LE, status post falls with left hip fracture and sciatica, chronic kidney disease with anemia, postherpetic neuralgia, last ED visit 10/2018 for GI upset, discharged without any change in medications, seen for acute visit for some swelling right wrist and right thumb pain.\par \par no significant interval events.\par Patient is being seen for a visit provided via telehealth real-time audio visual technology.\par Patient was located at their home at the time of the visit.\par [facility] clinician, Misha Thomas, was located remotely at their home in [place] at the time of the visit. \par The patient and the [facility] clinician participated in the telehealth encounter.\par Other participants included: REYES Urena\par \par Patient and her representative consents to the use of telehealth. All questions related to telehealth answered\par \par patient has been doing well overall. she notices 'some extra flesh under the skin' around right wrist, which is not tender, painful, red or affecting her in any other way. she can move wrist without any issues, no trauma reported. right thumb is painful mainly while using walker. chronic arthritic right knee pain is stable on tylenol once a day\par denies fever, chills, nausea, vomiting, SOB, pain, eye discharge. \par Appetite is good, no swallowing problems,  taking caltrate and omeprazole\par BM has been a problem janes when taking iron tabs, which she prefers to skip. taking senna and fleet enema as needed, reports a lot of flatus especially at night\par Ambulates with walker, no recent falls\par Memory and Mood is  reported to be good, denies prior h/o depression or anxiety. \par patient is not regular with medication intake, sometimes forgets, only has HHA during the day\par skin- no issues except for senile bruising\par chronic LE edema is stable with no worsening, reports dry itchy skin, using moisturizers with relief, purplish spots on feet are fading.\par \par OA of right knee and occasional sciatica, will take Percocet on rare occasion for severe sciatic pain- on average once or twice a year\par \par lyphedema legs b/l- refuses to use compression stockings or lasix. stable with no recent worsening.\par ---------\par lives alone with 24/7 HHA\par malik ambrose is involved in care\par  [FreeTextEntry1] : unsteady gait

## 2020-06-04 ENCOUNTER — TRANSCRIPTION ENCOUNTER (OUTPATIENT)
Age: 85
End: 2020-06-04

## 2020-06-04 RX ORDER — SUCRALFATE 1 G/10ML
1 SUSPENSION ORAL 3 TIMES DAILY
Qty: 1 | Refills: 3 | Status: COMPLETED | COMMUNITY
Start: 2018-11-10 | End: 2020-06-04

## 2020-06-08 ENCOUNTER — TRANSCRIPTION ENCOUNTER (OUTPATIENT)
Age: 85
End: 2020-06-08

## 2020-06-17 ENCOUNTER — APPOINTMENT (OUTPATIENT)
Dept: HOME HEALTH SERVICES | Facility: HOME HEALTH | Age: 85
End: 2020-06-17

## 2020-07-01 ENCOUNTER — NON-APPOINTMENT (OUTPATIENT)
Age: 85
End: 2020-07-01

## 2020-07-01 LAB
ALBUMIN SERPL ELPH-MCNC: 3.9 G/DL
ALP BLD-CCNC: 66 U/L
ALT SERPL-CCNC: 9 U/L
ANION GAP SERPL CALC-SCNC: 12 MMOL/L
AST SERPL-CCNC: 19 U/L
BASOPHILS # BLD AUTO: 0.02 K/UL
BASOPHILS NFR BLD AUTO: 0.3 %
BILIRUB SERPL-MCNC: 0.3 MG/DL
BUN SERPL-MCNC: 28 MG/DL
CALCIUM SERPL-MCNC: 9.7 MG/DL
CHLORIDE SERPL-SCNC: 106 MMOL/L
CO2 SERPL-SCNC: 24 MMOL/L
CREAT SERPL-MCNC: 1.6 MG/DL
EOSINOPHIL # BLD AUTO: 0.17 K/UL
EOSINOPHIL NFR BLD AUTO: 2.9 %
HCT VFR BLD CALC: 30 %
HGB BLD-MCNC: 9.1 G/DL
IMM GRANULOCYTES NFR BLD AUTO: 0.5 %
LYMPHOCYTES # BLD AUTO: 0.67 K/UL
LYMPHOCYTES NFR BLD AUTO: 11.3 %
MAN DIFF?: NORMAL
MCHC RBC-ENTMCNC: 27.5 PG
MCHC RBC-ENTMCNC: 30.3 GM/DL
MCV RBC AUTO: 90.6 FL
MONOCYTES # BLD AUTO: 0.47 K/UL
MONOCYTES NFR BLD AUTO: 8 %
NEUTROPHILS # BLD AUTO: 4.55 K/UL
NEUTROPHILS NFR BLD AUTO: 77 %
PLATELET # BLD AUTO: 233 K/UL
POTASSIUM SERPL-SCNC: 4 MMOL/L
PROT SERPL-MCNC: 6 G/DL
RBC # BLD: 3.31 M/UL
RBC # FLD: 14.7 %
SODIUM SERPL-SCNC: 142 MMOL/L
WBC # FLD AUTO: 5.91 K/UL

## 2020-07-24 ENCOUNTER — TRANSCRIPTION ENCOUNTER (OUTPATIENT)
Age: 85
End: 2020-07-24

## 2020-07-30 ENCOUNTER — APPOINTMENT (OUTPATIENT)
Dept: HOME HEALTH SERVICES | Facility: HOME HEALTH | Age: 85
End: 2020-07-30
Payer: MEDICARE

## 2020-07-30 DIAGNOSIS — R39.81 FUNCTIONAL URINARY INCONTINENCE: ICD-10-CM

## 2020-07-30 DIAGNOSIS — F51.04 PSYCHOPHYSIOLOGIC INSOMNIA: ICD-10-CM

## 2020-07-30 PROCEDURE — 99349 HOME/RES VST EST MOD MDM 40: CPT | Mod: 95

## 2020-07-30 NOTE — COUNSELING
[Decrease stress] : decrease stress [Improve pain control] : improve pain control [Decrease hospital use] : decrease hospital use [Minimize unnecessary interventions] : minimize unnecessary interventions [Discussed disease trajectory with patient/caregiver] : discussed disease trajectory with patient/caregiver [Maintain functional ability] : maintain functional ability [Likely to achieve goals/desired outcomes] : likely to achieve goals/desired outcomes [Advanced Directives discussed: ____] : Advanced directives discussed: [unfilled] [Patient/Caregiver has ___ understanding of disease process] : patient/caregiver has [unfilled] understanding of disease process [Completed Medical Orders for Life-Sustaining Treatment] : completed medical orders for life-sustaining treatment [Annual Discussion and review of: ___] : Annual discussion and review of [unfilled] [DNR] : Code Status: DNR [Limited] : Treatment Guidelines: Limited [DNI] : Intubation: DNI [[facility]: _____[facility]last verification date: [unfilled] [_____] : HCP: [unfilled] [Overweight (BMI 25.1 - 29.9)] : overweight - BMI 25.1-29.9 [Weight counseling provided] : Weight counseling provided [Mediterranean diet recommended] : Mediterranean diet recommended [Medical/Nutritional supplementation as prescribed] : medical/nutritional supplementation as prescribed [Non - Smoker] : non-smoker [Medical alert] : medical alert [Use assistive device to avoid falls] : use assistive device to avoid falls [Remove clutter and unsafe carpeting to avoid falls] : remove clutter and unsafe carpeting to avoid falls [Smoke/CO Detectors] : smoke/CO detectors [Use grab bars] : use grab bars [Not Indicated] : not indicated [FreeTextEntry5] : will take vaccines

## 2020-07-30 NOTE — PHYSICAL EXAM
[No Acute Distress] : no acute distress [Well Nourished] : well nourished [Well Developed] : well developed [Normal Voice/Communication] : normal voice communication [EOMI] : extra ocular movement intact [Normal Oropharynx] : the oropharynx was normal [No JVD] : no jugular venous distention [No LAD] : no lymphadenopathy [Supple] : the neck was supple [No Respiratory Distress] : no respiratory distress [No Accessory Muscle Use] : no accessory muscle use [Soft] : abdomen soft [Non Tender] : non-tender [Kyphosis] :  kyphosis present [Not Distended] : not distended [No CVA Tenderness] : no ~M costovertebral angle tenderness [No Joint Swelling] : no joint swelling seen [No Rash] : no rash [No Skin Lesions] : no skin lesions [Cranial Nerves Intact] : cranial nerves 2-12 were intact [No Gross Sensory Deficits] : no gross sensory deficits [No Motor Deficits] : the motor exam was normal [Normal Affect] : the affect was normal [Normal Mood] : the mood was normal [de-identified] : pleasant, calm, well dressed [de-identified] : b/l lymphedema, 2+ nonpitting swelling- chronic  stable [de-identified] : unsteady gait, uses walker, no paravertebral or CVA tenderness, [de-identified] : chronic lymphedema bilateral, feet cold to touch [de-identified] : oriented to place, person, recent memory is affected, remote intact

## 2020-07-30 NOTE — HISTORY OF PRESENT ILLNESS
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, lymphedema b/l LE, status post falls with left hip fracture and sciatica, chronic kidney disease with anemia, postherpetic neuralgia, last ED visit 10/2018 for GI upset, discharged without any change in medications, seen for follow-up visit.\par \par no significant interval events.\par Patient is being seen for a visit provided via telehealth real-time audio visual technology.\par Patient was located at their home at the time of the visit.\par [facility] clinician, Misha Thomas, was located remotely at their home in [place] at the time of the visit. \par The patient and the [facility] clinician participated in the telehealth encounter.\par Other participants included: REYES Urena\par \par Patient and her representative consents to the use of telehealth. All questions related to telehealth answered\par \par patient has been doing well overall.  chronic arthritic right knee pain is stable on tylenol once a day, however she feels muscle spasms in her thigh with ambulation.\par denies fever, chills, nausea, vomiting, SOB, pain, eye discharge. \par Appetite is good, no swallowing problems,  taking caltrate and omeprazole\par BM has been a problem janes when taking iron tabs, which she prefers to skip. taking senna and fleet enema as needed, reports a lot of flatus especially at night\par Ambulates with walker, no recent falls\par Memory and Mood is  reported to be good, denies prior h/o depression or anxiety. \par patient is not regular with medication intake, sometimes forgets, only has HHA during the day\par skin- no issues except for senile bruising\par chronic LE edema is stable with no worsening, reports dry itchy skin, using moisturizers with relief, purplish spots on feet are fading.\par \par OA of right knee and occasional sciatica, will take Percocet on rare occasion for severe sciatic pain- on average once or twice a year\par \par lyphedema legs b/l- refuses to use compression stockings or lasix. stable with no recent worsening.\par ---------\hyacinth lives alone with 24/7 HHA\hyacinth chilel is involved in care\par  [FreeTextEntry1] : unsteady gait

## 2020-08-11 ENCOUNTER — LABORATORY RESULT (OUTPATIENT)
Age: 85
End: 2020-08-11

## 2020-08-12 DIAGNOSIS — R79.89 OTHER SPECIFIED ABNORMAL FINDINGS OF BLOOD CHEMISTRY: ICD-10-CM

## 2020-08-12 LAB
25(OH)D3 SERPL-MCNC: 23.2 NG/ML
ALBUMIN SERPL ELPH-MCNC: 3.8 G/DL
ALP BLD-CCNC: 75 U/L
ALT SERPL-CCNC: 12 U/L
ANION GAP SERPL CALC-SCNC: 13 MMOL/L
AST SERPL-CCNC: 20 U/L
BASOPHILS # BLD AUTO: 0.02 K/UL
BASOPHILS NFR BLD AUTO: 0.3 %
BILIRUB SERPL-MCNC: 0.3 MG/DL
BUN SERPL-MCNC: 28 MG/DL
CALCIUM SERPL-MCNC: 9.6 MG/DL
CHLORIDE SERPL-SCNC: 104 MMOL/L
CO2 SERPL-SCNC: 25 MMOL/L
CREAT SERPL-MCNC: 1.43 MG/DL
EOSINOPHIL # BLD AUTO: 0.08 K/UL
EOSINOPHIL NFR BLD AUTO: 1.2 %
ESTIMATED AVERAGE GLUCOSE: 105 MG/DL
FOLATE SERPL-MCNC: 9.8 NG/ML
HBA1C MFR BLD HPLC: 5.3 %
HCT VFR BLD CALC: 27.8 %
HGB BLD-MCNC: 8.3 G/DL
IMM GRANULOCYTES NFR BLD AUTO: 0.5 %
LYMPHOCYTES # BLD AUTO: 0.5 K/UL
LYMPHOCYTES NFR BLD AUTO: 7.6 %
MAN DIFF?: NORMAL
MCHC RBC-ENTMCNC: 26.9 PG
MCHC RBC-ENTMCNC: 29.9 GM/DL
MCV RBC AUTO: 90 FL
MONOCYTES # BLD AUTO: 0.48 K/UL
MONOCYTES NFR BLD AUTO: 7.3 %
NEUTROPHILS # BLD AUTO: 5.43 K/UL
NEUTROPHILS NFR BLD AUTO: 83.1 %
PLATELET # BLD AUTO: 259 K/UL
POTASSIUM SERPL-SCNC: 4.6 MMOL/L
PROT SERPL-MCNC: 5.9 G/DL
RBC # BLD: 3.09 M/UL
RBC # FLD: 14.8 %
SODIUM SERPL-SCNC: 142 MMOL/L
TSH SERPL-ACNC: 1.25 UIU/ML
VIT B12 SERPL-MCNC: 284 PG/ML
WBC # FLD AUTO: 6.54 K/UL

## 2020-08-14 ENCOUNTER — TRANSCRIPTION ENCOUNTER (OUTPATIENT)
Age: 85
End: 2020-08-14

## 2020-08-31 ENCOUNTER — TRANSCRIPTION ENCOUNTER (OUTPATIENT)
Age: 85
End: 2020-08-31

## 2020-09-10 ENCOUNTER — APPOINTMENT (OUTPATIENT)
Dept: HOME HEALTH SERVICES | Facility: HOME HEALTH | Age: 85
End: 2020-09-10

## 2020-09-22 LAB
BASOPHILS # BLD AUTO: 0.02 K/UL
BASOPHILS NFR BLD AUTO: 0.3 %
EOSINOPHIL # BLD AUTO: 0.09 K/UL
EOSINOPHIL NFR BLD AUTO: 1.5 %
FERRITIN SERPL-MCNC: 13 NG/ML
HCT VFR BLD CALC: 28.4 %
HGB BLD-MCNC: 8.6 G/DL
IMM GRANULOCYTES NFR BLD AUTO: 0.3 %
IRON SATN MFR SERPL: 10 %
IRON SERPL-MCNC: 36 UG/DL
LYMPHOCYTES # BLD AUTO: 0.53 K/UL
LYMPHOCYTES NFR BLD AUTO: 8.8 %
MAN DIFF?: NORMAL
MCHC RBC-ENTMCNC: 27 PG
MCHC RBC-ENTMCNC: 30.3 GM/DL
MCV RBC AUTO: 89.3 FL
MONOCYTES # BLD AUTO: 0.48 K/UL
MONOCYTES NFR BLD AUTO: 8 %
NEUTROPHILS # BLD AUTO: 4.89 K/UL
NEUTROPHILS NFR BLD AUTO: 81.1 %
PLATELET # BLD AUTO: 269 K/UL
RBC # BLD: 3.18 M/UL
RBC # FLD: 14.9 %
TIBC SERPL-MCNC: 350 UG/DL
UIBC SERPL-MCNC: 314 UG/DL
WBC # FLD AUTO: 6.03 K/UL

## 2020-10-20 ENCOUNTER — NON-APPOINTMENT (OUTPATIENT)
Age: 85
End: 2020-10-20

## 2020-10-23 ENCOUNTER — APPOINTMENT (OUTPATIENT)
Dept: HOME HEALTH SERVICES | Facility: HOME HEALTH | Age: 85
End: 2020-10-23
Payer: MEDICARE

## 2020-10-23 VITALS
TEMPERATURE: 97.4 F | RESPIRATION RATE: 17 BRPM | SYSTOLIC BLOOD PRESSURE: 120 MMHG | OXYGEN SATURATION: 99 % | HEART RATE: 64 BPM | DIASTOLIC BLOOD PRESSURE: 80 MMHG

## 2020-10-23 DIAGNOSIS — M25.431 EFFUSION, RIGHT WRIST: ICD-10-CM

## 2020-10-23 PROCEDURE — 90662 IIV NO PRSV INCREASED AG IM: CPT

## 2020-10-23 PROCEDURE — G0008: CPT

## 2020-10-23 PROCEDURE — 99349 HOME/RES VST EST MOD MDM 40: CPT | Mod: 25

## 2020-10-23 NOTE — REASON FOR VISIT
[Follow-Up] : a follow-up visit [Formal Caregiver] : formal caregiver [Pre-Visit Preparation] : pre-visit preparation was done [Intercurrent Specialty/Sub-specialty Visits] : the patient has no intercurrent specialty/sub-specialty visits

## 2020-10-23 NOTE — HISTORY OF PRESENT ILLNESS
[Patient] : patient [FreeTextEntry1] : unsteady gait [FreeTextEntry2] : This is a 89-year-old female with a past medical history of hypertensive heart disease with afib and unprovoked PE on Coumadin, lymphedema b/l LE, status post falls with left hip fracture and sciatica, chronic kidney disease with anemia, postherpetic neuralgia, last ED visit 10/2018 for GI upset, discharged without any change in medications, seen for follow-up visit, and flu shot.\par \par Patient denies fever, cough, trouble breathing, rash, vomiting. Patient has not been in close contact with someone who is COVID positive.\par N95 mask, gloves, eye wear and gown (if indicated) used during visit: Y. \par Total face to face time with patient is 30 min.\par \par no significant interval events.\par \par patient has been doing well overall with no active complaints.\par she has chronic fatigue, taking iron daily. no changes in symptoms.\par she is worried about her BP to be high, pharmacy did not cover BP monitor, patient requesting to send it to [facility].\par denies fever, chills, nausea, vomiting, SOB, pain, eye discharge, chest pain ,headache.\par Appetite is good, no swallowing problems,  taking caltrate and omeprazole\par BM has been a problem janes when taking iron tabs, which she prefers to skip. taking senna and fleet enema as needed, reports a lot of flatus especially at night\par Ambulates with walker, no recent falls\par Memory and Mood is  reported to be good, denies prior h/o depression or anxiety. \par patient is not regular with medication intake, sometimes forgets, only has HHA during the day\par skin- no issues except for senile bruising\par chronic LE edema is stable with no worsening,  using moisturizers\par \par OA of right knee and occasional sciatica, will take Percocet on rare occasion for severe sciatic pain- on average once or twice a year\par \par lymphedema legs b/l- refuses to use compression stockings or lasix. stable with no recent worsening.\par ---------\par lives alone with 24/7 HHA\par malik chilel is involved in care\par

## 2020-10-23 NOTE — COUNSELING
[Improve pain control] : improve pain control [Decrease stress] : decrease stress [Decrease hospital use] : decrease hospital use [Minimize unnecessary interventions] : minimize unnecessary interventions [Maintain functional ability] : maintain functional ability [Discussed disease trajectory with patient/caregiver] : discussed disease trajectory with patient/caregiver [Likely to achieve goals/desired outcomes] : likely to achieve goals/desired outcomes [Patient/Caregiver has ___ understanding of disease process] : patient/caregiver has [unfilled] understanding of disease process [Advanced Directives discussed: ____] : Advanced directives discussed: [unfilled] [Annual Discussion and review of: ___] : Annual discussion and review of [unfilled] [Completed Medical Orders for Life-Sustaining Treatment] : completed medical orders for life-sustaining treatment [DNR] : Code Status: DNR [Limited] : Treatment Guidelines: Limited [DNI] : Intubation: DNI [[facility]: _____[facility]last verification date: [unfilled] [_____] : HCP: [unfilled] [Overweight (BMI 25.1 - 29.9)] : overweight - BMI 25.1-29.9 [Weight counseling provided] : Weight counseling provided [Mediterranean diet recommended] : Mediterranean diet recommended [Medical/Nutritional supplementation as prescribed] : medical/nutritional supplementation as prescribed [Non - Smoker] : non-smoker [Medical alert] : medical alert [Use assistive device to avoid falls] : use assistive device to avoid falls [Remove clutter and unsafe carpeting to avoid falls] : remove clutter and unsafe carpeting to avoid falls [Use grab bars] : use grab bars [Smoke/CO Detectors] : smoke/CO detectors [Not Indicated] : not indicated [FreeTextEntry5] : will take vaccines

## 2020-12-03 ENCOUNTER — APPOINTMENT (OUTPATIENT)
Dept: HOME HEALTH SERVICES | Facility: HOME HEALTH | Age: 85
End: 2020-12-03

## 2020-12-09 ENCOUNTER — NON-APPOINTMENT (OUTPATIENT)
Age: 85
End: 2020-12-09

## 2020-12-18 ENCOUNTER — NON-APPOINTMENT (OUTPATIENT)
Age: 85
End: 2020-12-18

## 2021-01-29 ENCOUNTER — NON-APPOINTMENT (OUTPATIENT)
Age: 86
End: 2021-01-29

## 2021-01-31 ENCOUNTER — INPATIENT (INPATIENT)
Facility: HOSPITAL | Age: 86
LOS: 3 days | Discharge: HOME | DRG: 812 | End: 2021-02-04
Attending: INTERNAL MEDICINE | Admitting: INTERNAL MEDICINE
Payer: MEDICARE

## 2021-01-31 ENCOUNTER — NON-APPOINTMENT (OUTPATIENT)
Age: 86
End: 2021-01-31

## 2021-01-31 VITALS
TEMPERATURE: 99 F | HEIGHT: 62 IN | OXYGEN SATURATION: 99 % | SYSTOLIC BLOOD PRESSURE: 134 MMHG | WEIGHT: 143.96 LBS | RESPIRATION RATE: 18 BRPM | HEART RATE: 63 BPM | DIASTOLIC BLOOD PRESSURE: 77 MMHG

## 2021-01-31 DIAGNOSIS — Z98.89 OTHER SPECIFIED POSTPROCEDURAL STATES: Chronic | ICD-10-CM

## 2021-01-31 DIAGNOSIS — D64.9 ANEMIA, UNSPECIFIED: ICD-10-CM

## 2021-01-31 LAB
ALBUMIN SERPL ELPH-MCNC: 3.4 G/DL — SIGNIFICANT CHANGE UP (ref 3.3–5)
ALP SERPL-CCNC: 70 U/L — SIGNIFICANT CHANGE UP (ref 40–120)
ALT FLD-CCNC: 8 U/L — LOW (ref 10–45)
ANION GAP SERPL CALC-SCNC: 10 MMOL/L — SIGNIFICANT CHANGE UP (ref 5–17)
APTT BLD: 32.9 SEC — SIGNIFICANT CHANGE UP (ref 27.5–35.5)
AST SERPL-CCNC: 15 U/L — SIGNIFICANT CHANGE UP (ref 10–40)
BASOPHILS # BLD AUTO: 0.01 K/UL — SIGNIFICANT CHANGE UP (ref 0–0.2)
BASOPHILS NFR BLD AUTO: 0.2 % — SIGNIFICANT CHANGE UP (ref 0–2)
BILIRUB SERPL-MCNC: 0.3 MG/DL — SIGNIFICANT CHANGE UP (ref 0.2–1.2)
BLD GP AB SCN SERPL QL: NEGATIVE — SIGNIFICANT CHANGE UP
BUN SERPL-MCNC: 27 MG/DL — HIGH (ref 7–23)
CALCIUM SERPL-MCNC: 9.6 MG/DL — SIGNIFICANT CHANGE UP (ref 8.4–10.5)
CHLORIDE SERPL-SCNC: 104 MMOL/L — SIGNIFICANT CHANGE UP (ref 96–108)
CO2 SERPL-SCNC: 22 MMOL/L — SIGNIFICANT CHANGE UP (ref 22–31)
CREAT SERPL-MCNC: 1.47 MG/DL — HIGH (ref 0.5–1.3)
EOSINOPHIL # BLD AUTO: 0.03 K/UL — SIGNIFICANT CHANGE UP (ref 0–0.5)
EOSINOPHIL NFR BLD AUTO: 0.5 % — SIGNIFICANT CHANGE UP (ref 0–6)
GLUCOSE SERPL-MCNC: 93 MG/DL — SIGNIFICANT CHANGE UP (ref 70–99)
HCT VFR BLD CALC: 20.6 % — CRITICAL LOW (ref 34.5–45)
HCT VFR BLD CALC: 23.1 % — LOW (ref 34.5–45)
HGB BLD-MCNC: 6.2 G/DL — CRITICAL LOW (ref 11.5–15.5)
HGB BLD-MCNC: 7.2 G/DL — LOW (ref 11.5–15.5)
IMM GRANULOCYTES NFR BLD AUTO: 0.3 % — SIGNIFICANT CHANGE UP (ref 0–1.5)
INR BLD: 1.02 RATIO — SIGNIFICANT CHANGE UP (ref 0.88–1.16)
LYMPHOCYTES # BLD AUTO: 0.37 K/UL — LOW (ref 1–3.3)
LYMPHOCYTES # BLD AUTO: 6.2 % — LOW (ref 13–44)
MCHC RBC-ENTMCNC: 23.9 PG — LOW (ref 27–34)
MCHC RBC-ENTMCNC: 25.2 PG — LOW (ref 27–34)
MCHC RBC-ENTMCNC: 30.1 GM/DL — LOW (ref 32–36)
MCHC RBC-ENTMCNC: 31.2 GM/DL — LOW (ref 32–36)
MCV RBC AUTO: 79.5 FL — LOW (ref 80–100)
MCV RBC AUTO: 80.8 FL — SIGNIFICANT CHANGE UP (ref 80–100)
MONOCYTES # BLD AUTO: 0.38 K/UL — SIGNIFICANT CHANGE UP (ref 0–0.9)
MONOCYTES NFR BLD AUTO: 6.3 % — SIGNIFICANT CHANGE UP (ref 2–14)
NEUTROPHILS # BLD AUTO: 5.19 K/UL — SIGNIFICANT CHANGE UP (ref 1.8–7.4)
NEUTROPHILS NFR BLD AUTO: 86.5 % — HIGH (ref 43–77)
NRBC # BLD: 0 /100 WBCS — SIGNIFICANT CHANGE UP (ref 0–0)
NRBC # BLD: 0 /100 WBCS — SIGNIFICANT CHANGE UP (ref 0–0)
OB PNL STL: NEGATIVE — SIGNIFICANT CHANGE UP
PLATELET # BLD AUTO: 210 K/UL — SIGNIFICANT CHANGE UP (ref 150–400)
PLATELET # BLD AUTO: 244 K/UL — SIGNIFICANT CHANGE UP (ref 150–400)
POTASSIUM SERPL-MCNC: 4.1 MMOL/L — SIGNIFICANT CHANGE UP (ref 3.5–5.3)
POTASSIUM SERPL-SCNC: 4.1 MMOL/L — SIGNIFICANT CHANGE UP (ref 3.5–5.3)
PROT SERPL-MCNC: 5.7 G/DL — LOW (ref 6–8.3)
PROTHROM AB SERPL-ACNC: 12.2 SEC — SIGNIFICANT CHANGE UP (ref 10.6–13.6)
RBC # BLD: 2.59 M/UL — LOW (ref 3.8–5.2)
RBC # BLD: 2.86 M/UL — LOW (ref 3.8–5.2)
RBC # FLD: 15.5 % — HIGH (ref 10.3–14.5)
RBC # FLD: 15.9 % — HIGH (ref 10.3–14.5)
RH IG SCN BLD-IMP: POSITIVE — SIGNIFICANT CHANGE UP
SARS-COV-2 RNA SPEC QL NAA+PROBE: SIGNIFICANT CHANGE UP
SODIUM SERPL-SCNC: 136 MMOL/L — SIGNIFICANT CHANGE UP (ref 135–145)
TROPONIN T, HIGH SENSITIVITY RESULT: 40 NG/L — SIGNIFICANT CHANGE UP (ref 0–51)
WBC # BLD: 5.61 K/UL — SIGNIFICANT CHANGE UP (ref 3.8–10.5)
WBC # BLD: 6 K/UL — SIGNIFICANT CHANGE UP (ref 3.8–10.5)
WBC # FLD AUTO: 5.61 K/UL — SIGNIFICANT CHANGE UP (ref 3.8–10.5)
WBC # FLD AUTO: 6 K/UL — SIGNIFICANT CHANGE UP (ref 3.8–10.5)

## 2021-01-31 PROCEDURE — 93010 ELECTROCARDIOGRAM REPORT: CPT

## 2021-01-31 PROCEDURE — 99291 CRITICAL CARE FIRST HOUR: CPT

## 2021-01-31 PROCEDURE — 71045 X-RAY EXAM CHEST 1 VIEW: CPT | Mod: 26

## 2021-01-31 RX ORDER — HYDROMORPHONE HYDROCHLORIDE 2 MG/ML
0.2 INJECTION INTRAMUSCULAR; INTRAVENOUS; SUBCUTANEOUS ONCE
Refills: 0 | Status: DISCONTINUED | OUTPATIENT
Start: 2021-01-31 | End: 2021-01-31

## 2021-01-31 RX ORDER — LORATADINE 10 MG/1
1 TABLET ORAL
Qty: 0 | Refills: 0 | DISCHARGE

## 2021-01-31 RX ORDER — HYDROMORPHONE HYDROCHLORIDE 2 MG/ML
0.5 INJECTION INTRAMUSCULAR; INTRAVENOUS; SUBCUTANEOUS ONCE
Refills: 0 | Status: DISCONTINUED | OUTPATIENT
Start: 2021-01-31 | End: 2021-01-31

## 2021-01-31 RX ORDER — LIDOCAINE 4 G/100G
1 CREAM TOPICAL ONCE
Refills: 0 | Status: COMPLETED | OUTPATIENT
Start: 2021-01-31 | End: 2021-01-31

## 2021-01-31 RX ADMIN — LIDOCAINE 1 PATCH: 4 CREAM TOPICAL at 12:24

## 2021-01-31 RX ADMIN — HYDROMORPHONE HYDROCHLORIDE 0.5 MILLIGRAM(S): 2 INJECTION INTRAMUSCULAR; INTRAVENOUS; SUBCUTANEOUS at 12:25

## 2021-01-31 RX ADMIN — LIDOCAINE 1 PATCH: 4 CREAM TOPICAL at 20:35

## 2021-01-31 RX ADMIN — HYDROMORPHONE HYDROCHLORIDE 0.2 MILLIGRAM(S): 2 INJECTION INTRAMUSCULAR; INTRAVENOUS; SUBCUTANEOUS at 17:36

## 2021-01-31 NOTE — CONSULT NOTE ADULT - ASSESSMENT
89 yo with remote past history of colon admitted with acute on chronic upper back pain found to have significant anemia.    Anemia DiffDx = occult GI bleed, bone marrow disorder, b12 deficiency, folate deficiency    --Agree to 1 unit prbc  --back pain seems to be in Thoracic region and is acute on chronic in nature.  If pain persists, consider CT imaging.  --check iron studies, b12 folate levels + retic count.  Will need to be interpreted in context of post-transfused blood sample.  Unable to add on to baseline BW in ER.  -- check SPEP, RAVINDRA, FLC to r/o myeloma in setting of renal insufficiency  --FOBT x 3    Baljinder Polk MD  Hematology/Oncology  Cell:  [phone]  Office Phone: [phone]  Office Fax:  [phone] [address] [id number]

## 2021-01-31 NOTE — H&P ADULT - HISTORY OF PRESENT ILLNESS
89y old  Female admitted w severe anemia, has h/o colon ca, resected 11 yrs ago. also c/o severe low back pain, acute on chronic exacerbation. denies any injury. h/o AFIB, HTN, HLD, GERD, PE, not on AC

## 2021-01-31 NOTE — ED PROVIDER NOTE - NS ED ROS FT

## 2021-01-31 NOTE — ED ADULT NURSE REASSESSMENT NOTE - NS ED NURSE REASSESS COMMENT FT1
Patient resting comfortably in stretcher denies shortness of breath or chest pain.  Bed in lowest position, fall band on and call bell next to patient.

## 2021-01-31 NOTE — ED PROVIDER NOTE - ATTENDING CONTRIBUTION TO CARE
Patient presenting complaining of R sided mid back pains ongoing for last 3 days.  Reporting she has had similar pains in past ongoing for years.  This episode began after going outside in the cold three days ago.  Trying percocet and NSAIDs at home with no relief, reporting it usually improves with this and does not last this long.  Also has history of recurrent anemia, unknown when it was last checked.    A 14 point review of systems is negative except as in HPI or otherwise documented.    Exam:  General: Patient well appearing, vital signs within normal limits  HEENT: airway patent with moist mucous membranes  Cardiac: RRR S1/S2 with strong peripheral pulses  Respiratory: lungs clear without respiratory distress  GI: abdomen soft, non tender, non distended  Neuro: no gross neurologic deficits  MSK: point tender along R posterior chest wall below scapula reproduces complaint  Skin: warm, well perfused  Psych: normal mood and affect    Patient presenting with severe posterior chest pains requiring IV dilaudid for pain control, also found to be significantly anemic on blood work, troponin testing and EKG obtained as this could be anginal equivalent given her anemia.  Blood transfusion ordered in Emergency Department.  Will admit for further pain control and hemoglobin monitoring.

## 2021-01-31 NOTE — ED PROVIDER NOTE - CLINICAL SUMMARY MEDICAL DECISION MAKING FREE TEXT BOX
89yo w/ PMHx of htn, PE, afib, colon ca (s/p resection 11 yrs ago), GERD p/w worsening back pain course c/b anemia with a negative occult who is receiving 1U of PRBC.

## 2021-01-31 NOTE — ED ADULT NURSE NOTE - INTERVENTIONS DEFINITIONS
Non-slip footwear when patient is off stretcher/Physically safe environment: no spills, clutter or unnecessary equipment/Stretcher in lowest position, wheels locked, appropriate side rails in place/Provide visual cue, wrist band, yellow gown, etc.

## 2021-01-31 NOTE — ED ADULT NURSE NOTE - OBJECTIVE STATEMENT
89 year old female presents to the ED by EMS for right mid back.  Patient has this pain intermittently x 30 years but normally improves when she takes a percocet.  She said pain has been worse x 4 days and is not improving with percocet.  She denies: injuries or trauma and said last dose of percoet was 0800 today.

## 2021-01-31 NOTE — ED ADULT NURSE REASSESSMENT NOTE - NS ED NURSE REASSESS COMMENT FT1
Patient had reported back pain again and asking for pain meds.  Went to give medication as ordered and patient sleeping in stretcher.  Patient not fidgeting and calm.

## 2021-01-31 NOTE — ED PROVIDER NOTE - OBJECTIVE STATEMENT
89yo w/ PMHx of htn, PE, afib, colon ca (s/p resection 11 yrs ago), GERD p/w worsening back pain. Pt states that for 3 days she has been having svere R. mid back pain that comes and goes. When it happens it is 10/10, stabbing/burning feeling. Yesterday when she was eating, the pain even caused her to throw up the food she as eating at the time. Pt took percocet for the pain and provided minimal relief this morning. This happened to her a few months ago but on the R. side and resolved that time with percocet. Denies n/v/f/c/diarrhea/dysuria/cp/sob.

## 2021-01-31 NOTE — H&P ADULT - ASSESSMENT
89 yo woman admitted w severe anemia,s/p transfusion w 1U PRBC  has h/o colon ca, resected 11 yrs ago. seen by heme and GI,   no work up being planned due to advanced age and comorbidities.   supportive care.   ptn c/o back pain, will place on analgesics. get CT T/L/S spine. place on neurontin 100 mg tid  Afib stable, not on AC  h/o PE, not on AC  HTN is stable  GERD  DVT ppx w PAS  GOC d/w ptn x 45 min: full code

## 2021-01-31 NOTE — ED PROVIDER NOTE - PHYSICAL EXAMINATION
GENERAL: lying in bed, in pain  HEAD:  Atraumatic, Normocephalic  EYES: EOMI, PERRLA, conjunctiva and sclera clear  ENT: Moist mucous membranes  NECK: Supple, No JVD  CHEST/LUNG: Clear to auscultation bilaterally; No rales, rhonchi, wheezing, or rubs. Unlabored respirations  HEART: Regular rate and rhythm; No murmurs, rubs, or gallops  ABDOMEN: Bowel sounds present; Soft, Nontender, Nondistended.   EXTREMITIES:  2+ Peripheral Pulses, brisk capillary refill. No clubbing, cyanosis, or edema  NERVOUS SYSTEM:  Alert & Oriented X3, speech clear. No deficits   MSK: FROM all 4 extremities, full and equal strength. TTP mid R. paravertebral area.  SKIN: No rashes or lesions GENERAL: lying in bed, in pain  HEAD:  Atraumatic, Normocephalic  EYES: EOMI, PERRLA, conjunctiva and sclera clear  ENT: Moist mucous membranes  NECK: Supple, No JVD  CHEST/LUNG: Clear to auscultation bilaterally; No rales, rhonchi, wheezing, or rubs. Unlabored respirations  HEART: Regular rate and rhythm; No murmurs, rubs, or gallops  ABDOMEN: Bowel sounds present; Soft, Nontender, Nondistended.   EXTREMITIES:  2+ Peripheral Pulses, brisk capillary refill. No clubbing, cyanosis, or edema  NERVOUS SYSTEM:  Alert & Oriented X3, speech clear. No deficits   MSK: FROM all 4 extremities, full and equal strength. TTP mid R. paravertebral area.  SKIN: No rashes or lesions  Rectal: No obvious bleeding; external hemorrhoids present; guaic sent

## 2021-01-31 NOTE — H&P ADULT - NSHPPHYSICALEXAM_GEN_ALL_CORE
T(F): 98.1 (01-31-21 @ 20:05), Max: 98.8 (01-31-21 @ 11:28)  HR: 75 (01-31-21 @ 20:05) (60 - 87)  BP: 135/61 (01-31-21 @ 20:05) (126/76 - 159/70)  RR: 18 (01-31-21 @ 20:05) (16 - 18)  SpO2: 98% (01-31-21 @ 20:05) (97% - 100%)    PHYSICAL EXAM:  GENERAL: NAD, well-developed  HEAD:  Atraumatic, Normocephalic  EYES: EOMI, PERRLA, conjunctiva and sclera clear  NECK: Supple, No JVD  CHEST/LUNG: Clear to auscultation bilaterally; No wheeze  HEART: Regular rate and rhythm; No murmurs, rubs, or gallops  ABDOMEN: Soft, Nontender, Nondistended; Bowel sounds present  EXTREMITIES:  2+ Peripheral Pulses, No clubbing, cyanosis, or edema  PSYCH: AAOx3  NEUROLOGY: non-focal  SKIN: No rashes or lesions

## 2021-01-31 NOTE — ED ADULT NURSE REASSESSMENT NOTE - NS ED NURSE REASSESS COMMENT FT1
Patient aware that she needs a blood transfusion and is agreeable to plan.  Bed in lowest position.  Call bell next to patient and patient repositioned in stretcher.

## 2021-02-01 ENCOUNTER — NON-APPOINTMENT (OUTPATIENT)
Age: 86
End: 2021-02-01

## 2021-02-01 ENCOUNTER — TRANSCRIPTION ENCOUNTER (OUTPATIENT)
Age: 86
End: 2021-02-01

## 2021-02-01 DIAGNOSIS — I48.91 UNSPECIFIED ATRIAL FIBRILLATION: ICD-10-CM

## 2021-02-01 DIAGNOSIS — R94.31 ABNORMAL ELECTROCARDIOGRAM [ECG] [EKG]: ICD-10-CM

## 2021-02-01 DIAGNOSIS — D64.9 ANEMIA, UNSPECIFIED: ICD-10-CM

## 2021-02-01 LAB
ANION GAP SERPL CALC-SCNC: 8 MMOL/L — SIGNIFICANT CHANGE UP (ref 5–17)
BUN SERPL-MCNC: 25 MG/DL — HIGH (ref 7–23)
CALCIUM SERPL-MCNC: 9.2 MG/DL — SIGNIFICANT CHANGE UP (ref 8.4–10.5)
CHLORIDE SERPL-SCNC: 108 MMOL/L — SIGNIFICANT CHANGE UP (ref 96–108)
CO2 SERPL-SCNC: 24 MMOL/L — SIGNIFICANT CHANGE UP (ref 22–31)
CREAT SERPL-MCNC: 1.38 MG/DL — HIGH (ref 0.5–1.3)
FERRITIN SERPL-MCNC: 11 NG/ML — LOW (ref 15–150)
FOLATE SERPL-MCNC: 12.9 NG/ML — SIGNIFICANT CHANGE UP
GLUCOSE SERPL-MCNC: 72 MG/DL — SIGNIFICANT CHANGE UP (ref 70–99)
HCT VFR BLD CALC: 23.7 % — LOW (ref 34.5–45)
HGB BLD-MCNC: 7.4 G/DL — LOW (ref 11.5–15.5)
IRON SATN MFR SERPL: 15 % — SIGNIFICANT CHANGE UP (ref 14–50)
IRON SATN MFR SERPL: 47 UG/DL — SIGNIFICANT CHANGE UP (ref 30–160)
KAPPA LC SER QL IFE: 4.7 MG/DL — HIGH (ref 0.33–1.94)
KAPPA/LAMBDA FREE LIGHT CHAIN RATIO, SERUM: 1.43 RATIO — SIGNIFICANT CHANGE UP (ref 0.26–1.65)
LAMBDA LC SER QL IFE: 3.29 MG/DL — HIGH (ref 0.57–2.63)
MAGNESIUM SERPL-MCNC: 2.1 MG/DL — SIGNIFICANT CHANGE UP (ref 1.6–2.6)
MCHC RBC-ENTMCNC: 25.2 PG — LOW (ref 27–34)
MCHC RBC-ENTMCNC: 31.2 GM/DL — LOW (ref 32–36)
MCV RBC AUTO: 80.6 FL — SIGNIFICANT CHANGE UP (ref 80–100)
NRBC # BLD: 0 /100 WBCS — SIGNIFICANT CHANGE UP (ref 0–0)
PHOSPHATE SERPL-MCNC: 2.7 MG/DL — SIGNIFICANT CHANGE UP (ref 2.5–4.5)
PLATELET # BLD AUTO: 240 K/UL — SIGNIFICANT CHANGE UP (ref 150–400)
POTASSIUM SERPL-MCNC: 4 MMOL/L — SIGNIFICANT CHANGE UP (ref 3.5–5.3)
POTASSIUM SERPL-SCNC: 4 MMOL/L — SIGNIFICANT CHANGE UP (ref 3.5–5.3)
PROT SERPL-MCNC: 4.8 G/DL — LOW (ref 6–8.3)
PROT SERPL-MCNC: 4.8 G/DL — LOW (ref 6–8.3)
RBC # BLD: 2.94 M/UL — LOW (ref 3.8–5.2)
RBC # BLD: 2.94 M/UL — LOW (ref 3.8–5.2)
RBC # FLD: 15.6 % — HIGH (ref 10.3–14.5)
RETICS #: 37.2 K/UL — SIGNIFICANT CHANGE UP (ref 25–125)
RETICS/RBC NFR: 1.3 % — SIGNIFICANT CHANGE UP (ref 0.5–2.5)
SODIUM SERPL-SCNC: 140 MMOL/L — SIGNIFICANT CHANGE UP (ref 135–145)
TIBC SERPL-MCNC: 313 UG/DL — SIGNIFICANT CHANGE UP (ref 220–430)
UIBC SERPL-MCNC: 266 UG/DL — SIGNIFICANT CHANGE UP (ref 110–370)
VIT B12 SERPL-MCNC: 242 PG/ML — SIGNIFICANT CHANGE UP (ref 232–1245)
WBC # BLD: 4.61 K/UL — SIGNIFICANT CHANGE UP (ref 3.8–10.5)
WBC # FLD AUTO: 4.61 K/UL — SIGNIFICANT CHANGE UP (ref 3.8–10.5)

## 2021-02-01 PROCEDURE — 72131 CT LUMBAR SPINE W/O DYE: CPT | Mod: 26

## 2021-02-01 PROCEDURE — 72128 CT CHEST SPINE W/O DYE: CPT | Mod: 26

## 2021-02-01 RX ORDER — PANTOPRAZOLE SODIUM 20 MG/1
40 TABLET, DELAYED RELEASE ORAL
Refills: 0 | Status: DISCONTINUED | OUTPATIENT
Start: 2021-02-01 | End: 2021-02-04

## 2021-02-01 RX ORDER — PREGABALIN 225 MG/1
1000 CAPSULE ORAL DAILY
Refills: 0 | Status: DISCONTINUED | OUTPATIENT
Start: 2021-02-01 | End: 2021-02-04

## 2021-02-01 RX ORDER — SENNA PLUS 8.6 MG/1
2 TABLET ORAL AT BEDTIME
Refills: 0 | Status: DISCONTINUED | OUTPATIENT
Start: 2021-02-01 | End: 2021-02-04

## 2021-02-01 RX ORDER — POLYETHYLENE GLYCOL 3350 17 G/17G
17 POWDER, FOR SOLUTION ORAL DAILY
Refills: 0 | Status: DISCONTINUED | OUTPATIENT
Start: 2021-02-01 | End: 2021-02-04

## 2021-02-01 RX ORDER — LIDOCAINE 4 G/100G
1 CREAM TOPICAL DAILY
Refills: 0 | Status: DISCONTINUED | OUTPATIENT
Start: 2021-02-01 | End: 2021-02-04

## 2021-02-01 RX ORDER — IRON SUCROSE 20 MG/ML
200 INJECTION, SOLUTION INTRAVENOUS EVERY 24 HOURS
Refills: 0 | Status: DISCONTINUED | OUTPATIENT
Start: 2021-02-01 | End: 2021-02-04

## 2021-02-01 RX ORDER — OXYCODONE HYDROCHLORIDE 5 MG/1
5 TABLET ORAL EVERY 4 HOURS
Refills: 0 | Status: DISCONTINUED | OUTPATIENT
Start: 2021-02-01 | End: 2021-02-04

## 2021-02-01 RX ORDER — OXYCODONE HYDROCHLORIDE 5 MG/1
10 TABLET ORAL EVERY 4 HOURS
Refills: 0 | Status: DISCONTINUED | OUTPATIENT
Start: 2021-02-01 | End: 2021-02-04

## 2021-02-01 RX ORDER — HYDROMORPHONE HYDROCHLORIDE 2 MG/ML
0.2 INJECTION INTRAMUSCULAR; INTRAVENOUS; SUBCUTANEOUS ONCE
Refills: 0 | Status: DISCONTINUED | OUTPATIENT
Start: 2021-02-01 | End: 2021-02-01

## 2021-02-01 RX ORDER — GABAPENTIN 400 MG/1
100 CAPSULE ORAL THREE TIMES A DAY
Refills: 0 | Status: DISCONTINUED | OUTPATIENT
Start: 2021-02-01 | End: 2021-02-04

## 2021-02-01 RX ORDER — LATANOPROST 0.05 MG/ML
1 SOLUTION/ DROPS OPHTHALMIC; TOPICAL AT BEDTIME
Refills: 0 | Status: DISCONTINUED | OUTPATIENT
Start: 2021-02-01 | End: 2021-02-04

## 2021-02-01 RX ADMIN — SENNA PLUS 2 TABLET(S): 8.6 TABLET ORAL at 23:11

## 2021-02-01 RX ADMIN — POLYETHYLENE GLYCOL 3350 17 GRAM(S): 17 POWDER, FOR SOLUTION ORAL at 12:58

## 2021-02-01 RX ADMIN — GABAPENTIN 100 MILLIGRAM(S): 400 CAPSULE ORAL at 15:42

## 2021-02-01 RX ADMIN — Medication 1 ENEMA: at 23:04

## 2021-02-01 RX ADMIN — GABAPENTIN 100 MILLIGRAM(S): 400 CAPSULE ORAL at 23:11

## 2021-02-01 RX ADMIN — LIDOCAINE 1 PATCH: 4 CREAM TOPICAL at 21:05

## 2021-02-01 RX ADMIN — LATANOPROST 1 DROP(S): 0.05 SOLUTION/ DROPS OPHTHALMIC; TOPICAL at 23:11

## 2021-02-01 RX ADMIN — IRON SUCROSE 110 MILLIGRAM(S): 20 INJECTION, SOLUTION INTRAVENOUS at 17:13

## 2021-02-01 RX ADMIN — PREGABALIN 1000 MICROGRAM(S): 225 CAPSULE ORAL at 15:42

## 2021-02-01 RX ADMIN — HYDROMORPHONE HYDROCHLORIDE 0.2 MILLIGRAM(S): 2 INJECTION INTRAMUSCULAR; INTRAVENOUS; SUBCUTANEOUS at 09:40

## 2021-02-01 RX ADMIN — OXYCODONE HYDROCHLORIDE 5 MILLIGRAM(S): 5 TABLET ORAL at 10:15

## 2021-02-01 RX ADMIN — LIDOCAINE 1 PATCH: 4 CREAM TOPICAL at 12:58

## 2021-02-01 NOTE — CONSULT NOTE ADULT - ASSESSMENT
89yo w/ PMHx of htn, PE not on AC currently, afib, colon ca (s/p resection 11 yrs ago), GERD p/w worsening back pain. Pt states that for 3 days she has been having svere R. mid back pain that comes and goes. She reports that she has had this pain wax and wane over several years.  When it happens it is 10/10, stabbing/burning feeling. Yesterday when she was eating, the pain even caused her to throw up the food she as eating at the time. Pt took percocet for the pain and provided minimal relief this morning. This happened to her a few months ago but on the R. side and resolved that time with percocet. Denies n/v/f/c/diarrhea/dysuria/cp/sob.

## 2021-02-01 NOTE — CONSULT NOTE ADULT - PROBLEM SELECTOR RECOMMENDATION 9
Conduction defects seen on EG are likely chronic   she is hemodynamically stable   given advanced age, will not pursue aggressive cardiac workup

## 2021-02-01 NOTE — DISCHARGE NOTE NURSING/CASE MANAGEMENT/SOCIAL WORK - PATIENT PORTAL LINK FT
You can access the FollowMyHealth Patient Portal offered by [facility] by registering at the following website: [email or web address]. By joining [facility]’s FollowMyHealth portal, you will also be able to view your health information using other applications (apps) compatible with our system.

## 2021-02-01 NOTE — CONSULT NOTE ADULT - SUBJECTIVE AND OBJECTIVE BOX
HPI  89yo w/ PMHx of htn, PE not on AC currently, afib, colon ca (s/p resection 11 yrs ago), GERD p/w worsening back pain. Pt states that for 3 days she has been having svere R. mid back pain that comes and goes. She reports that she has had this pain wax and wane over several years.  When it happens it is 10/10, stabbing/burning feeling. Yesterday when she was eating, the pain even caused her to throw up the food she as eating at the time. Pt took percocet for the pain and provided minimal relief this morning. This happened to her a few months ago but on the R. side and resolved that time with percocet. Denies n/v/f/c/diarrhea/dysuria/cp/sob.      Noted in ER to have signficant anemia.  Has been on oral iron in the past but non-compliant given constipation.     PAST MEDICAL/SURGICAL/FAMILY/SOCIAL HISTORY:   PMH of HTN, unprovoked PE (dx 2014, not on AC given recent hx of GIB 7 months ago), paroxysmal atrial fibrillation, colon cancer (10 years ago, s/p resection), GERD, and chronic lymphedema       PAST SURGICAL HISTORY:  H/O exploratory laparotomy for SBO?    S/P colectomy partial colectomy due to colon ca    S/P CAREY (Total Abdominal Hysterectomy).     FAMILY HISTORY:  No pertinent family history in first degree relatives.     No Pertinent Family History in first degree relatives of: CAD or Cancer.    Social History:  Social History (marital status, living situation, occupation, tobacco use, alcohol and drug use, and sexual history): Marital Status:  (   )    (   ) Single    (   )    ( X )   	Occupation: Retired  	Lives with: (X  ) alone  (  ) children   (  ) spouse   (  ) parents  (X  ) other: Aide 7 days a week, 5 hrs a day     	Substance Use (street drugs): ( X ) never used  (  ) other:  	Tobacco Usage:  ( X  ) never smoked   (   ) former smoker   (   ) current smoker  (     ) pack year  (        ) last cigarette date  Alcohol Usage: Denies     Family History:  No pertinent family history in first degree relatives.     No Pertinent Family History in first degree relatives of: n/a.    · Is Pregnant: No    · Marital Status: Single  · Lives With: alone; w/ HHA     Substance Use History:  · Substance Use	never used     Alcohol Use History:  · Have you ever consumed alcohol	never     Tobacco Usage:  · Tobacco Usage	Never smoker          penicillin (Unknown)      ROS otherwise negative     T(C): 36.6 (01-31-21 @ 15:23), Max: 37.1 (01-31-21 @ 11:28)  HR: 74 (01-31-21 @ 17:27) (60 - 75)  BP: 154/94 (01-31-21 @ 17:27) (133/76 - 159/70)  RR: 18 (01-31-21 @ 17:27) (16 - 18)  SpO2: 99% (01-31-21 @ 17:27) (97% - 100%)  PHYSICAL EXAM  Gen:  laying in bed, nad  H:  anicteric, eomi  Ab soft/nt/nd  Ext:  no edema                          6.2    6.00  )-----------( 244      ( 31 Jan 2021 12:56 )             20.6   01-31    136  |  104  |  27<H>  ----------------------------<  93  4.1   |  22  |  1.47<H>    Ca    9.6      31 Jan 2021 12:56    TPro  5.7<L>  /  Alb  3.4  /  TBili  0.3  /  DBili  x   /  AST  15  /  ALT  8<L>  /  AlkPhos  70  01-31  
CHIEF COMPLAINT:    HISTORY OF PRESENT ILLNESS:  89 yo w/ PMHx of htn, PE not on AC currently, afib, colon ca (s/p resection 11 yrs ago), GERD p/w worsening back pain. Pt states that for 3 days she has been having svere R. mid back pain that comes and goes. She reports that she has had this pain wax and wane over several years.  When it happens it is 10/10, stabbing/burning feeling. Yesterday when she was eating, the pain even caused her to throw up the food she as eating at the time. Pt took percocet for the pain and provided minimal relief this morning. This happened to her a few months ago but on the R. side and resolved that time with percocet. Denies n/v/f/c/diarrhea/dysuria/cp/sob.      PAST MEDICAL & SURGICAL HISTORY:  B12 deficiency    Lymphedema, limb  BLE    Colon cancer  6 yrs ago. did not require chemo    Hard of Hearing    GERD (Gastroesophageal Reflux Disease)    Gallstone    Hypertension    H/O exploratory laparotomy  for SBO?    S/P colectomy  partial colectomy due to colon ca    S/P CAREY (Total Abdominal Hysterectomy)            MEDICATIONS:        oxyCODONE    IR 5 milliGRAM(s) Oral every 4 hours PRN  oxyCODONE    IR 10 milliGRAM(s) Oral every 4 hours PRN    polyethylene glycol 3350 17 Gram(s) Oral daily  senna 2 Tablet(s) Oral at bedtime      lidocaine   Patch 1 Patch Transdermal daily      FAMILY HISTORY:  No pertinent family history in first degree relatives        SOCIAL HISTORY:    [ ] Non-smoker  [ ] Smoker  [ ] Alcohol    Allergies    penicillin (Unknown)    Intolerances    	    REVIEW OF SYSTEMS:  CONSTITUTIONAL: No fever, weight loss, + fatigue  EYES: No eye pain, visual disturbances, or discharge  ENMT:  No difficulty hearing, tinnitus, vertigo; No sinus or throat pain  NECK: No pain or stiffness  RESPIRATORY: No cough, wheezing, chills or hemoptysis; No Shortness of Breath  CARDIOVASCULAR: No chest pain, palpitations, passing out, dizziness, or leg swelling  GASTROINTESTINAL: No abdominal or epigastric pain. No nausea, vomiting, or hematemesis; No diarrhea or constipation. No melena or hematochezia.  GENITOURINARY: No dysuria, frequency, hematuria, or incontinence  NEUROLOGICAL: No headaches, memory loss, loss of strength, numbness, or tremors  SKIN: No itching, burning, rashes, or lesions   LYMPH Nodes: No enlarged glands  ENDOCRINE: No heat or cold intolerance; No hair loss  MUSCULOSKELETAL: No joint pain or swelling; No muscle, back, or extremity pain  PSYCHIATRIC: No depression, anxiety, mood swings, or difficulty sleeping  HEME/LYMPH: No easy bruising, or bleeding gums  ALLERY AND IMMUNOLOGIC: No hives or eczema	    [ ] All others negative	  [ ] Unable to obtain    PHYSICAL EXAM:  T(C): 36.8 (02-01-21 @ 05:33), Max: 36.9 (01-31-21 @ 18:58)  HR: 58 (02-01-21 @ 05:33) (58 - 87)  BP: 159/71 (02-01-21 @ 05:33) (126/76 - 159/71)  RR: 18 (02-01-21 @ 05:33) (16 - 18)  SpO2: 95% (02-01-21 @ 05:33) (95% - 100%)  Wt(kg): --  I&O's Summary      Appearance: NAD	  HEENT:  Dry oral mucosa, PERRL, EOMI	  Lymphatic: No lymphadenopathy  Cardiovascular: Normal S1 S2, No JVD,+ murmurs, No edema  Respiratory: decreased bs   Psychiatry: A & O x 3, Mood & affect appropriate  Gastrointestinal:  Soft, Non-tender, + BS	  Skin: No rashes, No ecchymoses, No cyanosis	  Neurologic: Non-focal  Extremities: Normal range of motion, No clubbing, cyanosis or edema  Vascular: Peripheral pulses palpable 2+ bilaterally    TELEMETRY: 	    ECG:    SINUS BRADYCARDIAWITH 1ST DEGREE A-V BLOCK WITH OCCASIONAL PREMATURE VENTRICULAR COMPLEXES  RIGHT BUNDLE BRANCH BLOCK  LEFT ANTERIOR FASCICULAR BLOCK  BIFASCICULAR BLOCK      RADIOLOGY:  < from: Xray Chest 1 View AP/PA (01.31.21 @ 12:25) >    EXAM:  XR CHEST AP OR PA 1V                            PROCEDURE DATE:  01/31/2021            INTERPRETATION:  Chest wall pain    AP chest. Prior 6/24/2019.    No change heart mediastinum. Symmetrically hyperinflated lungs without consolidation pneumothorax or effusion.    Impression: Hyperinflated lungs. No active infiltrates.                MARVIN BRADY MD; Attending Radiologist  This document has been electronically signed. Jan 31 2021 12:29PM    < end of copied text >    OTHER: 	  	  LABS:	 	    CARDIAC MARKERS:                                  7.4    4.61  )-----------( 240      ( 01 Feb 2021 06:59 )             23.7     02-01    140  |  108  |  25<H>  ----------------------------<  72  4.0   |  24  |  1.38<H>    Ca    9.2      01 Feb 2021 06:57  Phos  2.7     02-01  Mg     2.1     02-01    TPro  4.8<L>  /  Alb  x   /  TBili  x   /  DBili  x   /  AST  x   /  ALT  x   /  AlkPhos  x   02-01    proBNP:   Lipid Profile:   HgA1c:   TSH:

## 2021-02-02 ENCOUNTER — TRANSCRIPTION ENCOUNTER (OUTPATIENT)
Age: 86
End: 2021-02-02

## 2021-02-02 LAB
HCT VFR BLD CALC: 22.8 % — LOW (ref 34.5–45)
HGB BLD-MCNC: 7.2 G/DL — LOW (ref 11.5–15.5)
IRON SATN MFR SERPL: 304 UG/DL — HIGH (ref 30–160)
IRON SATN MFR SERPL: 92 % — HIGH (ref 14–50)
MCHC RBC-ENTMCNC: 25 PG — LOW (ref 27–34)
MCHC RBC-ENTMCNC: 31.6 GM/DL — LOW (ref 32–36)
MCV RBC AUTO: 79.2 FL — LOW (ref 80–100)
NRBC # BLD: 0 /100 WBCS — SIGNIFICANT CHANGE UP (ref 0–0)
PLATELET # BLD AUTO: 213 K/UL — SIGNIFICANT CHANGE UP (ref 150–400)
RBC # BLD: 2.88 M/UL — LOW (ref 3.8–5.2)
RBC # FLD: 16.2 % — HIGH (ref 10.3–14.5)
TIBC SERPL-MCNC: 329 UG/DL — SIGNIFICANT CHANGE UP (ref 220–430)
UIBC SERPL-MCNC: 26 UG/DL — LOW (ref 110–370)
WBC # BLD: 8.54 K/UL — SIGNIFICANT CHANGE UP (ref 3.8–10.5)
WBC # FLD AUTO: 8.54 K/UL — SIGNIFICANT CHANGE UP (ref 3.8–10.5)

## 2021-02-02 RX ADMIN — PANTOPRAZOLE SODIUM 40 MILLIGRAM(S): 20 TABLET, DELAYED RELEASE ORAL at 06:40

## 2021-02-02 RX ADMIN — SENNA PLUS 2 TABLET(S): 8.6 TABLET ORAL at 21:12

## 2021-02-02 RX ADMIN — OXYCODONE HYDROCHLORIDE 10 MILLIGRAM(S): 5 TABLET ORAL at 15:25

## 2021-02-02 RX ADMIN — GABAPENTIN 100 MILLIGRAM(S): 400 CAPSULE ORAL at 21:11

## 2021-02-02 RX ADMIN — GABAPENTIN 100 MILLIGRAM(S): 400 CAPSULE ORAL at 12:03

## 2021-02-02 RX ADMIN — PREGABALIN 1000 MICROGRAM(S): 225 CAPSULE ORAL at 12:03

## 2021-02-02 RX ADMIN — IRON SUCROSE 110 MILLIGRAM(S): 20 INJECTION, SOLUTION INTRAVENOUS at 12:03

## 2021-02-02 RX ADMIN — GABAPENTIN 100 MILLIGRAM(S): 400 CAPSULE ORAL at 06:41

## 2021-02-02 RX ADMIN — LATANOPROST 1 DROP(S): 0.05 SOLUTION/ DROPS OPHTHALMIC; TOPICAL at 21:12

## 2021-02-02 RX ADMIN — LIDOCAINE 1 PATCH: 4 CREAM TOPICAL at 00:40

## 2021-02-02 RX ADMIN — OXYCODONE HYDROCHLORIDE 10 MILLIGRAM(S): 5 TABLET ORAL at 00:40

## 2021-02-02 NOTE — DISCHARGE NOTE PROVIDER - NSDCCPCAREPLAN_GEN_ALL_CORE_FT
PRINCIPAL DISCHARGE DIAGNOSIS  Diagnosis: Anemia  Assessment and Plan of Treatment: Received blood transfusion and iron infusion      SECONDARY DISCHARGE DIAGNOSES  Diagnosis: Back pain  Assessment and Plan of Treatment: Pain control as needed  Do not stay in bed all day!  Increase your activity daily as tolerated.     PRINCIPAL DISCHARGE DIAGNOSIS  Diagnosis: Anemia  Assessment and Plan of Treatment: Received blood transfusion and iron infusion  Follow up with Hematologist as outpatient      SECONDARY DISCHARGE DIAGNOSES  Diagnosis: Back pain  Assessment and Plan of Treatment: Pain control as needed  Do not stay in bed all day!  Increase your activity daily as tolerated.     PRINCIPAL DISCHARGE DIAGNOSIS  Diagnosis: Anemia  Assessment and Plan of Treatment: Likely iron deficiency anemia   Received blood transfusion and iron infusion  Follow up with Hematologist as outpatient      SECONDARY DISCHARGE DIAGNOSES  Diagnosis: Back pain  Assessment and Plan of Treatment: Pain control as needed  Do not stay in bed all day!  Increase your activity daily as tolerated.  Physical therapy

## 2021-02-02 NOTE — DISCHARGE NOTE PROVIDER - CARE PROVIDER_API CALL
Satnam Caro)  Internal Medicine  [address], [address]  [place] [address]  Phone: [phone]  Fax: [phone]  Follow Up Time:

## 2021-02-02 NOTE — DISCHARGE NOTE PROVIDER - HOSPITAL COURSE
89 yo woman admitted w severe anemia,s/p transfusion w 1U PRBC  has h/o colon ca, resected 11 yrs ago. seen by heme and GI,   no work up being planned due to advanced age and comorbidities.   supportive care.   ptn c/o back pain, will place on analgesics. CT T/L/S spine Diffuse osteopenia.. place on neurontin 100 mg tid  Afib stable, not on AC  h/o PE, not on AC  HTN is stable 89 yo woman admitted w severe anemia,s/p transfusion w 1U PRBC  has h/o colon ca, resected 11 yrs ago. seen by heme and GI,   no work up being planned due to advanced age and comorbidities.   supportive care.   ptn c/o back pain, will place on analgesics. CT T/L/S spine Diffuse osteopenia.. place on neurontin 100 mg tid  Afib stable, not on AC  h/o PE, not on AC  Iron Diff Anemia   - pt with ferritin of 11   - likely iron diff   - getting iv iron  - b12 on lower end of normal   - started on b12 tablets   HTN is stable 89 yo woman admitted w severe anemia, s/p transfusion w 1U PRBC  has h/o colon ca, resected 11 yrs ago. seen by heme and GI,   no work up being planned due to advanced age and comorbidities.   supportive care.   ptn c/o back pain, will place on analgesics. CT T/L/S spine Diffuse osteopenia.. place on neurontin 100 mg tid  Afib stable, not on AC  h/o PE, not on AC  Iron Diff Anemia   - pt with ferritin of 11   - likely iron diff   - getting iv iron  - b12 on lower end of normal   - started on b12 tablets   HTN is stable    On 2/4 pt. dc'd home with management  as above / d/w Dr. Burns

## 2021-02-02 NOTE — DISCHARGE NOTE PROVIDER - NSDCMRMEDTOKEN_GEN_ALL_CORE_FT
Ambien 5 mg oral tablet: 1 tab(s) orally once a day (at bedtime)  Carafate 1 g/10 mL oral suspension: 10 milliliter(s) orally 3 times a day (before meals)  ferrous sulfate 325 mg (65 mg elemental iron) oral delayed release tablet: 1 tab(s) orally once a day   Lasix 20 mg oral tablet: 1 tab(s) orally Monday, Wednesday, and Friday  latanoprost 0.005% ophthalmic solution: 1 drop(s) to each affected eye once a day (in the evening)  pantoprazole 40 mg oral delayed release tablet: 1 tab(s) orally once a day (before a meal), As Needed  Percocet 7.5/325 oral tablet: 1 tab(s) orally once a day, As Needed  Vitamin B-12 1000 mcg oral tablet: 1 tab(s) orally once a day   ferrous sulfate 325 mg (65 mg elemental iron) oral delayed release tablet: 1 tab(s) orally once a day   gabapentin 100 mg oral capsule: 1 cap(s) orally 3 times a day  latanoprost 0.005% ophthalmic solution: 1 drop(s) to each affected eye once a day (in the evening)  lidocaine 5% topical film: Apply topically to affected area once a day  pantoprazole 40 mg oral delayed release tablet: 1 tab(s) orally once a day (before a meal)  Percocet 7.5/325 oral tablet: 1 tab(s) orally once a day, As Needed  polyethylene glycol 3350 oral powder for reconstitution: 17 gram(s) orally once a day  senna oral tablet: 2 tab(s) orally once a day (at bedtime)  Vitamin B-12 1000 mcg oral tablet: 1 tab(s) orally once a day

## 2021-02-03 LAB
% ALBUMIN: 56.6 % — SIGNIFICANT CHANGE UP
% ALPHA 1: 5.6 % — SIGNIFICANT CHANGE UP
% ALPHA 2: 11.5 % — SIGNIFICANT CHANGE UP
% BETA: 13.7 % — SIGNIFICANT CHANGE UP
% GAMMA: 12.6 % — SIGNIFICANT CHANGE UP
ALBUMIN SERPL ELPH-MCNC: 2.7 G/DL — LOW (ref 3.6–5.5)
ALBUMIN/GLOB SERPL ELPH: 1.3 RATIO — SIGNIFICANT CHANGE UP
ALPHA1 GLOB SERPL ELPH-MCNC: 0.3 G/DL — SIGNIFICANT CHANGE UP (ref 0.1–0.4)
ALPHA2 GLOB SERPL ELPH-MCNC: 0.6 G/DL — SIGNIFICANT CHANGE UP (ref 0.5–1)
ANION GAP SERPL CALC-SCNC: 12 MMOL/L — SIGNIFICANT CHANGE UP (ref 5–17)
B-GLOBULIN SERPL ELPH-MCNC: 0.7 G/DL — SIGNIFICANT CHANGE UP (ref 0.5–1)
BUN SERPL-MCNC: 21 MG/DL — SIGNIFICANT CHANGE UP (ref 7–23)
CALCIUM SERPL-MCNC: 8.7 MG/DL — SIGNIFICANT CHANGE UP (ref 8.4–10.5)
CHLORIDE SERPL-SCNC: 104 MMOL/L — SIGNIFICANT CHANGE UP (ref 96–108)
CO2 SERPL-SCNC: 22 MMOL/L — SIGNIFICANT CHANGE UP (ref 22–31)
CREAT SERPL-MCNC: 1.46 MG/DL — HIGH (ref 0.5–1.3)
GAMMA GLOBULIN: 0.6 G/DL — SIGNIFICANT CHANGE UP (ref 0.6–1.6)
GLUCOSE SERPL-MCNC: 95 MG/DL — SIGNIFICANT CHANGE UP (ref 70–99)
HCT VFR BLD CALC: 28.4 % — LOW (ref 34.5–45)
HGB BLD-MCNC: 8.8 G/DL — LOW (ref 11.5–15.5)
INTERPRETATION SERPL IFE-IMP: SIGNIFICANT CHANGE UP
MAGNESIUM SERPL-MCNC: 2.1 MG/DL — SIGNIFICANT CHANGE UP (ref 1.6–2.6)
MCHC RBC-ENTMCNC: 25.1 PG — LOW (ref 27–34)
MCHC RBC-ENTMCNC: 31 GM/DL — LOW (ref 32–36)
MCV RBC AUTO: 81.1 FL — SIGNIFICANT CHANGE UP (ref 80–100)
NRBC # BLD: 0 /100 WBCS — SIGNIFICANT CHANGE UP (ref 0–0)
PHOSPHATE SERPL-MCNC: 3.2 MG/DL — SIGNIFICANT CHANGE UP (ref 2.5–4.5)
PLATELET # BLD AUTO: 251 K/UL — SIGNIFICANT CHANGE UP (ref 150–400)
POTASSIUM SERPL-MCNC: 4 MMOL/L — SIGNIFICANT CHANGE UP (ref 3.5–5.3)
POTASSIUM SERPL-SCNC: 4 MMOL/L — SIGNIFICANT CHANGE UP (ref 3.5–5.3)
PROT PATTERN SERPL ELPH-IMP: SIGNIFICANT CHANGE UP
RBC # BLD: 3.5 M/UL — LOW (ref 3.8–5.2)
RBC # FLD: 16.6 % — HIGH (ref 10.3–14.5)
SODIUM SERPL-SCNC: 138 MMOL/L — SIGNIFICANT CHANGE UP (ref 135–145)
WBC # BLD: 6.62 K/UL — SIGNIFICANT CHANGE UP (ref 3.8–10.5)
WBC # FLD AUTO: 6.62 K/UL — SIGNIFICANT CHANGE UP (ref 3.8–10.5)

## 2021-02-03 RX ORDER — ZOLPIDEM TARTRATE 10 MG/1
1 TABLET ORAL
Qty: 0 | Refills: 0 | DISCHARGE

## 2021-02-03 RX ORDER — GABAPENTIN 400 MG/1
1 CAPSULE ORAL
Qty: 90 | Refills: 0
Start: 2021-02-03 | End: 2021-03-04

## 2021-02-03 RX ORDER — PANTOPRAZOLE SODIUM 20 MG/1
1 TABLET, DELAYED RELEASE ORAL
Qty: 30 | Refills: 0
Start: 2021-02-03 | End: 2021-03-04

## 2021-02-03 RX ORDER — LIDOCAINE 4 G/100G
1 CREAM TOPICAL
Qty: 14 | Refills: 0
Start: 2021-02-03 | End: 2021-02-16

## 2021-02-03 RX ORDER — SENNA PLUS 8.6 MG/1
2 TABLET ORAL
Qty: 0 | Refills: 0 | DISCHARGE
Start: 2021-02-03

## 2021-02-03 RX ORDER — SUCRALFATE 1 G
10 TABLET ORAL
Qty: 0 | Refills: 0 | DISCHARGE

## 2021-02-03 RX ORDER — FUROSEMIDE 40 MG
1 TABLET ORAL
Qty: 0 | Refills: 0 | DISCHARGE

## 2021-02-03 RX ORDER — POLYETHYLENE GLYCOL 3350 17 G/17G
17 POWDER, FOR SOLUTION ORAL
Qty: 0 | Refills: 0 | DISCHARGE
Start: 2021-02-03

## 2021-02-03 RX ADMIN — PANTOPRAZOLE SODIUM 40 MILLIGRAM(S): 20 TABLET, DELAYED RELEASE ORAL at 05:17

## 2021-02-03 RX ADMIN — LATANOPROST 1 DROP(S): 0.05 SOLUTION/ DROPS OPHTHALMIC; TOPICAL at 21:23

## 2021-02-03 RX ADMIN — IRON SUCROSE 110 MILLIGRAM(S): 20 INJECTION, SOLUTION INTRAVENOUS at 11:26

## 2021-02-03 RX ADMIN — GABAPENTIN 100 MILLIGRAM(S): 400 CAPSULE ORAL at 13:10

## 2021-02-03 RX ADMIN — POLYETHYLENE GLYCOL 3350 17 GRAM(S): 17 POWDER, FOR SOLUTION ORAL at 13:10

## 2021-02-03 RX ADMIN — SENNA PLUS 2 TABLET(S): 8.6 TABLET ORAL at 21:23

## 2021-02-03 RX ADMIN — GABAPENTIN 100 MILLIGRAM(S): 400 CAPSULE ORAL at 05:17

## 2021-02-03 RX ADMIN — PREGABALIN 1000 MICROGRAM(S): 225 CAPSULE ORAL at 11:25

## 2021-02-03 RX ADMIN — GABAPENTIN 100 MILLIGRAM(S): 400 CAPSULE ORAL at 21:23

## 2021-02-03 NOTE — PHYSICAL THERAPY INITIAL EVALUATION ADULT - ADDITIONAL COMMENTS
pt states lives alone in coop apartment, 5 steps to enter with HR, no stairs indoors; pt independent with mobility, has HHA 7d/5h; pt owns rolling walker

## 2021-02-03 NOTE — PHYSICAL THERAPY INITIAL EVALUATION ADULT - PLANNED THERAPY INTERVENTIONS, PT EVAL
STAIRS GOAL: pt will negotiate 5 steps with HR independently by 2 weeks/bed mobility training/gait training/transfer training

## 2021-02-03 NOTE — PHYSICAL THERAPY INITIAL EVALUATION ADULT - PERTINENT HX OF CURRENT PROBLEM, REHAB EVAL
89y old  Female admitted w severe anemia, has h/o colon ca, resected 11 yrs ago. also c/o severe low back pain, acute on chronic exacerbation. denies any injury. h/o AFIB, HTN, HLD, GERD, PE, not on AC. CT T/S-L/S; (-) acute fx; degen spine changes, high grade spinal canal stenosis at L3-4

## 2021-02-03 NOTE — PROGRESS NOTE ADULT - NSTELEHEALTH_GEN_ALL_CORE
No
No
[Follow-Up - From Hospitalization] : follow-up of a recent hospitalization for
[Chest Pain] : chest pain
[Coronary Artery Disease] : coronary artery disease
[Discharge Date: ___] : Discharge Date: [unfilled]
[Spouse] : spouse

## 2021-02-03 NOTE — PHYSICAL THERAPY INITIAL EVALUATION ADULT - DISCHARGE DISPOSITION, PT EVAL
for balance, gait and strengthening, and assistance for functional activities, pt owns rolling walker/home w/ home PT

## 2021-02-04 ENCOUNTER — NON-APPOINTMENT (OUTPATIENT)
Age: 86
End: 2021-02-04

## 2021-02-04 VITALS
HEART RATE: 74 BPM | SYSTOLIC BLOOD PRESSURE: 149 MMHG | RESPIRATION RATE: 18 BRPM | TEMPERATURE: 98 F | DIASTOLIC BLOOD PRESSURE: 77 MMHG | OXYGEN SATURATION: 98 %

## 2021-02-04 PROCEDURE — 86334 IMMUNOFIX E-PHORESIS SERUM: CPT

## 2021-02-04 PROCEDURE — 83550 IRON BINDING TEST: CPT

## 2021-02-04 PROCEDURE — P9016: CPT

## 2021-02-04 PROCEDURE — 84484 ASSAY OF TROPONIN QUANT: CPT

## 2021-02-04 PROCEDURE — 97161 PT EVAL LOW COMPLEX 20 MIN: CPT

## 2021-02-04 PROCEDURE — 71045 X-RAY EXAM CHEST 1 VIEW: CPT

## 2021-02-04 PROCEDURE — 85045 AUTOMATED RETICULOCYTE COUNT: CPT

## 2021-02-04 PROCEDURE — 85610 PROTHROMBIN TIME: CPT

## 2021-02-04 PROCEDURE — 72131 CT LUMBAR SPINE W/O DYE: CPT

## 2021-02-04 PROCEDURE — 36430 TRANSFUSION BLD/BLD COMPNT: CPT

## 2021-02-04 PROCEDURE — 83521 IG LIGHT CHAINS FREE EACH: CPT

## 2021-02-04 PROCEDURE — 86923 COMPATIBILITY TEST ELECTRIC: CPT

## 2021-02-04 PROCEDURE — 82272 OCCULT BLD FECES 1-3 TESTS: CPT

## 2021-02-04 PROCEDURE — U0003: CPT

## 2021-02-04 PROCEDURE — 93005 ELECTROCARDIOGRAM TRACING: CPT

## 2021-02-04 PROCEDURE — 84100 ASSAY OF PHOSPHORUS: CPT

## 2021-02-04 PROCEDURE — 85027 COMPLETE CBC AUTOMATED: CPT

## 2021-02-04 PROCEDURE — 72128 CT CHEST SPINE W/O DYE: CPT

## 2021-02-04 PROCEDURE — 86850 RBC ANTIBODY SCREEN: CPT

## 2021-02-04 PROCEDURE — 82607 VITAMIN B-12: CPT

## 2021-02-04 PROCEDURE — 83540 ASSAY OF IRON: CPT

## 2021-02-04 PROCEDURE — 80053 COMPREHEN METABOLIC PANEL: CPT

## 2021-02-04 PROCEDURE — 85730 THROMBOPLASTIN TIME PARTIAL: CPT

## 2021-02-04 PROCEDURE — 82728 ASSAY OF FERRITIN: CPT

## 2021-02-04 PROCEDURE — U0005: CPT

## 2021-02-04 PROCEDURE — 80048 BASIC METABOLIC PNL TOTAL CA: CPT

## 2021-02-04 PROCEDURE — 85025 COMPLETE CBC W/AUTO DIFF WBC: CPT

## 2021-02-04 PROCEDURE — 96374 THER/PROPH/DIAG INJ IV PUSH: CPT

## 2021-02-04 PROCEDURE — 82746 ASSAY OF FOLIC ACID SERUM: CPT

## 2021-02-04 PROCEDURE — 86900 BLOOD TYPING SEROLOGIC ABO: CPT

## 2021-02-04 PROCEDURE — 99285 EMERGENCY DEPT VISIT HI MDM: CPT | Mod: 25

## 2021-02-04 PROCEDURE — 83735 ASSAY OF MAGNESIUM: CPT

## 2021-02-04 PROCEDURE — 86901 BLOOD TYPING SEROLOGIC RH(D): CPT

## 2021-02-04 PROCEDURE — 84165 PROTEIN E-PHORESIS SERUM: CPT

## 2021-02-04 PROCEDURE — 84155 ASSAY OF PROTEIN SERUM: CPT

## 2021-02-04 RX ADMIN — PANTOPRAZOLE SODIUM 40 MILLIGRAM(S): 20 TABLET, DELAYED RELEASE ORAL at 06:44

## 2021-02-04 RX ADMIN — GABAPENTIN 100 MILLIGRAM(S): 400 CAPSULE ORAL at 06:44

## 2021-02-04 RX ADMIN — LIDOCAINE 1 PATCH: 4 CREAM TOPICAL at 11:30

## 2021-02-04 NOTE — PROGRESS NOTE ADULT - REASON FOR ADMISSION
anemia, back pain
anemia
anemia, back pain
anemia, chronic back pain
anemia, back pain
anemia, back pain

## 2021-02-04 NOTE — PROGRESS NOTE ADULT - PROVIDER SPECIALTY LIST ADULT
Heme/Onc
Gastroenterology
Gastroenterology
Internal Medicine
Heme/Onc
Gastroenterology
Gastroenterology
Internal Medicine
Cardiology

## 2021-02-04 NOTE — PROGRESS NOTE ADULT - ATTENDING COMMENTS
Advanced care planning was discussed with patient and family.  Advanced care planning forms were reviewed and discussed as appropriate.  Differential diagnosis and plan of care discussed with patient after the evaluation.   Pain assessed and judicious use of narcotics when appropriate was discussed.  Importance of Fall prevention discussed.  Counseling on Smoking and Alcohol cessation was offered when appropriate.  Counseling on Diet, exercise, and medication compliance was done.
agree with above

## 2021-02-04 NOTE — PROGRESS NOTE ADULT - PROBLEM SELECTOR PLAN 1
Conduction defects seen on EG are likely chronic   she is hemodynamically stable   given advanced age, will not pursue aggressive cardiac workup.
Conduction defects seen on EG are likely chronic   she is hemodynamically stable   given advanced age, will not pursue aggressive cardiac workup.
Conduction defects seen on EKG are likely chronic   she is hemodynamically stable   given advanced age, will not pursue aggressive cardiac workup.

## 2021-02-04 NOTE — PROGRESS NOTE ADULT - PROBLEM SELECTOR PLAN 2
maintaining sinus for now   Off AC due to anemia.

## 2021-02-05 ENCOUNTER — APPOINTMENT (OUTPATIENT)
Dept: HOME HEALTH SERVICES | Facility: HOME HEALTH | Age: 86
End: 2021-02-05

## 2021-02-06 ENCOUNTER — FORM ENCOUNTER (OUTPATIENT)
Age: 86
End: 2021-02-06

## 2021-02-12 ENCOUNTER — APPOINTMENT (OUTPATIENT)
Dept: HOME HEALTH SERVICES | Facility: HOME HEALTH | Age: 86
End: 2021-02-12

## 2021-02-12 ENCOUNTER — NON-APPOINTMENT (OUTPATIENT)
Age: 86
End: 2021-02-12

## 2021-02-16 ENCOUNTER — LABORATORY RESULT (OUTPATIENT)
Age: 86
End: 2021-02-16

## 2021-02-17 ENCOUNTER — NON-APPOINTMENT (OUTPATIENT)
Age: 86
End: 2021-02-17

## 2021-02-26 ENCOUNTER — NON-APPOINTMENT (OUTPATIENT)
Age: 86
End: 2021-02-26

## 2021-03-08 ENCOUNTER — LABORATORY RESULT (OUTPATIENT)
Age: 86
End: 2021-03-08

## 2021-03-09 NOTE — ED ADULT TRIAGE NOTE - PAIN RATING/NUMBER SCALE (0-10): ACTIVITY
Assessment and Plan     (H05.012) Cellulitis of left orbital region  (primary encounter diagnosis)  (L03.211) Facial cellulitis  Comment: acute, rapid onset of left facial and periorbital pain and edema after tooth pain onset.  Previous dental infection, and poor dentition.  VS unremarkable.  Exam remarkable for signs concerning for early orbital cellulitis from dental infection extension.  Patient needs hospitalization for IV abx and likely head/sinus CT with IV contrast to rule out deep abscess or other complications.    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  Will keep on this for 3 months, and then taper off.  Plan:   -Comprehensive Metabolic Panel ([facility]) -         results <1hr Protocol,   -CBC w/ Plt. ([facility])  -keep on Dapsone 150mg daily    (N40.1,  R35.1) Benign prostatic hyperplasia with nocturia  Comment: Worsened.  Even though he has been on finasteride for only 6 months, he is progressing in symptoms now.  Will send to Urology for other options, including TURP  Plan:   -UROLOGY ADULT REFERRAL  -Continue finasteride and tamsulosin        Options for treatment and follow-up care were reviewed with the patient and/or guardian. Piter Gaines and/or guardian engaged in the decision making process and verbalized understanding of the options discussed and agreed with the final plan.      Kody Bangura MD    Precepted today with: Dr. Burdick           HPI:       Piter Gaines is a 55 year old  male with a significant past medical history of T2DM, IgA bullous disease accompanied with self who presents for the new concern(s) of    1) IgA bullous disease  -Issues with taking Dapsone? no  -Lesions still popping up? significantly less itching, no new bullous lesions, pigment flat lesions are still cropping up occasionally  -ROS: Denies fatigue, new headaches, blue lips, palpitations, chest pain,  "new SOB, yellow eyes, dark urine, new numbness in feet or hands    2) left upper tooth pain  -three days ago  -bit into something and sudden onset of pain in back molars and gums in upper left jaw  -sore and intense  -progressively worsening  -naproxen not helping  -two days ago -> left sided facial swelling with pain  -swelling around eye  -pain with eye movements and feels off  -no fever, dizziness, nausea, vomiting  -poor dentition with partially edentulous    3) urinary urgency  -more intense and frequent micturition  -still has nocturia, hesitancy, and weak stream  -wants something else done  -been on finasteride for 6 months    4) ganglion cyst  -disappeared one week ago    5) C-scope  -positive FIT in 2017  -need to get done  -can't tolerate the prep    Independent Historian (N/A): none           Review of Systems:     7-point ROS reviewed and negative unless otherwise noted in HPI           Objective:     Vitals:    03/09/21 1406 03/09/21 1410   BP: (!) 147/99 (!) 146/90   BP Location: Right arm Right arm   Pulse: 91    Resp: 16    Temp: 97.9  F (36.6  C)    TempSrc: Oral    SpO2: 94%    Weight: 83.5 kg (184 lb)    Height: 1.765 m (5' 9.5\")      Body mass index is 26.78 kg/m .    Exam: Detailed exam of HEENT, integumentary, cardiovascular systems; Brief exam of consitutional, pulmonary, musculoskeletal, neurological, and psychiatric systems were performed - pertinent findings include: EOMI, no proptosis, no conjunctiva, significant periorbital edema without erythema, left facial edema, left facial tenderness without erythema    External Data: none    Internal Data: CBC and CMP ordered    " 8

## 2021-03-10 ENCOUNTER — NON-APPOINTMENT (OUTPATIENT)
Age: 86
End: 2021-03-10

## 2021-03-10 DIAGNOSIS — Z23 ENCOUNTER FOR IMMUNIZATION: ICD-10-CM

## 2021-03-10 LAB
BASOPHILS # BLD AUTO: 0.03 K/UL
BASOPHILS NFR BLD AUTO: 0.5 %
EOSINOPHIL # BLD AUTO: 0.17 K/UL
EOSINOPHIL NFR BLD AUTO: 2.8 %
HCT VFR BLD CALC: 30 %
HGB BLD-MCNC: 9 G/DL
IMM GRANULOCYTES NFR BLD AUTO: 0.7 %
LYMPHOCYTES # BLD AUTO: 0.81 K/UL
LYMPHOCYTES NFR BLD AUTO: 13.3 %
MAN DIFF?: NORMAL
MCHC RBC-ENTMCNC: 26.5 PG
MCHC RBC-ENTMCNC: 30 GM/DL
MCV RBC AUTO: 88.5 FL
MONOCYTES # BLD AUTO: 0.61 K/UL
MONOCYTES NFR BLD AUTO: 10 %
NEUTROPHILS # BLD AUTO: 4.44 K/UL
NEUTROPHILS NFR BLD AUTO: 72.7 %
PLATELET # BLD AUTO: 304 K/UL
RBC # BLD: 3.39 M/UL
RBC # FLD: 22.7 %
WBC # FLD AUTO: 6.1 K/UL

## 2021-03-12 ENCOUNTER — NON-APPOINTMENT (OUTPATIENT)
Age: 86
End: 2021-03-12

## 2021-03-18 ENCOUNTER — APPOINTMENT (OUTPATIENT)
Dept: HOME HEALTH SERVICES | Facility: HOME HEALTH | Age: 86
End: 2021-03-18

## 2021-03-19 ENCOUNTER — TRANSCRIPTION ENCOUNTER (OUTPATIENT)
Age: 86
End: 2021-03-19

## 2021-03-31 ENCOUNTER — NON-APPOINTMENT (OUTPATIENT)
Age: 86
End: 2021-03-31

## 2021-04-01 ENCOUNTER — NON-APPOINTMENT (OUTPATIENT)
Age: 86
End: 2021-04-01

## 2021-04-02 ENCOUNTER — APPOINTMENT (OUTPATIENT)
Dept: HOME HEALTH SERVICES | Facility: HOME HEALTH | Age: 86
End: 2021-04-02

## 2021-04-02 ENCOUNTER — TRANSCRIPTION ENCOUNTER (OUTPATIENT)
Age: 86
End: 2021-04-02

## 2021-04-02 ENCOUNTER — NON-APPOINTMENT (OUTPATIENT)
Age: 86
End: 2021-04-02

## 2021-04-02 VITALS
DIASTOLIC BLOOD PRESSURE: 72 MMHG | OXYGEN SATURATION: 98 % | SYSTOLIC BLOOD PRESSURE: 116 MMHG | HEART RATE: 72 BPM | TEMPERATURE: 98.9 F

## 2021-04-02 VITALS
SYSTOLIC BLOOD PRESSURE: 122 MMHG | TEMPERATURE: 98.9 F | RESPIRATION RATE: 16 BRPM | DIASTOLIC BLOOD PRESSURE: 72 MMHG | OXYGEN SATURATION: 98 % | HEART RATE: 68 BPM

## 2021-04-06 RX ORDER — CAMPHOR 0.45 %
25 GEL (GRAM) TOPICAL
Qty: 1 | Refills: 0 | Status: DISCONTINUED | COMMUNITY
Start: 2021-04-01 | End: 2021-04-06

## 2021-04-06 RX ORDER — EPINEPHRINE 0.3 MG/.3ML
0.3 INJECTION INTRAMUSCULAR
Qty: 1 | Refills: 1 | Status: DISCONTINUED | COMMUNITY
Start: 2021-04-01 | End: 2021-04-06

## 2021-04-06 RX ORDER — DIPHENHYDRAMINE HYDROCHLORIDE 50 MG/ML
50 INJECTION, SOLUTION INTRAMUSCULAR; INTRAVENOUS
Qty: 1 | Refills: 0 | Status: DISCONTINUED | COMMUNITY
Start: 2021-04-01 | End: 2021-04-06

## 2021-04-10 ENCOUNTER — TRANSCRIPTION ENCOUNTER (OUTPATIENT)
Age: 86
End: 2021-04-10

## 2021-04-10 ENCOUNTER — NON-APPOINTMENT (OUTPATIENT)
Age: 86
End: 2021-04-10

## 2021-05-06 ENCOUNTER — APPOINTMENT (OUTPATIENT)
Dept: HOME HEALTH SERVICES | Facility: HOME HEALTH | Age: 86
End: 2021-05-06
Payer: MEDICARE

## 2021-05-06 VITALS
SYSTOLIC BLOOD PRESSURE: 130 MMHG | TEMPERATURE: 98 F | OXYGEN SATURATION: 98 % | DIASTOLIC BLOOD PRESSURE: 76 MMHG | RESPIRATION RATE: 16 BRPM | HEART RATE: 77 BPM

## 2021-05-06 PROCEDURE — 99350 HOME/RES VST EST HIGH MDM 60: CPT

## 2021-05-07 ENCOUNTER — LABORATORY RESULT (OUTPATIENT)
Age: 86
End: 2021-05-07

## 2021-06-04 ENCOUNTER — NON-APPOINTMENT (OUTPATIENT)
Age: 86
End: 2021-06-04

## 2021-06-25 ENCOUNTER — APPOINTMENT (OUTPATIENT)
Dept: HOME HEALTH SERVICES | Facility: HOME HEALTH | Age: 86
End: 2021-06-25

## 2021-07-08 ENCOUNTER — LABORATORY RESULT (OUTPATIENT)
Age: 86
End: 2021-07-08

## 2021-07-09 ENCOUNTER — NON-APPOINTMENT (OUTPATIENT)
Age: 86
End: 2021-07-09

## 2021-07-09 LAB
BASOPHILS # BLD AUTO: 0.02 K/UL
BASOPHILS NFR BLD AUTO: 0.4 %
EOSINOPHIL # BLD AUTO: 0.12 K/UL
EOSINOPHIL NFR BLD AUTO: 2.1 %
HCT VFR BLD CALC: 31.9 %
HGB BLD-MCNC: 9.6 G/DL
IMM GRANULOCYTES NFR BLD AUTO: 0.4 %
LYMPHOCYTES # BLD AUTO: 0.59 K/UL
LYMPHOCYTES NFR BLD AUTO: 10.5 %
MAN DIFF?: NORMAL
MCHC RBC-ENTMCNC: 28.2 PG
MCHC RBC-ENTMCNC: 30.1 GM/DL
MCV RBC AUTO: 93.5 FL
MONOCYTES # BLD AUTO: 0.5 K/UL
MONOCYTES NFR BLD AUTO: 8.9 %
NEUTROPHILS # BLD AUTO: 4.35 K/UL
NEUTROPHILS NFR BLD AUTO: 77.7 %
PLATELET # BLD AUTO: NORMAL K/UL
RBC # BLD: 3.41 M/UL
RBC # FLD: 14.9 %
WBC # FLD AUTO: 5.6 K/UL

## 2021-07-30 ENCOUNTER — APPOINTMENT (OUTPATIENT)
Dept: HOME HEALTH SERVICES | Facility: HOME HEALTH | Age: 86
End: 2021-07-30
Payer: MEDICARE

## 2021-07-30 VITALS
SYSTOLIC BLOOD PRESSURE: 120 MMHG | DIASTOLIC BLOOD PRESSURE: 64 MMHG | HEART RATE: 58 BPM | OXYGEN SATURATION: 98 % | RESPIRATION RATE: 16 BRPM

## 2021-07-30 PROCEDURE — 99348 HOME/RES VST EST LOW MDM 30: CPT

## 2021-08-16 NOTE — PATIENT PROFILE ADULT - FALLEN IN THE PAST
ED Attending Physician Note      Patient : Edil Lala Age: 9 year old Sex: male   MRN: [id number] Encounter Date: 8/16/2021      History     Chief Complaint   Patient presents with   • Cough   • Fever 9 Weeks to 74 years       HPI this is a 9-year-old brought to the emergency department by mom for complaint of cough, upper respiratory symptoms including sore throat that began approximately 2-3 days ago.  He had seemed short of breath on a couple of occasions.  He has had some fever and body aches.  He is tolerating oral intake and has normal urinary output.  Mom has noted some audible wheezing.    Review of Systems   Constitutional: Positive for fever. Negative for chills.   HENT: Positive for congestion and sore throat.    Respiratory: Positive for cough and wheezing.    Cardiovascular: Negative for chest pain.   Gastrointestinal: Negative.    Genitourinary: Negative for dysuria.   Neurological: Negative for weakness and headaches.   Hematological: Does not bruise/bleed easily.   All other systems reviewed and are negative.      No Known Allergies    No past medical history on file.  Immunizations up-to-date    No past surgical history on file.    No family history on file.  Noncontributory    Social History     Tobacco Use   • Smoking status: Never Smoker   • Smokeless tobacco: Never Used   Substance Use Topics   • Alcohol use: Never   • Drug use: Not on file       Physical Exam     ED Triage Vitals   ED Triage Vitals Group      Temp 08/16/21 0759 99.5 °F (37.5 °C)      Heart Rate 08/16/21 0759 (!) 129      Resp 08/16/21 0759 19      BP 08/16/21 0759 100/69      SpO2 08/16/21 0759 95 %      EtCO2 mmHg --       Height 08/16/21 0801 4' 8\" (1.422 m)      Weight 08/16/21 0801 73 lb 13.7 oz (33.5 kg)      Weight Scale Used --       BMI (Calculated) 08/16/21 0801 16.56      IBW/kg (Calculated) 08/16/21 0801 40.8       Physical Exam  Vitals reviewed.   Constitutional:       General: He is active.      Appearance: He  is well-developed.      Comments: Mild distress   HENT:      Head: Normocephalic.      Right Ear: Tympanic membrane normal.      Left Ear: Tympanic membrane normal.      Nose: Nose normal.      Mouth/Throat:      Mouth: Mucous membranes are moist.      Pharynx: Oropharynx is clear.   Eyes:      Conjunctiva/sclera: Conjunctivae normal.      Pupils: Pupils are equal, round, and reactive to light.   Cardiovascular:      Rate and Rhythm: Regular rhythm. Tachycardia present.      Pulses: Normal pulses.   Pulmonary:      Effort: Pulmonary effort is normal. No retractions.      Breath sounds: Wheezing present.   Abdominal:      General: Bowel sounds are normal. There is no distension.      Palpations: Abdomen is soft.      Tenderness: There is no abdominal tenderness.   Musculoskeletal:         General: No tenderness. Normal range of motion.      Cervical back: Normal range of motion and neck supple.   Skin:     General: Skin is warm and dry.      Capillary Refill: Capillary refill takes less than 2 seconds.   Neurological:      General: No focal deficit present.      Mental Status: He is alert.   Psychiatric:         Mood and Affect: Mood normal.           Rationale     Presents with respiratory symptoms and wheezing, will be treated with albuterol nebulizer therapy, and chest x-ray.  Will also have Covid testing.    Lab Results     Rapid Covid, influenza and RSV were negative.    Radiology Results     Imaging Results          XR CHEST PA AND LATERAL 2 VIEWS (Final result)  Result time 08/16/21 09:16:17    Final result                 Impression:      1.  Findings which can suggest reactive airway disease and/or viral  illness.  2.  No focal airspace consolidation.  3.  Additional findings as described above.    Electronically Signed by: WALE NICE M.D.   Signed on: 8/16/2021 9:16 AM                Narrative:    EXAM: XR CHEST PA AND LATERAL 2 VIEWS    CLINICAL INDICATION: Cough.    COMPARISON: No comparison was  made.    FINDINGS:    Nonenlarged heart size.  Mildly prominent interstitial opacities in the  lungs which are nonspecific.  No focal airspace consolidation.  No pleural  effusion.  No pneumothorax.  Mild left convex curvature of the thoracic  spine.  The patient is skeletally immature.                                  ED Course    No evidence of pneumonia, will be discharged on azithromycin, wheezing resolved with nonnebulizer.  She is seeing the pediatrician will discuss whether further albuterol is indicated.    MDM    Clinical Impression     ED Diagnosis   1. Acute bronchitis, unspecified organism         Disposition        Discharge 8/16/2021 10:07 AM  Edil Lala discharge to home/self care.                         Estefani Prince MD  08/19/21 7844     no

## 2021-08-17 RX ORDER — OXYCODONE AND ACETAMINOPHEN 5; 325 MG/1; MG/1
5-325 TABLET ORAL
Qty: 7 | Refills: 0 | Status: DISCONTINUED | COMMUNITY
End: 2021-08-17

## 2021-09-03 ENCOUNTER — NON-APPOINTMENT (OUTPATIENT)
Age: 86
End: 2021-09-03

## 2021-09-08 ENCOUNTER — APPOINTMENT (OUTPATIENT)
Dept: HOME HEALTH SERVICES | Facility: HOME HEALTH | Age: 86
End: 2021-09-08

## 2021-09-08 VITALS
RESPIRATION RATE: 16 BRPM | OXYGEN SATURATION: 99 % | SYSTOLIC BLOOD PRESSURE: 118 MMHG | TEMPERATURE: 99.4 F | HEART RATE: 60 BPM | DIASTOLIC BLOOD PRESSURE: 66 MMHG

## 2021-09-17 ENCOUNTER — LABORATORY RESULT (OUTPATIENT)
Age: 86
End: 2021-09-17

## 2021-09-23 LAB
BASOPHILS # BLD AUTO: 0.02 K/UL
BASOPHILS NFR BLD AUTO: 0.3 %
EOSINOPHIL # BLD AUTO: 0.14 K/UL
EOSINOPHIL NFR BLD AUTO: 2.4 %
ESTIMATED AVERAGE GLUCOSE: 105 MG/DL
FOLATE SERPL-MCNC: 13.7 NG/ML
HBA1C MFR BLD HPLC: 5.3 %
HCT VFR BLD CALC: 31.9 %
HGB BLD-MCNC: 10 G/DL
IMM GRANULOCYTES NFR BLD AUTO: 0.5 %
LYMPHOCYTES # BLD AUTO: 0.74 K/UL
LYMPHOCYTES NFR BLD AUTO: 12.8 %
MAN DIFF?: NORMAL
MCHC RBC-ENTMCNC: 28.6 PG
MCHC RBC-ENTMCNC: 31.3 GM/DL
MCV RBC AUTO: 91.1 FL
MONOCYTES # BLD AUTO: 0.54 K/UL
MONOCYTES NFR BLD AUTO: 9.4 %
NEUTROPHILS # BLD AUTO: 4.3 K/UL
NEUTROPHILS NFR BLD AUTO: 74.6 %
PLATELET # BLD AUTO: 233 K/UL
RBC # BLD: 3.5 M/UL
RBC # FLD: 14.7 %
TSH SERPL-ACNC: 1.13 UIU/ML
VIT B12 SERPL-MCNC: 275 PG/ML
WBC # FLD AUTO: 5.77 K/UL

## 2021-10-05 ENCOUNTER — TRANSCRIPTION ENCOUNTER (OUTPATIENT)
Age: 86
End: 2021-10-05

## 2021-10-05 ENCOUNTER — NON-APPOINTMENT (OUTPATIENT)
Age: 86
End: 2021-10-05

## 2021-10-06 ENCOUNTER — NON-APPOINTMENT (OUTPATIENT)
Age: 86
End: 2021-10-06

## 2021-10-12 ENCOUNTER — NON-APPOINTMENT (OUTPATIENT)
Age: 86
End: 2021-10-12

## 2021-10-13 ENCOUNTER — APPOINTMENT (OUTPATIENT)
Dept: HOME HEALTH SERVICES | Facility: HOME HEALTH | Age: 86
End: 2021-10-13
Payer: MEDICARE

## 2021-10-13 VITALS
SYSTOLIC BLOOD PRESSURE: 122 MMHG | OXYGEN SATURATION: 98 % | DIASTOLIC BLOOD PRESSURE: 70 MMHG | RESPIRATION RATE: 16 BRPM | HEART RATE: 66 BPM | TEMPERATURE: 98 F

## 2021-10-13 DIAGNOSIS — M54.9 DORSALGIA, UNSPECIFIED: ICD-10-CM

## 2021-10-13 DIAGNOSIS — G47.00 INSOMNIA, UNSPECIFIED: ICD-10-CM

## 2021-10-13 DIAGNOSIS — M15.9 POLYOSTEOARTHRITIS, UNSPECIFIED: ICD-10-CM

## 2021-10-13 PROCEDURE — G0008: CPT

## 2021-10-13 PROCEDURE — 90662 IIV NO PRSV INCREASED AG IM: CPT

## 2021-10-13 PROCEDURE — 99349 HOME/RES VST EST MOD MDM 40: CPT | Mod: 25

## 2021-11-05 ENCOUNTER — NON-APPOINTMENT (OUTPATIENT)
Age: 86
End: 2021-11-05

## 2021-11-05 DIAGNOSIS — J32.9 CHRONIC SINUSITIS, UNSPECIFIED: ICD-10-CM

## 2021-11-26 ENCOUNTER — NON-APPOINTMENT (OUTPATIENT)
Age: 86
End: 2021-11-26

## 2021-12-02 ENCOUNTER — APPOINTMENT (OUTPATIENT)
Dept: HOME HEALTH SERVICES | Facility: HOME HEALTH | Age: 86
End: 2021-12-02
Payer: MEDICARE

## 2021-12-02 VITALS
RESPIRATION RATE: 16 BRPM | TEMPERATURE: 98.9 F | OXYGEN SATURATION: 98 % | HEART RATE: 73 BPM | SYSTOLIC BLOOD PRESSURE: 138 MMHG | DIASTOLIC BLOOD PRESSURE: 82 MMHG

## 2021-12-02 PROCEDURE — 0064A: CPT

## 2021-12-02 NOTE — DISCHARGE NOTE ADULT - MEDICATION SUMMARY - MEDICATIONS TO STOP TAKING
Patient should follow-up with cardiology for stress testing and further evaluation management of symptoms regarding his heart.  Medical clearance for sports should be performed by cardiology.    Patient was hypoglycemic according to today's lab work, information given with attached instructions regarding preventing hypoglycemia.  Dietary modifications should ensue including multiple smaller meals throughout the day to prevent hypoglycemic episodes which may cause syncope, confusion, fatigue, and other symptoms.    Return to the emergency department for any new or worsening symptoms or concerns.  Otherwise follow-up with primary care and cardiology as directed on disposition sheet.   I will STOP taking the medications listed below when I get home from the hospital:    valsartan 160 mg oral tablet  -- 1 tab(s) by mouth once a day    Percocet 7.5/325 oral tablet  -- 1 tab(s) by mouth once a day, As Needed

## 2021-12-21 ENCOUNTER — NON-APPOINTMENT (OUTPATIENT)
Age: 86
End: 2021-12-21

## 2021-12-21 LAB
BASOPHILS # BLD AUTO: 0.03 K/UL
BASOPHILS NFR BLD AUTO: 0.5 %
EOSINOPHIL # BLD AUTO: 0.12 K/UL
EOSINOPHIL NFR BLD AUTO: 2 %
HCT VFR BLD CALC: 31.4 %
HGB BLD-MCNC: 9.9 G/DL
IMM GRANULOCYTES NFR BLD AUTO: 0.7 %
LYMPHOCYTES # BLD AUTO: 0.5 K/UL
LYMPHOCYTES NFR BLD AUTO: 8.3 %
MAN DIFF?: NORMAL
MCHC RBC-ENTMCNC: 28.8 PG
MCHC RBC-ENTMCNC: 31.5 GM/DL
MCV RBC AUTO: 91.3 FL
MONOCYTES # BLD AUTO: 0.5 K/UL
MONOCYTES NFR BLD AUTO: 8.3 %
NEUTROPHILS # BLD AUTO: 4.82 K/UL
NEUTROPHILS NFR BLD AUTO: 80.2 %
PLATELET # BLD AUTO: 268 K/UL
RBC # BLD: 3.44 M/UL
RBC # FLD: 14.4 %
WBC # FLD AUTO: 6.01 K/UL

## 2022-01-20 NOTE — ED PROVIDER NOTE - CROS ED CONS ALL NEG
I spoke with the patient and reviewed her negative culture.  Advised against antibiotics.  Encouraged close follow up.  She states she may come back to get re evaluated and possibly get tested for mono.   
negative...

## 2022-01-21 ENCOUNTER — APPOINTMENT (OUTPATIENT)
Dept: HOME HEALTH SERVICES | Facility: HOME HEALTH | Age: 87
End: 2022-01-21
Payer: MEDICARE

## 2022-01-21 VITALS
OXYGEN SATURATION: 94 % | TEMPERATURE: 98 F | DIASTOLIC BLOOD PRESSURE: 80 MMHG | HEART RATE: 79 BPM | SYSTOLIC BLOOD PRESSURE: 118 MMHG | RESPIRATION RATE: 16 BRPM

## 2022-01-21 DIAGNOSIS — R26.9 UNSPECIFIED ABNORMALITIES OF GAIT AND MOBILITY: ICD-10-CM

## 2022-01-21 PROCEDURE — 99347 HOME/RES VST EST SF MDM 20: CPT

## 2022-01-21 RX ORDER — DOCUSATE SODIUM 100 MG/1
100 CAPSULE ORAL
Qty: 180 | Refills: 3 | Status: DISCONTINUED | COMMUNITY
Start: 2018-12-13 | End: 2022-01-21

## 2022-01-21 RX ORDER — DOXYCYCLINE HYCLATE 100 MG/1
100 TABLET ORAL TWICE DAILY
Qty: 14 | Refills: 0 | Status: DISCONTINUED | COMMUNITY
Start: 2021-11-05 | End: 2022-01-21

## 2022-01-25 PROBLEM — R26.9 GAIT ABNORMALITY: Status: ACTIVE | Noted: 2017-05-04

## 2022-01-25 NOTE — ED PROVIDER NOTE - NS ED ATTENDING STATEMENT MOD
BG goal 140-180    Change to transition insulin infusion at 1.5 u/hr.   BG monitoring ac/hs/0200 and moderate dose correction scale.   Start novolog 6 units with meals (0.5 u/kg wt based).     Discharge planning: [id number]       I have personally seen and examined this patient.  I have fully participated in the care of this patient. I have reviewed all pertinent clinical information, including history, physical exam, plan and the Resident’s note and agree except as noted.

## 2022-01-28 ENCOUNTER — NON-APPOINTMENT (OUTPATIENT)
Age: 87
End: 2022-01-28

## 2022-01-28 DIAGNOSIS — H92.01 OTALGIA, RIGHT EAR: ICD-10-CM

## 2022-01-31 ENCOUNTER — TRANSCRIPTION ENCOUNTER (OUTPATIENT)
Age: 87
End: 2022-01-31

## 2022-01-31 ENCOUNTER — NON-APPOINTMENT (OUTPATIENT)
Age: 87
End: 2022-01-31

## 2022-02-25 ENCOUNTER — LABORATORY RESULT (OUTPATIENT)
Age: 87
End: 2022-02-25

## 2022-03-02 ENCOUNTER — NON-APPOINTMENT (OUTPATIENT)
Age: 87
End: 2022-03-02

## 2022-03-23 ENCOUNTER — NON-APPOINTMENT (OUTPATIENT)
Age: 87
End: 2022-03-23

## 2022-03-25 ENCOUNTER — NON-APPOINTMENT (OUTPATIENT)
Age: 87
End: 2022-03-25

## 2022-03-28 ENCOUNTER — EMERGENCY (EMERGENCY)
Facility: HOSPITAL | Age: 87
LOS: 1 days | Discharge: HOME | End: 2022-03-28
Attending: EMERGENCY MEDICINE
Payer: MEDICARE

## 2022-03-28 VITALS
DIASTOLIC BLOOD PRESSURE: 90 MMHG | SYSTOLIC BLOOD PRESSURE: 130 MMHG | TEMPERATURE: 98 F | RESPIRATION RATE: 18 BRPM | OXYGEN SATURATION: 98 % | HEART RATE: 61 BPM

## 2022-03-28 VITALS
DIASTOLIC BLOOD PRESSURE: 90 MMHG | HEIGHT: 62 IN | HEART RATE: 60 BPM | TEMPERATURE: 97 F | WEIGHT: 139.99 LBS | SYSTOLIC BLOOD PRESSURE: 170 MMHG | RESPIRATION RATE: 16 BRPM | OXYGEN SATURATION: 95 %

## 2022-03-28 DIAGNOSIS — Z98.89 OTHER SPECIFIED POSTPROCEDURAL STATES: Chronic | ICD-10-CM

## 2022-03-28 LAB
APPEARANCE UR: CLEAR — SIGNIFICANT CHANGE UP
BACTERIA # UR AUTO: ABNORMAL
BILIRUB UR-MCNC: NEGATIVE — SIGNIFICANT CHANGE UP
COLOR SPEC: YELLOW — SIGNIFICANT CHANGE UP
DIFF PNL FLD: NEGATIVE — SIGNIFICANT CHANGE UP
EPI CELLS # UR: 1 — SIGNIFICANT CHANGE UP
GLUCOSE UR QL: NEGATIVE — SIGNIFICANT CHANGE UP
KETONES UR-MCNC: NEGATIVE — SIGNIFICANT CHANGE UP
LEUKOCYTE ESTERASE UR-ACNC: NEGATIVE — SIGNIFICANT CHANGE UP
NITRITE UR-MCNC: NEGATIVE — SIGNIFICANT CHANGE UP
PH UR: 6 — SIGNIFICANT CHANGE UP (ref 5–8)
PROT UR-MCNC: ABNORMAL
RBC CASTS # UR COMP ASSIST: 4 /HPF — SIGNIFICANT CHANGE UP (ref 0–4)
SP GR SPEC: 1.02 — SIGNIFICANT CHANGE UP (ref 1.01–1.02)
UROBILINOGEN FLD QL: NEGATIVE — SIGNIFICANT CHANGE UP
WBC UR QL: 2 /HPF — SIGNIFICANT CHANGE UP (ref 0–5)

## 2022-03-28 PROCEDURE — 87086 URINE CULTURE/COLONY COUNT: CPT

## 2022-03-28 PROCEDURE — 99284 EMERGENCY DEPT VISIT MOD MDM: CPT

## 2022-03-28 PROCEDURE — 81001 URINALYSIS AUTO W/SCOPE: CPT

## 2022-03-28 NOTE — ED PROVIDER NOTE - CLINICAL SUMMARY MEDICAL DECISION MAKING FREE TEXT BOX
89 yo F pmhx of anemia, colon cancer s/p resection 11 yrs ago, severe lumbar stenosis, afib, HTN, GERD, presents for LBP.  Neuro & back exam unremarkable. No recent trauma, likely d/t her chronic LBP. Now resolved  Exam and hx NOT concerned for cord pathology    PLAN  pain control  UA

## 2022-03-28 NOTE — ED ADULT NURSE NOTE - OBJECTIVE STATEMENT
89 female complaining of back pain "I have back pain, I took to oxycodone and it did not get better" pt alerted and oriented x3, no sings of acute distress noted, able to move all extremities , heart sounds normal,, lungs clear, abdomen soft, non distended, vitals WNL. Will continue to monitor closely 89 female complaining of back pain "I have back pain, I took to oxycodone and it did not get better. I have that pain for years, a couple of times a year and it is so bad. It goes from the left side of my shoulder all the way to my legs" pt alerted and oriented x3, no sings of acute distress noted, able to move all extremities , heart sounds normal,, lungs clear, abdomen soft, non distended, vitals WNL. Will continue to monitor closely. IV placed on L forearms 20G.

## 2022-03-28 NOTE — ED PROVIDER NOTE - PROGRESS NOTE DETAILS
GABINO - pending trial of ambulation with walker. endorsed to me by grupo. now amb w walker at baseline to bathroom across dept . will rob -Star Lam MD- María De La Garza DO (PGY1): Pt signed out to me by day team. Pt ambulating well with walker, ready for d/c. Pt made aware of plan. Questions regarding their symptoms were addressed. Advised to follow up with primary care doctor . Given strict return precautions. Pt verbalized understanding. María De La Garza DO (PGY1): Pt signed out to me by day team. Pt ambulating well with walker, ready for d/c. Pt does not want pain medication. Pt states she is safe at home. Pt made aware of plan. Questions regarding their symptoms were addressed. Advised to follow up with primary care doctor . Given strict return precautions. Pt verbalized understanding.

## 2022-03-28 NOTE — ED PROVIDER NOTE - PATIENT PORTAL LINK FT
You can access the FollowMyHealth Patient Portal offered by [facility] by registering at the following website: [email or web address]. By joining [facility]’s FollowMyHealth portal, you will also be able to view your health information using other applications (apps) compatible with our system. You can access the FollowMyHealth Patient Portal offered by Health system by registering at the following website: [email or web address]. By joining [facility]’s FollowMyHealth portal, you will also be able to view your health information using other applications (apps) compatible with our system.

## 2022-03-28 NOTE — ED PROVIDER NOTE - NSFOLLOWUPINSTRUCTIONS_ED_ALL_ED_FT
You were seen in the emergency department for back pain.   Your lab results showed no infection in your urine.  Please follow up with primary care doctor within 1 week.   For pain you can take acetaminophen according to the bottle instructions.     Back Pain    Back pain is very common in adults. The cause of back pain is rarely dangerous and the pain often gets better over time. The cause of your back pain may not be known and may include strain of muscles or ligaments, degeneration of the spinal disks, or arthritis. Occasionally the pain may radiate down your leg(s). Over-the-counter medicines to reduce pain and inflammation are often the most helpful. Stretching and remaining active frequently helps the healing process.       Rest, no heavy lifting.  Light walking encouraged, advanced activity as tolerated.    Warm compresses to area.       SEEK IMMEDIATE MEDICAL CARE IF YOU HAVE ANY OF THE FOLLOWING SYMPTOMS: bowel or bladder control problems, unusual weakness or numbness in your arms or legs, nausea or vomiting, abdominal pain, fever, dizziness/lightheadedness.

## 2022-03-28 NOTE — ED PROVIDER NOTE - PHYSICAL EXAMINATION
Vital Signs Last 24 Hrs  T(C): 36.1 (28 Mar 2022 12:51), Max: 36.1 (28 Mar 2022 12:51)  T(F): 97 (28 Mar 2022 12:51), Max: 97 (28 Mar 2022 12:51)  HR: 60 (28 Mar 2022 12:51) (60 - 60)  BP: 170/90 (28 Mar 2022 12:51) (170/90 - 170/90)  BP(mean): --  RR: 16 (28 Mar 2022 12:51) (16 - 16)  SpO2: 95% (28 Mar 2022 12:51) (95% - 95%)  PHYSICAL EXAM:  GENERAL: NAD, well-groomed, well-developed  ENMT: No tonsillar erythema, exudates, or enlargement; Moist mucous membranes  NECK: Supple, No JVD, Normal thyroid  HEART: Regular rate and rhythm; No murmurs, rubs, or gallops  RESPIRATORY: CTA B/L, No W/R/R  ABDOMEN: Soft, Nontender, Nondistended; Bowel sounds present  NEUROLOGY: A&Ox3, nonfocal, moving all extremities  EXTREMITIES: No clubbing, cyanosis, or edema. 5/5 strength bilateral LE.   SKIN: warm, dry, normal color, no rash or abnormal lesions

## 2022-03-28 NOTE — CHART NOTE - NSCHARTNOTEFT_GEN_A_CORE
EMERGENCY : Social work was consulted to assist with discharge planning. Patient medically clear to return home requesting merna salinas  to be contacted. Chart reviewed. Per ED provider note, patient is a " 89 yo F pmhx of anemia, colon cancer s/p resection 11 yrs ago, severe lumbar stenosis, afib, HTN, GERD, presents for LBP. Has chronic BP, usually has it 1-2x per year, but happened last week - relieved with percocet and then happened again this AM. Last >2 hrs, prompting her to come in. Took Percocet this AM - NO pain now"     LMSW met with patient at bedside and introduced self and role of social work. Patient reported that she has keys to her home but wants  to be contacted to pick her up as she does not have her long coat and shoes. LMSW contacted Andreas [phone] with patient. LMSW introduced self and role of social work, which he verbalized an understanding. LMSW informed Andreas that the patient is medically cleared for discharge and is requesting that he pick her up. Patient informed Andreas that she needs her long red coat and shoes. Patient confirmed that he can  the patient after he goes to get her belongings from her apartment. LMSW provided Andreas with contact number to call when he arrives to ED. LMSW informed medical team. All parties in agreement. Social work remains available.

## 2022-03-28 NOTE — ED ADULT NURSE REASSESSMENT NOTE - NS ED NURSE REASSESS COMMENT FT1
Handoff report received from MALCOLM Cabrera. Pt resting comfortably in gold 3. Pt A&O x 4, VSS. Pt ambulated with walker to bathroom with steady gait. MD Lam made aware. Pt denies back pain at this time. Bed locked in lowest position, side rails up and call bell in reach.

## 2022-03-28 NOTE — ED PROVIDER NOTE - ATTENDING CONTRIBUTION TO CARE
I, Dominick Rene, performed a history and physical exam of the patient and discussed their management with the resident and /or advanced care provider. I reviewed the resident and /or ACP's note and agree with the documented findings and plan of care. I was present and available for all procedures.  Patient with chronic back pain who takes percocet for pain did not have relief at home after taking percocet.  Patient improved by the time she came to the ED. Will evaluate urine, however exam wnl and pt non-tender. Will ambulate patient after urine results prior to discharge, however symptoms appear resolved. No midline spinal tenderness.

## 2022-03-28 NOTE — ED PROVIDER NOTE - OBJECTIVE STATEMENT
89 yo F pmhx of anemia, colon cancer s/p resection 11 yrs ago, severe lumbar stenosis, afib, HTN, GERD, presents for LBP. Has chronic BP, usually has it 1-2x per year, but happened last week - relieved with percocet and then happened again this AM. Last >2 hrs, prompting her to come in. Took Percocet this AM - NO pain now.     No urinary or bladder incontinence. No leg weakness. Walks with cane,

## 2022-03-29 ENCOUNTER — APPOINTMENT (OUTPATIENT)
Dept: HOME HEALTH SERVICES | Facility: HOME HEALTH | Age: 87
End: 2022-03-29

## 2022-03-29 LAB
CULTURE RESULTS: SIGNIFICANT CHANGE UP
SPECIMEN SOURCE: SIGNIFICANT CHANGE UP

## 2022-03-30 ENCOUNTER — TRANSCRIPTION ENCOUNTER (OUTPATIENT)
Age: 87
End: 2022-03-30

## 2022-03-30 ENCOUNTER — LABORATORY RESULT (OUTPATIENT)
Age: 87
End: 2022-03-30

## 2022-03-30 ENCOUNTER — FORM ENCOUNTER (OUTPATIENT)
Age: 87
End: 2022-03-30

## 2022-03-30 NOTE — ED POST DISCHARGE NOTE - DETAILS
3/30/22: Spoke with pt and her aid to discuss results. pt feels well, denies urinary sx. advised followup with PMD for repeat testing. gave strict return precautions - Andreas Branch PA-C

## 2022-03-31 ENCOUNTER — TRANSCRIPTION ENCOUNTER (OUTPATIENT)
Age: 87
End: 2022-03-31

## 2022-04-01 ENCOUNTER — LABORATORY RESULT (OUTPATIENT)
Age: 87
End: 2022-04-01

## 2022-04-04 LAB
APPEARANCE: CLEAR
BILIRUBIN URINE: NEGATIVE
BLOOD URINE: NEGATIVE
COLOR: YELLOW
GLUCOSE QUALITATIVE U: NEGATIVE
KETONES URINE: NEGATIVE
LEUKOCYTE ESTERASE URINE: ABNORMAL
NITRITE URINE: NEGATIVE
PH URINE: 6
PROTEIN URINE: NORMAL
SPECIFIC GRAVITY URINE: 1.02
UROBILINOGEN URINE: NORMAL

## 2022-04-13 ENCOUNTER — TRANSCRIPTION ENCOUNTER (OUTPATIENT)
Age: 87
End: 2022-04-13

## 2022-04-25 ENCOUNTER — NON-APPOINTMENT (OUTPATIENT)
Age: 87
End: 2022-04-25

## 2022-04-27 ENCOUNTER — NON-APPOINTMENT (OUTPATIENT)
Age: 87
End: 2022-04-27

## 2022-04-28 ENCOUNTER — LABORATORY RESULT (OUTPATIENT)
Age: 87
End: 2022-04-28

## 2022-04-28 ENCOUNTER — APPOINTMENT (OUTPATIENT)
Dept: HOME HEALTH SERVICES | Facility: HOME HEALTH | Age: 87
End: 2022-04-28
Payer: MEDICARE

## 2022-04-28 VITALS
OXYGEN SATURATION: 98 % | HEART RATE: 67 BPM | SYSTOLIC BLOOD PRESSURE: 122 MMHG | DIASTOLIC BLOOD PRESSURE: 78 MMHG | TEMPERATURE: 98 F | RESPIRATION RATE: 16 BRPM

## 2022-04-28 DIAGNOSIS — K26.9 DUODENAL ULCER, UNSPECIFIED AS ACUTE OR CHRONIC, W/OUT HEMORRHAGE OR PERFORATION: ICD-10-CM

## 2022-04-28 PROCEDURE — 99347 HOME/RES VST EST SF MDM 20: CPT

## 2022-05-02 ENCOUNTER — NON-APPOINTMENT (OUTPATIENT)
Age: 87
End: 2022-05-02

## 2022-05-03 ENCOUNTER — NON-APPOINTMENT (OUTPATIENT)
Age: 87
End: 2022-05-03

## 2022-05-10 PROBLEM — K26.9 DUODENAL ULCER: Status: ACTIVE | Noted: 2021-02-26

## 2022-05-11 ENCOUNTER — TRANSCRIPTION ENCOUNTER (OUTPATIENT)
Age: 87
End: 2022-05-11

## 2022-05-11 ENCOUNTER — NON-APPOINTMENT (OUTPATIENT)
Age: 87
End: 2022-05-11

## 2022-05-11 DIAGNOSIS — J30.9 ALLERGIC RHINITIS, UNSPECIFIED: ICD-10-CM

## 2022-05-13 ENCOUNTER — TRANSCRIPTION ENCOUNTER (OUTPATIENT)
Age: 87
End: 2022-05-13

## 2022-05-13 ENCOUNTER — NON-APPOINTMENT (OUTPATIENT)
Age: 87
End: 2022-05-13

## 2022-05-13 DIAGNOSIS — R05.9 COUGH, UNSPECIFIED: ICD-10-CM

## 2022-05-13 LAB — SARS-COV-2 N GENE NPH QL NAA+PROBE: NOT DETECTED

## 2022-06-29 ENCOUNTER — NON-APPOINTMENT (OUTPATIENT)
Age: 87
End: 2022-06-29

## 2022-07-15 LAB
BASOPHILS # BLD AUTO: 0.02 K/UL
BASOPHILS NFR BLD AUTO: 0.3 %
EOSINOPHIL # BLD AUTO: 0.08 K/UL
EOSINOPHIL NFR BLD AUTO: 1.1 %
HCT VFR BLD CALC: 32.4 %
HGB BLD-MCNC: 10 G/DL
IMM GRANULOCYTES NFR BLD AUTO: 0.6 %
LYMPHOCYTES # BLD AUTO: 0.66 K/UL
LYMPHOCYTES NFR BLD AUTO: 9.4 %
MAN DIFF?: NORMAL
MCHC RBC-ENTMCNC: 28.4 PG
MCHC RBC-ENTMCNC: 30.9 GM/DL
MCV RBC AUTO: 92 FL
MONOCYTES # BLD AUTO: 0.51 K/UL
MONOCYTES NFR BLD AUTO: 7.2 %
NEUTROPHILS # BLD AUTO: 5.74 K/UL
NEUTROPHILS NFR BLD AUTO: 81.4 %
PLATELET # BLD AUTO: 274 K/UL
RBC # BLD: 3.52 M/UL
RBC # FLD: 14.9 %
WBC # FLD AUTO: 7.05 K/UL

## 2022-07-26 ENCOUNTER — APPOINTMENT (OUTPATIENT)
Dept: HOME HEALTH SERVICES | Facility: HOME HEALTH | Age: 87
End: 2022-07-26

## 2022-07-26 VITALS
TEMPERATURE: 98.9 F | SYSTOLIC BLOOD PRESSURE: 122 MMHG | RESPIRATION RATE: 16 BRPM | HEART RATE: 61 BPM | DIASTOLIC BLOOD PRESSURE: 64 MMHG | OXYGEN SATURATION: 98 %

## 2022-07-26 DIAGNOSIS — I27.82 CHRONIC PULMONARY EMBOLISM: ICD-10-CM

## 2022-07-26 PROCEDURE — 99349 HOME/RES VST EST MOD MDM 40: CPT

## 2022-07-27 NOTE — DISCHARGE NOTE ADULT - PROVIDER RX CONTACT NUMBER
Physical Therapy Evaluation    Referred by: Arsenio Bull MD; Medical Diagnosis (from order):    Diagnosis Information      Diagnosis    726.0 (ICD-9-CM) - M75.01 (ICD-10-CM) - Adhesive capsulitis of right shoulder              Visit: 1    Visit Type: Initial Evaluation  Treatment Diagnosis: right: upper extremity and shoulder symptoms with increased pain/symptoms, impaired range of motion, impaired muscle length/flexibility, impaired joint play/mobility, impaired strength, impaired activity tolerance, impaired scapulohumeral rhythm  Date of onset: 7/22/2022  Chart reviewed at time of initial evaluation (relevant co-morbidities, allergies, tests and medications listed): Chart reviewed  Diagnostic Tests: MRI studies: reviewed.     SUBJECTIVE                                                                                                               Pt states she began having shoulder pain last Oct and has consistently gotten worse.  Pt had some PT in June however no change and continued pain.  Pt had one cortizone shot however she got no relief. Pt saw Ortho and MRI was performed: type 2 acromion and partial thickness tear of supraspinatus and infraspinatus.  Pain / Symptoms:  Pain rating (out of 10): ; Best: 3; Worst: 9  Location: R shoulder  Quality / Description: sharp, ache, discomfort, stiff.  Alleviating Factors: prescription medications.   Progression since onset: worsening  Function:   Limitations / Exacerbation Factors: pain, difficulty, sleep disturbed, grooming/hygiene/self-care activities, upper body dressing, reaching, pushing/pulling and lifting/carrying  Prior Level of Function: no limitation in involved extremity,  Patient Goals: decreased pain, increased motion, increased strength and independence with ADLs/IADLs    Prior treatment: outpatient PT  Discharged from hospital, home health, or skilled nursing facility in last 30 days: no    Home Environment:   Patient lives with significant  other  Type of home: multiple level home  Assistance available: consistent  Feels safe at home/work/school.  2 or more falls or an unexplained fall with injury in the last year:  No    OBJECTIVE                                                                                                             AROM R shoulder  Flexion 103 deg  Abduction 74 deg  Unable to reach to back of head  Unable to reach behind back (lateral/posterior hip)  PROM R Shoulder  Flexion 92 deg  Abduction 45 deg  ER 25 deg  IR 28 deg  R Shoulder strength  Flexion and abd 3-/5  IR/ER 3+  Extension 3+  Elbow flexion/ext 4-  Special test  + impingment         Outcome Measures:   Quick Disabilities of the Arm, Shoulder and Hand: QuickDash Total Score (Score will not calculate if more then 2 questions are left blank): 72.73  (scored 0-100; a higher score indicates greater disability) see flowsheet for additional documentation    TREATMENT                                                                                                                  Therapeutic Exercise:  UBE 1 min each CW/CCW  Standing cane flexion and abd  Standing cane extension/IR  Standing cane IR/ER  Reviewed pulleys but instruct to use cane for flexion/abd if easier and less painful  GH mobs inferior  PROM R shoulder         ASSESSMENT                                                                                                           67 year old female patient has reported functional limitations listed above impacted by signs and symptoms consistent with below   • Involved:       - right upper extremity and shoulder   • Symptoms/impairments: increased pain/symptoms, impaired range of motion, impaired muscle length/flexibility, impaired joint play/mobility, impaired strength, impaired activity tolerance and impaired scapulohumeral rhythm  Pt has been referred back to PT with R shoulder pain.  MRI + for 2 partial thickness tears and type 2 acromion.    Prognosis:  patient will benefit from skilled therapy  Rehabilitative potential is: fair due to; negative factors: time since onset of symptoms and failed response to prior treatment     • Predicted patient presentation: Low (stable) - Patient comorbidities and complexities, as defined above, will have little effect on progress for prescribed plan of care.  Patient Education:   Who will be receiving education: patient  Are they ready to learn: yes  Preferred learning style: written  Barriers to learning: no barriers apparent at this time  Results of above outlined education: Verbalizes understanding      PLAN                                                                                                                         The following skilled interventions to be implemented to achieve goals listed below:Manual Therapy (61846)  Therapeutic Exercise (84987)  Electrical Stimulation (unattended, 85414 or )  Frequency / Duration: 1 times per week tapering as patient progresses for an estimated total of 5 visits for 5 weeks    Patient involved in and agreed to plan of care and goals.    GOALS                                                                                                                            shoulder joint pain   shoulder joint stiffness   shoulder/UE weakness  The above improvements in impairments to assist in obtaining goals listed below  Long Term Goals: to be met by end of plan of care  1. Patient will be able to reach behind back with her right UE to allow for completion of independent upper body dressing tasks such as putting on a jacket and independent hygiene tasks such as washing her back.  2. Patient will reach overhead with her right UE without compensation due to right shoulder impairments for completion of household tasks such as putting away items in overhead cabinets by end of the current plan of care.  3. Patient will be able to lift 3 pounds overhead using the left/right UE with  proper body mechanics to assist with overhead lifting activities at home.  4. Quick DASH: Patient will complete form to reflect an improved calculated score to less than or equal to 55 to indicate patient reported improvement in function/disability/impairment. (minimal clinically important difference = 15.91)  5. Patient will be independent with progressed and modified home exercise program.      Therapy procedure time and total treatment time can be found documented on the Time Entry flowsheet   [phone]

## 2022-08-01 PROBLEM — I27.82 CHRONIC PULMONARY EMBOLISM: Status: ACTIVE | Noted: 2018-04-14

## 2022-08-01 NOTE — REVIEW OF SYSTEMS
[Vision Problems] : vision problems [Lower Ext Edema] : lower extremity edema [Incontinence] : incontinence [Negative] : Neurological [FreeTextEntry3] : wears glasses [FreeTextEntry1] : ROS negative except as noted in HPI

## 2022-08-01 NOTE — COUNSELING
[Improve pain control] : improve pain control [Decrease stress] : decrease stress [Decrease hospital use] : decrease hospital use [Minimize unnecessary interventions] : minimize unnecessary interventions [Maintain functional ability] : maintain functional ability [Discussed disease trajectory with patient/caregiver] : discussed disease trajectory with patient/caregiver [Likely to achieve goals/desired outcomes] : likely to achieve goals/desired outcomes [Patient/Caregiver has ___ understanding of disease process] : patient/caregiver has [unfilled] understanding of disease process [Advanced Directives discussed: ____] : Advanced directives discussed: [unfilled] [Annual Discussion and review of: ___] : Annual discussion and review of [unfilled] [Completed Medical Orders for Life-Sustaining Treatment] : completed medical orders for life-sustaining treatment [DNR] : Code Status: DNR [Limited] : Treatment Guidelines: Limited [DNI] : Intubation: DNI [[facility]: _____[facility]: [unfilled] [_____] : HCP: [unfilled] [Overweight (BMI 25.1 - 29.9)] : overweight - BMI 25.1-29.9 [Weight counseling provided] : Weight counseling provided [Mediterranean diet recommended] : Mediterranean diet recommended [Medical/Nutritional supplementation as prescribed] : medical/nutritional supplementation as prescribed [Non - Smoker] : non-smoker [Medical alert] : medical alert [Use assistive device to avoid falls] : use assistive device to avoid falls [Remove clutter and unsafe carpeting to avoid falls] : remove clutter and unsafe carpeting to avoid falls [Use grab bars] : use grab bars [Smoke/CO Detectors] : smoke/CO detectors [Not Indicated] : not indicated [FreeTextEntry5] : will take vaccines

## 2022-08-01 NOTE — HISTORY OF PRESENT ILLNESS
[Patient] : patient [Formal Caregiver] : formal caregiver [FreeTextEntry1] : unsteady gait [FreeTextEntry2] : COVID-19 Screen - 07/26/2022 \par N95 mask, gloves, eyewear, and gown (if indicated) used during visit: Yes \par Patient or caregiver denies fever, cough, trouble breathing, rash, and vomiting. Patient has not been in close contact with anyone who is COVID-19 positive or suspected of having COVID-19. 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 \par \par No significant interval events. Celebrated 89th birthday recently and is in good spirits. Patient has been doing well overall with no active complaints. Spoke with grandson during visit to provide updates on plan of care. \par \par -Does occasionally have pain in body, but no flares interim.  She is prefers in future Percocet 5/325 dose. Expressed concern for gait and balance being affected by medication.  Patient feels differently and support and education provided. \par \par -OA of right knee and occasional sciatica, will take Percocet on rare occasion for severe sciatic pain- on average once or twice a year\par \par -Lymphedema legs b/l: Using compression stockings and Lasix. Stable with no recent worsening. Regularly taking Lasix 20mg 3 times a week, using moisturizers. \par \par -She has chronic fatigue, taking iron daily. no changes in symptoms. Taking Fe pills regularly and gets periodic check of Hb. \par \par Subjective\par Appetite/weight: Appetite is good, no swallowing problems, taking caltrate and omeprazole. Weight stable. \par Gait/falls: Ambulates with walker, no recent falls. \par Urine: No issues. Gets up at night once or twice. \par BM: Goes regularly. Gets constipated at times with iron pills. Has a fleet enema that she uses solemnly. \par Sleep Fair. Takes naps during the day at times, Ambien PRN (once or twice a week). \par Skin: No issues except for senile bruising. \par Mood/memory: Memory and Mood is reported to be good, denies prior h/o depression or anxiety. \par \par Denies fever, chills, nausea, vomiting, SOB, pain, eye discharge, chest pain, headache.\par \par -Caregivers: Lives alone with HHA 8am - 1pm 7 days a week.. Patient is not regular with medication intake, sometimes forgets. Andreas (grandson) is involved in care. \par -MOLST: Last reviewed 10/13/21; DNR/DNI

## 2022-08-01 NOTE — PHYSICAL EXAM
[No Acute Distress] : no acute distress [Well Nourished] : well nourished [Well Developed] : well developed [Normal Voice/Communication] : normal voice communication [EOMI] : extra ocular movement intact [Normal Oropharynx] : the oropharynx was normal [No JVD] : no jugular venous distention [Supple] : the neck was supple [No LAD] : no lymphadenopathy [No Respiratory Distress] : no respiratory distress [Clear to Auscultation] : lungs were clear to auscultation bilaterally [No Accessory Muscle Use] : no accessory muscle use [Normal Rate] : heart rate was normal  [Regular Rhythm] : with a regular rhythm [Normal S1, S2] : normal S1 and S2 [No Murmurs] : no murmurs heard [Normal Appearance] : normal in appearance [Normal Bowel Sounds] : normal bowel sounds [Non Tender] : non-tender [Soft] : abdomen soft [Not Distended] : not distended [No CVA Tenderness] : no ~M costovertebral angle tenderness [Kyphosis] :  kyphosis present [No Joint Swelling] : no joint swelling seen [No Rash] : no rash [No Skin Lesions] : no skin lesions [Cranial Nerves Intact] : cranial nerves 2-12 were intact [No Motor Deficits] : the motor exam was normal [No Gross Sensory Deficits] : no gross sensory deficits [Normal Affect] : the affect was normal [Normal Mood] : the mood was normal [de-identified] : pleasant, calm, well dressed [de-identified] : b/l lymphedema, 2+ nonpitting swelling - chronic  [de-identified] : unsteady gait, uses walker, no paravertebral or CVA tenderness, [de-identified] : chronic lymphedema bilateral, no skin breakdown to sacrum per report [de-identified] : oriented to place, person, recent memory is affected, remote intact

## 2022-08-01 NOTE — HEALTH RISK ASSESSMENT
[HRA Reviewed] : Health risk assessment reviewed [Independent] : using telephone [Some assistance needed] : managing finances [Full assistance needed] : using transportation [Yes] : The patient does have visual impairment [No falls in past year] : Patient reported no falls in the past year [TimeGetUpGo] : 20

## 2022-09-25 ENCOUNTER — TRANSCRIPTION ENCOUNTER (OUTPATIENT)
Age: 87
End: 2022-09-25

## 2022-09-25 ENCOUNTER — NON-APPOINTMENT (OUTPATIENT)
Age: 87
End: 2022-09-25

## 2022-09-25 DIAGNOSIS — N39.0 URINARY TRACT INFECTION, SITE NOT SPECIFIED: ICD-10-CM

## 2022-09-26 ENCOUNTER — LABORATORY RESULT (OUTPATIENT)
Age: 87
End: 2022-09-26

## 2022-09-27 ENCOUNTER — NON-APPOINTMENT (OUTPATIENT)
Age: 87
End: 2022-09-27

## 2022-10-17 ENCOUNTER — APPOINTMENT (OUTPATIENT)
Dept: HOME HEALTH SERVICES | Facility: HOME HEALTH | Age: 87
End: 2022-10-17

## 2022-10-17 VITALS
SYSTOLIC BLOOD PRESSURE: 148 MMHG | TEMPERATURE: 98.7 F | RESPIRATION RATE: 16 BRPM | OXYGEN SATURATION: 99 % | DIASTOLIC BLOOD PRESSURE: 74 MMHG | HEART RATE: 80 BPM

## 2022-10-17 DIAGNOSIS — Z23 ENCOUNTER FOR IMMUNIZATION: ICD-10-CM

## 2022-10-17 DIAGNOSIS — Z71.89 OTHER SPECIFIED COUNSELING: ICD-10-CM

## 2022-10-17 PROCEDURE — 90662 IIV NO PRSV INCREASED AG IM: CPT

## 2022-10-17 PROCEDURE — 99349 HOME/RES VST EST MOD MDM 40: CPT | Mod: 25

## 2022-10-17 PROCEDURE — G0008: CPT

## 2022-10-18 ENCOUNTER — LABORATORY RESULT (OUTPATIENT)
Age: 87
End: 2022-10-18

## 2022-10-18 ENCOUNTER — TRANSCRIPTION ENCOUNTER (OUTPATIENT)
Age: 87
End: 2022-10-18

## 2022-10-19 ENCOUNTER — NON-APPOINTMENT (OUTPATIENT)
Age: 87
End: 2022-10-19

## 2022-10-19 ENCOUNTER — APPOINTMENT (OUTPATIENT)
Dept: HOME HEALTH SERVICES | Facility: HOME HEALTH | Age: 87
End: 2022-10-19

## 2022-10-25 ENCOUNTER — NON-APPOINTMENT (OUTPATIENT)
Age: 87
End: 2022-10-25

## 2022-10-29 PROBLEM — Z23 NEED FOR IMMUNIZATION AGAINST INFLUENZA: Status: ACTIVE | Noted: 2019-11-19

## 2022-10-29 PROBLEM — Z71.89 ADVANCE CARE PLANNING: Status: ACTIVE | Noted: 2017-05-04

## 2022-10-29 NOTE — HEALTH RISK ASSESSMENT
[HRA Reviewed] : Health risk assessment reviewed [Independent] : using telephone [Some assistance needed] : managing finances [Full assistance needed] : using transportation [No falls in past year] : Patient reported no falls in the past year [Yes] : The patient does have visual impairment [TimeGetUpGo] : 2

## 2022-10-29 NOTE — HISTORY OF PRESENT ILLNESS
[Patient] : patient [Formal Caregiver] : formal caregiver [FreeTextEntry1] : unsteady gait [FreeTextEntry2] : COVID-19 Screen - 10/17/2022 \par N95 mask, gloves, eyewear, and gown (if indicated) used during visit: Yes \par Patient or caregiver denies fever, cough, trouble breathing, rash, and vomiting. Patient has not been in close contact with anyone who is COVID-19 positive or suspected of having COVID-19. 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 \par \par No significant interval events. Celebrated 89th birthday this year and is in good spirits. Patient has been doing well overall with no active complaints. Spoke with grandson during visit to provide updates on plan of care. \par \par -Does occasionally have pain in body, but no flares interim.  She is prefers in future Percocet 5/325 dose. Expressed concern for gait and balance being affected by medication.  Patient feels differently and support and education provided. \par \par -OA of right knee and occasional sciatica, will take Percocet on rare occasion for severe sciatic pain- on average once or twice a year\par \par -Lymphedema legs b/l: Using compression stockings and Lasix. Stable with no recent worsening. Regularly taking Lasix 20mg 3 times a week, using moisturizers. \par \par -She has chronic fatigue, taking iron daily. no changes in symptoms. Taking Fe pills regularly and gets periodic check of Hb. \par \par Subjective\par Appetite/weight: Appetite is good, no swallowing problems, taking caltrate and omeprazole. Weight stable. \par Gait/falls: Ambulates with walker, no recent falls. \par Urine: No issues. Gets up at night once or twice. \par BM: Goes regularly. Gets constipated at times with iron pills. Has a fleet enema that she uses solemnly. \par Sleep Fair. Takes naps during the day at times, Ambien PRN (once or twice a week). \par Skin: No issues except for senile bruising. \par Mood/memory: Memory and Mood is reported to be good, denies prior h/o depression or anxiety. \par \par Denies fever, chills, nausea, vomiting, SOB, pain, eye discharge, chest pain, headache.\par \par -Caregivers: Lives alone with HHA 8am - 1pm 7 days a week.. Patient is not regular with medication intake, sometimes forgets. Andreas (grandson) is involved in care. \par -MOLST: Last reviewed 10/17/22; DNR/DNI

## 2022-10-29 NOTE — COUNSELING
[Improve pain control] : improve pain control [Decrease stress] : decrease stress [Decrease hospital use] : decrease hospital use [Minimize unnecessary interventions] : minimize unnecessary interventions [Maintain functional ability] : maintain functional ability [Discussed disease trajectory with patient/caregiver] : discussed disease trajectory with patient/caregiver [Likely to achieve goals/desired outcomes] : likely to achieve goals/desired outcomes [Patient/Caregiver has ___ understanding of disease process] : patient/caregiver has [unfilled] understanding of disease process [Advanced Directives discussed: ____] : Advanced directives discussed: [unfilled] [Annual Discussion and review of: ___] : Annual discussion and review of [unfilled] [Completed Medical Orders for Life-Sustaining Treatment] : completed medical orders for life-sustaining treatment [DNR] : Code Status: DNR [Limited] : Treatment Guidelines: Limited [DNI] : Intubation: DNI [[facility]: _____] [facility]: [unfilled] [_____] : HCP: [unfilled] [Overweight (BMI 25.1 - 29.9)] : overweight - BMI 25.1-29.9 [Weight counseling provided] : Weight counseling provided [Mediterranean diet recommended] : Mediterranean diet recommended [Medical/Nutritional supplementation as prescribed] : medical/nutritional supplementation as prescribed [Non - Smoker] : non-smoker [Medical alert] : medical alert [Use assistive device to avoid falls] : use assistive device to avoid falls [Remove clutter and unsafe carpeting to avoid falls] : remove clutter and unsafe carpeting to avoid falls [Use grab bars] : use grab bars [Smoke/CO Detectors] : smoke/CO detectors [Not Indicated] : not indicated [FreeTextEntry5] : will take vaccines

## 2022-10-29 NOTE — PHYSICAL EXAM
[No Acute Distress] : no acute distress [Well Nourished] : well nourished [Well Developed] : well developed [Normal Voice/Communication] : normal voice communication [EOMI] : extra ocular movement intact [Normal Oropharynx] : the oropharynx was normal [No JVD] : no jugular venous distention [Supple] : the neck was supple [No LAD] : no lymphadenopathy [No Respiratory Distress] : no respiratory distress [Clear to Auscultation] : lungs were clear to auscultation bilaterally [No Accessory Muscle Use] : no accessory muscle use [Normal Rate] : heart rate was normal  [Regular Rhythm] : with a regular rhythm [Normal S1, S2] : normal S1 and S2 [No Murmurs] : no murmurs heard [Normal Appearance] : normal in appearance [Normal Bowel Sounds] : normal bowel sounds [Non Tender] : non-tender [Soft] : abdomen soft [Not Distended] : not distended [No CVA Tenderness] : no ~M costovertebral angle tenderness [Kyphosis] :  kyphosis present [No Joint Swelling] : no joint swelling seen [No Rash] : no rash [No Skin Lesions] : no skin lesions [Cranial Nerves Intact] : cranial nerves 2-12 were intact [No Motor Deficits] : the motor exam was normal [No Gross Sensory Deficits] : no gross sensory deficits [Normal Affect] : the affect was normal [Normal Mood] : the mood was normal [de-identified] : pleasant, calm, well dressed [de-identified] : b/l lymphedema, 2+ nonpitting swelling - chronic  [de-identified] : unsteady gait, uses walker, no paravertebral or CVA tenderness, [de-identified] : chronic lymphedema bilateral, no skin breakdown to sacrum per report [de-identified] : oriented to place, person, recent memory is affected, remote intact

## 2022-10-29 NOTE — REVIEW OF SYSTEMS
[Vision Problems] : vision problems [Lower Ext Edema] : lower extremity edema [Incontinence] : incontinence [Negative] : Neurological [FreeTextEntry1] : ROS negative except as noted in HPI  [FreeTextEntry3] : wears glasses

## 2022-11-30 ENCOUNTER — NON-APPOINTMENT (OUTPATIENT)
Age: 87
End: 2022-11-30

## 2022-12-05 ENCOUNTER — MED ADMIN CHARGE (OUTPATIENT)
Age: 87
End: 2022-12-05

## 2022-12-05 ENCOUNTER — APPOINTMENT (OUTPATIENT)
Dept: HOME HEALTH SERVICES | Facility: HOME HEALTH | Age: 87
End: 2022-12-05

## 2022-12-05 VITALS
OXYGEN SATURATION: 98 % | SYSTOLIC BLOOD PRESSURE: 126 MMHG | HEART RATE: 73 BPM | RESPIRATION RATE: 16 BRPM | DIASTOLIC BLOOD PRESSURE: 68 MMHG | TEMPERATURE: 97.8 F

## 2022-12-05 RX ORDER — GUAIFENESIN 600 MG/1
600 TABLET, EXTENDED RELEASE ORAL
Qty: 1 | Refills: 3 | Status: COMPLETED | COMMUNITY
Start: 2022-05-13 | End: 2022-12-05

## 2022-12-05 RX ORDER — MOMETASONE 50 UG/1
50 SPRAY, METERED NASAL TWICE DAILY
Qty: 2 | Refills: 9 | Status: COMPLETED | COMMUNITY
Start: 2020-07-30 | End: 2022-12-05

## 2022-12-05 RX ORDER — DIPHENHYDRAMINE HCL 25 MG/1
25 CAPSULE ORAL
Qty: 2 | Refills: 0 | Status: DISCONTINUED | COMMUNITY
Start: 2022-12-05 | End: 2022-12-05

## 2022-12-05 RX ORDER — EPINEPHRINE 0.3 MG/.3ML
0.3 INJECTION INTRAMUSCULAR
Qty: 1 | Refills: 0 | Status: DISCONTINUED | COMMUNITY
Start: 2022-12-05 | End: 2022-12-05

## 2022-12-05 RX ORDER — DIPHENHYDRAMINE HYDROCHLORIDE 50 MG/ML
50 INJECTION, SOLUTION INTRAMUSCULAR; INTRAVENOUS
Qty: 1 | Refills: 0 | Status: DISCONTINUED | COMMUNITY
Start: 2022-12-05 | End: 2022-12-05

## 2022-12-09 ENCOUNTER — LABORATORY RESULT (OUTPATIENT)
Age: 87
End: 2022-12-09

## 2022-12-12 ENCOUNTER — LABORATORY RESULT (OUTPATIENT)
Age: 87
End: 2022-12-12

## 2022-12-13 ENCOUNTER — LABORATORY RESULT (OUTPATIENT)
Age: 87
End: 2022-12-13

## 2022-12-26 ENCOUNTER — TRANSCRIPTION ENCOUNTER (OUTPATIENT)
Age: 87
End: 2022-12-26

## 2023-01-01 ENCOUNTER — NON-APPOINTMENT (OUTPATIENT)
Age: 88
End: 2023-01-01

## 2023-01-01 ENCOUNTER — LABORATORY RESULT (OUTPATIENT)
Age: 88
End: 2023-01-01

## 2023-01-01 ENCOUNTER — TRANSCRIPTION ENCOUNTER (OUTPATIENT)
Age: 88
End: 2023-01-01

## 2023-01-01 ENCOUNTER — APPOINTMENT (OUTPATIENT)
Dept: HOME HEALTH SERVICES | Facility: HOME HEALTH | Age: 88
End: 2023-01-01

## 2023-01-01 ENCOUNTER — APPOINTMENT (OUTPATIENT)
Dept: HOME HEALTH SERVICES | Facility: HOME HEALTH | Age: 88
End: 2023-01-01
Payer: MEDICARE

## 2023-01-01 VITALS
OXYGEN SATURATION: 99 % | SYSTOLIC BLOOD PRESSURE: 124 MMHG | TEMPERATURE: 96.9 F | HEART RATE: 71 BPM | RESPIRATION RATE: 19 BRPM | DIASTOLIC BLOOD PRESSURE: 71 MMHG

## 2023-01-01 VITALS
OXYGEN SATURATION: 98 % | SYSTOLIC BLOOD PRESSURE: 140 MMHG | TEMPERATURE: 98.7 F | HEART RATE: 68 BPM | DIASTOLIC BLOOD PRESSURE: 66 MMHG | RESPIRATION RATE: 17 BRPM

## 2023-01-01 VITALS
DIASTOLIC BLOOD PRESSURE: 60 MMHG | HEART RATE: 66 BPM | OXYGEN SATURATION: 99 % | RESPIRATION RATE: 18 BRPM | SYSTOLIC BLOOD PRESSURE: 120 MMHG | TEMPERATURE: 97.8 F

## 2023-01-01 DIAGNOSIS — F03.90 UNSPECIFIED DEMENTIA W/OUT BEHAVIORAL DISTURBANCE: ICD-10-CM

## 2023-01-01 DIAGNOSIS — Z79.899 OTHER LONG TERM (CURRENT) DRUG THERAPY: ICD-10-CM

## 2023-01-01 DIAGNOSIS — I73.9 PERIPHERAL VASCULAR DISEASE, UNSPECIFIED: ICD-10-CM

## 2023-01-01 DIAGNOSIS — J34.9 UNSPECIFIED DISORDER OF NOSE AND NASAL SINUSES: ICD-10-CM

## 2023-01-01 DIAGNOSIS — N18.30 CHRONIC KIDNEY DISEASE, STAGE 3 UNSPECIFIED: ICD-10-CM

## 2023-01-01 DIAGNOSIS — Z23 ENCOUNTER FOR IMMUNIZATION: ICD-10-CM

## 2023-01-01 DIAGNOSIS — H92.09 OTALGIA, UNSPECIFIED EAR: ICD-10-CM

## 2023-01-01 LAB
25(OH)D3 SERPL-MCNC: 54 NG/ML
ALBUMIN SERPL ELPH-MCNC: 3.8 G/DL
ALP BLD-CCNC: 81 U/L
ALT SERPL-CCNC: 11 U/L
ANION GAP SERPL CALC-SCNC: 15 MMOL/L
AST SERPL-CCNC: 18 U/L
BILIRUB SERPL-MCNC: 0.3 MG/DL
BUN SERPL-MCNC: 25 MG/DL
CALCIUM SERPL-MCNC: 9.8 MG/DL
CHLORIDE SERPL-SCNC: 107 MMOL/L
CO2 SERPL-SCNC: 20 MMOL/L
CREAT SERPL-MCNC: 1.45 MG/DL
EGFR: 32 ML/MIN/1.73M2
GLUCOSE SERPL-MCNC: 99 MG/DL
POTASSIUM SERPL-SCNC: 5 MMOL/L
PROT SERPL-MCNC: 5.9 G/DL
SODIUM SERPL-SCNC: 143 MMOL/L

## 2023-01-01 PROCEDURE — G0008: CPT

## 2023-01-01 PROCEDURE — 90662 IIV NO PRSV INCREASED AG IM: CPT

## 2023-01-01 PROCEDURE — 99349 HOME/RES VST EST MOD MDM 40: CPT | Mod: 25

## 2023-01-01 RX ORDER — LIDOCAINE 5% 5 G/100G
5 CREAM TOPICAL
Qty: 1 | Refills: 3 | Status: COMPLETED | COMMUNITY
Start: 2021-02-05 | End: 2023-01-01

## 2023-01-01 RX ORDER — ASCORBIC ACID 500 MG
500 TABLET ORAL DAILY
Qty: 1 | Refills: 0 | Status: COMPLETED | COMMUNITY
Start: 2018-04-13 | End: 2023-01-01

## 2023-01-01 RX ORDER — SODIUM CHLORIDE 0.65 %
0.65 DROPS NASAL
Qty: 1 | Refills: 2 | Status: COMPLETED | COMMUNITY
Start: 2019-05-30 | End: 2023-01-01

## 2023-01-01 RX ORDER — CHLORHEXIDINE GLUCONATE, 0.12% ORAL RINSE 1.2 MG/ML
0.12 SOLUTION DENTAL
Qty: 1 | Refills: 0 | Status: COMPLETED | COMMUNITY
Start: 2021-06-04 | End: 2023-01-01

## 2023-01-11 ENCOUNTER — NON-APPOINTMENT (OUTPATIENT)
Age: 88
End: 2023-01-11

## 2023-01-15 ENCOUNTER — APPOINTMENT (OUTPATIENT)
Dept: HOME HEALTH SERVICES | Facility: HOME HEALTH | Age: 88
End: 2023-01-15
Payer: MEDICARE

## 2023-01-15 VITALS
OXYGEN SATURATION: 99 % | HEART RATE: 64 BPM | WEIGHT: 134 LBS | TEMPERATURE: 97.7 F | BODY MASS INDEX: 25.3 KG/M2 | SYSTOLIC BLOOD PRESSURE: 117 MMHG | RESPIRATION RATE: 15 BRPM | DIASTOLIC BLOOD PRESSURE: 71 MMHG | HEIGHT: 61 IN

## 2023-01-15 DIAGNOSIS — I89.0 LYMPHEDEMA, NOT ELSEWHERE CLASSIFIED: ICD-10-CM

## 2023-01-15 DIAGNOSIS — I48.91 UNSPECIFIED ATRIAL FIBRILLATION: ICD-10-CM

## 2023-01-15 DIAGNOSIS — I10 ESSENTIAL (PRIMARY) HYPERTENSION: ICD-10-CM

## 2023-01-15 DIAGNOSIS — D64.9 ANEMIA, UNSPECIFIED: ICD-10-CM

## 2023-01-15 DIAGNOSIS — M25.561 PAIN IN RIGHT KNEE: ICD-10-CM

## 2023-01-15 PROCEDURE — 99349 HOME/RES VST EST MOD MDM 40: CPT

## 2023-01-27 PROBLEM — I48.91 ATRIAL FIBRILLATION WITH NORMAL VENTRICULAR RATE: Status: ACTIVE | Noted: 2018-04-13

## 2023-01-27 NOTE — PHYSICAL EXAM
[No Acute Distress] : no acute distress [Well Nourished] : well nourished [Well Developed] : well developed [Normal Sclera/Conjunctiva] : normal sclera/conjunctiva [PERRL] : pupils equal, round and reactive to light [EOMI] : extra ocular movement intact [Normal Outer Ear/Nose] : the ears and nose were normal in appearance [Normal Oropharynx] : the oropharynx was normal [No JVD] : no jugular venous distention [Supple] : the neck was supple [Thyroid Normal, No Nodules] : the thyroid was normal and there were no nodules present [No Respiratory Distress] : no respiratory distress [Clear to Auscultation] : lungs were clear to auscultation bilaterally [No Accessory Muscle Use] : no accessory muscle use [Normal Rate] : heart rate was normal  [Regular Rhythm] : with a regular rhythm [Normal S1, S2] : normal S1 and S2 [No Murmurs] : no murmurs heard [Pedal Pulses Present] : the pedal pulses are present [Normal Bowel Sounds] : normal bowel sounds [Non Tender] : non-tender [Soft] : abdomen soft [Not Distended] : not distended [No Hernias] : no hernia was discovered [Normal Post Cervical Nodes] : no posterior cervical lymphadenopathy [Normal Anterior Cervical Nodes] : no anterior cervical lymphadenopathy [No CVA Tenderness] : no ~M costovertebral angle tenderness [No Spinal Tenderness] : no spinal tenderness [Kyphosis] :  kyphosis present [Scoliosis] : scoliosis not present [No Joint Swelling] : no joint swelling seen [No Clubbing, Cyanosis] : no clubbing  or cyanosis of the fingernails [Normal Strength/Tone] : muscle strength and tone were normal [No Rash] : no rash [Cranial Nerves Intact] : cranial nerves 2-12 were intact [No Motor Deficits] : the motor exam was normal [No Gross Sensory Deficits] : no gross sensory deficits [Normal Reflexes] : deep tendon reflexes were 2+ and symmetric [Oriented x3] : oriented to person, place, and time [Normal Affect] : the affect was normal [Normal Insight/Judgement] : insight and judgment were intact [de-identified] : 3+ pitting edema to karely LE.

## 2023-01-27 NOTE — COUNSELING
[Normal Weight - ( BMI  <25 )] : normal weight - ( BMI  <25 ) [Sodium restriction 2gm recommended] : sodium restriction 2 gm recommended [Non - Smoker] : non-smoker [Smoke/CO Detectors] : smoke/CO detectors [Use grab bars] : use grab bars [Medical alert] : medical alert [Use assistive device to avoid falls] : use assistive device to avoid falls [Remove clutter and unsafe carpeting to avoid falls] : remove clutter and unsafe carpeting to avoid falls [Improve exercise tolerance] : improve exercise tolerance [Improve mobility] : improve mobility [Improve pain control] : improve pain control [Decrease stress] : decrease stress [Decrease hospital use] : decrease hospital use [Minimize unnecessary interventions] : minimize unnecessary interventions [Maintain functional ability] : maintain functional ability [Discussed disease trajectory with patient/caregiver] : discussed disease trajectory with patient/caregiver [Likely to achieve goals/desired outcomes] : likely to achieve goals/desired outcomes [Patient/Caregiver has ___ understanding of disease process] : patient/caregiver has [unfilled] understanding of disease process [DNR] : Code Status: DNR [Limited] : Treatment Guidelines: Limited [DNI] : Intubation: DNI [[facility]: _____] [facility]last verification date: [unfilled] [_____] : HCP: [unfilled]

## 2023-01-27 NOTE — HISTORY OF PRESENT ILLNESS
[Patient] : patient [Family Member] : family member [FreeTextEntry1] : gait immobilization [FreeTextEntry2] : COVID-19 screen: 1/15/2023\par N95 mask, gloves, eyewear, and gown (if indicated) used during visit: Yes\par Patient or caregiver denies fever,cough, trouble breathing, rash, and vomiting, PAtient has not been in close contact with anyone who is COVID-19 positive or suspected of having COVID-19\par \par KEN GIFFORD is being seen at home for [facility] follow-up visit. \par \par KEN GIFFORD is 89 years old female with PMHx of  hypertensive heat disease, Afib, h/o unprovoked PE on Coumadin, lymphedema on karely LE, s/p falls with left hip fracture ad sciatic pain CKD with anemia, postherpetic neuralgia being seen at home for a routine follow-up. \par \par Patient AOx3, denies SOB, cP or dizziness, denies changes in bladder and bowel functions, reports good appetite and sleep, HHA/ family friend present during the visit. Patient denies any falls since the last visit, Left hip pain is not well controlled with Tylenol. chronic LE edema at baseline, Patient resting in bed a couple of times a day to elevate legs to control swelling. \par \par KEN GIFFORD living alone , ambulating with walker/rollator\par \par Difficulty obtaining meds or food: No\par \par \par Visit Type : Face to Face: Home\par Spoke with : Patient and HHA\par Activity: HPI, medication and history review and reconciliation\par Total time spent (minutes): 60mins\par \par

## 2023-01-27 NOTE — REVIEW OF SYSTEMS
[Fever] : no fever [Chills] : no chills [Fatigue] : no fatigue [Chest Pain] : no chest pain [Palpitations] : no palpitations [Leg Claudication] : no leg claudication [Lower Ext Edema] : lower extremity edema [Orthopnea] : no orthopnea [Paroxysmal Nocturnal Dyspnea] : no paroxysmal nocturnal dyspnea [Shortness Of Breath] : no shortness of breath [Wheezing] : no wheezing [Cough] : no cough [Dyspnea on Exertion] : no dyspnea on exertion [Abdominal Pain] : no abdominal pain [Nausea] : no nausea [Constipation] : no constipation [Diarrhea] : diarrhea [Vomiting] : no vomiting [Heartburn] : no heartburn [Melena] : no melena [Dysuria] : no dysuria [Incontinence] : incontinence [Nocturia] : no nocturia [Hematuria] : no hematuria [Frequency] : no frequency [Vaginal Discharge] : no vaginal discharge [Joint Pain] : joint pain [Joint Stiffness] : joint stiffness [Joint Swelling] : no joint swelling [Muscle Weakness] : no muscle weakness [Muscle Pain] : no muscle pain [Back Pain] : back pain [Itching] : no itching [Mole Changes] : no mole changes [Nail Changes] : no nail changes [Hair Changes] : no hair changes [Skin Rash] : no skin rash [Headache] : no headache [Dizziness] : no dizziness [Fainting] : no fainting [Confusion] : no confusion [Memory Loss] : no memory loss [Unsteady Walking] : ataxia [Insomnia] : no insomnia [Anxiety] : no anxiety [Depression] : no depression [Negative] : Heme/Lymph

## 2023-01-27 NOTE — ASSESSMENT
[FreeTextEntry1] : [] CKD\par - avoid nephrotoxic agents\par - adequate hydration encouraged\par \par [] Anemina\par - secondary to CKD\par - continue ferrous sulfate\par \par [] HTN/ Lymphedema\par - normotensive on exam\par - continue furosemide\par - low sodium diet\par - weight and BP monitor\par - continue elevating lower extremities and apply compression socks\par \par [] Rt knee pain\par - continue Tylenol 1000mg Q8hrs PRN\par - AVOID using percocet which can impose increased risk of fall\par - START Lidocaine 5% patch 12hrs on 12hrs off\par - warm compressor. \par \par Reviewed all medications at length with patient, All questions addressed. Worsening symptoms discussed with pt understanding. No issues or concerns at this time. Pt encouraged to call the [facility]  w/any questions, concerns or issues. Will continue to follow up with patient status\par \par \par \par \par labs.\par Rt knee - lidocaine patch\par doing well. \par

## 2023-01-27 NOTE — HEALTH RISK ASSESSMENT
[HRA Reviewed] : Health risk assessment reviewed [Independent] : using telephone [Some assistance needed] : managing finances [Full assistance needed] : doing laundry [No falls in past year] : Patient reported no falls in the past year

## 2023-01-30 ENCOUNTER — LABORATORY RESULT (OUTPATIENT)
Age: 88
End: 2023-01-30

## 2023-02-24 ENCOUNTER — NON-APPOINTMENT (OUTPATIENT)
Age: 88
End: 2023-02-24

## 2023-02-27 ENCOUNTER — TRANSCRIPTION ENCOUNTER (OUTPATIENT)
Age: 88
End: 2023-02-27

## 2023-02-27 ENCOUNTER — APPOINTMENT (OUTPATIENT)
Dept: HOME HEALTH SERVICES | Facility: HOME HEALTH | Age: 88
End: 2023-02-27

## 2023-02-27 VITALS
OXYGEN SATURATION: 98 % | HEART RATE: 64 BPM | RESPIRATION RATE: 18 BRPM | DIASTOLIC BLOOD PRESSURE: 66 MMHG | TEMPERATURE: 98.1 F | SYSTOLIC BLOOD PRESSURE: 116 MMHG

## 2023-02-27 DIAGNOSIS — E55.9 VITAMIN D DEFICIENCY, UNSPECIFIED: ICD-10-CM

## 2023-06-26 NOTE — DISCHARGE NOTE ADULT - AN EXTRA PILL TO ‘CATCH UP.’ NEVER TAKE A DOUBLE DOSE. NOTIFY YOUR DOCTOR THAT YOU MISSED A DOSE. TAKE WARFARIN/COUMADIN IN THE EVENING AT THE SAME TIME.
Have You Had Botox Before?: has had botox
When Was Your Last Botox Treatment?: 03/09/2023
Statement Selected

## 2023-08-01 NOTE — ED PROVIDER NOTE - TEMPLATE, MLM
From: Deborah Denton  To: Lakesha CELSO [place]  Sent: 8/1/2023 10:25 AM CDT  Subject: Buck    I went in to see you about Buck. I explained his violent behavior and lack of impulse control. I told you I have a long list of therapy but there are waiting list and he is about to get kicked out of . Everyone stated you could write meds for him and instead you sent a referral for another therapist that of course has a long wait list. Buck again has been displaying violent behavior and I am no closer to any answers.    Respiratory

## 2023-08-02 NOTE — PROGRESS NOTE ADULT - SUBJECTIVE AND OBJECTIVE BOX
KEN [id number]  89yFemale  T(C): 36.5 (02-03-21 @ 05:01), Max: 36.7 (02-02-21 @ 14:30)  HR: 55 (02-03-21 @ 05:01) (55 - 67)  BP: 118/59 (02-03-21 @ 05:01) (91/50 - 124/67)  RR: 18 (02-03-21 @ 05:01) (18 - 18)  SpO2: 92% (02-03-21 @ 05:01) (92% - 97%)  Wt(kg): --  02-02 @ 07:01  -  02-03 @ 07:00  --------------------------------------------------------  IN: 1000 mL / OUT: 0 mL / NET: 1000 mL      normal cephalic atraumatic  s1s2   clear to ascultation bilaterally  soft, non tender, non distended no guarding or rebound  no clubbing cyanosis or edema    
KEN [id number]  89yFemale  T(C): 36.7 (02-04-21 @ 05:47), Max: 36.9 (02-03-21 @ 12:38)  HR: 62 (02-04-21 @ 05:47) (61 - 74)  BP: 127/60 (02-04-21 @ 05:47) (121/68 - 147/77)  RR: 18 (02-04-21 @ 05:47) (18 - 18)  SpO2: 96% (02-04-21 @ 05:47) (96% - 98%)  Wt(kg): --  02-03 @ 07:01  -  02-04 @ 07:00  --------------------------------------------------------  IN: 200 mL / OUT: 0 mL / NET: 200 mL      normal cephalic atraumatic  s1s2   clear to ascultation bilaterally  soft, non tender, non distended no guarding or rebound  no clubbing cyanosis or edema    
INTERVAL HPI/OVERNIGHT EVENTS:    MEDICATIONS  (STANDING):  cyanocobalamin 1000 MICROGram(s) Oral daily  gabapentin 100 milliGRAM(s) Oral three times a day  iron sucrose IVPB 200 milliGRAM(s) IV Intermittent every 24 hours  latanoprost 0.005% Ophthalmic Solution 1 Drop(s) Both EYES at bedtime  lidocaine   Patch 1 Patch Transdermal daily  pantoprazole    Tablet 40 milliGRAM(s) Oral before breakfast  polyethylene glycol 3350 17 Gram(s) Oral daily  senna 2 Tablet(s) Oral at bedtime    MEDICATIONS  (PRN):  oxyCODONE    IR 5 milliGRAM(s) Oral every 4 hours PRN Moderate Pain (4 - 6)  oxyCODONE    IR 10 milliGRAM(s) Oral every 4 hours PRN Severe Pain (7 - 10)      Allergies    penicillin (Unknown)    Intolerances            PHYSICAL EXAM:   Vital Signs:  Vital Signs Last 24 Hrs  T(C): 37.5 (02 Feb 2021 04:37), Max: 37.5 (02 Feb 2021 04:37)  T(F): 99.5 (02 Feb 2021 04:37), Max: 99.5 (02 Feb 2021 04:37)  HR: 80 (02 Feb 2021 04:37) (62 - 96)  BP: 118/65 (02 Feb 2021 04:37) (118/65 - 166/80)  BP(mean): --  RR: 18 (02 Feb 2021 04:37) (18 - 18)  SpO2: 92% (02 Feb 2021 04:37) (92% - 96%)  Daily     Daily     GENERAL:  no distress  HEENT:  NC/AT,  anicteric  CHEST:   no increased effort, breath sounds clear  HEART:  Regular rhythm  ABDOMEN:  Soft, non-tender, non-distended, normoactive bowel sounds,  no masses ,no hepato-splenomegaly, no signs of chronic liver disease  EXTEREMITIES:  no cyanosis      LABS:                        7.2    8.54  )-----------( 213      ( 02 Feb 2021 07:22 )             22.8     02-01    140  |  108  |  25<H>  ----------------------------<  72  4.0   |  24  |  1.38<H>    Ca    9.2      01 Feb 2021 06:57  Phos  2.7     02-01  Mg     2.1     02-01    TPro  4.8<L>  /  Alb  x   /  TBili  x   /  DBili  x   /  AST  x   /  ALT  x   /  AlkPhos  x   02-01    PT/INR - ( 31 Jan 2021 12:56 )   PT: 12.2 sec;   INR: 1.02 ratio         PTT - ( 31 Jan 2021 12:56 )  PTT:32.9 sec      RADIOLOGY & ADDITIONAL TESTS:  
Patient is a 89y old  Female who presents with a chief complaint of anemia, back pain (02 Feb 2021 14:34)      SUBJECTIVE / OVERNIGHT EVENTS: no new developments, pain better controlled on neurontin and oxycodone, CT of T/L/S spine c/w severe spinal stenosis    MEDICATIONS  (STANDING):  cyanocobalamin 1000 MICROGram(s) Oral daily  gabapentin 100 milliGRAM(s) Oral three times a day  iron sucrose IVPB 200 milliGRAM(s) IV Intermittent every 24 hours  latanoprost 0.005% Ophthalmic Solution 1 Drop(s) Both EYES at bedtime  lidocaine   Patch 1 Patch Transdermal daily  pantoprazole    Tablet 40 milliGRAM(s) Oral before breakfast  polyethylene glycol 3350 17 Gram(s) Oral daily  senna 2 Tablet(s) Oral at bedtime    MEDICATIONS  (PRN):  oxyCODONE    IR 5 milliGRAM(s) Oral every 4 hours PRN Moderate Pain (4 - 6)  oxyCODONE    IR 10 milliGRAM(s) Oral every 4 hours PRN Severe Pain (7 - 10)      Vital Signs Last 24 Hrs  T(F): 97.7 (02-02-21 @ 21:25), Max: 99.5 (02-02-21 @ 04:37)  HR: 67 (02-02-21 @ 21:25) (58 - 96)  BP: 124/67 (02-02-21 @ 21:25) (91/50 - 166/80)  RR: 18 (02-02-21 @ 21:25) (18 - 18)  SpO2: 97% (02-02-21 @ 21:25) (92% - 97%)  Telemetry:   CAPILLARY BLOOD GLUCOSE        I&O's Summary    01 Feb 2021 07:01  -  02 Feb 2021 07:00  --------------------------------------------------------  IN: 820 mL / OUT: 0 mL / NET: 820 mL    02 Feb 2021 07:01  -  02 Feb 2021 21:29  --------------------------------------------------------  IN: 900 mL / OUT: 0 mL / NET: 900 mL        PHYSICAL EXAM:  GENERAL: NAD, well-developed  HEAD:  Atraumatic, Normocephalic  EYES: EOMI, PERRLA, conjunctiva and sclera clear  NECK: Supple, No JVD  CHEST/LUNG: Clear to auscultation bilaterally; No wheeze  HEART: Regular rate and rhythm; No murmurs, rubs, or gallops  ABDOMEN: Soft, Nontender, Nondistended; Bowel sounds present  EXTREMITIES:  2+ Peripheral Pulses, No clubbing, cyanosis, or edema  PSYCH: AAOx3  NEUROLOGY: non-focal  SKIN: No rashes or lesions    LABS:                        7.2    8.54  )-----------( 213      ( 02 Feb 2021 07:22 )             22.8     02-01    140  |  108  |  25<H>  ----------------------------<  72  4.0   |  24  |  1.38<H>    Ca    9.2      01 Feb 2021 06:57  Phos  2.7     02-01  Mg     2.1     02-01    TPro  4.8<L>  /  Alb  x   /  TBili  x   /  DBili  x   /  AST  x   /  ALT  x   /  AlkPhos  x   02-01              RADIOLOGY & ADDITIONAL TESTS:    Imaging Personally Reviewed:    Consultant(s) Notes Reviewed:      Care Discussed with Consultants/Other Providers:  
Subjective: Patient seen and examined. No new events except as noted.     REVIEW OF SYSTEMS:    CONSTITUTIONAL:+ weakness, fevers or chills  EYES/ENT: No visual changes;  No vertigo or throat pain   NECK: No pain or stiffness  RESPIRATORY: No cough, wheezing, hemoptysis; No shortness of breath  CARDIOVASCULAR: No chest pain or palpitations  GASTROINTESTINAL: No abdominal or epigastric pain. No nausea, vomiting, or hematemesis; No diarrhea or constipation. No melena or hematochezia.  GENITOURINARY: No dysuria, frequency or hematuria  NEUROLOGICAL: No numbness or weakness  SKIN: No itching, burning, rashes, or lesions   All other review of systems is negative unless indicated above.    MEDICATIONS:  MEDICATIONS  (STANDING):  cyanocobalamin 1000 MICROGram(s) Oral daily  gabapentin 100 milliGRAM(s) Oral three times a day  iron sucrose IVPB 200 milliGRAM(s) IV Intermittent every 24 hours  latanoprost 0.005% Ophthalmic Solution 1 Drop(s) Both EYES at bedtime  lidocaine   Patch 1 Patch Transdermal daily  pantoprazole    Tablet 40 milliGRAM(s) Oral before breakfast  polyethylene glycol 3350 17 Gram(s) Oral daily  senna 2 Tablet(s) Oral at bedtime      PHYSICAL EXAM:  T(C): 36.5 (02-04-21 @ 12:18), Max: 36.9 (02-03-21 @ 12:38)  HR: 74 (02-04-21 @ 12:18) (61 - 74)  BP: 149/77 (02-04-21 @ 12:18) (121/68 - 149/77)  RR: 18 (02-04-21 @ 12:18) (18 - 18)  SpO2: 98% (02-04-21 @ 12:18) (96% - 98%)  Wt(kg): --  I&O's Summary    03 Feb 2021 07:01  -  04 Feb 2021 07:00  --------------------------------------------------------  IN: 200 mL / OUT: 0 mL / NET: 200 mL          Appearance: NAD frail 	  HEENT:   dry  oral mucosa, PERRL, EOMI	  Lymphatic: No lymphadenopathy , no edema  Cardiovascular: Normal S1 S2, No JVD, + murmurs , Peripheral pulses palpable 2+ bilaterally  Respiratory: Lungs clear to auscultation, normal effort 	  Gastrointestinal:  Soft, Non-tender, + BS	  Skin: No rashes, No ecchymoses, No cyanosis, warm to touch  Musculoskeletal: decreased range of motion and  strength  Psychiatry:  sleepy    Ext: No edema      LABS:    CARDIAC MARKERS:                                8.8    6.62  )-----------( 251      ( 03 Feb 2021 07:23 )             28.4     02-03    138  |  104  |  21  ----------------------------<  95  4.0   |  22  |  1.46<H>    Ca    8.7      03 Feb 2021 07:23  Phos  3.2     02-03  Mg     2.1     02-03      proBNP:   Lipid Profile:   HgA1c:   TSH:             TELEMETRY: 	    ECG:  	  RADIOLOGY:   DIAGNOSTIC TESTING:  [ ] Echocardiogram:  [ ]  Catheterization:  [ ] Stress Test:    OTHER: 	          
KEN [id number]  89yFemale  T(C): 36.8 (02-01-21 @ 05:33), Max: 37.1 (01-31-21 @ 11:28)  HR: 58 (02-01-21 @ 05:33) (58 - 87)  BP: 159/71 (02-01-21 @ 05:33) (126/76 - 159/71)  RR: 18 (02-01-21 @ 05:33) (16 - 18)  SpO2: 95% (02-01-21 @ 05:33) (95% - 100%)  Wt(kg): --  normal cephalic atraumatic  s1s2   clear to ascultation bilaterally  soft, non tender, non distended no guarding or rebound  no clubbing cyanosis or edema    
Patient is a 89y old  Female who presents with a chief complaint of anemia, back pain     Patient was found to have iron deficiency anemia with a Ferritin of 11. Started on Venofer 200 mg IV daily (plan for 5 days). H/H has already improved. Patient without new complaints.      MEDICATIONS  (STANDING):  cyanocobalamin 1000 MICROGram(s) Oral daily  gabapentin 100 milliGRAM(s) Oral three times a day  iron sucrose IVPB 200 milliGRAM(s) IV Intermittent every 24 hours  latanoprost 0.005% Ophthalmic Solution 1 Drop(s) Both EYES at bedtime  lidocaine   Patch 1 Patch Transdermal daily  pantoprazole    Tablet 40 milliGRAM(s) Oral before breakfast  polyethylene glycol 3350 17 Gram(s) Oral daily  senna 2 Tablet(s) Oral at bedtime    MEDICATIONS  (PRN):  oxyCODONE    IR 5 milliGRAM(s) Oral every 4 hours PRN Moderate Pain (4 - 6)  oxyCODONE    IR 10 milliGRAM(s) Oral every 4 hours PRN Severe Pain (7 - 10)      Vital Signs Last 24 Hrs  T(C): 36.9 (03 Feb 2021 12:38), Max: 36.9 (03 Feb 2021 12:38)  T(F): 98.4 (03 Feb 2021 12:38), Max: 98.4 (03 Feb 2021 12:38)  HR: 74 (03 Feb 2021 12:38) (55 - 74)  BP: 121/68 (03 Feb 2021 12:38) (118/59 - 147/77)  BP(mean): --  RR: 18 (03 Feb 2021 12:38) (18 - 18)  SpO2: 98% (03 Feb 2021 12:38) (92% - 98%)    PE  NAD  Awake, alert  Anicteric  No rash grossly                            8.8    6.62  )-----------( 251      ( 03 Feb 2021 07:23 )             28.4       02-03    138  |  104  |  21  ----------------------------<  95  4.0   |  22  |  1.46<H>    Ca    8.7      03 Feb 2021 07:23  Phos  3.2     02-03  Mg     2.1     02-03      EXAM:  CT LUMBAR SPINE                          EXAM:  CT THORACIC SPINE                            PROCEDURE DATE:  02/01/2021            INTERPRETATION:  History: Back pain.    Description: Noncontrast CT studies of the thoracic and lumbar spine were performed.    Comparison is made to the lumbar spine region from the prior abdomen CT of 11/23/2018, thoracic spine region of the chest CT study from 10/16/2014, and to prior thoracic and lumbar spine MRI studies from 04/12/2010.    Axial images with coronal and sagittal reformatted series were obtained.    A large hemangioma is again noted involving the posterior aspect of the T10 vertebral body, with associated lucency, grossly stable in size compared to the prior studies.    Multilevel vacuum disc changes are noted in the thoracic and lumbar spine. Multilevel disc space calcifications are present. Multilevel osteophyte formation is noted. Multilevel endplate degenerative changes are present.    Diffuse osteopenia is present. Consider correlation with bone densitometry for better quantification if clinically warranted.    There is no evidence for acute fracture or subluxation.    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    Disc bulges, disc osteophyte complexes, facet degenerative changes, and ligamentum flavum hypertrophy result in multilevel spinal canal stenosis and neural foraminal narrowing, the overall degree of which is not well demonstrated with CT technique. However, the spinal canal stenosis may be high-grade at the L3-L4 level.    Bilateral pleural effusions and/or pleural thickening are noted.    Lobulated foci are noted involving both kidneys, partially visualized, most likely reflecting renal cysts, similar in appearance compared to the 2018 abdomen CT study.    IMPRESSION:    Degenerative changes involve the spine. High-grade spinal canal stenosis may be present at the L3-L4 level. Consider correlation spine MRI imaging follow-up if clinically warranted.    Diffuse osteopenia.    No evidence for acute fracture.      EXAM:  CT LUMBAR SPINE                          EXAM:  CT THORACIC SPINE                            PROCEDURE DATE:  02/01/2021            INTERPRETATION:  History: Back pain.    Description: Noncontrast CT studies of the thoracic and lumbar spine were performed.    Comparison is made to the lumbar spine region from the prior abdomen CT of 11/23/2018, thoracic spine region of the chest CT study from 10/16/2014, and to prior thoracic and lumbar spine MRI studies from 04/12/2010.    Axial images with coronal and sagittal reformatted series were obtained.    A large hemangioma is again noted involving the posterior aspect of the T10 vertebral body, with associated lucency, grossly stable in size compared to the prior studies.    Multilevel vacuum disc changes are noted in the thoracic and lumbar spine. Multilevel disc space calcifications are present. Multilevel osteophyte formation is noted. Multilevel endplate degenerative changes are present.    Diffuse osteopenia is present. Consider correlation with bone densitometry for better quantification if clinically warranted.    There is no evidence for acute fracture or subluxation.    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    Disc bulges, disc osteophyte complexes, facet degenerative changes, and ligamentum flavum hypertrophy result in multilevel spinal canal stenosis and neural foraminal narrowing, the overall degree of which is not well demonstrated with CT technique. However, the spinal canal stenosis may be high-grade at the L3-L4 level.    Bilateral pleural effusions and/or pleural thickening are noted.    Lobulated foci are noted involving both kidneys, partially visualized, most likely reflecting renal cysts, similar in appearance compared to the 2018 abdomen CT study.    IMPRESSION:    Degenerative changes involve the spine. High-grade spinal canal stenosis may be present at the L3-L4 level. Consider correlation spine MRI imaging follow-up if clinically warranted.    Diffuse osteopenia.    No evidence for acute fracture.          
Patient is a 89y old  Female who presents with a chief complaint of anemia, back pain (03 Feb 2021 14:55)      SUBJECTIVE / OVERNIGHT EVENTS: no new developments, cleared for [facility]    MEDICATIONS  (STANDING):  cyanocobalamin 1000 MICROGram(s) Oral daily  gabapentin 100 milliGRAM(s) Oral three times a day  iron sucrose IVPB 200 milliGRAM(s) IV Intermittent every 24 hours  latanoprost 0.005% Ophthalmic Solution 1 Drop(s) Both EYES at bedtime  lidocaine   Patch 1 Patch Transdermal daily  pantoprazole    Tablet 40 milliGRAM(s) Oral before breakfast  polyethylene glycol 3350 17 Gram(s) Oral daily  senna 2 Tablet(s) Oral at bedtime    MEDICATIONS  (PRN):  oxyCODONE    IR 5 milliGRAM(s) Oral every 4 hours PRN Moderate Pain (4 - 6)  oxyCODONE    IR 10 milliGRAM(s) Oral every 4 hours PRN Severe Pain (7 - 10)      Vital Signs Last 24 Hrs  T(F): 98.4 (02-03-21 @ 12:38), Max: 98.4 (02-03-21 @ 12:38)  HR: 61 (02-03-21 @ 21:07) (55 - 74)  BP: 130/62 (02-03-21 @ 21:07) (118/59 - 147/77)  RR: 18 (02-03-21 @ 21:07) (18 - 18)  SpO2: 98% (02-03-21 @ 21:07) (92% - 98%)  Telemetry:   CAPILLARY BLOOD GLUCOSE        I&O's Summary    02 Feb 2021 07:01  -  03 Feb 2021 07:00  --------------------------------------------------------  IN: 1000 mL / OUT: 0 mL / NET: 1000 mL    03 Feb 2021 07:01  -  03 Feb 2021 22:02  --------------------------------------------------------  IN: 200 mL / OUT: 0 mL / NET: 200 mL        PHYSICAL EXAM:  GENERAL: NAD, well-developed  HEAD:  Atraumatic, Normocephalic  EYES: EOMI, PERRLA, conjunctiva and sclera clear  NECK: Supple, No JVD  CHEST/LUNG: Clear to auscultation bilaterally; No wheeze  HEART: Regular rate and rhythm; No murmurs, rubs, or gallops  ABDOMEN: Soft, Nontender, Nondistended; Bowel sounds present  EXTREMITIES:  2+ Peripheral Pulses, No clubbing, cyanosis, or edema  PSYCH: AAOx3  NEUROLOGY: non-focal  SKIN: No rashes or lesions    LABS:                        8.8    6.62  )-----------( 251      ( 03 Feb 2021 07:23 )             28.4     02-03    138  |  104  |  21  ----------------------------<  95  4.0   |  22  |  1.46<H>    Ca    8.7      03 Feb 2021 07:23  Phos  3.2     02-03  Mg     2.1     02-03                RADIOLOGY & ADDITIONAL TESTS:    Imaging Personally Reviewed:    Consultant(s) Notes Reviewed:      Care Discussed with Consultants/Other Providers:  
Patient seen and examined at bedside. feels tired today     MEDICATIONS  (STANDING):  cyanocobalamin 1000 MICROGram(s) Oral daily  gabapentin 100 milliGRAM(s) Oral three times a day  iron sucrose IVPB 200 milliGRAM(s) IV Intermittent every 24 hours  latanoprost 0.005% Ophthalmic Solution 1 Drop(s) Both EYES at bedtime  lidocaine   Patch 1 Patch Transdermal daily  pantoprazole    Tablet 40 milliGRAM(s) Oral before breakfast  polyethylene glycol 3350 17 Gram(s) Oral daily  senna 2 Tablet(s) Oral at bedtime    MEDICATIONS  (PRN):  oxyCODONE    IR 5 milliGRAM(s) Oral every 4 hours PRN Moderate Pain (4 - 6)  oxyCODONE    IR 10 milliGRAM(s) Oral every 4 hours PRN Severe Pain (7 - 10)        Vital Signs Last 24 Hrs  T(C): 36.7 (02 Feb 2021 14:30), Max: 37.5 (02 Feb 2021 04:37)  T(F): 98.1 (02 Feb 2021 14:30), Max: 99.5 (02 Feb 2021 04:37)  HR: 58 (02 Feb 2021 14:30) (58 - 96)  BP: 91/50 (02 Feb 2021 14:30) (91/50 - 166/80)  BP(mean): --  RR: 18 (02 Feb 2021 14:30) (18 - 18)  SpO2: 96% (02 Feb 2021 14:30) (92% - 96%)      PHYSICAL EXAM:     GENERAL:  Appears stated age, well-groomed  HEENT:  NC/AT,   CHEST:  CTA b/l  HEART:  S1 s2+   ABDOMEN:  Soft, non-tender, non-distended                              7.2    8.54  )-----------( 213      ( 02 Feb 2021 07:22 )             22.8       02-01    140  |  108  |  25<H>  ----------------------------<  72  4.0   |  24  |  1.38<H>    Ca    9.2      01 Feb 2021 06:57  Phos  2.7     02-01  Mg     2.1     02-01    TPro  4.8<L>  /  Alb  x   /  TBili  x   /  DBili  x   /  AST  x   /  ALT  x   /  AlkPhos  x   02-01      
Patient is a 89y old  Female who presents with a chief complaint of anemia (01 Feb 2021 09:37)      SUBJECTIVE / OVERNIGHT EVENTS: ptn admitted w severe anemia, has h/o colon ca, resected 11 yrs ago. seen by heme and GI, no work up being planned due to advanced age and comorbidities. supportive care. ptn c/o back pain, will place on analgesics. get CT T/L/S spine. place on neurontin 100 mg tid    MEDICATIONS  (STANDING):  iron sucrose IVPB 200 milliGRAM(s) IV Intermittent every 24 hours  lidocaine   Patch 1 Patch Transdermal daily  polyethylene glycol 3350 17 Gram(s) Oral daily  senna 2 Tablet(s) Oral at bedtime    MEDICATIONS  (PRN):  oxyCODONE    IR 5 milliGRAM(s) Oral every 4 hours PRN Moderate Pain (4 - 6)  oxyCODONE    IR 10 milliGRAM(s) Oral every 4 hours PRN Severe Pain (7 - 10)      Vital Signs Last 24 Hrs  T(F): 98.3 (02-01-21 @ 05:33), Max: 98.4 (01-31-21 @ 18:58)  HR: 58 (02-01-21 @ 05:33) (58 - 87)  BP: 159/71 (02-01-21 @ 05:33) (126/76 - 159/71)  RR: 18 (02-01-21 @ 05:33) (16 - 18)  SpO2: 95% (02-01-21 @ 05:33) (95% - 100%)  Telemetry:   CAPILLARY BLOOD GLUCOSE        I&O's Summary      PHYSICAL EXAM:  GENERAL: NAD, well-developed  HEAD:  Atraumatic, Normocephalic  EYES: EOMI, PERRLA, conjunctiva and sclera clear  NECK: Supple, No JVD  CHEST/LUNG: Clear to auscultation bilaterally; No wheeze  HEART: Regular rate and rhythm; No murmurs, rubs, or gallops  ABDOMEN: Soft, Nontender, Nondistended; Bowel sounds present  EXTREMITIES:  2+ Peripheral Pulses, No clubbing, cyanosis, or edema  PSYCH: AAOx3  NEUROLOGY: non-focal  SKIN: No rashes or lesions    LABS:                        7.4    4.61  )-----------( 240      ( 01 Feb 2021 06:59 )             23.7     02-01    140  |  108  |  25<H>  ----------------------------<  72  4.0   |  24  |  1.38<H>    Ca    9.2      01 Feb 2021 06:57  Phos  2.7     02-01  Mg     2.1     02-01    TPro  4.8<L>  /  Alb  x   /  TBili  x   /  DBili  x   /  AST  x   /  ALT  x   /  AlkPhos  x   02-01    PT/INR - ( 31 Jan 2021 12:56 )   PT: 12.2 sec;   INR: 1.02 ratio         PTT - ( 31 Jan 2021 12:56 )  PTT:32.9 sec
Patient is a 89y old  Female who presents with a chief complaint of anemia, back pain (04 Feb 2021 12:23)      SUBJECTIVE / OVERNIGHT EVENTS: no new c/o    MEDICATIONS  (STANDING):  cyanocobalamin 1000 MICROGram(s) Oral daily  gabapentin 100 milliGRAM(s) Oral three times a day  iron sucrose IVPB 200 milliGRAM(s) IV Intermittent every 24 hours  latanoprost 0.005% Ophthalmic Solution 1 Drop(s) Both EYES at bedtime  lidocaine   Patch 1 Patch Transdermal daily  pantoprazole    Tablet 40 milliGRAM(s) Oral before breakfast  polyethylene glycol 3350 17 Gram(s) Oral daily  senna 2 Tablet(s) Oral at bedtime    MEDICATIONS  (PRN):  oxyCODONE    IR 5 milliGRAM(s) Oral every 4 hours PRN Moderate Pain (4 - 6)  oxyCODONE    IR 10 milliGRAM(s) Oral every 4 hours PRN Severe Pain (7 - 10)      Vital Signs Last 24 Hrs  T(F): 97.7 (02-04-21 @ 12:18), Max: 98 (02-04-21 @ 05:47)  HR: 74 (02-04-21 @ 12:18) (61 - 74)  BP: 149/77 (02-04-21 @ 12:18) (127/60 - 149/77)  RR: 18 (02-04-21 @ 12:18) (18 - 18)  SpO2: 98% (02-04-21 @ 12:18) (96% - 98%)    PHYSICAL EXAM:  GENERAL: NAD, well-developed  HEAD:  Atraumatic, Normocephalic  EYES: EOMI, PERRLA, conjunctiva and sclera clear  NECK: Supple, No JVD  CHEST/LUNG: Clear to auscultation bilaterally; No wheeze  HEART: Regular rate and rhythm; No murmurs, rubs, or gallops  ABDOMEN: Soft, Nontender, Nondistended; Bowel sounds present  EXTREMITIES:  2+ Peripheral Pulses, No clubbing, cyanosis, or edema  PSYCH: AAOx3  NEUROLOGY: non-focal  SKIN: No rashes or lesions    LABS:                        8.8    6.62  )-----------( 251      ( 03 Feb 2021 07:23 )             28.4     02-03    138  |  104  |  21  ----------------------------<  95  4.0   |  22  |  1.46<H>    Ca    8.7      03 Feb 2021 07:23  Phos  3.2     02-03  Mg     2.1     02-03                RADIOLOGY & ADDITIONAL TESTS:    Imaging Personally Reviewed:    Consultant(s) Notes Reviewed:      Care Discussed with Consultants/Other Providers:  
Subjective: Patient seen and examined. No new events except as noted.     REVIEW OF SYSTEMS:    CONSTITUTIONAL: + weakness, fevers or chills  EYES/ENT: No visual changes;  No vertigo or throat pain   NECK: No pain or stiffness  RESPIRATORY: No cough, wheezing, hemoptysis; No shortness of breath  CARDIOVASCULAR: No chest pain or palpitations  GASTROINTESTINAL: No abdominal or epigastric pain. No nausea, vomiting, or hematemesis; No diarrhea or constipation. No melena or hematochezia.  GENITOURINARY: No dysuria, frequency or hematuria  NEUROLOGICAL: No numbness or weakness  SKIN: No itching, burning, rashes, or lesions   All other review of systems is negative unless indicated above.    MEDICATIONS:  MEDICATIONS  (STANDING):  cyanocobalamin 1000 MICROGram(s) Oral daily  gabapentin 100 milliGRAM(s) Oral three times a day  iron sucrose IVPB 200 milliGRAM(s) IV Intermittent every 24 hours  latanoprost 0.005% Ophthalmic Solution 1 Drop(s) Both EYES at bedtime  lidocaine   Patch 1 Patch Transdermal daily  pantoprazole    Tablet 40 milliGRAM(s) Oral before breakfast  polyethylene glycol 3350 17 Gram(s) Oral daily  senna 2 Tablet(s) Oral at bedtime      PHYSICAL EXAM:  T(C): 37.5 (02-02-21 @ 04:37), Max: 37.5 (02-02-21 @ 04:37)  HR: 80 (02-02-21 @ 04:37) (62 - 96)  BP: 118/65 (02-02-21 @ 04:37) (118/65 - 166/80)  RR: 18 (02-02-21 @ 04:37) (18 - 18)  SpO2: 92% (02-02-21 @ 04:37) (92% - 96%)  Wt(kg): --  I&O's Summary    01 Feb 2021 07:01  -  02 Feb 2021 07:00  --------------------------------------------------------  IN: 820 mL / OUT: 0 mL / NET: 820 mL          Appearance: NAD	  HEENT:  Dry oral mucosa, PERRL, EOMI	  Lymphatic: No lymphadenopathy  Cardiovascular: Normal S1 S2, No JVD,+ murmurs, No edema  Respiratory: decreased bs   Psychiatry: A & O x 3, Mood & affect appropriate  Gastrointestinal:  Soft, Non-tender, + BS	  Skin: No rashes, No ecchymoses, No cyanosis	  Neurologic: Non-focal  Extremities: Normal range of motion, No clubbing, cyanosis or edema  Vascular: Peripheral pulses palpable 2+ bilaterally      LABS:    CARDIAC MARKERS:                                7.2    8.54  )-----------( 213      ( 02 Feb 2021 07:22 )             22.8     02-01    140  |  108  |  25<H>  ----------------------------<  72  4.0   |  24  |  1.38<H>    Ca    9.2      01 Feb 2021 06:57  Phos  2.7     02-01  Mg     2.1     02-01    TPro  4.8<L>  /  Alb  x   /  TBili  x   /  DBili  x   /  AST  x   /  ALT  x   /  AlkPhos  x   02-01    proBNP:   Lipid Profile:   HgA1c:   TSH:             TELEMETRY: 	    ECG:  	  RADIOLOGY:   DIAGNOSTIC TESTING:  [ ] Echocardiogram:  [ ]  Catheterization:  [ ] Stress Test:    OTHER: 	          
Subjective: Patient seen and examined. No new events except as noted.     REVIEW OF SYSTEMS:    CONSTITUTIONAL: No weakness, fevers or chills  EYES/ENT: No visual changes;  No vertigo or throat pain   NECK: No pain or stiffness  RESPIRATORY: No cough, wheezing, hemoptysis; No shortness of breath  CARDIOVASCULAR: No chest pain or palpitations  GASTROINTESTINAL: No abdominal or epigastric pain. No nausea, vomiting, or hematemesis; No diarrhea or constipation. No melena or hematochezia.  GENITOURINARY: No dysuria, frequency or hematuria  NEUROLOGICAL: No numbness or weakness  SKIN: No itching, burning, rashes, or lesions   All other review of systems is negative unless indicated above.    MEDICATIONS:  MEDICATIONS  (STANDING):  cyanocobalamin 1000 MICROGram(s) Oral daily  gabapentin 100 milliGRAM(s) Oral three times a day  iron sucrose IVPB 200 milliGRAM(s) IV Intermittent every 24 hours  latanoprost 0.005% Ophthalmic Solution 1 Drop(s) Both EYES at bedtime  lidocaine   Patch 1 Patch Transdermal daily  pantoprazole    Tablet 40 milliGRAM(s) Oral before breakfast  polyethylene glycol 3350 17 Gram(s) Oral daily  senna 2 Tablet(s) Oral at bedtime      PHYSICAL EXAM:  T(C): 36.5 (02-03-21 @ 05:01), Max: 36.7 (02-02-21 @ 14:30)  HR: 70 (02-03-21 @ 10:38) (55 - 70)  BP: 147/77 (02-03-21 @ 10:38) (91/50 - 147/77)  RR: 18 (02-03-21 @ 05:01) (18 - 18)  SpO2: 92% (02-03-21 @ 05:01) (92% - 97%)  Wt(kg): --  I&O's Summary    02 Feb 2021 07:01  -  03 Feb 2021 07:00  --------------------------------------------------------  IN: 1000 mL / OUT: 0 mL / NET: 1000 mL    03 Feb 2021 07:01  -  03 Feb 2021 10:52  --------------------------------------------------------  IN: 100 mL / OUT: 0 mL / NET: 100 mL        Appearance: NAD	  HEENT:  Dry oral mucosa, PERRL, EOMI	  Lymphatic: No lymphadenopathy  Cardiovascular: Normal S1 S2, No JVD,+ murmurs, No edema  Respiratory: decreased bs   Psychiatry: A & O x 3, Mood & affect appropriate  Gastrointestinal:  Soft, Non-tender, + BS	  Skin: No rashes, No ecchymoses, No cyanosis	  Neurologic: Non-focal  Extremities: Normal range of motion, No clubbing, cyanosis or edema  Vascular: Peripheral pulses palpable 2+ bilaterally    LABS:    CARDIAC MARKERS:                                8.8    6.62  )-----------( 251      ( 03 Feb 2021 07:23 )             28.4     02-03    138  |  104  |  21  ----------------------------<  95  4.0   |  22  |  1.46<H>    Ca    8.7      03 Feb 2021 07:23  Phos  3.2     02-03  Mg     2.1     02-03      proBNP:   Lipid Profile:   HgA1c:   TSH:             TELEMETRY: 	    ECG:  	  RADIOLOGY:   DIAGNOSTIC TESTING:  [ ] Echocardiogram:  [ ]  Catheterization:  [ ] Stress Test:    OTHER: 	          
- - -

## 2023-08-17 NOTE — ED ADULT NURSE NOTE - PRO INTERPRETER NEED 2
PATIENT INFORMATION        Follow-Up  - Return for your 4 year well child visit.    3 years old Health and Safety Tips - The following hyperlinks are available to access via [employer]    Parent Education from [employer] Parent    Educación para padres sobre niños sanos    Common dosing for acetaminophen and ibuprofen:   Acetaminophen (Tylenol) can be given every 4 hours.  · Infant or Children's Elixir: 160mg/5ml for  Weight 18-23 lbs 24-35 lbs 36-47 lbs   Dose 3.75 ml 5 ml 7.5 ml      Weight 48-59 lbs 60-71 lsb 72-95 lbs   Dose 10 ml 12.5 ml 15 ml     Ibuprofen (Motrin) can be given every 6 hours.  Safe for children 6 months and older.  · Infant Drops: 50mg/1.25ml  Weight 12-17 lbs 18-23 lbs   Dose 1.25 ml 1.875 ml     · Children's Elixir 100mg/5ml   Weight 12-17 lbs 18-23 lbs 24-35 lbs 36-47 lbs   Dose 2.5 ml 3.75 ml 5 ml 7.5 ml        Weight 48-59 lbs 60-71 lbs 72-95 lbs   Dose 10 ml 12.5 ml 15 ml     Additional Educational Resources:  For additional resources regarding your symptoms, diagnosis, or further health information, please visit the Discover a Healthier You section on [email or web address] or the Online Health Resources section in [employer].    
English

## 2023-10-03 PROBLEM — Z79.899 OTHER LONG TERM (CURRENT) DRUG THERAPY: Status: ACTIVE | Noted: 2023-01-01

## 2023-10-03 PROBLEM — F03.90 DEMENTIA WITHOUT BEHAVIORAL DISTURBANCE: Status: ACTIVE | Noted: 2019-03-13

## 2023-10-03 PROBLEM — Z79.899 OTHER LONG TERM (CURRENT) DRUG THERAPY: Status: RESOLVED | Noted: 2023-01-01 | Resolved: 2023-01-01

## 2023-10-03 PROBLEM — I73.9 PERIPHERAL VASCULAR DISEASE: Status: ACTIVE | Noted: 2019-08-14

## 2023-10-03 PROBLEM — N18.30 CKD (CHRONIC KIDNEY DISEASE) STAGE 3, GFR 30-59 ML/MIN: Status: ACTIVE | Noted: 2020-07-30

## 2023-10-03 PROBLEM — Z23 FLU VACCINE NEED: Status: ACTIVE | Noted: 2020-10-23

## 2023-11-01 NOTE — CURRENT MEDS
[Medication and Allergies Reconciled] : medication and allergies reconciled [High Risk Medications Reviewed and Reconciled (Beers Criteria)] : high risk medications reviewed and reconciled [Reviewed patient reported medication adherence from Comprehensive Assessment] : Reviewed patient reported medication adherence from comprehensive assessment [Compliant with medications] : Patient is compliant with medications Interpolation Flap Text: A decision was made to reconstruct the defect utilizing an interpolation axial flap and a staged reconstruction.  A telfa template was made of the defect.  This telfa template was then used to outline the interpolation flap.  The donor area for the pedicle flap was then injected with anesthesia.  The flap was excised through the skin and subcutaneous tissue down to the layer of the underlying musculature.  The interpolation flap was carefully excised within this deep plane to maintain its blood supply.  The edges of the donor site were undermined.   The donor site was closed in a primary fashion.  The pedicle was then rotated into position and sutured.  Once the tube was sutured into place, adequate blood supply was confirmed with blanching and refill.  The pedicle was then wrapped with xeroform gauze and dressed appropriately with a telfa and gauze bandage to ensure continued blood supply and protect the attached pedicle.

## 2023-11-03 NOTE — PATIENT PROFILE ADULT. - NS PRO AD NO ADVANCE DIRECTIVE
11/03/23 1537   Pain Assessment   Pain Assessment Tool 0-10   Pain Score 8   Pain Location/Orientation Location: Back;Orientation: Bilateral;Location: Leg   Pain Onset/Description Onset: Ongoing   Hospital Pain Intervention(s) Medication (See MAR)     Patient complaining of severe pain and requested PRN medication. 50mg tramadol administered at 1537 for severe pain. No

## 2023-11-17 NOTE — ED PROVIDER NOTE - CROS ED ENDOCRINE ALL NEG
Refill Request:    QUEtiapine (SEROquel) 25 MG tablet    Last Refill: 11/1/23 (refilled by Dr. Cassidy)    Last Office Visit: 11/17/23    QUEtiapine (SEROquel) 50 MG tablet    Last Refill: 11/1/23 (refilled by Dr. Cassidy)    Last Office Visit: 11/17/23   negative...

## 2023-12-01 PROBLEM — H92.09 EAR PAIN: Status: ACTIVE | Noted: 2023-01-01

## 2023-12-03 PROBLEM — J34.9 SINUS DISORDER: Status: ACTIVE | Noted: 2023-01-01

## 2023-12-23 NOTE — ED PROVIDER NOTE - CARDIOVASCULAR NEGATIVE STATEMENT, MLM
no chest pain and no edema.
PAST SURGICAL HISTORY:  Benign cyst of left breast     Previous  section

## 2024-01-01 ENCOUNTER — NON-APPOINTMENT (OUTPATIENT)
Age: 89
End: 2024-01-01

## 2024-01-01 ENCOUNTER — INPATIENT (INPATIENT)
Facility: HOSPITAL | Age: 89
LOS: 2 days | DRG: 951 | End: 2024-04-10
Attending: INTERNAL MEDICINE | Admitting: HOSPITALIST
Payer: MEDICARE

## 2024-01-01 ENCOUNTER — TRANSCRIPTION ENCOUNTER (OUTPATIENT)
Age: 89
End: 2024-01-01

## 2024-01-01 ENCOUNTER — INPATIENT (INPATIENT)
Facility: HOSPITAL | Age: 89
LOS: 10 days | Discharge: HOME HEALTH | DRG: 391 | End: 2024-03-03
Attending: INTERNAL MEDICINE | Admitting: STUDENT IN AN ORGANIZED HEALTH CARE EDUCATION/TRAINING PROGRAM
Payer: MEDICARE

## 2024-01-01 ENCOUNTER — APPOINTMENT (OUTPATIENT)
Dept: HOME HEALTH SERVICES | Facility: HOME HEALTH | Age: 89
End: 2024-01-01

## 2024-01-01 ENCOUNTER — INPATIENT (INPATIENT)
Facility: HOSPITAL | Age: 89
LOS: 4 days | Discharge: NURSING, REHAB OR LONG-TERM CARE | DRG: 948 | End: 2024-03-08
Attending: STUDENT IN AN ORGANIZED HEALTH CARE EDUCATION/TRAINING PROGRAM | Admitting: STUDENT IN AN ORGANIZED HEALTH CARE EDUCATION/TRAINING PROGRAM
Payer: MEDICARE

## 2024-01-01 ENCOUNTER — RX RENEWAL (OUTPATIENT)
Age: 89
End: 2024-01-01

## 2024-01-01 ENCOUNTER — RESULT REVIEW (OUTPATIENT)
Age: 89
End: 2024-01-01

## 2024-01-01 ENCOUNTER — MED ADMIN CHARGE (OUTPATIENT)
Age: 89
End: 2024-01-01

## 2024-01-01 VITALS
HEART RATE: 87 BPM | SYSTOLIC BLOOD PRESSURE: 132 MMHG | TEMPERATURE: 98 F | HEIGHT: 65 IN | RESPIRATION RATE: 20 BRPM | WEIGHT: 145.06 LBS | DIASTOLIC BLOOD PRESSURE: 67 MMHG | OXYGEN SATURATION: 99 %

## 2024-01-01 VITALS
WEIGHT: 149.91 LBS | HEART RATE: 46 BPM | RESPIRATION RATE: 18 BRPM | SYSTOLIC BLOOD PRESSURE: 160 MMHG | OXYGEN SATURATION: 98 % | DIASTOLIC BLOOD PRESSURE: 58 MMHG | HEIGHT: 65 IN | TEMPERATURE: 98 F

## 2024-01-01 VITALS
OXYGEN SATURATION: 90 % | RESPIRATION RATE: 20 BRPM | SYSTOLIC BLOOD PRESSURE: 102 MMHG | DIASTOLIC BLOOD PRESSURE: 63 MMHG | HEART RATE: 41 BPM | TEMPERATURE: 98 F

## 2024-01-01 VITALS
OXYGEN SATURATION: 92 % | DIASTOLIC BLOOD PRESSURE: 63 MMHG | RESPIRATION RATE: 18 BRPM | WEIGHT: 151.02 LBS | HEART RATE: 75 BPM | SYSTOLIC BLOOD PRESSURE: 109 MMHG | TEMPERATURE: 98 F

## 2024-01-01 VITALS
RESPIRATION RATE: 20 BRPM | SYSTOLIC BLOOD PRESSURE: 128 MMHG | WEIGHT: 130.07 LBS | HEIGHT: 65 IN | OXYGEN SATURATION: 97 % | DIASTOLIC BLOOD PRESSURE: 58 MMHG | HEART RATE: 82 BPM

## 2024-01-01 VITALS
DIASTOLIC BLOOD PRESSURE: 60 MMHG | SYSTOLIC BLOOD PRESSURE: 142 MMHG | OXYGEN SATURATION: 94 % | HEART RATE: 74 BPM | RESPIRATION RATE: 18 BRPM | TEMPERATURE: 98 F

## 2024-01-01 DIAGNOSIS — Z29.9 ENCOUNTER FOR PROPHYLACTIC MEASURES, UNSPECIFIED: ICD-10-CM

## 2024-01-01 DIAGNOSIS — I48.0 PAROXYSMAL ATRIAL FIBRILLATION: ICD-10-CM

## 2024-01-01 DIAGNOSIS — R52 PAIN, UNSPECIFIED: ICD-10-CM

## 2024-01-01 DIAGNOSIS — R09.89 OTHER SPECIFIED SYMPTOMS AND SIGNS INVOLVING THE CIRCULATORY AND RESPIRATORY SYSTEMS: ICD-10-CM

## 2024-01-01 DIAGNOSIS — G93.41 METABOLIC ENCEPHALOPATHY: ICD-10-CM

## 2024-01-01 DIAGNOSIS — A41.9 SEPSIS, UNSPECIFIED ORGANISM: ICD-10-CM

## 2024-01-01 DIAGNOSIS — R78.81 BACTEREMIA: ICD-10-CM

## 2024-01-01 DIAGNOSIS — J18.9 PNEUMONIA, UNSPECIFIED ORGANISM: ICD-10-CM

## 2024-01-01 DIAGNOSIS — Z98.890 OTHER SPECIFIED POSTPROCEDURAL STATES: Chronic | ICD-10-CM

## 2024-01-01 DIAGNOSIS — F41.9 ANXIETY DISORDER, UNSPECIFIED: ICD-10-CM

## 2024-01-01 DIAGNOSIS — K21.9 GASTRO-ESOPHAGEAL REFLUX DISEASE WITHOUT ESOPHAGITIS: ICD-10-CM

## 2024-01-01 DIAGNOSIS — D64.9 ANEMIA, UNSPECIFIED: ICD-10-CM

## 2024-01-01 DIAGNOSIS — Z51.5 ENCOUNTER FOR PALLIATIVE CARE: ICD-10-CM

## 2024-01-01 DIAGNOSIS — R53.1 WEAKNESS: ICD-10-CM

## 2024-01-01 DIAGNOSIS — Z71.89 OTHER SPECIFIED COUNSELING: ICD-10-CM

## 2024-01-01 DIAGNOSIS — K57.92 DIVERTICULITIS OF INTESTINE, PART UNSPECIFIED, WITHOUT PERFORATION OR ABSCESS WITHOUT BLEEDING: ICD-10-CM

## 2024-01-01 DIAGNOSIS — I10 ESSENTIAL (PRIMARY) HYPERTENSION: ICD-10-CM

## 2024-01-01 DIAGNOSIS — R53.2 FUNCTIONAL QUADRIPLEGIA: ICD-10-CM

## 2024-01-01 DIAGNOSIS — K80.50 CALCULUS OF BILE DUCT WITHOUT CHOLANGITIS OR CHOLECYSTITIS WITHOUT OBSTRUCTION: ICD-10-CM

## 2024-01-01 DIAGNOSIS — Z98.89 OTHER SPECIFIED POSTPROCEDURAL STATES: Chronic | ICD-10-CM

## 2024-01-01 DIAGNOSIS — R06.00 DYSPNEA, UNSPECIFIED: ICD-10-CM

## 2024-01-01 DIAGNOSIS — N17.9 ACUTE KIDNEY FAILURE, UNSPECIFIED: ICD-10-CM

## 2024-01-01 DIAGNOSIS — R00.1 BRADYCARDIA, UNSPECIFIED: ICD-10-CM

## 2024-01-01 DIAGNOSIS — K80.20 CALCULUS OF GALLBLADDER WITHOUT CHOLECYSTITIS WITHOUT OBSTRUCTION: ICD-10-CM

## 2024-01-01 DIAGNOSIS — J96.01 ACUTE RESPIRATORY FAILURE WITH HYPOXIA: ICD-10-CM

## 2024-01-01 DIAGNOSIS — S09.90XA UNSPECIFIED INJURY OF HEAD, INITIAL ENCOUNTER: ICD-10-CM

## 2024-01-01 DIAGNOSIS — R79.89 OTHER SPECIFIED ABNORMAL FINDINGS OF BLOOD CHEMISTRY: ICD-10-CM

## 2024-01-01 LAB
-  AMPICILLIN: SIGNIFICANT CHANGE UP
-  BLOOD PCR PANEL: SIGNIFICANT CHANGE UP
-  CIPROFLOXACIN: SIGNIFICANT CHANGE UP
-  LEVOFLOXACIN: SIGNIFICANT CHANGE UP
-  NITROFURANTOIN: SIGNIFICANT CHANGE UP
-  TETRACYCLINE: SIGNIFICANT CHANGE UP
-  VANCOMYCIN: SIGNIFICANT CHANGE UP
A1C WITH ESTIMATED AVERAGE GLUCOSE RESULT: 5.3 % — SIGNIFICANT CHANGE UP (ref 4–5.6)
ADD ON TEST-SPECIMEN IN LAB: SIGNIFICANT CHANGE UP
ALBUMIN SERPL ELPH-MCNC: 2.2 G/DL — LOW (ref 3.3–5)
ALBUMIN SERPL ELPH-MCNC: 2.7 G/DL — LOW (ref 3.3–5)
ALBUMIN SERPL ELPH-MCNC: 2.9 G/DL — LOW (ref 3.3–5)
ALBUMIN SERPL ELPH-MCNC: 3 G/DL — LOW (ref 3.3–5)
ALBUMIN SERPL ELPH-MCNC: 3.8 G/DL — SIGNIFICANT CHANGE UP (ref 3.3–5)
ALP SERPL-CCNC: 57 U/L — SIGNIFICANT CHANGE UP (ref 40–120)
ALP SERPL-CCNC: 58 U/L — SIGNIFICANT CHANGE UP (ref 40–120)
ALP SERPL-CCNC: 64 U/L — SIGNIFICANT CHANGE UP (ref 40–120)
ALP SERPL-CCNC: 65 U/L — SIGNIFICANT CHANGE UP (ref 40–120)
ALP SERPL-CCNC: 75 U/L — SIGNIFICANT CHANGE UP (ref 40–120)
ALP SERPL-CCNC: 79 U/L — SIGNIFICANT CHANGE UP (ref 40–120)
ALP SERPL-CCNC: 85 U/L — SIGNIFICANT CHANGE UP (ref 40–120)
ALT FLD-CCNC: 11 U/L — SIGNIFICANT CHANGE UP (ref 10–45)
ALT FLD-CCNC: 6 U/L — LOW (ref 10–45)
ALT FLD-CCNC: 7 U/L — LOW (ref 10–45)
ALT FLD-CCNC: 7 U/L — LOW (ref 10–45)
ALT FLD-CCNC: 8 U/L — LOW (ref 10–45)
ALT FLD-CCNC: 9 U/L — LOW (ref 10–45)
ALT FLD-CCNC: 9 U/L — LOW (ref 10–45)
AMYLASE P1 CFR SERPL: 73 U/L — SIGNIFICANT CHANGE UP (ref 25–125)
ANION GAP SERPL CALC-SCNC: 10 MMOL/L — SIGNIFICANT CHANGE UP (ref 5–17)
ANION GAP SERPL CALC-SCNC: 10 MMOL/L — SIGNIFICANT CHANGE UP (ref 5–17)
ANION GAP SERPL CALC-SCNC: 11 MMOL/L — SIGNIFICANT CHANGE UP (ref 5–17)
ANION GAP SERPL CALC-SCNC: 12 MMOL/L — SIGNIFICANT CHANGE UP (ref 5–17)
ANION GAP SERPL CALC-SCNC: 13 MMOL/L — SIGNIFICANT CHANGE UP (ref 5–17)
ANION GAP SERPL CALC-SCNC: 13 MMOL/L — SIGNIFICANT CHANGE UP (ref 5–17)
ANION GAP SERPL CALC-SCNC: 15 MMOL/L — SIGNIFICANT CHANGE UP (ref 5–17)
ANION GAP SERPL CALC-SCNC: 8 MMOL/L — SIGNIFICANT CHANGE UP (ref 5–17)
ANION GAP SERPL CALC-SCNC: 9 MMOL/L — SIGNIFICANT CHANGE UP (ref 5–17)
ANION GAP SERPL CALC-SCNC: 9 MMOL/L — SIGNIFICANT CHANGE UP (ref 5–17)
ANISOCYTOSIS BLD QL: SLIGHT — SIGNIFICANT CHANGE UP
ANISOCYTOSIS BLD QL: SLIGHT — SIGNIFICANT CHANGE UP
APPEARANCE UR: CLEAR — SIGNIFICANT CHANGE UP
APPEARANCE UR: CLEAR — SIGNIFICANT CHANGE UP
APTT BLD: 29.2 SEC — SIGNIFICANT CHANGE UP (ref 24.5–35.6)
APTT BLD: 33.7 SEC — SIGNIFICANT CHANGE UP (ref 24.5–35.6)
AST SERPL-CCNC: 13 U/L — SIGNIFICANT CHANGE UP (ref 10–40)
AST SERPL-CCNC: 15 U/L — SIGNIFICANT CHANGE UP (ref 10–40)
AST SERPL-CCNC: 15 U/L — SIGNIFICANT CHANGE UP (ref 10–40)
AST SERPL-CCNC: 16 U/L — SIGNIFICANT CHANGE UP (ref 10–40)
AST SERPL-CCNC: 16 U/L — SIGNIFICANT CHANGE UP (ref 10–40)
AST SERPL-CCNC: 17 U/L — SIGNIFICANT CHANGE UP (ref 10–40)
AST SERPL-CCNC: 18 U/L — SIGNIFICANT CHANGE UP (ref 10–40)
BACTERIA # UR AUTO: NEGATIVE /HPF — SIGNIFICANT CHANGE UP
BACTERIA # UR AUTO: NEGATIVE /HPF — SIGNIFICANT CHANGE UP
BASE EXCESS BLDV CALC-SCNC: -2.5 MMOL/L — LOW (ref -2–3)
BASE EXCESS BLDV CALC-SCNC: -6.1 MMOL/L — LOW (ref -2–3)
BASE EXCESS BLDV CALC-SCNC: 0.2 MMOL/L — SIGNIFICANT CHANGE UP (ref -2–3)
BASOPHILS # BLD AUTO: 0 K/UL — SIGNIFICANT CHANGE UP (ref 0–0.2)
BASOPHILS # BLD AUTO: 0 K/UL — SIGNIFICANT CHANGE UP (ref 0–0.2)
BASOPHILS # BLD AUTO: 0.01 K/UL — SIGNIFICANT CHANGE UP (ref 0–0.2)
BASOPHILS # BLD AUTO: 0.02 K/UL — SIGNIFICANT CHANGE UP (ref 0–0.2)
BASOPHILS # BLD AUTO: 0.03 K/UL — SIGNIFICANT CHANGE UP (ref 0–0.2)
BASOPHILS NFR BLD AUTO: 0 % — SIGNIFICANT CHANGE UP (ref 0–2)
BASOPHILS NFR BLD AUTO: 0 % — SIGNIFICANT CHANGE UP (ref 0–2)
BASOPHILS NFR BLD AUTO: 0.1 % — SIGNIFICANT CHANGE UP (ref 0–2)
BASOPHILS NFR BLD AUTO: 0.2 % — SIGNIFICANT CHANGE UP (ref 0–2)
BASOPHILS NFR BLD AUTO: 0.3 % — SIGNIFICANT CHANGE UP (ref 0–2)
BILIRUB SERPL-MCNC: 0.2 MG/DL — SIGNIFICANT CHANGE UP (ref 0.2–1.2)
BILIRUB SERPL-MCNC: 0.3 MG/DL — SIGNIFICANT CHANGE UP (ref 0.2–1.2)
BILIRUB SERPL-MCNC: 0.4 MG/DL — SIGNIFICANT CHANGE UP (ref 0.2–1.2)
BILIRUB SERPL-MCNC: 0.5 MG/DL — SIGNIFICANT CHANGE UP (ref 0.2–1.2)
BILIRUB SERPL-MCNC: 1.3 MG/DL — HIGH (ref 0.2–1.2)
BILIRUB UR-MCNC: NEGATIVE — SIGNIFICANT CHANGE UP
BILIRUB UR-MCNC: NEGATIVE — SIGNIFICANT CHANGE UP
BUN SERPL-MCNC: 17 MG/DL — SIGNIFICANT CHANGE UP (ref 7–23)
BUN SERPL-MCNC: 19 MG/DL — SIGNIFICANT CHANGE UP (ref 7–23)
BUN SERPL-MCNC: 22 MG/DL — SIGNIFICANT CHANGE UP (ref 7–23)
BUN SERPL-MCNC: 23 MG/DL — SIGNIFICANT CHANGE UP (ref 7–23)
BUN SERPL-MCNC: 24 MG/DL — HIGH (ref 7–23)
BUN SERPL-MCNC: 26 MG/DL — HIGH (ref 7–23)
BUN SERPL-MCNC: 26 MG/DL — HIGH (ref 7–23)
BUN SERPL-MCNC: 27 MG/DL — HIGH (ref 7–23)
BUN SERPL-MCNC: 28 MG/DL — HIGH (ref 7–23)
BUN SERPL-MCNC: 32 MG/DL — HIGH (ref 7–23)
BUN SERPL-MCNC: 33 MG/DL — HIGH (ref 7–23)
BUN SERPL-MCNC: 36 MG/DL — HIGH (ref 7–23)
BUN SERPL-MCNC: 43 MG/DL — HIGH (ref 7–23)
BURR CELLS BLD QL SMEAR: PRESENT — SIGNIFICANT CHANGE UP
BURR CELLS BLD QL SMEAR: PRESENT — SIGNIFICANT CHANGE UP
CA-I SERPL-SCNC: 1.14 MMOL/L — LOW (ref 1.15–1.33)
CA-I SERPL-SCNC: 1.3 MMOL/L — SIGNIFICANT CHANGE UP (ref 1.15–1.33)
CA-I SERPL-SCNC: 1.31 MMOL/L — SIGNIFICANT CHANGE UP (ref 1.15–1.33)
CALCIUM SERPL-MCNC: 8.5 MG/DL — SIGNIFICANT CHANGE UP (ref 8.4–10.5)
CALCIUM SERPL-MCNC: 8.6 MG/DL — SIGNIFICANT CHANGE UP (ref 8.4–10.5)
CALCIUM SERPL-MCNC: 8.8 MG/DL — SIGNIFICANT CHANGE UP (ref 8.4–10.5)
CALCIUM SERPL-MCNC: 8.8 MG/DL — SIGNIFICANT CHANGE UP (ref 8.4–10.5)
CALCIUM SERPL-MCNC: 8.9 MG/DL — SIGNIFICANT CHANGE UP (ref 8.4–10.5)
CALCIUM SERPL-MCNC: 9 MG/DL — SIGNIFICANT CHANGE UP (ref 8.4–10.5)
CALCIUM SERPL-MCNC: 9.1 MG/DL — SIGNIFICANT CHANGE UP (ref 8.4–10.5)
CALCIUM SERPL-MCNC: 9.1 MG/DL — SIGNIFICANT CHANGE UP (ref 8.4–10.5)
CALCIUM SERPL-MCNC: 9.2 MG/DL — SIGNIFICANT CHANGE UP (ref 8.4–10.5)
CALCIUM SERPL-MCNC: 9.2 MG/DL — SIGNIFICANT CHANGE UP (ref 8.4–10.5)
CALCIUM SERPL-MCNC: 9.3 MG/DL — SIGNIFICANT CHANGE UP (ref 8.4–10.5)
CALCIUM SERPL-MCNC: 9.4 MG/DL — SIGNIFICANT CHANGE UP (ref 8.4–10.5)
CALCIUM SERPL-MCNC: 9.9 MG/DL — SIGNIFICANT CHANGE UP (ref 8.4–10.5)
CAST: 28 /LPF — HIGH (ref 0–4)
CAST: 5 /LPF — HIGH (ref 0–4)
CHLORIDE BLDV-SCNC: 105 MMOL/L — SIGNIFICANT CHANGE UP (ref 96–108)
CHLORIDE BLDV-SCNC: 109 MMOL/L — HIGH (ref 96–108)
CHLORIDE BLDV-SCNC: 115 MMOL/L — HIGH (ref 96–108)
CHLORIDE SERPL-SCNC: 104 MMOL/L — SIGNIFICANT CHANGE UP (ref 96–108)
CHLORIDE SERPL-SCNC: 107 MMOL/L — SIGNIFICANT CHANGE UP (ref 96–108)
CHLORIDE SERPL-SCNC: 109 MMOL/L — HIGH (ref 96–108)
CHLORIDE SERPL-SCNC: 109 MMOL/L — HIGH (ref 96–108)
CHLORIDE SERPL-SCNC: 111 MMOL/L — HIGH (ref 96–108)
CHLORIDE SERPL-SCNC: 111 MMOL/L — HIGH (ref 96–108)
CHLORIDE SERPL-SCNC: 112 MMOL/L — HIGH (ref 96–108)
CHLORIDE SERPL-SCNC: 114 MMOL/L — HIGH (ref 96–108)
CHLORIDE SERPL-SCNC: 115 MMOL/L — HIGH (ref 96–108)
CO2 BLDV-SCNC: 20 MMOL/L — LOW (ref 22–26)
CO2 BLDV-SCNC: 24 MMOL/L — SIGNIFICANT CHANGE UP (ref 22–26)
CO2 BLDV-SCNC: 26 MMOL/L — SIGNIFICANT CHANGE UP (ref 22–26)
CO2 SERPL-SCNC: 18 MMOL/L — LOW (ref 22–31)
CO2 SERPL-SCNC: 18 MMOL/L — LOW (ref 22–31)
CO2 SERPL-SCNC: 19 MMOL/L — LOW (ref 22–31)
CO2 SERPL-SCNC: 20 MMOL/L — LOW (ref 22–31)
CO2 SERPL-SCNC: 21 MMOL/L — LOW (ref 22–31)
CO2 SERPL-SCNC: 22 MMOL/L — SIGNIFICANT CHANGE UP (ref 22–31)
CO2 SERPL-SCNC: 23 MMOL/L — SIGNIFICANT CHANGE UP (ref 22–31)
COLOR SPEC: SIGNIFICANT CHANGE UP
COLOR SPEC: SIGNIFICANT CHANGE UP
CREAT SERPL-MCNC: 1.28 MG/DL — SIGNIFICANT CHANGE UP (ref 0.5–1.3)
CREAT SERPL-MCNC: 1.29 MG/DL — SIGNIFICANT CHANGE UP (ref 0.5–1.3)
CREAT SERPL-MCNC: 1.34 MG/DL — HIGH (ref 0.5–1.3)
CREAT SERPL-MCNC: 1.39 MG/DL — HIGH (ref 0.5–1.3)
CREAT SERPL-MCNC: 1.45 MG/DL — HIGH (ref 0.5–1.3)
CREAT SERPL-MCNC: 1.62 MG/DL — HIGH (ref 0.5–1.3)
CREAT SERPL-MCNC: 1.65 MG/DL — HIGH (ref 0.5–1.3)
CREAT SERPL-MCNC: 1.67 MG/DL — HIGH (ref 0.5–1.3)
CREAT SERPL-MCNC: 1.68 MG/DL — HIGH (ref 0.5–1.3)
CREAT SERPL-MCNC: 1.69 MG/DL — HIGH (ref 0.5–1.3)
CREAT SERPL-MCNC: 1.75 MG/DL — HIGH (ref 0.5–1.3)
CREAT SERPL-MCNC: 1.8 MG/DL — HIGH (ref 0.5–1.3)
CREAT SERPL-MCNC: 1.81 MG/DL — HIGH (ref 0.5–1.3)
CREAT SERPL-MCNC: 1.86 MG/DL — HIGH (ref 0.5–1.3)
CREAT SERPL-MCNC: 1.92 MG/DL — HIGH (ref 0.5–1.3)
CULTURE RESULTS: ABNORMAL
CULTURE RESULTS: ABNORMAL
CULTURE RESULTS: NO GROWTH — SIGNIFICANT CHANGE UP
CULTURE RESULTS: SIGNIFICANT CHANGE UP
DIFF PNL FLD: ABNORMAL
DIFF PNL FLD: NEGATIVE — SIGNIFICANT CHANGE UP
EGFR: 23 ML/MIN/1.73M2 — LOW
EGFR: 24 ML/MIN/1.73M2 — LOW
EGFR: 24 ML/MIN/1.73M2 — LOW
EGFR: 25 ML/MIN/1.73M2 — LOW
EGFR: 25 ML/MIN/1.73M2 — LOW
EGFR: 26 ML/MIN/1.73M2 — LOW
EGFR: 27 ML/MIN/1.73M2 — LOW
EGFR: 28 ML/MIN/1.73M2 — LOW
EGFR: 32 ML/MIN/1.73M2 — LOW
EGFR: 33 ML/MIN/1.73M2 — LOW
EGFR: 35 ML/MIN/1.73M2 — LOW
EGFR: 37 ML/MIN/1.73M2 — LOW
EGFR: 37 ML/MIN/1.73M2 — LOW
ELLIPTOCYTES BLD QL SMEAR: SLIGHT — SIGNIFICANT CHANGE UP
EOSINOPHIL # BLD AUTO: 0 K/UL — SIGNIFICANT CHANGE UP (ref 0–0.5)
EOSINOPHIL # BLD AUTO: 0.06 K/UL — SIGNIFICANT CHANGE UP (ref 0–0.5)
EOSINOPHIL # BLD AUTO: 0.07 K/UL — SIGNIFICANT CHANGE UP (ref 0–0.5)
EOSINOPHIL # BLD AUTO: 0.08 K/UL — SIGNIFICANT CHANGE UP (ref 0–0.5)
EOSINOPHIL # BLD AUTO: 0.22 K/UL — SIGNIFICANT CHANGE UP (ref 0–0.5)
EOSINOPHIL NFR BLD AUTO: 0 % — SIGNIFICANT CHANGE UP (ref 0–6)
EOSINOPHIL NFR BLD AUTO: 0.6 % — SIGNIFICANT CHANGE UP (ref 0–6)
EOSINOPHIL NFR BLD AUTO: 0.9 % — SIGNIFICANT CHANGE UP (ref 0–6)
EOSINOPHIL NFR BLD AUTO: 0.9 % — SIGNIFICANT CHANGE UP (ref 0–6)
EOSINOPHIL NFR BLD AUTO: 2.6 % — SIGNIFICANT CHANGE UP (ref 0–6)
ESTIMATED AVERAGE GLUCOSE: 105 MG/DL — SIGNIFICANT CHANGE UP (ref 68–114)
FERRITIN SERPL-MCNC: 49 NG/ML — SIGNIFICANT CHANGE UP (ref 13–330)
FLUAV AG NPH QL: SIGNIFICANT CHANGE UP
FLUBV AG NPH QL: SIGNIFICANT CHANGE UP
FOLATE SERPL-MCNC: 14.6 NG/ML — SIGNIFICANT CHANGE UP
GAS PNL BLDV: 136 MMOL/L — SIGNIFICANT CHANGE UP (ref 136–145)
GAS PNL BLDV: 141 MMOL/L — SIGNIFICANT CHANGE UP (ref 136–145)
GAS PNL BLDV: 142 MMOL/L — SIGNIFICANT CHANGE UP (ref 136–145)
GAS PNL BLDV: SIGNIFICANT CHANGE UP
GIANT PLATELETS BLD QL SMEAR: PRESENT — SIGNIFICANT CHANGE UP
GLUCOSE BLDV-MCNC: 117 MG/DL — HIGH (ref 70–99)
GLUCOSE BLDV-MCNC: 93 MG/DL — SIGNIFICANT CHANGE UP (ref 70–99)
GLUCOSE BLDV-MCNC: 96 MG/DL — SIGNIFICANT CHANGE UP (ref 70–99)
GLUCOSE SERPL-MCNC: 103 MG/DL — HIGH (ref 70–99)
GLUCOSE SERPL-MCNC: 108 MG/DL — HIGH (ref 70–99)
GLUCOSE SERPL-MCNC: 110 MG/DL — HIGH (ref 70–99)
GLUCOSE SERPL-MCNC: 131 MG/DL — HIGH (ref 70–99)
GLUCOSE SERPL-MCNC: 76 MG/DL — SIGNIFICANT CHANGE UP (ref 70–99)
GLUCOSE SERPL-MCNC: 78 MG/DL — SIGNIFICANT CHANGE UP (ref 70–99)
GLUCOSE SERPL-MCNC: 80 MG/DL — SIGNIFICANT CHANGE UP (ref 70–99)
GLUCOSE SERPL-MCNC: 82 MG/DL — SIGNIFICANT CHANGE UP (ref 70–99)
GLUCOSE SERPL-MCNC: 86 MG/DL — SIGNIFICANT CHANGE UP (ref 70–99)
GLUCOSE SERPL-MCNC: 86 MG/DL — SIGNIFICANT CHANGE UP (ref 70–99)
GLUCOSE SERPL-MCNC: 92 MG/DL — SIGNIFICANT CHANGE UP (ref 70–99)
GLUCOSE SERPL-MCNC: 94 MG/DL — SIGNIFICANT CHANGE UP (ref 70–99)
GLUCOSE SERPL-MCNC: 94 MG/DL — SIGNIFICANT CHANGE UP (ref 70–99)
GLUCOSE SERPL-MCNC: 97 MG/DL — SIGNIFICANT CHANGE UP (ref 70–99)
GLUCOSE SERPL-MCNC: 98 MG/DL — SIGNIFICANT CHANGE UP (ref 70–99)
GLUCOSE UR QL: NEGATIVE MG/DL — SIGNIFICANT CHANGE UP
GLUCOSE UR QL: NEGATIVE MG/DL — SIGNIFICANT CHANGE UP
GRAM STN FLD: ABNORMAL
HCO3 BLDV-SCNC: 18 MMOL/L — LOW (ref 22–29)
HCO3 BLDV-SCNC: 23 MMOL/L — SIGNIFICANT CHANGE UP (ref 22–29)
HCO3 BLDV-SCNC: 25 MMOL/L — SIGNIFICANT CHANGE UP (ref 22–29)
HCT VFR BLD CALC: 23.2 % — LOW (ref 34.5–45)
HCT VFR BLD CALC: 24.5 % — LOW (ref 34.5–45)
HCT VFR BLD CALC: 25.8 % — LOW (ref 34.5–45)
HCT VFR BLD CALC: 26 % — LOW (ref 34.5–45)
HCT VFR BLD CALC: 26.2 % — LOW (ref 34.5–45)
HCT VFR BLD CALC: 26.5 % — LOW (ref 34.5–45)
HCT VFR BLD CALC: 27.3 % — LOW (ref 34.5–45)
HCT VFR BLD CALC: 27.6 % — LOW (ref 34.5–45)
HCT VFR BLD CALC: 27.8 % — LOW (ref 34.5–45)
HCT VFR BLD CALC: 28 % — LOW (ref 34.5–45)
HCT VFR BLD CALC: 28.9 % — LOW (ref 34.5–45)
HCT VFR BLD CALC: 29 % — LOW (ref 34.5–45)
HCT VFR BLD CALC: 31.4 % — LOW (ref 34.5–45)
HCT VFR BLDA CALC: 23 % — LOW (ref 34.5–46.5)
HCT VFR BLDA CALC: 27 % — LOW (ref 34.5–46.5)
HCT VFR BLDA CALC: 30 % — LOW (ref 34.5–46.5)
HGB BLD CALC-MCNC: 10 G/DL — LOW (ref 11.7–16.1)
HGB BLD CALC-MCNC: 7.6 G/DL — LOW (ref 11.7–16.1)
HGB BLD CALC-MCNC: 8.9 G/DL — LOW (ref 11.7–16.1)
HGB BLD-MCNC: 10 G/DL — LOW (ref 11.5–15.5)
HGB BLD-MCNC: 7.2 G/DL — LOW (ref 11.5–15.5)
HGB BLD-MCNC: 7.7 G/DL — LOW (ref 11.5–15.5)
HGB BLD-MCNC: 7.9 G/DL — LOW (ref 11.5–15.5)
HGB BLD-MCNC: 7.9 G/DL — LOW (ref 11.5–15.5)
HGB BLD-MCNC: 8.1 G/DL — LOW (ref 11.5–15.5)
HGB BLD-MCNC: 8.2 G/DL — LOW (ref 11.5–15.5)
HGB BLD-MCNC: 8.3 G/DL — LOW (ref 11.5–15.5)
HGB BLD-MCNC: 8.4 G/DL — LOW (ref 11.5–15.5)
HGB BLD-MCNC: 8.4 G/DL — LOW (ref 11.5–15.5)
HGB BLD-MCNC: 8.5 G/DL — LOW (ref 11.5–15.5)
HGB BLD-MCNC: 8.7 G/DL — LOW (ref 11.5–15.5)
HGB BLD-MCNC: 8.9 G/DL — LOW (ref 11.5–15.5)
HOROWITZ INDEX BLDV+IHG-RTO: SIGNIFICANT CHANGE UP
HOROWITZ INDEX BLDV+IHG-RTO: SIGNIFICANT CHANGE UP
HYALINE CASTS # UR AUTO: PRESENT
HYPOCHROMIA BLD QL: SLIGHT — SIGNIFICANT CHANGE UP
IMM GRANULOCYTES NFR BLD AUTO: 0.4 % — SIGNIFICANT CHANGE UP (ref 0–0.9)
IMM GRANULOCYTES NFR BLD AUTO: 0.4 % — SIGNIFICANT CHANGE UP (ref 0–0.9)
IMM GRANULOCYTES NFR BLD AUTO: 0.9 % — SIGNIFICANT CHANGE UP (ref 0–0.9)
INR BLD: 1.13 RATIO — SIGNIFICANT CHANGE UP (ref 0.85–1.18)
INR BLD: 1.58 RATIO — HIGH (ref 0.85–1.18)
IRON SATN MFR SERPL: 3 % — LOW (ref 14–50)
IRON SATN MFR SERPL: 9 UG/DL — LOW (ref 30–160)
KETONES UR-MCNC: ABNORMAL MG/DL
KETONES UR-MCNC: ABNORMAL MG/DL
LACTATE BLDV-MCNC: 2.5 MMOL/L — HIGH (ref 0.5–2)
LACTATE BLDV-MCNC: 3.2 MMOL/L — HIGH (ref 0.5–2)
LACTATE BLDV-MCNC: 3.3 MMOL/L — HIGH (ref 0.5–2)
LACTATE SERPL-SCNC: 1.1 MMOL/L — SIGNIFICANT CHANGE UP (ref 0.5–2)
LACTATE SERPL-SCNC: 1.9 MMOL/L — SIGNIFICANT CHANGE UP (ref 0.5–2)
LACTATE SERPL-SCNC: 2.9 MMOL/L — HIGH (ref 0.5–2)
LEGIONELLA AG UR QL: NEGATIVE — SIGNIFICANT CHANGE UP
LEUKOCYTE ESTERASE UR-ACNC: ABNORMAL
LEUKOCYTE ESTERASE UR-ACNC: NEGATIVE — SIGNIFICANT CHANGE UP
LIDOCAIN IGE QN: 23 U/L — SIGNIFICANT CHANGE UP (ref 7–60)
LIDOCAIN IGE QN: 38 U/L — SIGNIFICANT CHANGE UP (ref 7–60)
LYMPHOCYTES # BLD AUTO: 0.18 K/UL — LOW (ref 1–3.3)
LYMPHOCYTES # BLD AUTO: 0.4 K/UL — LOW (ref 1–3.3)
LYMPHOCYTES # BLD AUTO: 0.54 K/UL — LOW (ref 1–3.3)
LYMPHOCYTES # BLD AUTO: 0.61 K/UL — LOW (ref 1–3.3)
LYMPHOCYTES # BLD AUTO: 0.72 K/UL — LOW (ref 1–3.3)
LYMPHOCYTES # BLD AUTO: 1.7 % — LOW (ref 13–44)
LYMPHOCYTES # BLD AUTO: 5.4 % — LOW (ref 13–44)
LYMPHOCYTES # BLD AUTO: 5.7 % — LOW (ref 13–44)
LYMPHOCYTES # BLD AUTO: 5.9 % — LOW (ref 13–44)
LYMPHOCYTES # BLD AUTO: 8.7 % — LOW (ref 13–44)
MACROCYTES BLD QL: SLIGHT — SIGNIFICANT CHANGE UP
MAGNESIUM SERPL-MCNC: 1.5 MG/DL — LOW (ref 1.6–2.6)
MAGNESIUM SERPL-MCNC: 1.9 MG/DL — SIGNIFICANT CHANGE UP (ref 1.6–2.6)
MAGNESIUM SERPL-MCNC: 2 MG/DL — SIGNIFICANT CHANGE UP (ref 1.6–2.6)
MANUAL SMEAR VERIFICATION: SIGNIFICANT CHANGE UP
MANUAL SMEAR VERIFICATION: SIGNIFICANT CHANGE UP
MCHC RBC-ENTMCNC: 24.9 PG — LOW (ref 27–34)
MCHC RBC-ENTMCNC: 25 PG — LOW (ref 27–34)
MCHC RBC-ENTMCNC: 25.3 PG — LOW (ref 27–34)
MCHC RBC-ENTMCNC: 25.4 PG — LOW (ref 27–34)
MCHC RBC-ENTMCNC: 25.4 PG — LOW (ref 27–34)
MCHC RBC-ENTMCNC: 25.6 PG — LOW (ref 27–34)
MCHC RBC-ENTMCNC: 25.7 PG — LOW (ref 27–34)
MCHC RBC-ENTMCNC: 25.8 PG — LOW (ref 27–34)
MCHC RBC-ENTMCNC: 25.9 PG — LOW (ref 27–34)
MCHC RBC-ENTMCNC: 26 PG — LOW (ref 27–34)
MCHC RBC-ENTMCNC: 26.3 PG — LOW (ref 27–34)
MCHC RBC-ENTMCNC: 29.9 GM/DL — LOW (ref 32–36)
MCHC RBC-ENTMCNC: 30 GM/DL — LOW (ref 32–36)
MCHC RBC-ENTMCNC: 30.2 GM/DL — LOW (ref 32–36)
MCHC RBC-ENTMCNC: 30.4 GM/DL — LOW (ref 32–36)
MCHC RBC-ENTMCNC: 30.8 GM/DL — LOW (ref 32–36)
MCHC RBC-ENTMCNC: 30.8 GM/DL — LOW (ref 32–36)
MCHC RBC-ENTMCNC: 30.9 GM/DL — LOW (ref 32–36)
MCHC RBC-ENTMCNC: 31 GM/DL — LOW (ref 32–36)
MCHC RBC-ENTMCNC: 31.4 GM/DL — LOW (ref 32–36)
MCHC RBC-ENTMCNC: 31.4 GM/DL — LOW (ref 32–36)
MCHC RBC-ENTMCNC: 31.8 GM/DL — LOW (ref 32–36)
MCV RBC AUTO: 78.1 FL — LOW (ref 80–100)
MCV RBC AUTO: 82.2 FL — SIGNIFICANT CHANGE UP (ref 80–100)
MCV RBC AUTO: 82.8 FL — SIGNIFICANT CHANGE UP (ref 80–100)
MCV RBC AUTO: 83.3 FL — SIGNIFICANT CHANGE UP (ref 80–100)
MCV RBC AUTO: 83.3 FL — SIGNIFICANT CHANGE UP (ref 80–100)
MCV RBC AUTO: 83.5 FL — SIGNIFICANT CHANGE UP (ref 80–100)
MCV RBC AUTO: 83.6 FL — SIGNIFICANT CHANGE UP (ref 80–100)
MCV RBC AUTO: 84 FL — SIGNIFICANT CHANGE UP (ref 80–100)
MCV RBC AUTO: 84.1 FL — SIGNIFICANT CHANGE UP (ref 80–100)
MCV RBC AUTO: 84.2 FL — SIGNIFICANT CHANGE UP (ref 80–100)
MCV RBC AUTO: 84.6 FL — SIGNIFICANT CHANGE UP (ref 80–100)
MCV RBC AUTO: 84.8 FL — SIGNIFICANT CHANGE UP (ref 80–100)
MCV RBC AUTO: 85.3 FL — SIGNIFICANT CHANGE UP (ref 80–100)
METAMYELOCYTES # FLD: 2.6 % — HIGH (ref 0–0)
METHOD TYPE: SIGNIFICANT CHANGE UP
METHOD TYPE: SIGNIFICANT CHANGE UP
MONOCYTES # BLD AUTO: 0.07 K/UL — SIGNIFICANT CHANGE UP (ref 0–0.9)
MONOCYTES # BLD AUTO: 0.48 K/UL — SIGNIFICANT CHANGE UP (ref 0–0.9)
MONOCYTES # BLD AUTO: 0.56 K/UL — SIGNIFICANT CHANGE UP (ref 0–0.9)
MONOCYTES # BLD AUTO: 0.63 K/UL — SIGNIFICANT CHANGE UP (ref 0–0.9)
MONOCYTES # BLD AUTO: 0.93 K/UL — HIGH (ref 0–0.9)
MONOCYTES NFR BLD AUTO: 0.9 % — LOW (ref 2–14)
MONOCYTES NFR BLD AUTO: 5.5 % — SIGNIFICANT CHANGE UP (ref 2–14)
MONOCYTES NFR BLD AUTO: 6 % — SIGNIFICANT CHANGE UP (ref 2–14)
MONOCYTES NFR BLD AUTO: 7.1 % — SIGNIFICANT CHANGE UP (ref 2–14)
MONOCYTES NFR BLD AUTO: 8.7 % — SIGNIFICANT CHANGE UP (ref 2–14)
MRSA PCR RESULT.: DETECTED
MRSA PCR RESULT.: SIGNIFICANT CHANGE UP
MYELOCYTES NFR BLD: 0.9 % — HIGH (ref 0–0)
NEUTROPHILS # BLD AUTO: 10.01 K/UL — HIGH (ref 1.8–7.4)
NEUTROPHILS # BLD AUTO: 5.76 K/UL — SIGNIFICANT CHANGE UP (ref 1.8–7.4)
NEUTROPHILS # BLD AUTO: 7.27 K/UL — SIGNIFICANT CHANGE UP (ref 1.8–7.4)
NEUTROPHILS # BLD AUTO: 8.12 K/UL — HIGH (ref 1.8–7.4)
NEUTROPHILS # BLD AUTO: 9.17 K/UL — HIGH (ref 1.8–7.4)
NEUTROPHILS NFR BLD AUTO: 85.6 % — HIGH (ref 43–77)
NEUTROPHILS NFR BLD AUTO: 86.1 % — HIGH (ref 43–77)
NEUTROPHILS NFR BLD AUTO: 86.2 % — HIGH (ref 43–77)
NEUTROPHILS NFR BLD AUTO: 87.8 % — HIGH (ref 43–77)
NEUTROPHILS NFR BLD AUTO: 87.9 % — HIGH (ref 43–77)
NITRITE UR-MCNC: NEGATIVE — SIGNIFICANT CHANGE UP
NITRITE UR-MCNC: NEGATIVE — SIGNIFICANT CHANGE UP
NRBC # BLD: 0 /100 WBCS — SIGNIFICANT CHANGE UP (ref 0–0)
ORGANISM # SPEC MICROSCOPIC CNT: ABNORMAL
OVALOCYTES BLD QL SMEAR: SIGNIFICANT CHANGE UP
PCO2 BLDV: 32 MMHG — LOW (ref 39–42)
PCO2 BLDV: 38 MMHG — LOW (ref 39–42)
PCO2 BLDV: 43 MMHG — HIGH (ref 39–42)
PH BLDV: 7.34 — SIGNIFICANT CHANGE UP (ref 7.32–7.43)
PH BLDV: 7.37 — SIGNIFICANT CHANGE UP (ref 7.32–7.43)
PH BLDV: 7.42 — SIGNIFICANT CHANGE UP (ref 7.32–7.43)
PH UR: 5.5 — SIGNIFICANT CHANGE UP (ref 5–8)
PH UR: 5.5 — SIGNIFICANT CHANGE UP (ref 5–8)
PHOSPHATE SERPL-MCNC: 2.8 MG/DL — SIGNIFICANT CHANGE UP (ref 2.5–4.5)
PHOSPHATE SERPL-MCNC: 3.2 MG/DL — SIGNIFICANT CHANGE UP (ref 2.5–4.5)
PLAT MORPH BLD: NORMAL — SIGNIFICANT CHANGE UP
PLAT MORPH BLD: NORMAL — SIGNIFICANT CHANGE UP
PLATELET # BLD AUTO: 189 K/UL — SIGNIFICANT CHANGE UP (ref 150–400)
PLATELET # BLD AUTO: 207 K/UL — SIGNIFICANT CHANGE UP (ref 150–400)
PLATELET # BLD AUTO: 227 K/UL — SIGNIFICANT CHANGE UP (ref 150–400)
PLATELET # BLD AUTO: 241 K/UL — SIGNIFICANT CHANGE UP (ref 150–400)
PLATELET # BLD AUTO: 244 K/UL — SIGNIFICANT CHANGE UP (ref 150–400)
PLATELET # BLD AUTO: 253 K/UL — SIGNIFICANT CHANGE UP (ref 150–400)
PLATELET # BLD AUTO: 254 K/UL — SIGNIFICANT CHANGE UP (ref 150–400)
PLATELET # BLD AUTO: 256 K/UL — SIGNIFICANT CHANGE UP (ref 150–400)
PLATELET # BLD AUTO: 268 K/UL — SIGNIFICANT CHANGE UP (ref 150–400)
PLATELET # BLD AUTO: 278 K/UL — SIGNIFICANT CHANGE UP (ref 150–400)
PLATELET # BLD AUTO: 280 K/UL — SIGNIFICANT CHANGE UP (ref 150–400)
PLATELET # BLD AUTO: 291 K/UL — SIGNIFICANT CHANGE UP (ref 150–400)
PLATELET # BLD AUTO: 297 K/UL — SIGNIFICANT CHANGE UP (ref 150–400)
PLATELET COUNT - ESTIMATE: ABNORMAL
PO2 BLDV: 15 MMHG — LOW (ref 25–45)
PO2 BLDV: 22 MMHG — LOW (ref 25–45)
PO2 BLDV: 24 MMHG — LOW (ref 25–45)
POIKILOCYTOSIS BLD QL AUTO: SIGNIFICANT CHANGE UP
POIKILOCYTOSIS BLD QL AUTO: SIGNIFICANT CHANGE UP
POLYCHROMASIA BLD QL SMEAR: SLIGHT — SIGNIFICANT CHANGE UP
POTASSIUM BLDV-SCNC: 3.3 MMOL/L — LOW (ref 3.5–5.1)
POTASSIUM BLDV-SCNC: 3.7 MMOL/L — SIGNIFICANT CHANGE UP (ref 3.5–5.1)
POTASSIUM BLDV-SCNC: 4.4 MMOL/L — SIGNIFICANT CHANGE UP (ref 3.5–5.1)
POTASSIUM SERPL-MCNC: 3.4 MMOL/L — LOW (ref 3.5–5.3)
POTASSIUM SERPL-MCNC: 3.6 MMOL/L — SIGNIFICANT CHANGE UP (ref 3.5–5.3)
POTASSIUM SERPL-MCNC: 3.7 MMOL/L — SIGNIFICANT CHANGE UP (ref 3.5–5.3)
POTASSIUM SERPL-MCNC: 3.9 MMOL/L — SIGNIFICANT CHANGE UP (ref 3.5–5.3)
POTASSIUM SERPL-MCNC: 4 MMOL/L — SIGNIFICANT CHANGE UP (ref 3.5–5.3)
POTASSIUM SERPL-MCNC: 4 MMOL/L — SIGNIFICANT CHANGE UP (ref 3.5–5.3)
POTASSIUM SERPL-MCNC: 4.1 MMOL/L — SIGNIFICANT CHANGE UP (ref 3.5–5.3)
POTASSIUM SERPL-MCNC: 4.1 MMOL/L — SIGNIFICANT CHANGE UP (ref 3.5–5.3)
POTASSIUM SERPL-MCNC: 4.2 MMOL/L — SIGNIFICANT CHANGE UP (ref 3.5–5.3)
POTASSIUM SERPL-MCNC: 4.3 MMOL/L — SIGNIFICANT CHANGE UP (ref 3.5–5.3)
POTASSIUM SERPL-MCNC: 4.7 MMOL/L — SIGNIFICANT CHANGE UP (ref 3.5–5.3)
POTASSIUM SERPL-SCNC: 3.4 MMOL/L — LOW (ref 3.5–5.3)
POTASSIUM SERPL-SCNC: 3.6 MMOL/L — SIGNIFICANT CHANGE UP (ref 3.5–5.3)
POTASSIUM SERPL-SCNC: 3.7 MMOL/L — SIGNIFICANT CHANGE UP (ref 3.5–5.3)
POTASSIUM SERPL-SCNC: 3.9 MMOL/L — SIGNIFICANT CHANGE UP (ref 3.5–5.3)
POTASSIUM SERPL-SCNC: 4 MMOL/L — SIGNIFICANT CHANGE UP (ref 3.5–5.3)
POTASSIUM SERPL-SCNC: 4 MMOL/L — SIGNIFICANT CHANGE UP (ref 3.5–5.3)
POTASSIUM SERPL-SCNC: 4.1 MMOL/L — SIGNIFICANT CHANGE UP (ref 3.5–5.3)
POTASSIUM SERPL-SCNC: 4.1 MMOL/L — SIGNIFICANT CHANGE UP (ref 3.5–5.3)
POTASSIUM SERPL-SCNC: 4.2 MMOL/L — SIGNIFICANT CHANGE UP (ref 3.5–5.3)
POTASSIUM SERPL-SCNC: 4.3 MMOL/L — SIGNIFICANT CHANGE UP (ref 3.5–5.3)
POTASSIUM SERPL-SCNC: 4.7 MMOL/L — SIGNIFICANT CHANGE UP (ref 3.5–5.3)
PROCALCITONIN SERPL-MCNC: 2.1 NG/ML — HIGH (ref 0.02–0.1)
PROT SERPL-MCNC: 4.7 G/DL — LOW (ref 6–8.3)
PROT SERPL-MCNC: 4.9 G/DL — LOW (ref 6–8.3)
PROT SERPL-MCNC: 5 G/DL — LOW (ref 6–8.3)
PROT SERPL-MCNC: 5.2 G/DL — LOW (ref 6–8.3)
PROT SERPL-MCNC: 5.5 G/DL — LOW (ref 6–8.3)
PROT SERPL-MCNC: 5.5 G/DL — LOW (ref 6–8.3)
PROT SERPL-MCNC: 6.4 G/DL — SIGNIFICANT CHANGE UP (ref 6–8.3)
PROT UR-MCNC: 30 MG/DL
PROT UR-MCNC: SIGNIFICANT CHANGE UP MG/DL
PROTHROM AB SERPL-ACNC: 12.4 SEC — SIGNIFICANT CHANGE UP (ref 9.5–13)
PROTHROM AB SERPL-ACNC: 17.2 SEC — HIGH (ref 9.5–13)
RBC # BLD: 2.78 M/UL — LOW (ref 3.8–5.2)
RBC # BLD: 2.96 M/UL — LOW (ref 3.8–5.2)
RBC # BLD: 3.11 M/UL — LOW (ref 3.8–5.2)
RBC # BLD: 3.11 M/UL — LOW (ref 3.8–5.2)
RBC # BLD: 3.14 M/UL — LOW (ref 3.8–5.2)
RBC # BLD: 3.18 M/UL — LOW (ref 3.8–5.2)
RBC # BLD: 3.25 M/UL — LOW (ref 3.8–5.2)
RBC # BLD: 3.28 M/UL — LOW (ref 3.8–5.2)
RBC # BLD: 3.28 M/UL — LOW (ref 3.8–5.2)
RBC # BLD: 3.31 M/UL — LOW (ref 3.8–5.2)
RBC # BLD: 3.39 M/UL — LOW (ref 3.8–5.2)
RBC # BLD: 3.48 M/UL — LOW (ref 3.8–5.2)
RBC # BLD: 4.02 M/UL — SIGNIFICANT CHANGE UP (ref 3.8–5.2)
RBC # FLD: 16.7 % — HIGH (ref 10.3–14.5)
RBC # FLD: 16.8 % — HIGH (ref 10.3–14.5)
RBC # FLD: 16.8 % — HIGH (ref 10.3–14.5)
RBC # FLD: 16.9 % — HIGH (ref 10.3–14.5)
RBC # FLD: 17.1 % — HIGH (ref 10.3–14.5)
RBC # FLD: 17.1 % — HIGH (ref 10.3–14.5)
RBC # FLD: 17.9 % — HIGH (ref 10.3–14.5)
RBC # FLD: 18.1 % — HIGH (ref 10.3–14.5)
RBC # FLD: 18.1 % — HIGH (ref 10.3–14.5)
RBC # FLD: 18.2 % — HIGH (ref 10.3–14.5)
RBC # FLD: 18.3 % — HIGH (ref 10.3–14.5)
RBC # FLD: 18.4 % — HIGH (ref 10.3–14.5)
RBC # FLD: 19.9 % — HIGH (ref 10.3–14.5)
RBC BLD AUTO: ABNORMAL
RBC BLD AUTO: ABNORMAL
RBC CASTS # UR COMP ASSIST: 2 /HPF — SIGNIFICANT CHANGE UP (ref 0–4)
RBC CASTS # UR COMP ASSIST: 22 /HPF — HIGH (ref 0–4)
REVIEW: SIGNIFICANT CHANGE UP
RSV RNA NPH QL NAA+NON-PROBE: SIGNIFICANT CHANGE UP
S AUREUS DNA NOSE QL NAA+PROBE: DETECTED
S AUREUS DNA NOSE QL NAA+PROBE: SIGNIFICANT CHANGE UP
S PNEUM AG UR QL: NEGATIVE — SIGNIFICANT CHANGE UP
SAO2 % BLDV: 18.5 % — LOW (ref 67–88)
SAO2 % BLDV: 24.2 % — LOW (ref 67–88)
SAO2 % BLDV: 29.5 % — LOW (ref 67–88)
SARS-COV-2 RNA SPEC QL NAA+PROBE: SIGNIFICANT CHANGE UP
SARS-COV-2 RNA SPEC QL NAA+PROBE: SIGNIFICANT CHANGE UP
SCHISTOCYTES BLD QL AUTO: SIGNIFICANT CHANGE UP
SCHISTOCYTES BLD QL AUTO: SLIGHT — SIGNIFICANT CHANGE UP
SODIUM SERPL-SCNC: 139 MMOL/L — SIGNIFICANT CHANGE UP (ref 135–145)
SODIUM SERPL-SCNC: 139 MMOL/L — SIGNIFICANT CHANGE UP (ref 135–145)
SODIUM SERPL-SCNC: 140 MMOL/L — SIGNIFICANT CHANGE UP (ref 135–145)
SODIUM SERPL-SCNC: 141 MMOL/L — SIGNIFICANT CHANGE UP (ref 135–145)
SODIUM SERPL-SCNC: 142 MMOL/L — SIGNIFICANT CHANGE UP (ref 135–145)
SODIUM SERPL-SCNC: 142 MMOL/L — SIGNIFICANT CHANGE UP (ref 135–145)
SODIUM SERPL-SCNC: 143 MMOL/L — SIGNIFICANT CHANGE UP (ref 135–145)
SODIUM SERPL-SCNC: 144 MMOL/L — SIGNIFICANT CHANGE UP (ref 135–145)
SODIUM SERPL-SCNC: 144 MMOL/L — SIGNIFICANT CHANGE UP (ref 135–145)
SODIUM SERPL-SCNC: 145 MMOL/L — SIGNIFICANT CHANGE UP (ref 135–145)
SODIUM SERPL-SCNC: 146 MMOL/L — HIGH (ref 135–145)
SP GR SPEC: 1.02 — SIGNIFICANT CHANGE UP (ref 1–1.03)
SP GR SPEC: 1.02 — SIGNIFICANT CHANGE UP (ref 1–1.03)
SPECIMEN SOURCE: SIGNIFICANT CHANGE UP
SQUAMOUS # UR AUTO: 3 /HPF — SIGNIFICANT CHANGE UP (ref 0–5)
SQUAMOUS # UR AUTO: 3 /HPF — SIGNIFICANT CHANGE UP (ref 0–5)
TARGETS BLD QL SMEAR: SLIGHT — SIGNIFICANT CHANGE UP
TIBC SERPL-MCNC: 251 UG/DL — SIGNIFICANT CHANGE UP (ref 220–430)
TROPONIN T, HIGH SENSITIVITY RESULT: 75 NG/L — HIGH (ref 0–51)
TROPONIN T, HIGH SENSITIVITY RESULT: 78 NG/L — HIGH (ref 0–51)
TROPONIN T, HIGH SENSITIVITY RESULT: 86 NG/L — HIGH (ref 0–51)
UIBC SERPL-MCNC: 242 UG/DL — SIGNIFICANT CHANGE UP (ref 110–370)
UROBILINOGEN FLD QL: 0.2 MG/DL — SIGNIFICANT CHANGE UP (ref 0.2–1)
UROBILINOGEN FLD QL: 0.2 MG/DL — SIGNIFICANT CHANGE UP (ref 0.2–1)
VIT B12 SERPL-MCNC: 707 PG/ML — SIGNIFICANT CHANGE UP (ref 232–1245)
WBC # BLD: 10.65 K/UL — HIGH (ref 3.8–10.5)
WBC # BLD: 11.39 K/UL — HIGH (ref 3.8–10.5)
WBC # BLD: 5.7 K/UL — SIGNIFICANT CHANGE UP (ref 3.8–10.5)
WBC # BLD: 6.6 K/UL — SIGNIFICANT CHANGE UP (ref 3.8–10.5)
WBC # BLD: 6.74 K/UL — SIGNIFICANT CHANGE UP (ref 3.8–10.5)
WBC # BLD: 6.94 K/UL — SIGNIFICANT CHANGE UP (ref 3.8–10.5)
WBC # BLD: 7 K/UL — SIGNIFICANT CHANGE UP (ref 3.8–10.5)
WBC # BLD: 7.2 K/UL — SIGNIFICANT CHANGE UP (ref 3.8–10.5)
WBC # BLD: 8.14 K/UL — SIGNIFICANT CHANGE UP (ref 3.8–10.5)
WBC # BLD: 8.28 K/UL — SIGNIFICANT CHANGE UP (ref 3.8–10.5)
WBC # BLD: 8.33 K/UL — SIGNIFICANT CHANGE UP (ref 3.8–10.5)
WBC # BLD: 9.38 K/UL — SIGNIFICANT CHANGE UP (ref 3.8–10.5)
WBC # BLD: 9.41 K/UL — SIGNIFICANT CHANGE UP (ref 3.8–10.5)
WBC # FLD AUTO: 10.65 K/UL — HIGH (ref 3.8–10.5)
WBC # FLD AUTO: 11.39 K/UL — HIGH (ref 3.8–10.5)
WBC # FLD AUTO: 5.7 K/UL — SIGNIFICANT CHANGE UP (ref 3.8–10.5)
WBC # FLD AUTO: 6.6 K/UL — SIGNIFICANT CHANGE UP (ref 3.8–10.5)
WBC # FLD AUTO: 6.74 K/UL — SIGNIFICANT CHANGE UP (ref 3.8–10.5)
WBC # FLD AUTO: 6.94 K/UL — SIGNIFICANT CHANGE UP (ref 3.8–10.5)
WBC # FLD AUTO: 7 K/UL — SIGNIFICANT CHANGE UP (ref 3.8–10.5)
WBC # FLD AUTO: 7.2 K/UL — SIGNIFICANT CHANGE UP (ref 3.8–10.5)
WBC # FLD AUTO: 8.14 K/UL — SIGNIFICANT CHANGE UP (ref 3.8–10.5)
WBC # FLD AUTO: 8.28 K/UL — SIGNIFICANT CHANGE UP (ref 3.8–10.5)
WBC # FLD AUTO: 8.33 K/UL — SIGNIFICANT CHANGE UP (ref 3.8–10.5)
WBC # FLD AUTO: 9.38 K/UL — SIGNIFICANT CHANGE UP (ref 3.8–10.5)
WBC # FLD AUTO: 9.41 K/UL — SIGNIFICANT CHANGE UP (ref 3.8–10.5)
WBC UR QL: 1 /HPF — SIGNIFICANT CHANGE UP (ref 0–5)
WBC UR QL: 4 /HPF — SIGNIFICANT CHANGE UP (ref 0–5)

## 2024-01-01 PROCEDURE — 99285 EMERGENCY DEPT VISIT HI MDM: CPT

## 2024-01-01 PROCEDURE — 84145 PROCALCITONIN (PCT): CPT

## 2024-01-01 PROCEDURE — 80048 BASIC METABOLIC PNL TOTAL CA: CPT

## 2024-01-01 PROCEDURE — 72100 X-RAY EXAM L-S SPINE 2/3 VWS: CPT

## 2024-01-01 PROCEDURE — 99232 SBSQ HOSP IP/OBS MODERATE 35: CPT

## 2024-01-01 PROCEDURE — 85014 HEMATOCRIT: CPT

## 2024-01-01 PROCEDURE — 83735 ASSAY OF MAGNESIUM: CPT

## 2024-01-01 PROCEDURE — 96374 THER/PROPH/DIAG INJ IV PUSH: CPT

## 2024-01-01 PROCEDURE — 74176 CT ABD & PELVIS W/O CONTRAST: CPT | Mod: 26

## 2024-01-01 PROCEDURE — 99223 1ST HOSP IP/OBS HIGH 75: CPT | Mod: GC

## 2024-01-01 PROCEDURE — 99233 SBSQ HOSP IP/OBS HIGH 50: CPT

## 2024-01-01 PROCEDURE — 84484 ASSAY OF TROPONIN QUANT: CPT

## 2024-01-01 PROCEDURE — 96365 THER/PROPH/DIAG IV INF INIT: CPT

## 2024-01-01 PROCEDURE — 71046 X-RAY EXAM CHEST 2 VIEWS: CPT | Mod: 26

## 2024-01-01 PROCEDURE — 85730 THROMBOPLASTIN TIME PARTIAL: CPT

## 2024-01-01 PROCEDURE — 81001 URINALYSIS AUTO W/SCOPE: CPT

## 2024-01-01 PROCEDURE — 99239 HOSP IP/OBS DSCHRG MGMT >30: CPT

## 2024-01-01 PROCEDURE — 83605 ASSAY OF LACTIC ACID: CPT

## 2024-01-01 PROCEDURE — 71045 X-RAY EXAM CHEST 1 VIEW: CPT | Mod: 26

## 2024-01-01 PROCEDURE — 70450 CT HEAD/BRAIN W/O DYE: CPT | Mod: MC

## 2024-01-01 PROCEDURE — 93010 ELECTROCARDIOGRAM REPORT: CPT

## 2024-01-01 PROCEDURE — 74176 CT ABD & PELVIS W/O CONTRAST: CPT | Mod: 26,MC

## 2024-01-01 PROCEDURE — 82803 BLOOD GASES ANY COMBINATION: CPT

## 2024-01-01 PROCEDURE — 36415 COLL VENOUS BLD VENIPUNCTURE: CPT

## 2024-01-01 PROCEDURE — 87640 STAPH A DNA AMP PROBE: CPT

## 2024-01-01 PROCEDURE — 71250 CT THORAX DX C-: CPT | Mod: MC

## 2024-01-01 PROCEDURE — 83550 IRON BINDING TEST: CPT

## 2024-01-01 PROCEDURE — 83540 ASSAY OF IRON: CPT

## 2024-01-01 PROCEDURE — 85027 COMPLETE CBC AUTOMATED: CPT

## 2024-01-01 PROCEDURE — 80053 COMPREHEN METABOLIC PANEL: CPT

## 2024-01-01 PROCEDURE — 74176 CT ABD & PELVIS W/O CONTRAST: CPT | Mod: MC

## 2024-01-01 PROCEDURE — 82746 ASSAY OF FOLIC ACID SERUM: CPT

## 2024-01-01 PROCEDURE — 84100 ASSAY OF PHOSPHORUS: CPT

## 2024-01-01 PROCEDURE — 87641 MR-STAPH DNA AMP PROBE: CPT

## 2024-01-01 PROCEDURE — 87040 BLOOD CULTURE FOR BACTERIA: CPT

## 2024-01-01 PROCEDURE — 99221 1ST HOSP IP/OBS SF/LOW 40: CPT

## 2024-01-01 PROCEDURE — 94640 AIRWAY INHALATION TREATMENT: CPT

## 2024-01-01 PROCEDURE — 72170 X-RAY EXAM OF PELVIS: CPT | Mod: 26

## 2024-01-01 PROCEDURE — 82607 VITAMIN B-12: CPT

## 2024-01-01 PROCEDURE — 82330 ASSAY OF CALCIUM: CPT

## 2024-01-01 PROCEDURE — 86901 BLOOD TYPING SEROLOGIC RH(D): CPT

## 2024-01-01 PROCEDURE — 96375 TX/PRO/DX INJ NEW DRUG ADDON: CPT

## 2024-01-01 PROCEDURE — 87635 SARS-COV-2 COVID-19 AMP PRB: CPT

## 2024-01-01 PROCEDURE — 87637 SARSCOV2&INF A&B&RSV AMP PRB: CPT

## 2024-01-01 PROCEDURE — 82728 ASSAY OF FERRITIN: CPT

## 2024-01-01 PROCEDURE — 71250 CT THORAX DX C-: CPT | Mod: 26

## 2024-01-01 PROCEDURE — 97110 THERAPEUTIC EXERCISES: CPT

## 2024-01-01 PROCEDURE — 84295 ASSAY OF SERUM SODIUM: CPT

## 2024-01-01 PROCEDURE — 72100 X-RAY EXAM L-S SPINE 2/3 VWS: CPT | Mod: 26

## 2024-01-01 PROCEDURE — 71250 CT THORAX DX C-: CPT | Mod: 26,MC

## 2024-01-01 PROCEDURE — 87077 CULTURE AEROBIC IDENTIFY: CPT

## 2024-01-01 PROCEDURE — 85025 COMPLETE CBC W/AUTO DIFF WBC: CPT

## 2024-01-01 PROCEDURE — 84132 ASSAY OF SERUM POTASSIUM: CPT

## 2024-01-01 PROCEDURE — 76705 ECHO EXAM OF ABDOMEN: CPT

## 2024-01-01 PROCEDURE — 73090 X-RAY EXAM OF FOREARM: CPT

## 2024-01-01 PROCEDURE — 85610 PROTHROMBIN TIME: CPT

## 2024-01-01 PROCEDURE — 87086 URINE CULTURE/COLONY COUNT: CPT

## 2024-01-01 PROCEDURE — 99285 EMERGENCY DEPT VISIT HI MDM: CPT | Mod: 25

## 2024-01-01 PROCEDURE — 97530 THERAPEUTIC ACTIVITIES: CPT

## 2024-01-01 PROCEDURE — 72125 CT NECK SPINE W/O DYE: CPT | Mod: 26,MC

## 2024-01-01 PROCEDURE — 99232 SBSQ HOSP IP/OBS MODERATE 35: CPT | Mod: GC

## 2024-01-01 PROCEDURE — 99233 SBSQ HOSP IP/OBS HIGH 50: CPT | Mod: GC

## 2024-01-01 PROCEDURE — 72125 CT NECK SPINE W/O DYE: CPT | Mod: MC

## 2024-01-01 PROCEDURE — 82150 ASSAY OF AMYLASE: CPT

## 2024-01-01 PROCEDURE — 76775 US EXAM ABDO BACK WALL LIM: CPT

## 2024-01-01 PROCEDURE — 93306 TTE W/DOPPLER COMPLETE: CPT | Mod: 26

## 2024-01-01 PROCEDURE — 99223 1ST HOSP IP/OBS HIGH 75: CPT

## 2024-01-01 PROCEDURE — 97162 PT EVAL MOD COMPLEX 30 MIN: CPT

## 2024-01-01 PROCEDURE — 82947 ASSAY GLUCOSE BLOOD QUANT: CPT

## 2024-01-01 PROCEDURE — 93005 ELECTROCARDIOGRAM TRACING: CPT

## 2024-01-01 PROCEDURE — 72170 X-RAY EXAM OF PELVIS: CPT

## 2024-01-01 PROCEDURE — 97161 PT EVAL LOW COMPLEX 20 MIN: CPT

## 2024-01-01 PROCEDURE — 82435 ASSAY OF BLOOD CHLORIDE: CPT

## 2024-01-01 PROCEDURE — 83690 ASSAY OF LIPASE: CPT

## 2024-01-01 PROCEDURE — 74174 CTA ABD&PLVS W/CONTRAST: CPT | Mod: 26,MA

## 2024-01-01 PROCEDURE — 97116 GAIT TRAINING THERAPY: CPT

## 2024-01-01 PROCEDURE — 36000 PLACE NEEDLE IN VEIN: CPT

## 2024-01-01 PROCEDURE — 86850 RBC ANTIBODY SCREEN: CPT

## 2024-01-01 PROCEDURE — 85018 HEMOGLOBIN: CPT

## 2024-01-01 PROCEDURE — 71045 X-RAY EXAM CHEST 1 VIEW: CPT

## 2024-01-01 PROCEDURE — 86900 BLOOD TYPING SEROLOGIC ABO: CPT

## 2024-01-01 PROCEDURE — 70450 CT HEAD/BRAIN W/O DYE: CPT | Mod: 26,MC

## 2024-01-01 PROCEDURE — 71046 X-RAY EXAM CHEST 2 VIEWS: CPT

## 2024-01-01 PROCEDURE — 87186 SC STD MICRODIL/AGAR DIL: CPT

## 2024-01-01 PROCEDURE — 83036 HEMOGLOBIN GLYCOSYLATED A1C: CPT

## 2024-01-01 PROCEDURE — 87899 AGENT NOS ASSAY W/OPTIC: CPT

## 2024-01-01 PROCEDURE — 87150 DNA/RNA AMPLIFIED PROBE: CPT

## 2024-01-01 PROCEDURE — 87449 NOS EACH ORGANISM AG IA: CPT

## 2024-01-01 PROCEDURE — 76775 US EXAM ABDO BACK WALL LIM: CPT | Mod: 26

## 2024-01-01 PROCEDURE — 74174 CTA ABD&PLVS W/CONTRAST: CPT | Mod: MA

## 2024-01-01 PROCEDURE — 93306 TTE W/DOPPLER COMPLETE: CPT

## 2024-01-01 PROCEDURE — 76705 ECHO EXAM OF ABDOMEN: CPT | Mod: 26

## 2024-01-01 PROCEDURE — 73090 X-RAY EXAM OF FOREARM: CPT | Mod: 26,LT

## 2024-01-01 RX ORDER — METRONIDAZOLE 500 MG
1 TABLET ORAL
Qty: 9 | Refills: 0
Start: 2024-01-01 | End: 2024-01-01

## 2024-01-01 RX ORDER — FERROUS SULFATE 325(65) MG
325 TABLET ORAL DAILY
Refills: 0 | Status: DISCONTINUED | OUTPATIENT
Start: 2024-01-01 | End: 2024-01-01

## 2024-01-01 RX ORDER — OXYCODONE HYDROCHLORIDE 5 MG/1
7.5 TABLET ORAL AT BEDTIME
Refills: 0 | Status: DISCONTINUED | OUTPATIENT
Start: 2024-01-01 | End: 2024-01-01

## 2024-01-01 RX ORDER — SENNA PLUS 8.6 MG/1
2 TABLET ORAL AT BEDTIME
Refills: 0 | Status: DISCONTINUED | OUTPATIENT
Start: 2024-01-01 | End: 2024-01-01

## 2024-01-01 RX ORDER — SUCRALFATE 1 G
10 TABLET ORAL
Qty: 0 | Refills: 0 | DISCHARGE
Start: 2024-01-01

## 2024-01-01 RX ORDER — ZOLPIDEM TARTRATE 10 MG/1
1 TABLET ORAL
Refills: 0 | DISCHARGE

## 2024-01-01 RX ORDER — LANOLIN ALCOHOL/MO/W.PET/CERES
3 CREAM (GRAM) TOPICAL AT BEDTIME
Refills: 0 | Status: DISCONTINUED | OUTPATIENT
Start: 2024-01-01 | End: 2024-01-01

## 2024-01-01 RX ORDER — ROBINUL 0.2 MG/ML
0.4 INJECTION INTRAMUSCULAR; INTRAVENOUS EVERY 6 HOURS
Refills: 0 | Status: DISCONTINUED | OUTPATIENT
Start: 2024-01-01 | End: 2024-01-01

## 2024-01-01 RX ORDER — MEROPENEM 1 G/30ML
500 INJECTION INTRAVENOUS EVERY 12 HOURS
Refills: 0 | Status: DISCONTINUED | OUTPATIENT
Start: 2024-01-01 | End: 2024-01-01

## 2024-01-01 RX ORDER — OXYCODONE AND ACETAMINOPHEN 5; 325 MG/1; MG/1
2 TABLET ORAL
Qty: 0 | Refills: 0 | DISCHARGE
Start: 2024-01-01

## 2024-01-01 RX ORDER — AMLODIPINE BESYLATE 2.5 MG/1
2.5 TABLET ORAL DAILY
Refills: 0 | Status: DISCONTINUED | OUTPATIENT
Start: 2024-01-01 | End: 2024-01-01

## 2024-01-01 RX ORDER — SODIUM CHLORIDE 9 MG/ML
1000 INJECTION, SOLUTION INTRAVENOUS
Refills: 0 | Status: DISCONTINUED | OUTPATIENT
Start: 2024-01-01 | End: 2024-01-01

## 2024-01-01 RX ORDER — METRONIDAZOLE 500 MG
500 TABLET ORAL ONCE
Refills: 0 | Status: COMPLETED | OUTPATIENT
Start: 2024-01-01 | End: 2024-01-01

## 2024-01-01 RX ORDER — DICLOFENAC SODIUM 1% 10 MG/G
1 GEL TOPICAL
Qty: 2 | Refills: 0 | Status: ACTIVE | COMMUNITY
Start: 2020-12-18 | End: 1900-01-01

## 2024-01-01 RX ORDER — MIDAZOLAM HYDROCHLORIDE 1 MG/ML
2 INJECTION, SOLUTION INTRAMUSCULAR; INTRAVENOUS ONCE
Refills: 0 | Status: DISCONTINUED | OUTPATIENT
Start: 2024-01-01 | End: 2024-01-01

## 2024-01-01 RX ORDER — CIPROFLOXACIN LACTATE 400MG/40ML
400 VIAL (ML) INTRAVENOUS ONCE
Refills: 0 | Status: DISCONTINUED | OUTPATIENT
Start: 2024-01-01 | End: 2024-01-01

## 2024-01-01 RX ORDER — AZTREONAM 2 G
1000 VIAL (EA) INJECTION ONCE
Refills: 0 | Status: COMPLETED | OUTPATIENT
Start: 2024-01-01 | End: 2024-01-01

## 2024-01-01 RX ORDER — MIDAZOLAM HYDROCHLORIDE 1 MG/ML
1 INJECTION, SOLUTION INTRAMUSCULAR; INTRAVENOUS
Refills: 0 | Status: DISCONTINUED | OUTPATIENT
Start: 2024-01-01 | End: 2024-01-01

## 2024-01-01 RX ORDER — FUROSEMIDE 40 MG
20 TABLET ORAL ONCE
Refills: 0 | Status: COMPLETED | OUTPATIENT
Start: 2024-01-01 | End: 2024-01-01

## 2024-01-01 RX ORDER — LORATADINE 10 MG/1
10 TABLET ORAL
Qty: 30 | Refills: 0 | Status: ACTIVE | COMMUNITY
Start: 2020-04-30

## 2024-01-01 RX ORDER — AMLODIPINE BESYLATE 2.5 MG/1
1 TABLET ORAL
Qty: 0 | Refills: 0 | DISCHARGE
Start: 2024-01-01

## 2024-01-01 RX ORDER — MULTIVIT-MIN/FERROUS GLUCONATE 9 MG/15 ML
15 LIQUID (ML) ORAL DAILY
Refills: 0 | Status: DISCONTINUED | OUTPATIENT
Start: 2024-01-01 | End: 2024-01-01

## 2024-01-01 RX ORDER — ACETAMINOPHEN 500 MG
1000 TABLET ORAL ONCE
Refills: 0 | Status: COMPLETED | OUTPATIENT
Start: 2024-01-01 | End: 2024-01-01

## 2024-01-01 RX ORDER — POTASSIUM CHLORIDE 20 MEQ
40 PACKET (EA) ORAL ONCE
Refills: 0 | Status: COMPLETED | OUTPATIENT
Start: 2024-01-01 | End: 2024-01-01

## 2024-01-01 RX ORDER — PREGABALIN 225 MG/1
1 CAPSULE ORAL
Qty: 0 | Refills: 0 | DISCHARGE

## 2024-01-01 RX ORDER — MAGNESIUM SULFATE 500 MG/ML
2 VIAL (ML) INJECTION ONCE
Refills: 0 | Status: COMPLETED | OUTPATIENT
Start: 2024-01-01 | End: 2024-01-01

## 2024-01-01 RX ORDER — SENNA PLUS 8.6 MG/1
2 TABLET ORAL
Qty: 0 | Refills: 0 | DISCHARGE
Start: 2024-01-01

## 2024-01-01 RX ORDER — PANTOPRAZOLE 40 MG/1
40 TABLET, DELAYED RELEASE ORAL DAILY
Qty: 90 | Refills: 1 | Status: ACTIVE | COMMUNITY
Start: 2019-07-01

## 2024-01-01 RX ORDER — FLUTICASONE PROPIONATE 50 MCG
1 SPRAY, SUSPENSION NASAL
Refills: 0 | Status: DISCONTINUED | OUTPATIENT
Start: 2024-01-01 | End: 2024-01-01

## 2024-01-01 RX ORDER — POTASSIUM CHLORIDE 20 MEQ
20 PACKET (EA) ORAL ONCE
Refills: 0 | Status: DISCONTINUED | OUTPATIENT
Start: 2024-01-01 | End: 2024-01-01

## 2024-01-01 RX ORDER — ZOLPIDEM TARTRATE 5 MG/1
5 TABLET ORAL
Qty: 30 | Refills: 0 | Status: ACTIVE | COMMUNITY
Start: 2018-12-31 | End: 1900-01-01

## 2024-01-01 RX ORDER — SODIUM CHLORIDE 9 MG/ML
1000 INJECTION INTRAMUSCULAR; INTRAVENOUS; SUBCUTANEOUS ONCE
Refills: 0 | Status: COMPLETED | OUTPATIENT
Start: 2024-01-01 | End: 2024-01-01

## 2024-01-01 RX ORDER — LIDOCAINE 4 G/100G
0 CREAM TOPICAL
Qty: 0 | Refills: 0 | DISCHARGE
Start: 2024-01-01

## 2024-01-01 RX ORDER — SENNOSIDES 8.6 MG
8.6 TABLET ORAL
Qty: 90 | Refills: 0 | Status: ACTIVE | COMMUNITY
Start: 2018-12-31

## 2024-01-01 RX ORDER — ACETAMINOPHEN 500 MG
1000 TABLET ORAL ONCE
Refills: 0 | Status: DISCONTINUED | OUTPATIENT
Start: 2024-01-01 | End: 2024-01-01

## 2024-01-01 RX ORDER — DOXYCYCLINE 100 MG/1
100 CAPSULE ORAL
Qty: 14 | Refills: 0 | Status: ACTIVE | COMMUNITY
Start: 2023-01-01

## 2024-01-01 RX ORDER — ROBINUL 0.2 MG/ML
0.2 INJECTION INTRAMUSCULAR; INTRAVENOUS EVERY 6 HOURS
Refills: 0 | Status: DISCONTINUED | OUTPATIENT
Start: 2024-01-01 | End: 2024-01-01

## 2024-01-01 RX ORDER — MEROPENEM 1 G/30ML
500 INJECTION INTRAVENOUS ONCE
Refills: 0 | Status: COMPLETED | OUTPATIENT
Start: 2024-01-01 | End: 2024-01-01

## 2024-01-01 RX ORDER — DICLOFENAC SODIUM 30 MG/G
2 GEL TOPICAL
Refills: 0 | DISCHARGE

## 2024-01-01 RX ORDER — LEVOFLOXACIN 25 MG/ML
25 SOLUTION ORAL DAILY
Qty: 140 | Refills: 0 | Status: ACTIVE | COMMUNITY
Start: 2023-01-01

## 2024-01-01 RX ORDER — ONDANSETRON 8 MG/1
4 TABLET, FILM COATED ORAL EVERY 8 HOURS
Refills: 0 | Status: DISCONTINUED | OUTPATIENT
Start: 2024-01-01 | End: 2024-01-01

## 2024-01-01 RX ORDER — LEVOFLOXACIN 500 MG/1
500 TABLET, FILM COATED ORAL DAILY
Qty: 10 | Refills: 0 | Status: ACTIVE | COMMUNITY
Start: 2023-01-01

## 2024-01-01 RX ORDER — FLUTICASONE PROPIONATE 50 MCG
1 SPRAY, SUSPENSION NASAL
Qty: 0 | Refills: 0 | DISCHARGE
Start: 2024-01-01

## 2024-01-01 RX ORDER — LEVOFLOXACIN 5 MG/ML
1 INJECTION, SOLUTION INTRAVENOUS
Qty: 5 | Refills: 0
Start: 2024-01-01 | End: 2024-01-01

## 2024-01-01 RX ORDER — AZTREONAM 2 G
1000 VIAL (EA) INJECTION ONCE
Refills: 0 | Status: DISCONTINUED | OUTPATIENT
Start: 2024-01-01 | End: 2024-01-01

## 2024-01-01 RX ORDER — METRONIDAZOLE 500 MG
500 TABLET ORAL EVERY 8 HOURS
Refills: 0 | Status: DISCONTINUED | OUTPATIENT
Start: 2024-01-01 | End: 2024-01-01

## 2024-01-01 RX ORDER — SUCRALFATE 1 G
1 TABLET ORAL
Refills: 0 | Status: DISCONTINUED | OUTPATIENT
Start: 2024-01-01 | End: 2024-01-01

## 2024-01-01 RX ORDER — ACETAMINOPHEN 500 MG
2 TABLET ORAL
Qty: 0 | Refills: 0 | DISCHARGE
Start: 2024-01-01

## 2024-01-01 RX ORDER — GABAPENTIN 100 MG/1
100 CAPSULE ORAL
Qty: 270 | Refills: 0 | Status: ACTIVE | COMMUNITY
Start: 2021-02-05

## 2024-01-01 RX ORDER — ALPRAZOLAM 0.25 MG
0.25 TABLET ORAL ONCE
Refills: 0 | Status: DISCONTINUED | OUTPATIENT
Start: 2024-01-01 | End: 2024-01-01

## 2024-01-01 RX ORDER — LATANOPROST/PF 0.005 %
0.01 DROPS OPHTHALMIC (EYE)
Qty: 1 | Refills: 6 | Status: ACTIVE | COMMUNITY
Start: 2016-10-19

## 2024-01-01 RX ORDER — ACETAMINOPHEN 500 MG
650 TABLET ORAL EVERY 6 HOURS
Refills: 0 | Status: DISCONTINUED | OUTPATIENT
Start: 2024-01-01 | End: 2024-01-01

## 2024-01-01 RX ORDER — FLUTICASONE PROPIONATE 220 MCG
1 AEROSOL WITH ADAPTER (GRAM) INHALATION
Refills: 0 | DISCHARGE

## 2024-01-01 RX ORDER — SODIUM CHLORIDE 9 MG/ML
500 INJECTION INTRAMUSCULAR; INTRAVENOUS; SUBCUTANEOUS
Refills: 0 | Status: DISCONTINUED | OUTPATIENT
Start: 2024-01-01 | End: 2024-01-01

## 2024-01-01 RX ORDER — ACETAMINOPHEN 500 MG
650 TABLET ORAL ONCE
Refills: 0 | Status: COMPLETED | OUTPATIENT
Start: 2024-01-01 | End: 2024-01-01

## 2024-01-01 RX ORDER — PREGABALIN 225 MG/1
1 CAPSULE ORAL
Qty: 0 | Refills: 0 | DISCHARGE
Start: 2024-01-01

## 2024-01-01 RX ORDER — ERGOCALCIFEROL 1.25 MG/1
1.25 MG CAPSULE ORAL
Qty: 12 | Refills: 1 | Status: ACTIVE | COMMUNITY
Start: 2023-02-27

## 2024-01-01 RX ORDER — CHLORHEXIDINE GLUCONATE 213 G/1000ML
1 SOLUTION TOPICAL DAILY
Refills: 0 | Status: DISCONTINUED | OUTPATIENT
Start: 2024-01-01 | End: 2024-01-01

## 2024-01-01 RX ORDER — HYDROMORPHONE HYDROCHLORIDE 2 MG/ML
0.5 INJECTION INTRAMUSCULAR; INTRAVENOUS; SUBCUTANEOUS
Refills: 0 | Status: DISCONTINUED | OUTPATIENT
Start: 2024-01-01 | End: 2024-01-01

## 2024-01-01 RX ORDER — FLUTICASONE PROPIONATE 50 UG/1
50 SPRAY, METERED NASAL DAILY
Qty: 1 | Refills: 5 | Status: ACTIVE | COMMUNITY
Start: 2019-03-01 | End: 1900-01-01

## 2024-01-01 RX ORDER — SUCRALFATE 1 G
10 TABLET ORAL
Refills: 0 | DISCHARGE

## 2024-01-01 RX ORDER — LIDOCAINE 4 G/100G
1 CREAM TOPICAL DAILY
Refills: 0 | Status: DISCONTINUED | OUTPATIENT
Start: 2024-01-01 | End: 2024-01-01

## 2024-01-01 RX ORDER — HEPARIN SODIUM 5000 [USP'U]/ML
5000 INJECTION INTRAVENOUS; SUBCUTANEOUS EVERY 8 HOURS
Refills: 0 | Status: DISCONTINUED | OUTPATIENT
Start: 2024-01-01 | End: 2024-01-01

## 2024-01-01 RX ORDER — SUCRALFATE 1 G/10ML
1 SUSPENSION ORAL
Qty: 1260 | Refills: 2 | Status: ACTIVE | COMMUNITY
Start: 2020-06-04

## 2024-01-01 RX ORDER — OXYCODONE AND ACETAMINOPHEN 7.5; 325 MG/1; MG/1
7.5-325 TABLET ORAL
Qty: 7 | Refills: 0 | Status: ACTIVE | COMMUNITY
Start: 2017-08-24 | End: 1900-01-01

## 2024-01-01 RX ORDER — GABAPENTIN 400 MG/1
100 CAPSULE ORAL THREE TIMES A DAY
Refills: 0 | Status: DISCONTINUED | OUTPATIENT
Start: 2024-01-01 | End: 2024-01-01

## 2024-01-01 RX ORDER — METRONIDAZOLE 500 MG
500 TABLET ORAL THREE TIMES A DAY
Refills: 0 | Status: COMPLETED | OUTPATIENT
Start: 2024-01-01 | End: 2024-01-01

## 2024-01-01 RX ORDER — CHLORHEXIDINE GLUCONATE 4 %
325 (65 FE) LIQUID (ML) TOPICAL DAILY
Qty: 90 | Refills: 3 | Status: ACTIVE | COMMUNITY
Start: 2018-12-31

## 2024-01-01 RX ORDER — LATANOPROST 0.05 MG/ML
1 SOLUTION/ DROPS OPHTHALMIC; TOPICAL
Qty: 0 | Refills: 0 | DISCHARGE
Start: 2024-01-01

## 2024-01-01 RX ORDER — METRONIDAZOLE 500 MG
1 TABLET ORAL
Qty: 15 | Refills: 0
Start: 2024-01-01 | End: 2024-01-01

## 2024-01-01 RX ORDER — ENOXAPARIN SODIUM 100 MG/ML
30 INJECTION SUBCUTANEOUS EVERY 24 HOURS
Refills: 0 | Status: DISCONTINUED | OUTPATIENT
Start: 2024-01-01 | End: 2024-01-01

## 2024-01-01 RX ORDER — GABAPENTIN 400 MG/1
100 CAPSULE ORAL EVERY 8 HOURS
Refills: 0 | Status: DISCONTINUED | OUTPATIENT
Start: 2024-01-01 | End: 2024-01-01

## 2024-01-01 RX ORDER — POLYETHYLENE GLYCOL 3350 17 G/17G
17 POWDER, FOR SOLUTION ORAL DAILY
Refills: 0 | Status: DISCONTINUED | OUTPATIENT
Start: 2024-01-01 | End: 2024-01-01

## 2024-01-01 RX ORDER — LATANOPROST 0.05 MG/ML
1 SOLUTION/ DROPS OPHTHALMIC; TOPICAL
Qty: 0 | Refills: 0 | DISCHARGE

## 2024-01-01 RX ORDER — CEFEPIME 1 G/1
1000 INJECTION, POWDER, FOR SOLUTION INTRAMUSCULAR; INTRAVENOUS ONCE
Refills: 0 | Status: COMPLETED | OUTPATIENT
Start: 2024-01-01 | End: 2024-01-01

## 2024-01-01 RX ORDER — PREGABALIN 225 MG/1
1000 CAPSULE ORAL DAILY
Refills: 0 | Status: DISCONTINUED | OUTPATIENT
Start: 2024-01-01 | End: 2024-01-01

## 2024-01-01 RX ORDER — LIDOCAINE 5% 700 MG/1
5 PATCH TOPICAL
Qty: 1 | Refills: 3 | Status: ACTIVE | COMMUNITY
Start: 2023-01-15

## 2024-01-01 RX ORDER — FUROSEMIDE 20 MG/1
20 TABLET ORAL
Qty: 60 | Refills: 3 | Status: ACTIVE | COMMUNITY
Start: 2019-02-28

## 2024-01-01 RX ORDER — MIDAZOLAM HYDROCHLORIDE 1 MG/ML
2 INJECTION, SOLUTION INTRAMUSCULAR; INTRAVENOUS EVERY 4 HOURS
Refills: 0 | Status: DISCONTINUED | OUTPATIENT
Start: 2024-01-01 | End: 2024-01-01

## 2024-01-01 RX ORDER — LATANOPROST 0.05 MG/ML
1 SOLUTION/ DROPS OPHTHALMIC; TOPICAL AT BEDTIME
Refills: 0 | Status: DISCONTINUED | OUTPATIENT
Start: 2024-01-01 | End: 2024-01-01

## 2024-01-01 RX ORDER — MORPHINE SULFATE 50 MG/1
1 CAPSULE, EXTENDED RELEASE ORAL
Refills: 0 | Status: DISCONTINUED | OUTPATIENT
Start: 2024-01-01 | End: 2024-01-01

## 2024-01-01 RX ORDER — CIPROFLOXACIN AND DEXAMETHASONE 3; 1 MG/ML; MG/ML
0.3-0.1 SUSPENSION/ DROPS AURICULAR (OTIC) TWICE DAILY
Qty: 1 | Refills: 0 | Status: ACTIVE | COMMUNITY
Start: 2022-01-28

## 2024-01-01 RX ORDER — ZOLPIDEM TARTRATE 10 MG/1
5 TABLET ORAL AT BEDTIME
Refills: 0 | Status: DISCONTINUED | OUTPATIENT
Start: 2024-01-01 | End: 2024-01-01

## 2024-01-01 RX ORDER — HYDROMORPHONE HYDROCHLORIDE 2 MG/ML
0.2 INJECTION INTRAMUSCULAR; INTRAVENOUS; SUBCUTANEOUS
Refills: 0 | Status: DISCONTINUED | OUTPATIENT
Start: 2024-01-01 | End: 2024-01-01

## 2024-01-01 RX ORDER — PANTOPRAZOLE SODIUM 20 MG/1
40 TABLET, DELAYED RELEASE ORAL DAILY
Refills: 0 | Status: DISCONTINUED | OUTPATIENT
Start: 2024-01-01 | End: 2024-01-01

## 2024-01-01 RX ORDER — HEPARIN SODIUM 5000 [USP'U]/ML
5000 INJECTION INTRAVENOUS; SUBCUTANEOUS EVERY 12 HOURS
Refills: 0 | Status: DISCONTINUED | OUTPATIENT
Start: 2024-01-01 | End: 2024-01-01

## 2024-01-01 RX ORDER — AZITHROMYCIN 250 MG/1
250 TABLET, FILM COATED ORAL
Qty: 6 | Refills: 0 | Status: DISCONTINUED | COMMUNITY
End: 2024-01-01

## 2024-01-01 RX ORDER — OXYCODONE AND ACETAMINOPHEN 5; 325 MG/1; MG/1
1 TABLET ORAL
Qty: 0 | Refills: 0 | DISCHARGE

## 2024-01-01 RX ORDER — BLOOD PRESSURE TEST KIT-MEDIUM
KIT MISCELLANEOUS
Qty: 1 | Refills: 0 | Status: ACTIVE | COMMUNITY
Start: 2020-07-30

## 2024-01-01 RX ORDER — CEFTRIAXONE 500 MG/1
2000 INJECTION, POWDER, FOR SOLUTION INTRAMUSCULAR; INTRAVENOUS EVERY 24 HOURS
Refills: 0 | Status: DISCONTINUED | OUTPATIENT
Start: 2024-01-01 | End: 2024-01-01

## 2024-01-01 RX ORDER — VANCOMYCIN HCL 1 G
1000 VIAL (EA) INTRAVENOUS ONCE
Refills: 0 | Status: COMPLETED | OUTPATIENT
Start: 2024-01-01 | End: 2024-01-01

## 2024-01-01 RX ORDER — MORPHINE SULFATE 50 MG/1
2 CAPSULE, EXTENDED RELEASE ORAL
Refills: 0 | Status: DISCONTINUED | OUTPATIENT
Start: 2024-01-01 | End: 2024-01-01

## 2024-01-01 RX ORDER — LEVOFLOXACIN 5 MG/ML
1 INJECTION, SOLUTION INTRAVENOUS
Qty: 1 | Refills: 0
Start: 2024-01-01 | End: 2024-01-01

## 2024-01-01 RX ORDER — MEROPENEM 1 G/30ML
INJECTION INTRAVENOUS
Refills: 0 | Status: DISCONTINUED | OUTPATIENT
Start: 2024-01-01 | End: 2024-01-01

## 2024-01-01 RX ORDER — ZOLPIDEM TARTRATE 10 MG/1
1 TABLET ORAL
Qty: 0 | Refills: 0 | DISCHARGE
Start: 2024-01-01

## 2024-01-01 RX ORDER — IPRATROPIUM/ALBUTEROL SULFATE 18-103MCG
3 AEROSOL WITH ADAPTER (GRAM) INHALATION EVERY 6 HOURS
Refills: 0 | Status: DISCONTINUED | OUTPATIENT
Start: 2024-01-01 | End: 2024-01-01

## 2024-01-01 RX ORDER — AMLODIPINE BESYLATE 2.5 MG/1
2.5 TABLET ORAL EVERY 24 HOURS
Refills: 0 | Status: DISCONTINUED | OUTPATIENT
Start: 2024-01-01 | End: 2024-01-01

## 2024-01-01 RX ORDER — PANTOPRAZOLE SODIUM 20 MG/1
40 TABLET, DELAYED RELEASE ORAL
Refills: 0 | Status: DISCONTINUED | OUTPATIENT
Start: 2024-01-01 | End: 2024-01-01

## 2024-01-01 RX ADMIN — Medication 500 MILLIGRAM(S): at 13:31

## 2024-01-01 RX ADMIN — MEROPENEM 100 MILLIGRAM(S): 1 INJECTION INTRAVENOUS at 19:21

## 2024-01-01 RX ADMIN — Medication 3 MILLILITER(S): at 17:32

## 2024-01-01 RX ADMIN — Medication 400 MILLIGRAM(S): at 22:13

## 2024-01-01 RX ADMIN — POLYETHYLENE GLYCOL 3350 17 GRAM(S): 17 POWDER, FOR SOLUTION ORAL at 11:35

## 2024-01-01 RX ADMIN — LATANOPROST 1 DROP(S): 0.05 SOLUTION/ DROPS OPHTHALMIC; TOPICAL at 21:09

## 2024-01-01 RX ADMIN — Medication 1 GRAM(S): at 00:51

## 2024-01-01 RX ADMIN — Medication 1 SPRAY(S): at 18:58

## 2024-01-01 RX ADMIN — Medication 3 MILLILITER(S): at 12:03

## 2024-01-01 RX ADMIN — LIDOCAINE 1 PATCH: 4 CREAM TOPICAL at 19:59

## 2024-01-01 RX ADMIN — HEPARIN SODIUM 5000 UNIT(S): 5000 INJECTION INTRAVENOUS; SUBCUTANEOUS at 12:57

## 2024-01-01 RX ADMIN — Medication 1 GRAM(S): at 18:26

## 2024-01-01 RX ADMIN — MIDAZOLAM HYDROCHLORIDE 2 MILLIGRAM(S): 1 INJECTION, SOLUTION INTRAMUSCULAR; INTRAVENOUS at 07:50

## 2024-01-01 RX ADMIN — Medication 1 SPRAY(S): at 05:51

## 2024-01-01 RX ADMIN — Medication 1 SPRAY(S): at 17:36

## 2024-01-01 RX ADMIN — Medication 1 GRAM(S): at 17:36

## 2024-01-01 RX ADMIN — LATANOPROST 1 DROP(S): 0.05 SOLUTION/ DROPS OPHTHALMIC; TOPICAL at 22:06

## 2024-01-01 RX ADMIN — LIDOCAINE 1 PATCH: 4 CREAM TOPICAL at 19:00

## 2024-01-01 RX ADMIN — Medication 400 MILLIGRAM(S): at 15:28

## 2024-01-01 RX ADMIN — LATANOPROST 1 DROP(S): 0.05 SOLUTION/ DROPS OPHTHALMIC; TOPICAL at 01:28

## 2024-01-01 RX ADMIN — Medication 1 SPRAY(S): at 05:47

## 2024-01-01 RX ADMIN — LATANOPROST 1 DROP(S): 0.05 SOLUTION/ DROPS OPHTHALMIC; TOPICAL at 21:29

## 2024-01-01 RX ADMIN — LIDOCAINE 1 PATCH: 4 CREAM TOPICAL at 19:06

## 2024-01-01 RX ADMIN — Medication 500 MILLIGRAM(S): at 07:00

## 2024-01-01 RX ADMIN — PANTOPRAZOLE SODIUM 40 MILLIGRAM(S): 20 TABLET, DELAYED RELEASE ORAL at 14:37

## 2024-01-01 RX ADMIN — AMLODIPINE BESYLATE 2.5 MILLIGRAM(S): 2.5 TABLET ORAL at 12:57

## 2024-01-01 RX ADMIN — Medication 3 MILLIGRAM(S): at 20:07

## 2024-01-01 RX ADMIN — LATANOPROST 1 DROP(S): 0.05 SOLUTION/ DROPS OPHTHALMIC; TOPICAL at 20:24

## 2024-01-01 RX ADMIN — LIDOCAINE 1 PATCH: 4 CREAM TOPICAL at 01:20

## 2024-01-01 RX ADMIN — GABAPENTIN 100 MILLIGRAM(S): 400 CAPSULE ORAL at 13:54

## 2024-01-01 RX ADMIN — GABAPENTIN 100 MILLIGRAM(S): 400 CAPSULE ORAL at 17:25

## 2024-01-01 RX ADMIN — LIDOCAINE 1 PATCH: 4 CREAM TOPICAL at 11:54

## 2024-01-01 RX ADMIN — HEPARIN SODIUM 5000 UNIT(S): 5000 INJECTION INTRAVENOUS; SUBCUTANEOUS at 11:56

## 2024-01-01 RX ADMIN — Medication 1 GRAM(S): at 07:30

## 2024-01-01 RX ADMIN — MEROPENEM 100 MILLIGRAM(S): 1 INJECTION INTRAVENOUS at 17:03

## 2024-01-01 RX ADMIN — LATANOPROST 1 DROP(S): 0.05 SOLUTION/ DROPS OPHTHALMIC; TOPICAL at 20:07

## 2024-01-01 RX ADMIN — POLYETHYLENE GLYCOL 3350 17 GRAM(S): 17 POWDER, FOR SOLUTION ORAL at 11:22

## 2024-01-01 RX ADMIN — Medication 1 SPRAY(S): at 05:06

## 2024-01-01 RX ADMIN — LIDOCAINE 1 PATCH: 4 CREAM TOPICAL at 01:23

## 2024-01-01 RX ADMIN — AMLODIPINE BESYLATE 2.5 MILLIGRAM(S): 2.5 TABLET ORAL at 06:06

## 2024-01-01 RX ADMIN — CEFEPIME 100 MILLIGRAM(S): 1 INJECTION, POWDER, FOR SOLUTION INTRAMUSCULAR; INTRAVENOUS at 22:31

## 2024-01-01 RX ADMIN — PREGABALIN 1000 MICROGRAM(S): 225 CAPSULE ORAL at 13:32

## 2024-01-01 RX ADMIN — HEPARIN SODIUM 5000 UNIT(S): 5000 INJECTION INTRAVENOUS; SUBCUTANEOUS at 11:16

## 2024-01-01 RX ADMIN — Medication 3 MILLILITER(S): at 19:18

## 2024-01-01 RX ADMIN — Medication 1 GRAM(S): at 13:51

## 2024-01-01 RX ADMIN — GABAPENTIN 100 MILLIGRAM(S): 400 CAPSULE ORAL at 14:33

## 2024-01-01 RX ADMIN — LIDOCAINE 1 PATCH: 4 CREAM TOPICAL at 17:31

## 2024-01-01 RX ADMIN — HEPARIN SODIUM 5000 UNIT(S): 5000 INJECTION INTRAVENOUS; SUBCUTANEOUS at 22:09

## 2024-01-01 RX ADMIN — Medication 3 MILLILITER(S): at 06:26

## 2024-01-01 RX ADMIN — Medication 20 MILLIGRAM(S): at 13:38

## 2024-01-01 RX ADMIN — Medication 20 MILLIGRAM(S): at 13:46

## 2024-01-01 RX ADMIN — GABAPENTIN 100 MILLIGRAM(S): 400 CAPSULE ORAL at 06:06

## 2024-01-01 RX ADMIN — LIDOCAINE 1 PATCH: 4 CREAM TOPICAL at 19:37

## 2024-01-01 RX ADMIN — LIDOCAINE 1 PATCH: 4 CREAM TOPICAL at 13:49

## 2024-01-01 RX ADMIN — GABAPENTIN 100 MILLIGRAM(S): 400 CAPSULE ORAL at 05:55

## 2024-01-01 RX ADMIN — Medication 1 SPRAY(S): at 05:52

## 2024-01-01 RX ADMIN — MEROPENEM 100 MILLIGRAM(S): 1 INJECTION INTRAVENOUS at 17:15

## 2024-01-01 RX ADMIN — LIDOCAINE 1 PATCH: 4 CREAM TOPICAL at 13:30

## 2024-01-01 RX ADMIN — CEFTRIAXONE 100 MILLIGRAM(S): 500 INJECTION, POWDER, FOR SOLUTION INTRAMUSCULAR; INTRAVENOUS at 08:02

## 2024-01-01 RX ADMIN — Medication 3 MILLILITER(S): at 05:06

## 2024-01-01 RX ADMIN — MEROPENEM 100 MILLIGRAM(S): 1 INJECTION INTRAVENOUS at 05:51

## 2024-01-01 RX ADMIN — Medication 1 GRAM(S): at 18:48

## 2024-01-01 RX ADMIN — MEROPENEM 100 MILLIGRAM(S): 1 INJECTION INTRAVENOUS at 05:56

## 2024-01-01 RX ADMIN — GABAPENTIN 100 MILLIGRAM(S): 400 CAPSULE ORAL at 21:23

## 2024-01-01 RX ADMIN — SENNA PLUS 2 TABLET(S): 8.6 TABLET ORAL at 21:16

## 2024-01-01 RX ADMIN — Medication 500 MILLIGRAM(S): at 17:25

## 2024-01-01 RX ADMIN — LIDOCAINE 1 PATCH: 4 CREAM TOPICAL at 06:50

## 2024-01-01 RX ADMIN — POLYETHYLENE GLYCOL 3350 17 GRAM(S): 17 POWDER, FOR SOLUTION ORAL at 13:51

## 2024-01-01 RX ADMIN — Medication 1 GRAM(S): at 07:55

## 2024-01-01 RX ADMIN — Medication 325 MILLIGRAM(S): at 12:04

## 2024-01-01 RX ADMIN — GABAPENTIN 100 MILLIGRAM(S): 400 CAPSULE ORAL at 05:57

## 2024-01-01 RX ADMIN — HEPARIN SODIUM 5000 UNIT(S): 5000 INJECTION INTRAVENOUS; SUBCUTANEOUS at 10:13

## 2024-01-01 RX ADMIN — LIDOCAINE 1 PATCH: 4 CREAM TOPICAL at 00:27

## 2024-01-01 RX ADMIN — Medication 1 GRAM(S): at 11:28

## 2024-01-01 RX ADMIN — ROBINUL 0.2 MILLIGRAM(S): 0.2 INJECTION INTRAMUSCULAR; INTRAVENOUS at 07:50

## 2024-01-01 RX ADMIN — PREGABALIN 1000 MICROGRAM(S): 225 CAPSULE ORAL at 11:16

## 2024-01-01 RX ADMIN — Medication 500 MILLIGRAM(S): at 21:57

## 2024-01-01 RX ADMIN — Medication 1 GRAM(S): at 12:57

## 2024-01-01 RX ADMIN — Medication 3 MILLILITER(S): at 05:51

## 2024-01-01 RX ADMIN — Medication 3 MILLILITER(S): at 13:37

## 2024-01-01 RX ADMIN — Medication 1 GRAM(S): at 17:31

## 2024-01-01 RX ADMIN — Medication 1 GRAM(S): at 11:21

## 2024-01-01 RX ADMIN — AMLODIPINE BESYLATE 2.5 MILLIGRAM(S): 2.5 TABLET ORAL at 09:57

## 2024-01-01 RX ADMIN — Medication 1 GRAM(S): at 05:49

## 2024-01-01 RX ADMIN — Medication 100 MILLIGRAM(S): at 19:10

## 2024-01-01 RX ADMIN — Medication 50 MILLIGRAM(S): at 21:39

## 2024-01-01 RX ADMIN — LIDOCAINE 1 PATCH: 4 CREAM TOPICAL at 13:13

## 2024-01-01 RX ADMIN — Medication 1 GRAM(S): at 11:36

## 2024-01-01 RX ADMIN — Medication 3 MILLILITER(S): at 15:49

## 2024-01-01 RX ADMIN — Medication 500 MILLIGRAM(S): at 14:33

## 2024-01-01 RX ADMIN — Medication 1 SPRAY(S): at 05:55

## 2024-01-01 RX ADMIN — SODIUM CHLORIDE 50 MILLILITER(S): 9 INJECTION, SOLUTION INTRAVENOUS at 06:45

## 2024-01-01 RX ADMIN — Medication 1 GRAM(S): at 09:15

## 2024-01-01 RX ADMIN — SENNA PLUS 2 TABLET(S): 8.6 TABLET ORAL at 21:29

## 2024-01-01 RX ADMIN — Medication 500 MILLIGRAM(S): at 21:00

## 2024-01-01 RX ADMIN — MEROPENEM 100 MILLIGRAM(S): 1 INJECTION INTRAVENOUS at 17:33

## 2024-01-01 RX ADMIN — SODIUM CHLORIDE 1000 MILLILITER(S): 9 INJECTION INTRAMUSCULAR; INTRAVENOUS; SUBCUTANEOUS at 22:52

## 2024-01-01 RX ADMIN — SENNA PLUS 2 TABLET(S): 8.6 TABLET ORAL at 21:50

## 2024-01-01 RX ADMIN — GABAPENTIN 100 MILLIGRAM(S): 400 CAPSULE ORAL at 21:50

## 2024-01-01 RX ADMIN — MIDAZOLAM HYDROCHLORIDE 1 MILLIGRAM(S): 1 INJECTION, SOLUTION INTRAMUSCULAR; INTRAVENOUS at 06:38

## 2024-01-01 RX ADMIN — Medication 1 SPRAY(S): at 17:09

## 2024-01-01 RX ADMIN — CHLORHEXIDINE GLUCONATE 1 APPLICATION(S): 213 SOLUTION TOPICAL at 13:30

## 2024-01-01 RX ADMIN — LIDOCAINE 1 PATCH: 4 CREAM TOPICAL at 21:46

## 2024-01-01 RX ADMIN — Medication 1 GRAM(S): at 12:04

## 2024-01-01 RX ADMIN — GABAPENTIN 100 MILLIGRAM(S): 400 CAPSULE ORAL at 21:29

## 2024-01-01 RX ADMIN — Medication 1 GRAM(S): at 18:58

## 2024-01-01 RX ADMIN — POLYETHYLENE GLYCOL 3350 17 GRAM(S): 17 POWDER, FOR SOLUTION ORAL at 13:40

## 2024-01-01 RX ADMIN — MORPHINE SULFATE 2 MILLIGRAM(S): 50 CAPSULE, EXTENDED RELEASE ORAL at 07:37

## 2024-01-01 RX ADMIN — Medication 15 MILLILITER(S): at 11:21

## 2024-01-01 RX ADMIN — GABAPENTIN 100 MILLIGRAM(S): 400 CAPSULE ORAL at 20:23

## 2024-01-01 RX ADMIN — SENNA PLUS 2 TABLET(S): 8.6 TABLET ORAL at 00:41

## 2024-01-01 RX ADMIN — AMLODIPINE BESYLATE 2.5 MILLIGRAM(S): 2.5 TABLET ORAL at 11:16

## 2024-01-01 RX ADMIN — CHLORHEXIDINE GLUCONATE 1 APPLICATION(S): 213 SOLUTION TOPICAL at 12:58

## 2024-01-01 RX ADMIN — GABAPENTIN 100 MILLIGRAM(S): 400 CAPSULE ORAL at 10:15

## 2024-01-01 RX ADMIN — LATANOPROST 1 DROP(S): 0.05 SOLUTION/ DROPS OPHTHALMIC; TOPICAL at 21:23

## 2024-01-01 RX ADMIN — Medication 500 MILLIGRAM(S): at 05:46

## 2024-01-01 RX ADMIN — CEFTRIAXONE 100 MILLIGRAM(S): 500 INJECTION, POWDER, FOR SOLUTION INTRAMUSCULAR; INTRAVENOUS at 06:05

## 2024-01-01 RX ADMIN — LIDOCAINE 1 PATCH: 4 CREAM TOPICAL at 09:00

## 2024-01-01 RX ADMIN — Medication 1 SPRAY(S): at 18:03

## 2024-01-01 RX ADMIN — Medication 500 MILLIGRAM(S): at 05:57

## 2024-01-01 RX ADMIN — GABAPENTIN 100 MILLIGRAM(S): 400 CAPSULE ORAL at 15:58

## 2024-01-01 RX ADMIN — LIDOCAINE 1 PATCH: 4 CREAM TOPICAL at 23:15

## 2024-01-01 RX ADMIN — Medication 3 MILLILITER(S): at 23:56

## 2024-01-01 RX ADMIN — Medication 1 GRAM(S): at 18:03

## 2024-01-01 RX ADMIN — Medication 1 GRAM(S): at 17:09

## 2024-01-01 RX ADMIN — GABAPENTIN 100 MILLIGRAM(S): 400 CAPSULE ORAL at 14:42

## 2024-01-01 RX ADMIN — HEPARIN SODIUM 5000 UNIT(S): 5000 INJECTION INTRAVENOUS; SUBCUTANEOUS at 23:47

## 2024-01-01 RX ADMIN — MIDAZOLAM HYDROCHLORIDE 1 MILLIGRAM(S): 1 INJECTION, SOLUTION INTRAMUSCULAR; INTRAVENOUS at 13:16

## 2024-01-01 RX ADMIN — HEPARIN SODIUM 5000 UNIT(S): 5000 INJECTION INTRAVENOUS; SUBCUTANEOUS at 21:26

## 2024-01-01 RX ADMIN — Medication 1 GRAM(S): at 10:00

## 2024-01-01 RX ADMIN — LIDOCAINE 1 PATCH: 4 CREAM TOPICAL at 22:21

## 2024-01-01 RX ADMIN — Medication 100 MILLIGRAM(S): at 05:02

## 2024-01-01 RX ADMIN — LIDOCAINE 1 PATCH: 4 CREAM TOPICAL at 18:40

## 2024-01-01 RX ADMIN — Medication 1 GRAM(S): at 17:17

## 2024-01-01 RX ADMIN — PREGABALIN 1000 MICROGRAM(S): 225 CAPSULE ORAL at 11:42

## 2024-01-01 RX ADMIN — GABAPENTIN 100 MILLIGRAM(S): 400 CAPSULE ORAL at 13:51

## 2024-01-01 RX ADMIN — Medication 325 MILLIGRAM(S): at 13:37

## 2024-01-01 RX ADMIN — Medication 40 MILLIEQUIVALENT(S): at 12:35

## 2024-01-01 RX ADMIN — LATANOPROST 1 DROP(S): 0.05 SOLUTION/ DROPS OPHTHALMIC; TOPICAL at 21:43

## 2024-01-01 RX ADMIN — Medication 1000 MILLIGRAM(S): at 22:45

## 2024-01-01 RX ADMIN — Medication 3 MILLILITER(S): at 00:43

## 2024-01-01 RX ADMIN — Medication 1 GRAM(S): at 06:06

## 2024-01-01 RX ADMIN — Medication 1 GRAM(S): at 14:59

## 2024-01-01 RX ADMIN — LIDOCAINE 1 PATCH: 4 CREAM TOPICAL at 20:15

## 2024-01-01 RX ADMIN — POLYETHYLENE GLYCOL 3350 17 GRAM(S): 17 POWDER, FOR SOLUTION ORAL at 14:37

## 2024-01-01 RX ADMIN — HEPARIN SODIUM 5000 UNIT(S): 5000 INJECTION INTRAVENOUS; SUBCUTANEOUS at 22:52

## 2024-01-01 RX ADMIN — Medication 1000 MILLIGRAM(S): at 12:30

## 2024-01-01 RX ADMIN — Medication 1 GRAM(S): at 05:46

## 2024-01-01 RX ADMIN — Medication 1 GRAM(S): at 09:56

## 2024-01-01 RX ADMIN — Medication 1 GRAM(S): at 09:09

## 2024-01-01 RX ADMIN — Medication 500 MILLIGRAM(S): at 13:40

## 2024-01-01 RX ADMIN — Medication 15 MILLILITER(S): at 17:31

## 2024-01-01 RX ADMIN — HEPARIN SODIUM 5000 UNIT(S): 5000 INJECTION INTRAVENOUS; SUBCUTANEOUS at 21:16

## 2024-01-01 RX ADMIN — SENNA PLUS 2 TABLET(S): 8.6 TABLET ORAL at 21:26

## 2024-01-01 RX ADMIN — Medication 1 GRAM(S): at 01:44

## 2024-01-01 RX ADMIN — Medication 500 MILLIGRAM(S): at 05:43

## 2024-01-01 RX ADMIN — Medication 3 MILLILITER(S): at 23:34

## 2024-01-01 RX ADMIN — ENOXAPARIN SODIUM 30 MILLIGRAM(S): 100 INJECTION SUBCUTANEOUS at 18:39

## 2024-01-01 RX ADMIN — Medication 325 MILLIGRAM(S): at 13:51

## 2024-01-01 RX ADMIN — CHLORHEXIDINE GLUCONATE 1 APPLICATION(S): 213 SOLUTION TOPICAL at 11:34

## 2024-01-01 RX ADMIN — Medication 500 MILLIGRAM(S): at 22:52

## 2024-01-01 RX ADMIN — LIDOCAINE 1 PATCH: 4 CREAM TOPICAL at 18:30

## 2024-01-01 RX ADMIN — LIDOCAINE 1 PATCH: 4 CREAM TOPICAL at 07:52

## 2024-01-01 RX ADMIN — SODIUM CHLORIDE 50 MILLILITER(S): 9 INJECTION INTRAMUSCULAR; INTRAVENOUS; SUBCUTANEOUS at 14:55

## 2024-01-01 RX ADMIN — GABAPENTIN 100 MILLIGRAM(S): 400 CAPSULE ORAL at 09:01

## 2024-01-01 RX ADMIN — GABAPENTIN 100 MILLIGRAM(S): 400 CAPSULE ORAL at 14:02

## 2024-01-01 RX ADMIN — LATANOPROST 1 DROP(S): 0.05 SOLUTION/ DROPS OPHTHALMIC; TOPICAL at 21:57

## 2024-01-01 RX ADMIN — Medication 500 MILLIGRAM(S): at 13:46

## 2024-01-01 RX ADMIN — SENNA PLUS 2 TABLET(S): 8.6 TABLET ORAL at 22:09

## 2024-01-01 RX ADMIN — Medication 1 SPRAY(S): at 17:21

## 2024-01-01 RX ADMIN — Medication 1000 MILLIGRAM(S): at 05:34

## 2024-01-01 RX ADMIN — Medication 1 GRAM(S): at 05:52

## 2024-01-01 RX ADMIN — AMLODIPINE BESYLATE 2.5 MILLIGRAM(S): 2.5 TABLET ORAL at 11:56

## 2024-01-01 RX ADMIN — PREGABALIN 1000 MICROGRAM(S): 225 CAPSULE ORAL at 11:56

## 2024-01-01 RX ADMIN — LIDOCAINE 1 PATCH: 4 CREAM TOPICAL at 11:22

## 2024-01-01 RX ADMIN — GABAPENTIN 100 MILLIGRAM(S): 400 CAPSULE ORAL at 20:07

## 2024-01-01 RX ADMIN — MEROPENEM 100 MILLIGRAM(S): 1 INJECTION INTRAVENOUS at 06:21

## 2024-01-01 RX ADMIN — LATANOPROST 1 DROP(S): 0.05 SOLUTION/ DROPS OPHTHALMIC; TOPICAL at 21:00

## 2024-01-01 RX ADMIN — Medication 500 MILLIGRAM(S): at 20:07

## 2024-01-01 RX ADMIN — Medication 20 MILLIEQUIVALENT(S): at 10:00

## 2024-01-01 RX ADMIN — LIDOCAINE 1 PATCH: 4 CREAM TOPICAL at 11:26

## 2024-01-01 RX ADMIN — POLYETHYLENE GLYCOL 3350 17 GRAM(S): 17 POWDER, FOR SOLUTION ORAL at 13:38

## 2024-01-01 RX ADMIN — Medication 0.25 MILLIGRAM(S): at 11:57

## 2024-01-01 RX ADMIN — Medication 1 GRAM(S): at 13:40

## 2024-01-01 RX ADMIN — LIDOCAINE 1 PATCH: 4 CREAM TOPICAL at 11:55

## 2024-01-01 RX ADMIN — Medication 400 MILLIGRAM(S): at 22:31

## 2024-01-01 RX ADMIN — Medication 1 GRAM(S): at 13:31

## 2024-01-01 RX ADMIN — HEPARIN SODIUM 5000 UNIT(S): 5000 INJECTION INTRAVENOUS; SUBCUTANEOUS at 11:36

## 2024-01-01 RX ADMIN — Medication 500 MILLIGRAM(S): at 13:12

## 2024-01-01 RX ADMIN — Medication 325 MILLIGRAM(S): at 14:08

## 2024-01-01 RX ADMIN — OXYCODONE HYDROCHLORIDE 7.5 MILLIGRAM(S): 5 TABLET ORAL at 02:15

## 2024-01-01 RX ADMIN — Medication 3 MILLILITER(S): at 17:09

## 2024-01-01 RX ADMIN — AMLODIPINE BESYLATE 2.5 MILLIGRAM(S): 2.5 TABLET ORAL at 10:13

## 2024-01-01 RX ADMIN — Medication 25 GRAM(S): at 00:43

## 2024-01-01 RX ADMIN — GABAPENTIN 100 MILLIGRAM(S): 400 CAPSULE ORAL at 22:05

## 2024-01-01 RX ADMIN — CHLORHEXIDINE GLUCONATE 1 APPLICATION(S): 213 SOLUTION TOPICAL at 11:42

## 2024-01-01 RX ADMIN — Medication 1 GRAM(S): at 17:33

## 2024-01-01 RX ADMIN — Medication 1 GRAM(S): at 05:55

## 2024-01-01 RX ADMIN — Medication 3 MILLILITER(S): at 17:36

## 2024-01-01 RX ADMIN — Medication 3 MILLILITER(S): at 18:03

## 2024-01-01 RX ADMIN — Medication 500 MILLIGRAM(S): at 06:06

## 2024-01-01 RX ADMIN — AMLODIPINE BESYLATE 2.5 MILLIGRAM(S): 2.5 TABLET ORAL at 05:52

## 2024-01-01 RX ADMIN — PREGABALIN 1000 MICROGRAM(S): 225 CAPSULE ORAL at 12:57

## 2024-01-01 RX ADMIN — CEFTRIAXONE 100 MILLIGRAM(S): 500 INJECTION, POWDER, FOR SOLUTION INTRAMUSCULAR; INTRAVENOUS at 06:27

## 2024-01-01 RX ADMIN — Medication 325 MILLIGRAM(S): at 11:21

## 2024-01-01 RX ADMIN — LATANOPROST 1 DROP(S): 0.05 SOLUTION/ DROPS OPHTHALMIC; TOPICAL at 23:47

## 2024-01-01 RX ADMIN — Medication 1 SPRAY(S): at 19:17

## 2024-01-01 RX ADMIN — CEFTRIAXONE 100 MILLIGRAM(S): 500 INJECTION, POWDER, FOR SOLUTION INTRAMUSCULAR; INTRAVENOUS at 05:57

## 2024-01-01 RX ADMIN — GABAPENTIN 100 MILLIGRAM(S): 400 CAPSULE ORAL at 14:08

## 2024-01-01 RX ADMIN — Medication 250 MILLIGRAM(S): at 22:52

## 2024-01-01 RX ADMIN — SENNA PLUS 2 TABLET(S): 8.6 TABLET ORAL at 21:23

## 2024-01-01 RX ADMIN — LIDOCAINE 1 PATCH: 4 CREAM TOPICAL at 01:28

## 2024-01-01 RX ADMIN — Medication 400 MILLIGRAM(S): at 05:02

## 2024-01-01 RX ADMIN — LATANOPROST 1 DROP(S): 0.05 SOLUTION/ DROPS OPHTHALMIC; TOPICAL at 21:50

## 2024-01-01 RX ADMIN — Medication 500 MILLIGRAM(S): at 21:30

## 2024-01-01 RX ADMIN — Medication 650 MILLIGRAM(S): at 15:57

## 2024-01-01 RX ADMIN — Medication 1 SPRAY(S): at 17:57

## 2024-01-01 RX ADMIN — AMLODIPINE BESYLATE 2.5 MILLIGRAM(S): 2.5 TABLET ORAL at 10:00

## 2024-01-01 RX ADMIN — SODIUM CHLORIDE 1000 MILLILITER(S): 9 INJECTION INTRAMUSCULAR; INTRAVENOUS; SUBCUTANEOUS at 16:13

## 2024-01-01 RX ADMIN — Medication 1 GRAM(S): at 17:21

## 2024-01-01 RX ADMIN — GABAPENTIN 100 MILLIGRAM(S): 400 CAPSULE ORAL at 21:45

## 2024-01-01 RX ADMIN — LIDOCAINE 1 PATCH: 4 CREAM TOPICAL at 11:36

## 2024-01-01 RX ADMIN — Medication 325 MILLIGRAM(S): at 11:35

## 2024-01-01 RX ADMIN — Medication 325 MILLIGRAM(S): at 13:40

## 2024-01-01 RX ADMIN — AMLODIPINE BESYLATE 2.5 MILLIGRAM(S): 2.5 TABLET ORAL at 06:49

## 2024-01-01 RX ADMIN — Medication 1 SPRAY(S): at 05:46

## 2024-01-01 RX ADMIN — CEFTRIAXONE 100 MILLIGRAM(S): 500 INJECTION, POWDER, FOR SOLUTION INTRAMUSCULAR; INTRAVENOUS at 10:54

## 2024-01-01 RX ADMIN — PANTOPRAZOLE SODIUM 40 MILLIGRAM(S): 20 TABLET, DELAYED RELEASE ORAL at 06:07

## 2024-01-01 RX ADMIN — Medication 500 MILLIGRAM(S): at 14:42

## 2024-01-01 RX ADMIN — Medication 500 MILLIGRAM(S): at 08:02

## 2024-01-01 RX ADMIN — LIDOCAINE 1 PATCH: 4 CREAM TOPICAL at 22:42

## 2024-01-01 RX ADMIN — LIDOCAINE 1 PATCH: 4 CREAM TOPICAL at 23:32

## 2024-01-01 RX ADMIN — SENNA PLUS 2 TABLET(S): 8.6 TABLET ORAL at 22:05

## 2024-01-01 RX ADMIN — LATANOPROST 1 DROP(S): 0.05 SOLUTION/ DROPS OPHTHALMIC; TOPICAL at 22:09

## 2024-01-01 RX ADMIN — Medication 1 GRAM(S): at 13:38

## 2024-01-01 RX ADMIN — Medication 1 GRAM(S): at 17:16

## 2024-01-01 RX ADMIN — HYDROMORPHONE HYDROCHLORIDE 0.5 MILLIGRAM(S): 2 INJECTION INTRAMUSCULAR; INTRAVENOUS; SUBCUTANEOUS at 14:50

## 2024-01-01 RX ADMIN — SODIUM CHLORIDE 50 MILLILITER(S): 9 INJECTION INTRAMUSCULAR; INTRAVENOUS; SUBCUTANEOUS at 14:48

## 2024-01-01 RX ADMIN — LIDOCAINE 1 PATCH: 4 CREAM TOPICAL at 19:52

## 2024-01-01 RX ADMIN — Medication 3 MILLILITER(S): at 00:55

## 2024-01-01 RX ADMIN — Medication 1 SPRAY(S): at 18:15

## 2024-01-01 RX ADMIN — Medication 500 MILLIGRAM(S): at 22:06

## 2024-01-01 RX ADMIN — SENNA PLUS 2 TABLET(S): 8.6 TABLET ORAL at 22:52

## 2024-01-01 RX ADMIN — Medication 1 SPRAY(S): at 05:39

## 2024-01-01 RX ADMIN — MIDAZOLAM HYDROCHLORIDE 1 MILLIGRAM(S): 1 INJECTION, SOLUTION INTRAMUSCULAR; INTRAVENOUS at 23:40

## 2024-01-01 RX ADMIN — SODIUM CHLORIDE 75 MILLILITER(S): 9 INJECTION, SOLUTION INTRAVENOUS at 00:42

## 2024-01-01 RX ADMIN — AMLODIPINE BESYLATE 2.5 MILLIGRAM(S): 2.5 TABLET ORAL at 05:55

## 2024-01-01 RX ADMIN — AMLODIPINE BESYLATE 2.5 MILLIGRAM(S): 2.5 TABLET ORAL at 11:42

## 2024-01-01 RX ADMIN — GABAPENTIN 100 MILLIGRAM(S): 400 CAPSULE ORAL at 06:49

## 2024-01-01 RX ADMIN — Medication 1 GRAM(S): at 05:44

## 2024-01-01 RX ADMIN — MEROPENEM 100 MILLIGRAM(S): 1 INJECTION INTRAVENOUS at 17:32

## 2024-01-01 RX ADMIN — Medication 1 SPRAY(S): at 17:32

## 2024-01-01 RX ADMIN — CEFTRIAXONE 100 MILLIGRAM(S): 500 INJECTION, POWDER, FOR SOLUTION INTRAMUSCULAR; INTRAVENOUS at 05:30

## 2024-01-01 RX ADMIN — Medication 1 GRAM(S): at 12:08

## 2024-01-01 RX ADMIN — LIDOCAINE 1 PATCH: 4 CREAM TOPICAL at 21:11

## 2024-01-01 RX ADMIN — Medication 1 GRAM(S): at 18:39

## 2024-01-01 RX ADMIN — Medication 1000 MILLIGRAM(S): at 23:00

## 2024-01-01 RX ADMIN — LATANOPROST 1 DROP(S): 0.05 SOLUTION/ DROPS OPHTHALMIC; TOPICAL at 21:45

## 2024-01-01 RX ADMIN — Medication 400 MILLIGRAM(S): at 11:57

## 2024-01-01 RX ADMIN — Medication 325 MILLIGRAM(S): at 13:49

## 2024-01-01 RX ADMIN — GABAPENTIN 100 MILLIGRAM(S): 400 CAPSULE ORAL at 23:47

## 2024-01-01 RX ADMIN — Medication 500 MILLIGRAM(S): at 21:26

## 2024-01-01 RX ADMIN — SENNA PLUS 2 TABLET(S): 8.6 TABLET ORAL at 21:45

## 2024-01-01 RX ADMIN — Medication 650 MILLIGRAM(S): at 16:30

## 2024-01-01 RX ADMIN — Medication 1 SPRAY(S): at 06:21

## 2024-01-01 RX ADMIN — Medication 3 MILLILITER(S): at 05:55

## 2024-01-01 RX ADMIN — HEPARIN SODIUM 5000 UNIT(S): 5000 INJECTION INTRAVENOUS; SUBCUTANEOUS at 13:52

## 2024-01-01 RX ADMIN — Medication 3 MILLILITER(S): at 23:27

## 2024-01-01 RX ADMIN — GABAPENTIN 100 MILLIGRAM(S): 400 CAPSULE ORAL at 13:32

## 2024-01-01 RX ADMIN — Medication 1 SPRAY(S): at 05:49

## 2024-01-01 RX ADMIN — CHLORHEXIDINE GLUCONATE 1 APPLICATION(S): 213 SOLUTION TOPICAL at 11:56

## 2024-01-01 RX ADMIN — LIDOCAINE 1 PATCH: 4 CREAM TOPICAL at 12:58

## 2024-01-01 RX ADMIN — Medication 500 MILLIGRAM(S): at 10:00

## 2024-01-01 RX ADMIN — HEPARIN SODIUM 5000 UNIT(S): 5000 INJECTION INTRAVENOUS; SUBCUTANEOUS at 06:22

## 2024-01-01 RX ADMIN — Medication 3 MILLILITER(S): at 14:08

## 2024-01-01 RX ADMIN — SENNA PLUS 2 TABLET(S): 8.6 TABLET ORAL at 20:55

## 2024-01-01 RX ADMIN — Medication 3 MILLILITER(S): at 05:52

## 2024-01-01 RX ADMIN — Medication 1 SPRAY(S): at 05:44

## 2024-01-01 RX ADMIN — Medication 3 MILLILITER(S): at 06:27

## 2024-01-01 RX ADMIN — AMLODIPINE BESYLATE 2.5 MILLIGRAM(S): 2.5 TABLET ORAL at 11:36

## 2024-01-01 RX ADMIN — Medication 1 GRAM(S): at 22:13

## 2024-01-01 RX ADMIN — Medication 500 MILLIGRAM(S): at 13:51

## 2024-01-01 RX ADMIN — MEROPENEM 100 MILLIGRAM(S): 1 INJECTION INTRAVENOUS at 05:09

## 2024-01-01 RX ADMIN — Medication 3 MILLILITER(S): at 00:52

## 2024-02-21 NOTE — H&P ADULT - ASSESSMENT
Patient is a 89-year-old female with a medical history of hypertension, B12 deficiency, colon cancer 6 years ago not requiring chemotherapy, gallstones, GERD, lymphedema of both lower extremities, and hard of hearing, who presented with a chief complaint of right-sided abdominal pain worsening after breakfast with nausea but no vomiting, and is being admitted to medicine for management of jejunal diverticulitis and bradycardia.

## 2024-02-21 NOTE — ED ADULT NURSE NOTE - OBJECTIVE STATEMENT
Pt BIB EMS for eval of diffuse abd pain which came on after taking a med for heartburn.  She had her usual breakfast today without problems. States she has been told in the past that there is an issue with her gall bladder, but has not required surgery for that. Reports nausea, no vomiting or diarrhea and passed a hard stool earlier today.  Oral mucosa slightly dry, not cracked.  Bilateral leg swelling present, which she states is usual for her, and for which she takes a diuretic.

## 2024-02-21 NOTE — H&P ADULT - PROBLEM SELECTOR PLAN 4
Patient bradycardic to 30s, priorly baseline was in 50s.     Plan: Patient bradycardic to 30s, priorly baseline was in 50s.     Plan: Patient hemodynamically stable, ctm

## 2024-02-21 NOTE — ED ADULT NURSE NOTE - NSFALLHARMRISKINTERV_ED_ALL_ED

## 2024-02-21 NOTE — H&P ADULT - NSICDXPASTSURGICALHX_GEN_ALL_CORE_FT
PAST SURGICAL HISTORY:  H/O exploratory laparotomy for SBO?    History of open reduction and internal fixation (ORIF) procedure     S/P colectomy partial colectomy due to colon ca    S/P CAREY (Total Abdominal Hysterectomy)

## 2024-02-21 NOTE — ED PROVIDER NOTE - OBJECTIVE STATEMENT
89yof pmhx of HTN edema BIB EMS for R sided abd pain worsening after breakfast with NB NB vomiting. no fever or chills. pt reports being told to take her gallbladder out few years ago but didn't. feels similar pain. No diarrhea. No chest pain or sob.

## 2024-02-21 NOTE — ED PROVIDER NOTE - ATTENDING APP SHARED VISIT CONTRIBUTION OF CARE
89 F w/ hx of gallstones, HTN, HLD presents to the ER w/ abd pain/n/v. Pt states that this AM she woke up ate breakfast and had severe abd pain, radiating to the back, pt w/ no cp, no sob, no leg pain, pt reports that she has no fevers, no chills.   On exam, pt is awake and alert in mod distress, has clear lungs soft abdomen has tenderness in the LLQ and RUQ pt reports pain radiates to the abck. Plan for labs imaging eval for biliary colic, nephrolithiasis, less likely aaa, 89 F w/ hx of gallstones, htn, PE not on AC currently, afib, colon ca (s/p resection 11 yrs ago), GERD p/w worsening back pain. pt w/ severe epigastric abd pain 10/10 burning stabbing sensation states "I know my gall bladder is the problem" pt reports she ate breakfast and has pain radiating to the back, pt w/ no cp, no sob, no leg pain, no fevers, no chills, no meds taken for sx.   On exam, pt is awake and alert in mod distress, has clear lungs soft abdomen has tenderness in the LLQ and RUQ pt reports pain radiates to the back. Plan for labs imaging eval for biliary colic, nephrolithiasis, less likely aaa,pt w/ bradycardia irregular rhythm likely admission, of note today is patients birthday,

## 2024-02-21 NOTE — H&P ADULT - ATTENDING COMMENTS
89-year-old female, hard of hearing (has hearing aids) with a Hx of HTN, Vitamin B12 deficiency, colon cancer s/p resection (no chemo) 6 years ago, gallstones, GERD, lymphedema of LE, presenting with LLQ abdominal pain. Pt found to have jejunal diverticulitis, dilated CBD and bradycardia.    # Jejunal diverticulitis - small bowel diverticulosis with evidence of diverticulitis on imaging. No evidence of abscess, fistula, SBO at this time. Abdominal exam benign. Pt does not meet sepsis criteria. Will continue empiric antibiotics for now: flagyl and ceftriaxone. F/u blood cultures drawn in ED. Will continue diet.  # Dilated CBD - given hx of gallstones and dilated CBD on CT, concern for stone. Labs not suggestive of biliary obstruction at this time; Bili, Alk phos, LFTs wnl. Pt not septic. Will continue empiric abx for now. Consider MRCP if abdominal pain worsens or pt develops evidence of obstruction on labs.  # Bradycardia - HR 30-40s in ED. Pt asymptomatic and currently hemodynamically stable. Will continue monitoring on telemetry. Avoid guille blocking agents. Appears to be   # DIOGENES on CKD - SCr 1.75. Will monitor I/Os, start gentle IV hydration, and repeat BMP in AM. Check UA and urine lytes.  # Lymphedema - Per patient she takes diuretics three times a week but is unsure of name of medication. Can consider resuming diuretic when DIOGENES improves. In meantime, can try compression wrapping and leg elevation.   # DVT ppx: SCDs    **Med rec incomplete, will need full med rec in the AM. Pt uses [facility] [phone]**    Rest of plan as outlined above. Discussed case with resident. 89-year-old female, hard of hearing (has hearing aids) with a Hx of HTN, Vitamin B12 deficiency, colon cancer s/p resection (no chemo) 6 years ago, gallstones, GERD, lymphedema of LE, presenting with LLQ abdominal pain. Pt found to have jejunal diverticulitis, dilated CBD and bradycardia.    # Jejunal diverticulitis - small bowel diverticulosis with evidence of diverticulitis on imaging. No evidence of abscess, fistula, SBO at this time. Abdominal exam benign. Pt does not meet sepsis criteria. Will continue empiric antibiotics for now: flagyl and ceftriaxone. F/u blood cultures drawn in ED. Will continue diet.  # Dilated CBD - given hx of gallstones and dilated CBD on CT, concern for stone. Labs not suggestive of biliary obstruction at this time; Bili, Alk phos, LFTs wnl. Pt not septic. Will continue empiric abx for now. Consider MRCP if abdominal pain worsens or pt develops evidence of obstruction on labs.  # Bradycardia - HR 30-40s in ED. Pt asymptomatic and currently hemodynamically stable. Will continue monitoring on telemetry. Avoid guille blocking agents. Will obtain a TTE to evaluate further.  # DIOGENES on CKD - SCr 1.75. Will monitor I/Os, start gentle IV hydration, and repeat BMP in AM. Check UA and urine lytes.  # Lymphedema - Per patient she takes diuretics three times a week but is unsure of name of medication. Can consider resuming diuretic when DIOGENES improves. In meantime, can try compression wrapping and leg elevation.   # DVT ppx: SCDs    **Med rec incomplete, will need full med rec in the AM. Pt uses [facility] [phone]**    Rest of plan as outlined above. Discussed case with resident.

## 2024-02-21 NOTE — H&P ADULT - PROBLEM SELECTOR PLAN 1
Physical Exam showing right upper quadrant tenderness. CT angiogram showing concern for jejunal diverticulitis.     Currently s/p metronidazole, aztreonam     Plan:   -metronidazole 500 IV q8 + ceftriaxone 2g qD.   - Clear liquid diet, progress as tolerated.  - If no improvement within 3 days consider repeat imaging   - f/u outpatient GI to consider colonoscopy 6 months from now Physical Exam showing right upper quadrant tenderness. CT angiogram showing concern for jejunal diverticulitis.     Currently s/p metronidazole, aztreonam     Plan:   -metronidazole 500 IV q8 + ceftriaxone 2g qD.   - Clear liquid diet, progress as tolerated.  - If no improvement within 3 days consider repeat imaging

## 2024-02-21 NOTE — ED PROVIDER NOTE - NS ED ROS FT
Constitutional: No fever or chills  Eyes: No visual changes, eye pain or redness  HEENT: No throat pain, ear pain, nasal pain. No nose bleeding.  CV: No chest pain or lower extremity edema  Resp: No SOB no cough  GI: ++abd pain. N+o nausea or+ vomiting. No diarrhea. No constipation.   : No dysuria, hematuria.   MSK: No musculoskeletal pain  Skin: No rash  Neuro: No headache. No numbness or tingling. No weakness.

## 2024-02-21 NOTE — ED PROVIDER NOTE - PROGRESS NOTE DETAILS
Pt's results reviewed, with jejunal diverticulitis, clarified allergy to pcn pt unsure of reaction, on chart review pt given clindamycin previously. Pt noted to be bradycardic, will admit to tele and give aztreonam and flagyl. RGUJRAL

## 2024-02-21 NOTE — H&P ADULT - PROBLEM SELECTOR PLAN 5
Patient with elevated troponin 75, likely in setting of infection + DIOGENES, low suspicion for cardiac process, ctm till peak    EKG reviewed showing aflutter with left axis deviation, no signs of acute ischemic change

## 2024-02-21 NOTE — H&P ADULT - PROBLEM SELECTOR PLAN 2
US with intrahepatic ductal dilatation with dilatation of the CBD up to 12 without stones on study  CT with dilated CBD measuring up to 1.7 cm with intraluminal hyperdensity measuring 1.0 cm, likely a stone.    Patient with likely stone with criteria met for moderate acute cholangitis (age >75, abnormal WBC), but with infection more likely iso diverticulitis. Abx for diverticulitis covering possible acute cholangitis.     Plan:  - Abx for diverticulitis   - Consider MRCP for evaluation of cholelithiasis

## 2024-02-21 NOTE — ED PROVIDER NOTE - PHYSICAL EXAMINATION
Gen: AAO x 3, uncomfortable, elderly female   Skin: No rashes or lesions  HEENT: NC/AT, PERRLA, EOMI, MMM  Resp: unlabored CTAB  Cardiac: rrr s1s2, no murmurs, rubs or gallops  GI: ND, +BS, Soft, +RUQ ttp   Ext: no pedal edema, FROM in all extremities  Neuro: no focal deficits

## 2024-02-21 NOTE — H&P ADULT - NSHPSOCIALHISTORY_GEN_ALL_CORE
Patient , lives alone with an aid 7 days a week five hours a day.   No substance use, smoking history, or alcohol history.

## 2024-02-21 NOTE — H&P ADULT - PROBLEM SELECTOR PLAN 10
- DVT PPx: Hold in setting of anemia with prior GI bleed, concern for diverticulitis   - Dispo: Home  - PT evaluation order is pending

## 2024-02-21 NOTE — H&P ADULT - NSHPLABSRESULTS_GEN_ALL_CORE
.                        8.9    11.39 )-----------( 280      ( 21 Feb 2024 15:35 )             28.9     02-21    139  |  104  |  36<H>  ----------------------------<  98  4.7   |  23  |  1.75<H>    Ca    9.9      21 Feb 2024 15:35    TPro  6.4  /  Alb  3.8  /  TBili  0.5  /  DBili  x   /  AST  15  /  ALT  9<L>  /  AlkPhos  79  02-21          Urinalysis Basic - ( 21 Feb 2024 15:35 )    Color: x / Appearance: x / SG: x / pH: x  Gluc: 98 mg/dL / Ketone: x  / Bili: x / Urobili: x   Blood: x / Protein: x / Nitrite: x   Leuk Esterase: x / RBC: x / WBC x   Sq Epi: x / Non Sq Epi: x / Bacteria: x      PT/INR - ( 21 Feb 2024 15:35 )   PT: 12.4 sec;   INR: 1.13 ratio         PTT - ( 21 Feb 2024 15:35 )  PTT:29.2 sec    EKG   EKG · Rate: 39, aflutter with Left Deviation    Imaging:   US abdomen -  Mild intrahepatic ductal dilatation with dilatation of the CBD up to 12 mm consistent with CT appearance. No cholelithiasis on study. Choledocholithiasis suspected on CT is not appreciated on ultrasound. Consider further evaluation with MRCP.    CT angio abd/pelvis -  No evidence of mesenteric ischemia. Findings are compatible with jejunal diverticulitis. No evidence of perforation or abscess.  Right mid renal indeterminate lesion is mildly increased in size, now measuring 2.5 cm.  Dilated CBD measuring up to 1.7 cm with intraluminal hyperdensity measuring 1.0 cm, likely a stone. If clinically warranted, MR abdomen can be considered.

## 2024-02-21 NOTE — ED ADULT NURSE NOTE - NSICDXPASTSURGICALHX_GEN_ALL_CORE_FT
PAST SURGICAL HISTORY:  H/O exploratory laparotomy for SBO?    S/P colectomy partial colectomy due to colon ca    S/P CAREY (Total Abdominal Hysterectomy)      PAST SURGICAL HISTORY:  H/O exploratory laparotomy for SBO?    History of open reduction and internal fixation (ORIF) procedure     S/P colectomy partial colectomy due to colon ca    S/P CAREY (Total Abdominal Hysterectomy)

## 2024-02-21 NOTE — H&P ADULT - HISTORY OF PRESENT ILLNESS
Patient is a 89-year-old female with a medical history of hypertension, B12 deficiency, colon cancer 6 years ago not requiring chemotherapy, gallstones, GERD, lymphedema of both lower extremities, and hard of hearing, who presented with a chief complaint of right-sided abdominal pain worsening after breakfast with nausea but no vomiting, and is being admitted to medicine for management of jejunal diverticulitis and bradycardia.     The patient was brought in by EMS earlier today, on her [birth year], February 21, 2024, due to the abdominal pain which she described as a burning and stabbing sensation, rating it 10/10 in severity. She reported a history of being advised to undergo gallbladder removal a few years prior but did not proceed with the surgery. The pain was similar to her previous gallbladder episodes, with no accompanying diarrhea, chest pain, or shortness of breath. On review of systems, she reported increased abdominal pain. Her physical exam in the emergency department revealed her to be AOx3, in moderate distress, with a soft abdomen, normal bowel sounds, and tenderness in the right upper quadrant, but no other significant findings.    In the ED, the patient was administered one dose of metronidazole, one dose of aztreonam, and 2 grams of intravenous Tylenol. Her workup included an EKG that showed a rate of 39 with atrial flutter and left deviation. Imaging with ultrasound of the abdomen showed mild intrahepatic ductal dilatation without evidence of cholelithiasis, and a CT angiogram of the abdomen/pelvis revealed findings compatible with jejunal diverticulitis, a dilated common bile duct with a likely stone, and an indeterminate lesion on the right kidney which had increased in size.     The patient is now being admitted to medicine for further management of her diagnosis of jejunal diverticulitis and bradycardia.

## 2024-02-21 NOTE — H&P ADULT - PROBLEM SELECTOR PLAN 3
Pt has elevated Cr, Baseline appears to be 1.4    Today’s creatinine: 1.7  DIOGENES likely prerenal in setting of infection    Plan:   - Send urine creatinine, urea and lytes  - Trend BUN/Cr.  - Strict I/O  - Avoid neprhotoxic agents. Hold ACE-I in setting of DIOGENES

## 2024-02-21 NOTE — H&P ADULT - NSHPPHYSICALEXAM_GEN_ALL_CORE
PHYSICAL EXAM:  - GENERAL: Alert and oriented . No acute distress. Uncomfortable appearing  - EYES: EOMI. Anicteric.  - HENT: Moist mucous membranes. No scleral icterus.   - LUNGS: Clear to auscultation bilaterally. No accessory muscle use.  - CARDIOVASCULAR: Regular rate and rhythm. No murmur.  - ABDOMEN: Soft, non-tender and non-distended. No palpable masses. +BS, Soft, +RUQ ttp   - EXTREMITIES: No edema. Non-tender.  - SKIN: No rashes or lesions. Warm.  - NEUROLOGIC: No focal neurological deficits. CN II-XII grossly intact, but not individually tested.  - PSYCHIATRIC: Cooperative. Appropriate mood and affect. PHYSICAL EXAM:  - GENERAL: Alert and oriented . No acute distress. Uncomfortable appearing  - EYES: EOMI. Anicteric.  - HENT: Moist mucous membranes. No scleral icterus.   - LUNGS: Clear to auscultation bilaterally. No accessory muscle use.  - CARDIOVASCULAR: Regular rate and rhythm. No murmur.  - ABDOMEN: Soft, non-tender and non-distended. No palpable masses. +BS, Soft, +RUQ ttp   - EXTREMITIES: Non-tender. 2+ pitting edema b/l to thighs  - SKIN: No rashes or lesions. Warm.  - NEUROLOGIC: No focal neurological deficits. CN II-XII grossly intact, but not individually tested.  - PSYCHIATRIC: Cooperative. Appropriate mood and affect. Vital Signs Last 24 Hrs  T(C): 36.7 (21 Feb 2024 23:29), Max: 36.9 (21 Feb 2024 14:01)  T(F): 98 (21 Feb 2024 23:29), Max: 98.4 (21 Feb 2024 14:01)  HR: 38 (21 Feb 2024 23:29) (38 - 46)  BP: 116/43 (21 Feb 2024 23:29) (116/43 - 160/58)  BP(mean): --  RR: 18 (21 Feb 2024 23:29) (18 - 19)  SpO2: 95% (21 Feb 2024 23:29) (95% - 98%)    Parameters below as of 21 Feb 2024 23:29  Patient On (Oxygen Delivery Method): room air    PHYSICAL EXAM:  - GENERAL: Alert and oriented . No acute distress. Uncomfortable appearing  - EYES: EOMI. Anicteric.  - HENT: Moist mucous membranes. No scleral icterus.   - LUNGS: Clear to auscultation bilaterally. No accessory muscle use.  - CARDIOVASCULAR: Regular rate and rhythm. No murmur.  - ABDOMEN: Soft, non-tender and non-distended. No palpable masses. +BS, Soft, +RUQ ttp   - EXTREMITIES: Non-tender. 2+ pitting edema b/l to thighs  - SKIN: No rashes or lesions. Warm.  - NEUROLOGIC: No focal neurological deficits. CN II-XII grossly intact, but not individually tested.  - PSYCHIATRIC: Cooperative. Appropriate mood and affect.

## 2024-02-22 NOTE — PATIENT PROFILE ADULT - FALL HARM RISK - HARM RISK INTERVENTIONS

## 2024-02-22 NOTE — PATIENT PROFILE ADULT - HAVE YOU BEEN EATING POORLY BECAUSE OF A DECREASED APPETITE?
Patient requesting to sign out AMA. Patient reports "I want to go home, I was not updated by anyone, I'm not that sick, there are sicker patients here that need your time." Explained to patient results of imaging and labs. Patient advised to stay, pending PT/OT and  consult. Patient declined these evaluations and wants to AMA.    Patient is alert and oriented x3, MRI r/u CVA and has capacity to make decisions. I explained at length to the Patient the risks of signing out AMA including but not limited to harm, injury or death. I explained the risks, benefits and alternatives to treatment as well as the risks of refusing treatment at this time. I offered to answer any questions and fully addressed concerns and answered any such questions. Patient fully understands what has been explained and answered. Attending aware of above. Patient signed form to sign out AMA and she accepts responsibility for any and all results of this decision. No (0)

## 2024-02-22 NOTE — PATIENT PROFILE ADULT - NSPROIMPLANTSMEDDEV_GEN_A_NUR
Patient states that she does not think she has implants but when she broke her hip when she was about 89 they may have use screws.

## 2024-02-22 NOTE — PATIENT PROFILE ADULT - SPECIFY REASON UNABLE TO ASSESS:
Patient states she is unsure if she go the influenza vaccine this season she knows she had 3 Covid vaccines and recently had a Pneumonia vaccine.

## 2024-02-22 NOTE — PROGRESS NOTE ADULT - PROBLEM SELECTOR PLAN 5
Patient with elevated troponin 75, likely in setting of infection + DIOGENES, low suspicion for cardiac process  EKG reviewed showing aflutter with left axis deviation, no signs of acute ischemic change

## 2024-02-22 NOTE — PATIENT PROFILE ADULT - DOES PATIENT HAVE ADVANCE DIRECTIVE
Patient states that she is unsure if her family has signed any of these documents but she wants everything to be done to help her.

## 2024-02-22 NOTE — PROGRESS NOTE ADULT - PROBLEM SELECTOR PLAN 2
US with intrahepatic ductal dilatation with dilatation of the CBD up to 12 without stones on study  CT with dilated CBD measuring up to 1.7 cm with intraluminal hyperdensity measuring 1.0 cm, likely a stone.  Bili and LFTs not elevated, hold off on MRCP for now

## 2024-02-22 NOTE — PROGRESS NOTE ADULT - SUBJECTIVE AND OBJECTIVE BOX
[facility] Division of Hospital Medicine  Oscar Collins DO  [employer] on [employer] Teams    Patient is a 89y old  Female who presents with a chief complaint of     SUBJECTIVE / OVERNIGHT EVENTS: Overnight patient admitted for diverticulitis. Patient seen and examined at bedside this morning, states abdominal pain still present but improved.    REVIEW OF SYSTEMS:    CONSTITUTIONAL: No weakness, fevers or chills  EYES/ENT: No visual changes;  No vertigo or throat pain   NECK: No pain or stiffness  RESPIRATORY: No cough, wheezing, hemoptysis; No shortness of breath  CARDIOVASCULAR: No chest pain or palpitations  GASTROINTESTINAL: Endorses abdominal pain No nausea, vomiting, or hematemesis; No diarrhea or constipation. No melena or hematochezia.  GENITOURINARY: No dysuria, frequency or hematuria  NEUROLOGICAL: No numbness or weakness  SKIN: No itching, burning, rashes, or lesions  MSK: No joint pain, no back pain  HEME: No easy bleeding, no easy bruising  All other review of systems is negative unless indicated above.    MEDICATIONS  (STANDING):  cefTRIAXone   IVPB 2000 milliGRAM(s) IV Intermittent every 24 hours  enoxaparin Injectable 30 milliGRAM(s) SubCutaneous every 24 hours  ferrous    sulfate 325 milliGRAM(s) Oral daily  latanoprost 0.005% Ophthalmic Solution 1 Drop(s) Both EYES at bedtime  metroNIDAZOLE    Tablet 500 milliGRAM(s) Oral every 8 hours  polyethylene glycol 3350 17 Gram(s) Oral daily  senna 2 Tablet(s) Oral at bedtime    MEDICATIONS  (PRN):  acetaminophen     Tablet .. 650 milliGRAM(s) Oral every 6 hours PRN Temp greater or equal to 38C (100.4F), Mild Pain (1 - 3)  aluminum hydroxide/magnesium hydroxide/simethicone Suspension 30 milliLiter(s) Oral every 4 hours PRN Dyspepsia  melatonin 3 milliGRAM(s) Oral at bedtime PRN Insomnia  ondansetron Injectable 4 milliGRAM(s) IV Push every 8 hours PRN Nausea and/or Vomiting      CAPILLARY BLOOD GLUCOSE        I&O's Summary    21 Feb 2024 07:01  -  22 Feb 2024 07:00  --------------------------------------------------------  IN: 1250 mL / OUT: 0 mL / NET: 1250 mL        PHYSICAL EXAM:  Vital Signs Last 24 Hrs  T(C): 36.8 (22 Feb 2024 15:30), Max: 36.8 (22 Feb 2024 04:45)  T(F): 98.3 (22 Feb 2024 15:30), Max: 98.3 (22 Feb 2024 12:02)  HR: 40 (22 Feb 2024 15:30) (37 - 50)  BP: 147/64 (22 Feb 2024 15:30) (116/43 - 149/66)  BP(mean): --  RR: 18 (22 Feb 2024 15:30) (18 - 19)  SpO2: 97% (22 Feb 2024 15:30) (95% - 98%)    Parameters below as of 22 Feb 2024 15:30  Patient On (Oxygen Delivery Method): room air      CONSTITUTIONAL: Elderly female laying in bed in NAD  RESPIRATORY: Normal respiratory effort; lungs are clear to auscultation bilaterally  CARDIOVASCULAR: Regular rate and rhythm, normal S1 and S2, no murmur/rub/gallop, 1+ edema bilaterally  ABDOMEN: Nontender to palpation, soft, nondistended  PSYCH: A+O to person, place, and time; affect appropriate  SKIN: No rashes; no palpable lesions    LABS:                        8.3    9.38  )-----------( 241      ( 22 Feb 2024 12:40 )             27.8     02-22    139  |  107  |  36<H>  ----------------------------<  80  4.0   |  21<L>  |  1.68<H>    Ca    8.5      22 Feb 2024 05:38    TPro  5.2<L>  /  Alb  3.0<L>  /  TBili  0.4  /  DBili  x   /  AST  13  /  ALT  9<L>  /  AlkPhos  64  02-22    PT/INR - ( 21 Feb 2024 15:35 )   PT: 12.4 sec;   INR: 1.13 ratio         PTT - ( 21 Feb 2024 15:35 )  PTT:29.2 sec      Urinalysis Basic - ( 22 Feb 2024 05:38 )    Color: x / Appearance: x / SG: x / pH: x  Gluc: 80 mg/dL / Ketone: x  / Bili: x / Urobili: x   Blood: x / Protein: x / Nitrite: x   Leuk Esterase: x / RBC: x / WBC x   Sq Epi: x / Non Sq Epi: x / Bacteria: x

## 2024-02-23 NOTE — PHYSICAL THERAPY INITIAL EVALUATION ADULT - ADDITIONAL COMMENTS
pt states lives alone in coop apartment, 5 steps to enter with HR, no stairs indoors; pt independent with mobility, has HHA 7d/5h; pt owns rolling walker pt states lives alone in coop apartment, 5 steps to enter with HR, no stairs indoors; pt independent with mobility, has HHA 7d/5h; granddaughter stays with patient in the PM when aide left, pt owns rolling walker

## 2024-02-23 NOTE — PHYSICAL THERAPY INITIAL EVALUATION ADULT - PHYSICAL ASSIST/NONPHYSICAL ASSIST: GAIT, REHAB EVAL
Spoke with patient advised provider would recommend Dr Marion Gill for a second opinion  Dr Nicole Nichols phone number given to patient  Patient also given number for MyChart so she can easily access her records  Verbalized understanding  1 person assist

## 2024-02-23 NOTE — PROGRESS NOTE ADULT - SUBJECTIVE AND OBJECTIVE BOX
[facility] Division of Hospital Medicine  Aisha Holt MD  Available on TEAMS 8a-8p        Patient is a 89y old  Female who presents with a chief complaint of     SUBJECTIVE / OVERNIGHT EVENTS: Reports marked improvement of abd pain after large BM.  REVIEW OF SYSTEMS: Otherwise negative    MEDICATIONS  (STANDING):  cefTRIAXone   IVPB 2000 milliGRAM(s) IV Intermittent every 24 hours  ferrous    sulfate 325 milliGRAM(s) Oral daily  latanoprost 0.005% Ophthalmic Solution 1 Drop(s) Both EYES at bedtime  metroNIDAZOLE    Tablet 500 milliGRAM(s) Oral every 8 hours  polyethylene glycol 3350 17 Gram(s) Oral daily  senna 2 Tablet(s) Oral at bedtime  sucralfate suspension 1 Gram(s) Oral <User Schedule>    MEDICATIONS  (PRN):  acetaminophen     Tablet .. 650 milliGRAM(s) Oral every 6 hours PRN Temp greater or equal to 38C (100.4F), Mild Pain (1 - 3)  aluminum hydroxide/magnesium hydroxide/simethicone Suspension 30 milliLiter(s) Oral every 4 hours PRN Dyspepsia  melatonin 3 milliGRAM(s) Oral at bedtime PRN Insomnia  ondansetron Injectable 4 milliGRAM(s) IV Push every 8 hours PRN Nausea and/or Vomiting    I&O's Summary    22 Feb 2024 07:01  -  23 Feb 2024 07:00  --------------------------------------------------------  IN: 240 mL / OUT: 0 mL / NET: 240 mL      PHYSICAL EXAM:  Vital Signs Last 24 Hrs  T(C): 36.6 (23 Feb 2024 16:15), Max: 37 (22 Feb 2024 21:05)  T(F): 97.8 (23 Feb 2024 16:15), Max: 98.6 (22 Feb 2024 21:05)  HR: 42 (23 Feb 2024 16:15) (42 - 57)  BP: 156/59 (23 Feb 2024 16:15) (130/60 - 156/59)  BP(mean): --  RR: 18 (23 Feb 2024 16:15) (18 - 18)  SpO2: 94% (23 Feb 2024 16:15) (92% - 98%)    Parameters below as of 23 Feb 2024 16:15  Patient On (Oxygen Delivery Method): room air      CONSTITUTIONAL: Well appearing elderly female in NAD  RESPIRATORY: Normal respiratory effort; lungs are clear to auscultation bilaterally  CARDIOVASCULAR: Regular rate and rhythm, normal S1 and S2, no murmur/rub/gallop, 1+ edema bilaterally  ABDOMEN: Nontender to palpation, soft, nondistended  PSYCH: A+O to person, place, and time; affect appropriate  SKIN: No rashes; no palpable lesions    LABS: reviewed                        7.2    5.70  )-----------( 189      ( 23 Feb 2024 06:54 )             23.2       02-23    140  |  109<H>  |  36<H>  ----------------------------<  86  4.1   |  21<L>  |  1.86<H>    Ca    8.6      23 Feb 2024 06:54  Phos  3.2     02-23  Mg     1.9     02-23    TPro  5.0<L>  /  Alb  2.7<L>  /  TBili  0.3  /  DBili  x   /  AST  16  /  ALT  7<L>  /  AlkPhos  75  02-23              Urinalysis Basic - ( 23 Feb 2024 06:54 )    Color: x / Appearance: x / SG: x / pH: x  Gluc: 86 mg/dL / Ketone: x  / Bili: x / Urobili: x   Blood: x / Protein: x / Nitrite: x   Leuk Esterase: x / RBC: x / WBC x   Sq Epi: x / Non Sq Epi: x / Bacteria: x                  CAPILLARY BLOOD GLUCOSE

## 2024-02-23 NOTE — PROGRESS NOTE ADULT - NSPROGADDITIONALINFOA_GEN_ALL_CORE
discussed with Andreas Sandhu via phonecall    52 minutes spent on total encounter. The necessity of the time spent during the encounter on this date of service was due to:     - preparing to see the patient (eg, review of tests, documents)  - performing a medically appropriate examination and/or evaluation  - referring and communicating with other health care professionals  - documenting clinical information in the electronic or other health record  - care coordination.

## 2024-02-23 NOTE — PROVIDER CONTACT NOTE (OTHER) - ASSESSMENT
Pt is increasingly agitated and confused. Pt yelling at both PCA and RN. Attempts to get EKG and administer medications were made, however pt stated, "she does not want to Pt is increasingly agitated and confused. Pt yelling at both PCA and RN. Attempts to get EKG and administer medications were made, however pt stated, "she does not want to be bothered" and demanding staff leave her room. Pt not allowing RN to administer IV abx or oral medications at this time.

## 2024-02-23 NOTE — PHYSICAL THERAPY INITIAL EVALUATION ADULT - PERTINENT HX OF CURRENT PROBLEM, REHAB EVAL
89-year-old female with a medical history of hypertension, B12 deficiency, colon cancer 6 years ago not requiring chemotherapy, gallstones, GERD, lymphedema of both lower extremities, and hard of hearing, who presented with a chief complaint of right-sided abdominal pain worsening after breakfast with nausea but no vomiting, and is being admitted to medicine for management of jejunal diverticulitis and bradycardia. CTA ABD/pelvis 2/21, jejunal diverticulitis, dilated CBD with a hyperdensity, US RUQ 2/21, CBD dilation.

## 2024-02-23 NOTE — PROVIDER CONTACT NOTE (OTHER) - SITUATION
Pt refusing 0600 medications and refusing to have EKG done at bedside as ordered Pt refusing 0600 medications and refusing to have EKG done at bedside as ordered.

## 2024-02-24 NOTE — PROGRESS NOTE ADULT - PROBLEM SELECTOR PLAN 2
US with intrahepatic ductal dilatation with dilatation of the CBD up to 12 without stones on study  CT with dilated CBD measuring up to 1.7 cm with intraluminal hyperdensity measuring 1.0 cm, likely a stone.  Bili and LFTs not elevated, hold off on MRCP for now Ivermectin Counseling:  Patient instructed to take medication on an empty stomach with a full glass of water.  Patient informed of potential adverse effects including but not limited to nausea, diarrhea, dizziness, itching, and swelling of the extremities or lymph nodes.  The patient verbalized understanding of the proper use and possible adverse effects of ivermectin.  All of the patient's questions and concerns were addressed. BCx with GNR, Roseomonas mucosa 1 out of 4, unclear if contaminant  ID consulted for assistance  On abx as above

## 2024-02-24 NOTE — PROGRESS NOTE ADULT - PROBLEM SELECTOR PLAN 6
Likely secondary to severe iron deficiency despite oral iron supplementation  Repeat CBC stable  Transfuse if Hgb <7  Cont PO iron for now Patient with elevated troponin 75, likely in setting of infection + DIOGENES, low suspicion for cardiac process  EKG reviewed showing aflutter with left axis deviation, no signs of acute ischemic change

## 2024-02-24 NOTE — PROGRESS NOTE ADULT - PROBLEM SELECTOR PLAN 7
BP elevated  Start amlodipine 2.5mg daily  resume home lasix 20mg TIW Likely secondary to severe iron deficiency despite oral iron supplementation  Repeat CBC stable  Transfuse if Hgb <7  Cont PO iron for now

## 2024-02-24 NOTE — CONSULT NOTE ADULT - SUBJECTIVE AND OBJECTIVE BOX
Patient is a 89y old  Female who presents with a chief complaint of     HPI:  89F with hypertension, B12 deficiency, colon cancer 6 years ago not requiring chemotherapy, gallstones, GERD, lymphedema of both lower extremities, and hard of hearing, initially presented with right-sided abdominal pain worsening after breakfast with nausea, found to be afebrile, WBC 11, CT AP with jejunal diverticulitis without perforation or abscess, US abdomen with mild CBD dilatation, BCx with GNR, Roseomonas mucosa 1 out of 4, ID consulted for assistance.    Patient ---        prior hospital charts reviewed [  ]  primary team notes reviewed [  ]  other consultant notes reviewed [  ]    PAST MEDICAL & SURGICAL HISTORY:  Hypertension      Gallstone      GERD (Gastroesophageal Reflux Disease)      Hard of Hearing      Colon cancer  6 yrs ago. did not require chemo      Lymphedema, limb  BLE      B12 deficiency      S/P CAREY (Total Abdominal Hysterectomy)      S/P colectomy  partial colectomy due to colon ca      H/O exploratory laparotomy  for SBO?      History of open reduction and internal fixation (ORIF) procedure          Allergies  penicillin (Unknown)    ANTIMICROBIALS (past 90 days)  MEDICATIONS  (STANDING):    aztreonam  IVPB   50 mL/Hr IV Intermittent (02-21-24 @ 21:39)    cefTRIAXone   IVPB   100 mL/Hr IV Intermittent (02-23-24 @ 08:02)   100 mL/Hr IV Intermittent (02-22-24 @ 05:57)    metroNIDAZOLE    Tablet   500 milliGRAM(s) Oral (02-24-24 @ 07:00)   500 milliGRAM(s) Oral (02-23-24 @ 21:00)   500 milliGRAM(s) Oral (02-23-24 @ 13:40)   500 milliGRAM(s) Oral (02-23-24 @ 08:02)   500 milliGRAM(s) Oral (02-22-24 @ 21:57)   500 milliGRAM(s) Oral (02-22-24 @ 14:33)   500 milliGRAM(s) Oral (02-22-24 @ 05:57)    metroNIDAZOLE  IVPB   100 mL/Hr IV Intermittent (02-21-24 @ 19:10)        cefTRIAXone   IVPB 2000 every 24 hours  metroNIDAZOLE    Tablet 500 every 8 hours    MEDICATIONS  (STANDING):  acetaminophen     Tablet .. 650 every 6 hours PRN  aluminum hydroxide/magnesium hydroxide/simethicone Suspension 30 every 4 hours PRN  amLODIPine   Tablet 2.5 daily  gabapentin 100 every 8 hours  melatonin 3 at bedtime PRN  ondansetron Injectable 4 every 8 hours PRN  oxycodone    5 mG/acetaminophen 325 mG 2 every 12 hours PRN  pantoprazole    Tablet 40 before breakfast  polyethylene glycol 3350 17 daily  senna 2 at bedtime  sucralfate suspension 1 <User Schedule>    SOCIAL HISTORY:       FAMILY HISTORY:  No pertinent family history in first degree relatives      REVIEW OF SYSTEMS  [  ] ROS unobtainable because:    [  ] All other systems negative except as noted below:	    Constitutional:  [ ] fever [ ] chills  [ ] weight loss  [ ] weakness  Skin:  [ ] rash [ ] phlebitis	  Eyes: [ ] icterus [ ] pain  [ ] discharge	  ENMT: [ ] sore throat  [ ] thrush [ ] ulcers [ ] exudates  Respiratory: [ ] dyspnea [ ] hemoptysis [ ] cough [ ] sputum	  Cardiovascular:  [ ] chest pain [ ] palpitations [ ] edema	  Gastrointestinal:  [ ] nausea [ ] vomiting [ ] diarrhea [ ] constipation [ ] pain	  Genitourinary:  [ ] dysuria [ ] frequency [ ] hematuria [ ] discharge [ ] flank pain  [ ] incontinence  Musculoskeletal:  [ ] myalgias [ ] arthralgias [ ] arthritis  [ ] back pain  Neurological:  [ ] headache [ ] seizures  [ ] confusion/altered mental status  Psychiatric:  [ ] anxiety [ ] depression	  Hematology/Lymphatics:  [ ] lymphadenopathy  Endocrine:  [ ] adrenal [ ] thyroid  Allergic/Immunologic:	 [ ] transplant [ ] seasonal    Vital Signs Last 24 Hrs  T(F): 97.2 (02-24-24 @ 11:44), Max: 98.6 (02-22-24 @ 21:05)  Vital Signs Last 24 Hrs  HR: 46 (02-24-24 @ 11:44) (42 - 51)  BP: 133/56 (02-24-24 @ 11:44) (133/56 - 171/65)  RR: 18 (02-24-24 @ 11:44)  SpO2: 92% (02-24-24 @ 11:44) (92% - 94%)  Wt(kg): --    PHYSICAL EXAM:                              7.7    6.94  )-----------( 227      ( 24 Feb 2024 07:34 )             24.5   02-24    143  |  111<H>  |  33<H>  ----------------------------<  108<H>  4.0   |  21<L>  |  1.92<H>    Ca    9.2      24 Feb 2024 07:34  Phos  3.2     02-23  Mg     1.9     02-23    TPro  5.0<L>  /  Alb  2.7<L>  /  TBili  0.3  /  DBili  x   /  AST  16  /  ALT  7<L>  /  AlkPhos  75  02-23    Urinalysis Basic - ( 24 Feb 2024 07:34 )    < from: US Abdomen Upper Quadrant Right (02.21.24 @ 17:28) >  IMPRESSION:  Mild intrahepatic ductal dilatation with dilatation of the CBD up to 12   mm consistent with CT appearance. No cholelithiasis on study.   Choledocholithiasis suspected on CT is not appreciated on ultrasound.   Consider further evaluation with MRCP.    < end of copied text >  < from: CT Angio Abdomen and Pelvis w/ IV Cont (02.21.24 @ 16:23) >  IMPRESSION:  No evidence of mesenteric ischemia.  Findings are compatible with jejunal diverticulitis. No evidence of   perforation or abscess.  Right mid renal indeterminate lesion is mildly increased in size, now   measuring 2.5 cm.  Dilated CBD measuring up to 1.7 cm with intraluminal hyperdensity   measuring 1.0 cm, likely a stone. If clinically warranted, MR abdomen can   be considered.    < end of copied text >  Color: x / Appearance: x / SG: x / pH: x  Gluc: 108 mg/dL / Ketone: x  / Bili: x / Urobili: x   Blood: x / Protein: x / Nitrite: x   Leuk Esterase: x / RBC: x / WBC x   Sq Epi: x / Non Sq Epi: x / Bacteria: x    MICROBIOLOGY:  Culture - Blood (collected 21 Feb 2024 15:34)  Source: .Blood Blood-Peripheral  Gram Stain (24 Feb 2024 00:48):    Growth in aerobic bottle: Gram Negative Rods  Preliminary Report (24 Feb 2024 04:41):    Growth in aerobic bottle: Roseomonas mucosa    Direct identification is available within approximately 3-5    hours either by Blood Panel Multiplexed PCR or Direct    MALDI-TOF. Details: [email or web address]  Organism: Blood Culture PCR (24 Feb 2024 03:09)  Organism: Blood Culture PCR (24 Feb 2024 03:09)      Method Type: PCR      -  Blood PCR Panel: NEG    Culture - Blood (collected 21 Feb 2024 15:20)  Source: .Blood Blood-Peripheral  Preliminary Report (23 Feb 2024 20:01):    No growth at 48 Hours                  RADIOLOGY:  imaging below personally reviewed and agree with findings   Patient is a 89y old  Female who presents with a chief complaint of     HPI:  89F with hypertension, B12 deficiency, colon cancer 6 years ago not requiring chemotherapy, gallstones, GERD, lymphedema of both lower extremities, and hard of hearing, initially presented with right-sided abdominal pain worsening after breakfast with nausea, found to be afebrile, WBC 11, CT AP with jejunal diverticulitis without perforation or abscess, US abdomen with mild CBD dilatation, BCx with GNR, Roseomonas mucosa 1 out of 4, ID consulted for assistance.    Reports that her abdominal pain has resolved, but now has L hip and back pain worsening since yesterday  Denies any fever or chills  No recent trauma  Denies cough, dysuria, diarrhea, n/v, URI symptoms  PCN allergy listed, but cannot recall reaction, states it occurred years ago    prior hospital charts reviewed [x ]  primary team notes reviewed [x  ]  other consultant notes reviewed [x  ]    PAST MEDICAL & SURGICAL HISTORY:  Hypertension      Gallstone      GERD (Gastroesophageal Reflux Disease)      Hard of Hearing      Colon cancer  6 yrs ago. did not require chemo      Lymphedema, limb  BLE      B12 deficiency      S/P CAREY (Total Abdominal Hysterectomy)      S/P colectomy  partial colectomy due to colon ca      H/O exploratory laparotomy  for SBO?      History of open reduction and internal fixation (ORIF) procedure          Allergies  penicillin (Unknown)    ANTIMICROBIALS (past 90 days)  MEDICATIONS  (STANDING):    aztreonam  IVPB   50 mL/Hr IV Intermittent (02-21-24 @ 21:39)    cefTRIAXone   IVPB   100 mL/Hr IV Intermittent (02-23-24 @ 08:02)   100 mL/Hr IV Intermittent (02-22-24 @ 05:57)    metroNIDAZOLE    Tablet   500 milliGRAM(s) Oral (02-24-24 @ 07:00)   500 milliGRAM(s) Oral (02-23-24 @ 21:00)   500 milliGRAM(s) Oral (02-23-24 @ 13:40)   500 milliGRAM(s) Oral (02-23-24 @ 08:02)   500 milliGRAM(s) Oral (02-22-24 @ 21:57)   500 milliGRAM(s) Oral (02-22-24 @ 14:33)   500 milliGRAM(s) Oral (02-22-24 @ 05:57)    metroNIDAZOLE  IVPB   100 mL/Hr IV Intermittent (02-21-24 @ 19:10)        cefTRIAXone   IVPB 2000 every 24 hours  metroNIDAZOLE    Tablet 500 every 8 hours    MEDICATIONS  (STANDING):  acetaminophen     Tablet .. 650 every 6 hours PRN  aluminum hydroxide/magnesium hydroxide/simethicone Suspension 30 every 4 hours PRN  amLODIPine   Tablet 2.5 daily  gabapentin 100 every 8 hours  melatonin 3 at bedtime PRN  ondansetron Injectable 4 every 8 hours PRN  oxycodone    5 mG/acetaminophen 325 mG 2 every 12 hours PRN  pantoprazole    Tablet 40 before breakfast  polyethylene glycol 3350 17 daily  senna 2 at bedtime  sucralfate suspension 1 <User Schedule>    SOCIAL HISTORY:   Denies alcohol, tobacco, recreational drug use      FAMILY HISTORY:  No pertinent family history in first degree relatives      REVIEW OF SYSTEMS  [  ] ROS unobtainable because:    [ x ] All other systems negative except as noted below:	    Constitutional:  [ ] fever [ ] chills  [ ] weight loss  [ ] weakness  Skin:  [ ] rash [ ] phlebitis	  Eyes: [ ] icterus [ ] pain  [ ] discharge	  ENMT: [ ] sore throat  [ ] thrush [ ] ulcers [ ] exudates  Respiratory: [ ] dyspnea [ ] hemoptysis [ ] cough [ ] sputum	  Cardiovascular:  [ ] chest pain [ ] palpitations [ ] edema	  Gastrointestinal:  [ ] nausea [ ] vomiting [ ] diarrhea [ ] constipation [ ] pain	  Genitourinary:  [ ] dysuria [ ] frequency [ ] hematuria [ ] discharge [ ] flank pain  [ ] incontinence  Musculoskeletal:  [ ] myalgias [ ] arthralgias [ ] arthritis  [ x] back pain  Neurological:  [ ] headache [ ] seizures  [ ] confusion/altered mental status  Psychiatric:  [ ] anxiety [ ] depression	  Hematology/Lymphatics:  [ ] lymphadenopathy  Endocrine:  [ ] adrenal [ ] thyroid  Allergic/Immunologic:	 [ ] transplant [ ] seasonal    Vital Signs Last 24 Hrs  T(F): 97.2 (02-24-24 @ 11:44), Max: 98.6 (02-22-24 @ 21:05)  Vital Signs Last 24 Hrs  HR: 46 (02-24-24 @ 11:44) (42 - 51)  BP: 133/56 (02-24-24 @ 11:44) (133/56 - 171/65)  RR: 18 (02-24-24 @ 11:44)  SpO2: 92% (02-24-24 @ 11:44) (92% - 94%)  Wt(kg): --    Physical Exam:  Constitutional:  well preserved, comfortable  Head/Eyes: no icterus  ENT:  supple, no cervical lymphadenopathy   LUNGS:  CTA  CVS:  regular rhythm, no murmur  Abd:  soft, non-tender; non-distended  Ext:  no edema  Vascular:  IV site no erythema tenderness or discharge  MSK:  +TTP at L PSIS, mild TTP lower lumbar  Neuro: AAO X 3, non- focal                              7.7    6.94  )-----------( 227      ( 24 Feb 2024 07:34 )             24.5   02-24    143  |  111<H>  |  33<H>  ----------------------------<  108<H>  4.0   |  21<L>  |  1.92<H>    Ca    9.2      24 Feb 2024 07:34  Phos  3.2     02-23  Mg     1.9     02-23    TPro  5.0<L>  /  Alb  2.7<L>  /  TBili  0.3  /  DBili  x   /  AST  16  /  ALT  7<L>  /  AlkPhos  75  02-23    Urinalysis Basic - ( 24 Feb 2024 07:34 )    < from: US Abdomen Upper Quadrant Right (02.21.24 @ 17:28) >  IMPRESSION:  Mild intrahepatic ductal dilatation with dilatation of the CBD up to 12   mm consistent with CT appearance. No cholelithiasis on study.   Choledocholithiasis suspected on CT is not appreciated on ultrasound.   Consider further evaluation with MRCP.    < end of copied text >  < from: CT Angio Abdomen and Pelvis w/ IV Cont (02.21.24 @ 16:23) >  IMPRESSION:  No evidence of mesenteric ischemia.  Findings are compatible with jejunal diverticulitis. No evidence of   perforation or abscess.  Right mid renal indeterminate lesion is mildly increased in size, now   measuring 2.5 cm.  Dilated CBD measuring up to 1.7 cm with intraluminal hyperdensity   measuring 1.0 cm, likely a stone. If clinically warranted, MR abdomen can   be considered.    < end of copied text >  Color: x / Appearance: x / SG: x / pH: x  Gluc: 108 mg/dL / Ketone: x  / Bili: x / Urobili: x   Blood: x / Protein: x / Nitrite: x   Leuk Esterase: x / RBC: x / WBC x   Sq Epi: x / Non Sq Epi: x / Bacteria: x    MICROBIOLOGY:  Culture - Blood (collected 21 Feb 2024 15:34)  Source: .Blood Blood-Peripheral  Gram Stain (24 Feb 2024 00:48):    Growth in aerobic bottle: Gram Negative Rods  Preliminary Report (24 Feb 2024 04:41):    Growth in aerobic bottle: Roseomonas mucosa    Direct identification is available within approximately 3-5    hours either by Blood Panel Multiplexed PCR or Direct    MALDI-TOF. Details: [email or web address]  Organism: Blood Culture PCR (24 Feb 2024 03:09)  Organism: Blood Culture PCR (24 Feb 2024 03:09)      Method Type: PCR      -  Blood PCR Panel: NEG    Culture - Blood (collected 21 Feb 2024 15:20)  Source: .Blood Blood-Peripheral  Preliminary Report (23 Feb 2024 20:01):    No growth at 48 Hours                  RADIOLOGY:  imaging below personally reviewed and agree with findings

## 2024-02-24 NOTE — PROGRESS NOTE ADULT - PROBLEM SELECTOR PLAN 5
Patient with elevated troponin 75, likely in setting of infection + DIOGENES, low suspicion for cardiac process  EKG reviewed showing aflutter with left axis deviation, no signs of acute ischemic change US with intrahepatic ductal dilatation with dilatation of the CBD up to 12 without stones on study  CT with dilated CBD measuring up to 1.7 cm with intraluminal hyperdensity measuring 1.0 cm, likely a stone.  Bili and LFTs not elevated, hold off on MRCP for now

## 2024-02-24 NOTE — PROVIDER CONTACT NOTE (OTHER) - ACTION/TREATMENT ORDERED:
ok to lower HR parameters
Provider notified, reschedule 0600 medications to 0800. EKG to be done when pt is more corporative.

## 2024-02-24 NOTE — PROGRESS NOTE ADULT - SUBJECTIVE AND OBJECTIVE BOX
[facility] Division of Hospital Medicine  Aisha Holt MD  Available on TEAMS 8a-8p      Patient is a 89y old  Female who presents with a chief complaint of     SUBJECTIVE / OVERNIGHT EVENTS: No acute events. Several nonspecific complaints, mostly about her dislike of the food  REVIEW OF SYSTEMS: Otherwise negative    MEDICATIONS  (STANDING):  cefTRIAXone   IVPB 2000 milliGRAM(s) IV Intermittent every 24 hours  ferrous    sulfate 325 milliGRAM(s) Oral daily  latanoprost 0.005% Ophthalmic Solution 1 Drop(s) Both EYES at bedtime  metroNIDAZOLE    Tablet 500 milliGRAM(s) Oral every 8 hours  polyethylene glycol 3350 17 Gram(s) Oral daily  senna 2 Tablet(s) Oral at bedtime  sucralfate suspension 1 Gram(s) Oral <User Schedule>    MEDICATIONS  (PRN):  acetaminophen     Tablet .. 650 milliGRAM(s) Oral every 6 hours PRN Temp greater or equal to 38C (100.4F), Mild Pain (1 - 3)  aluminum hydroxide/magnesium hydroxide/simethicone Suspension 30 milliLiter(s) Oral every 4 hours PRN Dyspepsia  melatonin 3 milliGRAM(s) Oral at bedtime PRN Insomnia  ondansetron Injectable 4 milliGRAM(s) IV Push every 8 hours PRN Nausea and/or Vomiting      PHYSICAL EXAM:  T(C): 36.2 (02-24-24 @ 11:44), Max: 36.9 (02-23-24 @ 21:47)  T(F): 97.2 (02-24-24 @ 11:44), Max: 98.5 (02-23-24 @ 21:47)  HR: 46 (02-24-24 @ 11:44) (42 - 51)  BP: 133/56 (02-24-24 @ 11:44) (133/56 - 171/65)  RR: 18 (02-24-24 @ 11:44) (18 - 18)  SpO2: 92% (02-24-24 @ 11:44) (92% - 94%)  Wt(kg): --      CONSTITUTIONAL: Well appearing elderly female in NAD  RESPIRATORY: Normal respiratory effort; lungs are clear to auscultation bilaterally  CARDIOVASCULAR: Regular rate and rhythm, normal S1 and S2, no murmur/rub/gallop, 1+ edema bilaterally, chronic  ABDOMEN: Nontender to palpation, soft, nondistended  PSYCH: A+O to person, place, and time; affect appropriate  SKIN: No rashes; no palpable lesions    LABS: reviewed                                         7.7    6.94  )-----------( 227      ( 24 Feb 2024 07:34 )             24.5       02-24    143  |  111<H>  |  33<H>  ----------------------------<  108<H>  4.0   |  21<L>  |  1.92<H>    Ca    9.2      24 Feb 2024 07:34  Phos  3.2     02-23  Mg     1.9     02-23    TPro  5.0<L>  /  Alb  2.7<L>  /  TBili  0.3  /  DBili  x   /  AST  16  /  ALT  7<L>  /  AlkPhos  75  02-23              Urinalysis Basic - ( 24 Feb 2024 07:34 )    Color: x / Appearance: x / SG: x / pH: x  Gluc: 108 mg/dL / Ketone: x  / Bili: x / Urobili: x   Blood: x / Protein: x / Nitrite: x   Leuk Esterase: x / RBC: x / WBC x   Sq Epi: x / Non Sq Epi: x / Bacteria: x                  CAPILLARY BLOOD GLUCOSE               [facility] Division of Hospital Medicine  Aisha Holt MD  Available on TEAMS 8a-8p      Patient is a 89y old  Female who presents with a chief complaint of Abd pain    SUBJECTIVE / OVERNIGHT EVENTS: No acute events. Several nonspecific complaints, mostly about her dislike of the food  REVIEW OF SYSTEMS: Otherwise negative    MEDICATIONS  (STANDING):  cefTRIAXone   IVPB 2000 milliGRAM(s) IV Intermittent every 24 hours  ferrous    sulfate 325 milliGRAM(s) Oral daily  latanoprost 0.005% Ophthalmic Solution 1 Drop(s) Both EYES at bedtime  metroNIDAZOLE    Tablet 500 milliGRAM(s) Oral every 8 hours  polyethylene glycol 3350 17 Gram(s) Oral daily  senna 2 Tablet(s) Oral at bedtime  sucralfate suspension 1 Gram(s) Oral <User Schedule>    MEDICATIONS  (PRN):  acetaminophen     Tablet .. 650 milliGRAM(s) Oral every 6 hours PRN Temp greater or equal to 38C (100.4F), Mild Pain (1 - 3)  aluminum hydroxide/magnesium hydroxide/simethicone Suspension 30 milliLiter(s) Oral every 4 hours PRN Dyspepsia  melatonin 3 milliGRAM(s) Oral at bedtime PRN Insomnia  ondansetron Injectable 4 milliGRAM(s) IV Push every 8 hours PRN Nausea and/or Vomiting      PHYSICAL EXAM:  T(C): 36.2 (02-24-24 @ 11:44), Max: 36.9 (02-23-24 @ 21:47)  T(F): 97.2 (02-24-24 @ 11:44), Max: 98.5 (02-23-24 @ 21:47)  HR: 46 (02-24-24 @ 11:44) (42 - 51)  BP: 133/56 (02-24-24 @ 11:44) (133/56 - 171/65)  RR: 18 (02-24-24 @ 11:44) (18 - 18)  SpO2: 92% (02-24-24 @ 11:44) (92% - 94%)  Wt(kg): --      CONSTITUTIONAL: Well appearing elderly female in NAD  RESPIRATORY: Normal respiratory effort; lungs are clear to auscultation bilaterally  CARDIOVASCULAR: Regular rate and rhythm, normal S1 and S2, no murmur/rub/gallop, 1+ edema bilaterally, chronic  ABDOMEN: Nontender to palpation, soft, nondistended  PSYCH: A+O to person, place, and time; affect appropriate  SKIN: No rashes; no palpable lesions    LABS: reviewed                                         7.7    6.94  )-----------( 227      ( 24 Feb 2024 07:34 )             24.5       02-24    143  |  111<H>  |  33<H>  ----------------------------<  108<H>  4.0   |  21<L>  |  1.92<H>    Ca    9.2      24 Feb 2024 07:34  Phos  3.2     02-23  Mg     1.9     02-23    TPro  5.0<L>  /  Alb  2.7<L>  /  TBili  0.3  /  DBili  x   /  AST  16  /  ALT  7<L>  /  AlkPhos  75  02-23              Urinalysis Basic - ( 24 Feb 2024 07:34 )    Color: x / Appearance: x / SG: x / pH: x  Gluc: 108 mg/dL / Ketone: x  / Bili: x / Urobili: x   Blood: x / Protein: x / Nitrite: x   Leuk Esterase: x / RBC: x / WBC x   Sq Epi: x / Non Sq Epi: x / Bacteria: x                  CAPILLARY BLOOD GLUCOSE

## 2024-02-24 NOTE — PROGRESS NOTE ADULT - PROBLEM SELECTOR PLAN 10
- DVT PPx: Lovenox  - Dispo: Pending improvement  - PT eval Afib stable not on AC, stopped AC 1/2019    Plan  - monitor on tele

## 2024-02-24 NOTE — CONSULT NOTE ADULT - ATTENDING COMMENTS
89F with hypertension, B12 deficiency, colon cancer 6 years ago not requiring chemotherapy, gallstones, GERD, lymphedema of both lower extremities, and hard of hearing, initially presented with right-sided abdominal pain worsening after breakfast with nausea, found to be afebrile, WBC 11, CT AP with jejunal diverticulitis without perforation or abscess, US abdomen with mild CBD dilatation, BCx with GNR, Roseomonas mucosa 1 out of 4, ID consulted for assistance.    Reports that her abdominal pain has resolved, but now has L hip and back pain worsening since yesterday  Denies any fever or chills  No recent trauma  Denies cough, dysuria, diarrhea, n/v, URI symptoms  PCN allergy listed, but cannot recall reaction, states it occurred years ago    ANTIBIOTIC:  CTX (2/22 ---> )  Flagyl (2/22 ---> )    IMPRESSION:  #Jejunal Diverticulitis  #Roseomonas mucosa in Blood  #Back Pain, L Hip Pain  - symptoms of diverticulitis resolved  - unclear if Roseomonas is real pathogen or contaminant (part of human skin microbiome, also in soil and water environment)  - Roseomonas often resistant to 2nd/3rd Gen Cephalosporin, which this patient has been on CTX throughout, yet remains afebrile without leukocytosis, favoring contaminant  - back pain ?msk or something else    SUGGESTIONS:  - continue CTX and Flagyl   - monitor temperature curve  - trend WBC  - obtain repeat BCx x2  - obtain XR Lumbar spine  - obtain XR Pelvis  - may need MRI of LS spine and MRI of pelvis if pain persists     Paige Matthews M.D. ,   please reach via teams   If no answer, or after 5PM/ weekends,  then please call  [phone]    Assessment and plan discussed with the primary team .    ID service will be covering over the weekend. Please call for acute issues or questions. [phone]

## 2024-02-24 NOTE — CONSULT NOTE ADULT - ASSESSMENT
WORK UP:      ANTIBIOTIC:      IMPRESSION:        SUGGESTIONS:        Above recommendations are Preliminary until Attending's Addendum which includes Final Recommendations      Ap Sargent DO, PGY-5   ID fellow  [employer] Teams Preferred  After 5pm/weekends call [phone]   89F with hypertension, B12 deficiency, colon cancer 6 years ago not requiring chemotherapy, gallstones, GERD, lymphedema of both lower extremities, and hard of hearing, initially presented with right-sided abdominal pain worsening after breakfast with nausea, found to be afebrile, WBC 11, CT AP with jejunal diverticulitis without perforation or abscess, US abdomen with mild CBD dilatation, BCx with GNR, Roseomonas mucosa 1 out of 4, ID consulted for assistance.    Reports that her abdominal pain has resolved, but now has L hip and back pain worsening since yesterday  Denies any fever or chills  No recent trauma  Denies cough, dysuria, diarrhea, n/v, URI symptoms  PCN allergy listed, but cannot recall reaction, states it occurred years ago    ANTIBIOTIC:  CTX (2/22 ---> )  Flagyl (2/22 ---> )    IMPRESSION:  #Jejunal Diverticulitis  #Roseomonas mucosa in Blood  #Back Pain, L Hip Pain  - symptoms of diverticulitis resolved  - unclear if Roseomonas is real pathogen or contaminant (part of human skin microbiome, also in soil and water environment)  - Roseomonas often resistant to 2nd/3rd Gen Cephalosporin, which this patient has been on CTX throughout, yet remains afebrile without leukocytosis, favoring contaminant  - back pain ?msk or something else    SUGGESTIONS:  - continue CTX and Flagyl   - monitor temperature curve  - trend WBC  - obtain repeat BCx x2  - obtain XR Lumbar spine  - obtain XR Pelvis      Above recommendations are Preliminary until Attending's Addendum which includes Final Recommendations      Ap Sargent DO, PGY-5   ID fellow  [employer] Teams Preferred  After 5pm/weekends call [phone]   89F with hypertension, B12 deficiency, colon cancer 6 years ago not requiring chemotherapy, gallstones, GERD, lymphedema of both lower extremities, and hard of hearing, initially presented with right-sided abdominal pain worsening after breakfast with nausea, found to be afebrile, WBC 11, CT AP with jejunal diverticulitis without perforation or abscess, US abdomen with mild CBD dilatation, BCx with GNR, Roseomonas mucosa 1 out of 4, ID consulted for assistance.    Reports that her abdominal pain has resolved, but now has L hip and back pain worsening since yesterday  Denies any fever or chills  No recent trauma  Denies cough, dysuria, diarrhea, n/v, URI symptoms  PCN allergy listed, but cannot recall reaction, states it occurred years ago    ANTIBIOTIC:  CTX (2/22 ---> )  Flagyl (2/22 ---> )    IMPRESSION:  #Jejunal Diverticulitis  #Roseomonas mucosa in Blood  #Back Pain, L Hip Pain  - symptoms of diverticulitis resolved  - unclear if Roseomonas is real pathogen or contaminant (part of human skin microbiome, also in soil and water environment)  - Roseomonas often resistant to 2nd/3rd Gen Cephalosporin, which this patient has been on CTX throughout, yet remains afebrile without leukocytosis, favoring contaminant  - back pain ?msk or something else    SUGGESTIONS:  - continue CTX and Flagyl   - monitor temperature curve  - trend WBC  - obtain repeat BCx x2  - obtain XR Lumbar spine  - obtain XR Pelvis    Case d/w attending and primary team      Ap Sargent DO, PGY-5   ID fellow  [employer] Teams Preferred  After 5pm/weekends call [phone]

## 2024-02-24 NOTE — PROGRESS NOTE ADULT - PROBLEM SELECTOR PLAN 8
Hold home carafate BP elevated  Start amlodipine 2.5mg daily  resume home lasix 20mg TIW tomorrow if sCr continues to rise

## 2024-02-24 NOTE — PROGRESS NOTE ADULT - NSPROGADDITIONALINFOA_GEN_ALL_CORE
discussed with getachew Sandhu via phonecall 2/23    51 minutes spent on total encounter. The necessity of the time spent during the encounter on this date of service was due to:     - preparing to see the patient (eg, review of tests, documents)  - performing a medically appropriate examination and/or evaluation  - referring and communicating with other health care professionals  - documenting clinical information in the electronic or other health record  - care coordination.

## 2024-02-25 NOTE — PROGRESS NOTE ADULT - PROBLEM SELECTOR PLAN 11
- DVT PPx: SCDs  - Dispo: Likely to home with HPT 2/26 if sCr improves further; lives alone but has 24hr aide - DVT PPx: SCDs  - Dispo: Likely to home with HPT 2/26 if sCr improves further; lives alone but has 24hr aide; f/u XRays requested by ID for back pain

## 2024-02-25 NOTE — PROGRESS NOTE ADULT - PROBLEM SELECTOR PROBLEM 7
Lisinopril-hydrochlorothiazide    LOV  1-5-23    LAST LAB  12-30-22 BMP Creatinine 1.33    LAST RX  1-4-23 #180    Next OV   Future Appointments   Date Time Provider Oren Jenni   6/2/2023  8:40 AM Danyelle Garcia MD [facility]         PROTOCOL  Hypertension Medications Protocol Passed 04/02/2023 03:30 AM   Protocol Details  CMP or BMP in past 12 months    Last serum creatinine< 2.0    Appointment in past 6 or next 3 months Anemia

## 2024-02-25 NOTE — PROGRESS NOTE ADULT - PROBLEM SELECTOR PLAN 7
Likely secondary to severe iron deficiency despite oral iron supplementation  Repeat CBC stable; no need to repeat CBC 2/26  Transfuse if Hgb <7  Cont PO iron for now; consider IV iron infusions as OP

## 2024-02-25 NOTE — PROGRESS NOTE ADULT - PROBLEM SELECTOR PLAN 2
BCx with GNR, Roseomonas mucosa 1 out of 4  ID recs appreciated, likely contaminant  Repeat blood cx sent 2/24  On abx as above

## 2024-02-25 NOTE — PROGRESS NOTE ADULT - SUBJECTIVE AND OBJECTIVE BOX
[facility] Division of Hospital Medicine  Aisha Holt MD  Available on TEAMS 8a-8p      Patient is a 89y old  Female who presents with a chief complaint of Abd pain    SUBJECTIVE / OVERNIGHT EVENTS: No acute events. Several nonspecific complaints, mostly about her dislike of the food  REVIEW OF SYSTEMS: Otherwise negative    MEDICATIONS  (STANDING):  cefTRIAXone   IVPB 2000 milliGRAM(s) IV Intermittent every 24 hours  ferrous    sulfate 325 milliGRAM(s) Oral daily  latanoprost 0.005% Ophthalmic Solution 1 Drop(s) Both EYES at bedtime  metroNIDAZOLE    Tablet 500 milliGRAM(s) Oral every 8 hours  polyethylene glycol 3350 17 Gram(s) Oral daily  senna 2 Tablet(s) Oral at bedtime  sucralfate suspension 1 Gram(s) Oral <User Schedule>    MEDICATIONS  (PRN):  acetaminophen     Tablet .. 650 milliGRAM(s) Oral every 6 hours PRN Temp greater or equal to 38C (100.4F), Mild Pain (1 - 3)  aluminum hydroxide/magnesium hydroxide/simethicone Suspension 30 milliLiter(s) Oral every 4 hours PRN Dyspepsia  melatonin 3 milliGRAM(s) Oral at bedtime PRN Insomnia  ondansetron Injectable 4 milliGRAM(s) IV Push every 8 hours PRN Nausea and/or Vomiting      PHYSICAL EXAM:  Vital Signs Last 24 Hrs  T(C): 36.9 (25 Feb 2024 12:58), Max: 36.9 (25 Feb 2024 12:58)  T(F): 98.4 (25 Feb 2024 12:58), Max: 98.4 (25 Feb 2024 12:58)  HR: 48 (25 Feb 2024 12:58) (46 - 49)  BP: 149/68 (25 Feb 2024 12:58) (149/65 - 170/90)  BP(mean): --  RR: 18 (25 Feb 2024 12:58) (18 - 18)  SpO2: 92% (25 Feb 2024 12:58) (91% - 92%)    Parameters below as of 25 Feb 2024 12:58  Patient On (Oxygen Delivery Method): room air        CONSTITUTIONAL: Well appearing elderly female in NAD  RESPIRATORY: Normal respiratory effort; lungs are clear to auscultation bilaterally  CARDIOVASCULAR: Regular rate and rhythm, normal S1 and S2, no murmur/rub/gallop, 1+ edema bilaterally, chronic  ABDOMEN: Nontender to palpation, soft, nondistended  PSYCH: A+O to person, place, and time; affect appropriate  SKIN: No rashes; no palpable lesions    LABS: reviewed                                           8.5    8.33  )-----------( 256      ( 25 Feb 2024 09:19 )             28.0       02-25    143  |  112<H>  |  32<H>  ----------------------------<  103<H>  3.9   |  22  |  1.81<H>    Ca    9.0      25 Feb 2024 09:19                Urinalysis Basic - ( 25 Feb 2024 09:19 )    Color: x / Appearance: x / SG: x / pH: x  Gluc: 103 mg/dL / Ketone: x  / Bili: x / Urobili: x   Blood: x / Protein: x / Nitrite: x   Leuk Esterase: x / RBC: x / WBC x   Sq Epi: x / Non Sq Epi: x / Bacteria: x                  CAPILLARY BLOOD GLUCOSE

## 2024-02-25 NOTE — PROGRESS NOTE ADULT - NSPROGADDITIONALINFOA_GEN_ALL_CORE
discussed with getachew Sandhu via phonecall 2/23    41 minutes spent on total encounter. The necessity of the time spent during the encounter on this date of service was due to:     - preparing to see the patient (eg, review of tests, documents)  - performing a medically appropriate examination and/or evaluation  - referring and communicating with other health care professionals  - documenting clinical information in the electronic or other health record  - care coordination. discussed with getachew Sandhu via phonecall 2/23      41 minutes spent on total encounter. The necessity of the time spent during the encounter on this date of service was due to:     - preparing to see the patient (eg, review of tests, documents)  - performing a medically appropriate examination and/or evaluation  - referring and communicating with other health care professionals  - documenting clinical information in the electronic or other health record  - care coordination.

## 2024-02-26 NOTE — PROGRESS NOTE ADULT - SUBJECTIVE AND OBJECTIVE BOX
Patient is a 89y old  Female who presents with a chief complaint of abd pain (26 Feb 2024 15:31)    Being followed by ID for        Interval history:  No other acute events      ROS:  No cough,SOB,CP  No N/V/D  No abd pain  No urinary complaints  No HA  No joint or limb pain  No other complaints    PAST MEDICAL & SURGICAL HISTORY:  Hypertension      Gallstone      GERD (Gastroesophageal Reflux Disease)      Hard of Hearing      Colon cancer  6 yrs ago. did not require chemo      Lymphedema, limb  BLE      B12 deficiency      S/P CAREY (Total Abdominal Hysterectomy)      S/P colectomy  partial colectomy due to colon ca      H/O exploratory laparotomy  for SBO?      History of open reduction and internal fixation (ORIF) procedure        Allergies    penicillin (Unknown)    Intolerances      Antimicrobials:    cefTRIAXone   IVPB 2000 milliGRAM(s) IV Intermittent every 24 hours  metroNIDAZOLE    Tablet 500 milliGRAM(s) Oral every 8 hours    MEDICATIONS  (STANDING):  albuterol/ipratropium for Nebulization 3 milliLiter(s) Nebulizer every 6 hours  amLODIPine   Tablet 2.5 milliGRAM(s) Oral daily  cefTRIAXone   IVPB 2000 milliGRAM(s) IV Intermittent every 24 hours  ferrous    sulfate 325 milliGRAM(s) Oral daily  gabapentin 100 milliGRAM(s) Oral every 8 hours  latanoprost 0.005% Ophthalmic Solution 1 Drop(s) Both EYES at bedtime  lidocaine   4% Patch 1 Patch Transdermal daily  metroNIDAZOLE    Tablet 500 milliGRAM(s) Oral every 8 hours  polyethylene glycol 3350 17 Gram(s) Oral daily  senna 2 Tablet(s) Oral at bedtime  sodium chloride 0.9%. 500 milliLiter(s) (50 mL/Hr) IV Continuous <Continuous>  sodium chloride 0.9%. 500 milliLiter(s) (50 mL/Hr) IV Continuous <Continuous>  sucralfate suspension 1 Gram(s) Oral <User Schedule>      Vital Signs Last 24 Hrs  T(C): 36.3 (02-26-24 @ 11:23), Max: 36.8 (02-25-24 @ 20:53)  T(F): 97.4 (02-26-24 @ 11:23), Max: 98.2 (02-25-24 @ 20:53)  HR: 47 (02-26-24 @ 11:23) (45 - 47)  BP: 153/68 (02-26-24 @ 11:23) (120/69 - 153/68)  BP(mean): --  RR: 18 (02-26-24 @ 11:23) (18 - 18)  SpO2: 92% (02-26-24 @ 11:23) (91% - 92%)    Physical Exam:    Constitutional well preserved,comfortable,pleasant    HEENT PERRLA EOMI,No pallor or icterus    No oral exudate or erythema    Neck supple no JVD or LN    Chest Good AE,CTA    CVS RRR S1 S2 WNl No murmur or rub or gallop    Abd soft BS normal No tenderness no masses    Ext No cyanosis clubbing or edema    IV site no erythema tenderness or discharge    Joints no swelling or LOM    CNS AAO X 3 no focal    Lab Data:                          8.5    8.33  )-----------( 256      ( 25 Feb 2024 09:19 )             28.0       02-26    142  |  112<H>  |  28<H>  ----------------------------<  131<H>  3.6   |  19<L>  |  1.67<H>    Ca    9.0      26 Feb 2024 11:56        Urinalysis Basic - ( 26 Feb 2024 11:56 )    Color: x / Appearance: x / SG: x / pH: x  Gluc: 131 mg/dL / Ketone: x  / Bili: x / Urobili: x   Blood: x / Protein: x / Nitrite: x   Leuk Esterase: x / RBC: x / WBC x   Sq Epi: x / Non Sq Epi: x / Bacteria: x        .Blood Blood-Peripheral  02-24-24   No growth at 48 Hours  --  --      .Blood Blood-Peripheral  02-24-24   No growth at 48 Hours  --  --      .Blood Blood-Peripheral  02-21-24   Growth in aerobic bottle: Roseomonas mucosa "Susceptibilities not  performed"  Direct identification is available within approximately 3-5  hours either by Blood Panel Multiplexed PCR or Direct  MALDI-TOF. Details: [email or web address]  --  Blood Culture PCR      .Blood Blood-Peripheral  02-21-24   No growth at 4 days  --  --                    WBC Count: 8.33 (02-25-24 @ 09:19)  WBC Count: 6.94 (02-24-24 @ 07:34)  WBC Count: 5.70 (02-23-24 @ 06:54)  WBC Count: 9.38 (02-22-24 @ 12:40)  WBC Count: 6.74 (02-22-24 @ 05:38)  WBC Count: 11.39 (02-21-24 @ 15:35)       Bilirubin Total: 0.3 mg/dL (02-23-24 @ 06:54)  Aspartate Aminotransferase (AST/SGOT): 16 U/L (02-23-24 @ 06:54)  Alanine Aminotransferase (ALT/SGPT): 7 U/L (02-23-24 @ 06:54)  Alkaline Phosphatase: 75 U/L (02-23-24 @ 06:54)  Bilirubin Total: 0.4 mg/dL (02-22-24 @ 05:38)  Aspartate Aminotransferase (AST/SGOT): 13 U/L (02-22-24 @ 05:38)  Alanine Aminotransferase (ALT/SGPT): 9 U/L (02-22-24 @ 05:38)  Alkaline Phosphatase: 64 U/L (02-22-24 @ 05:38)         Patient is a 89y old  Female who presents with a chief complaint of abd pain (26 Feb 2024 15:31)    Being followed by ID for bacteremia        Interval history:  pt c/o sinus congestion  pt denies diarrhea  pt notes some hip pain  breathing stable  No other acute events      PAST MEDICAL & SURGICAL HISTORY:  Hypertension      Gallstone      GERD (Gastroesophageal Reflux Disease)      Hard of Hearing      Colon cancer  6 yrs ago. did not require chemo      Lymphedema, limb  BLE      B12 deficiency      S/P CAREY (Total Abdominal Hysterectomy)      S/P colectomy  partial colectomy due to colon ca      H/O exploratory laparotomy  for SBO?      History of open reduction and internal fixation (ORIF) procedure        Allergies    penicillin (Unknown)    Intolerances      Antimicrobials:    cefTRIAXone   IVPB 2000 milliGRAM(s) IV Intermittent every 24 hours  metroNIDAZOLE    Tablet 500 milliGRAM(s) Oral every 8 hours    MEDICATIONS  (STANDING):  albuterol/ipratropium for Nebulization 3 milliLiter(s) Nebulizer every 6 hours  amLODIPine   Tablet 2.5 milliGRAM(s) Oral daily  cefTRIAXone   IVPB 2000 milliGRAM(s) IV Intermittent every 24 hours  ferrous    sulfate 325 milliGRAM(s) Oral daily  gabapentin 100 milliGRAM(s) Oral every 8 hours  latanoprost 0.005% Ophthalmic Solution 1 Drop(s) Both EYES at bedtime  lidocaine   4% Patch 1 Patch Transdermal daily  metroNIDAZOLE    Tablet 500 milliGRAM(s) Oral every 8 hours  polyethylene glycol 3350 17 Gram(s) Oral daily  senna 2 Tablet(s) Oral at bedtime  sodium chloride 0.9%. 500 milliLiter(s) (50 mL/Hr) IV Continuous <Continuous>  sodium chloride 0.9%. 500 milliLiter(s) (50 mL/Hr) IV Continuous <Continuous>  sucralfate suspension 1 Gram(s) Oral <User Schedule>      Vital Signs Last 24 Hrs  T(C): 36.3 (02-26-24 @ 11:23), Max: 36.8 (02-25-24 @ 20:53)  T(F): 97.4 (02-26-24 @ 11:23), Max: 98.2 (02-25-24 @ 20:53)  HR: 47 (02-26-24 @ 11:23) (45 - 47)  BP: 153/68 (02-26-24 @ 11:23) (120/69 - 153/68)  BP(mean): --  RR: 18 (02-26-24 @ 11:23) (18 - 18)  SpO2: 92% (02-26-24 @ 11:23) (91% - 92%)    Physical Exam:    Constitutional well preserved,comfortable,pleasant    HEENT PERRLA EOMI,No pallor or icterus    oropharynx dry    Neck supple no JVD or LN    Chest Good AE,CTA    CVS  S1 S2     Abd soft BS normal No tenderness    Ext edema    IV site no erythema tenderness or discharge    Joints no swelling or LOM        Lab Data:                          8.5    8.33  )-----------( 256      ( 25 Feb 2024 09:19 )             28.0       02-26    142  |  112<H>  |  28<H>  ----------------------------<  131<H>  3.6   |  19<L>  |  1.67<H>    Ca    9.0      26 Feb 2024 11:56              .Blood Blood-Peripheral  02-24-24   No growth at 48 Hours  --  --      .Blood Blood-Peripheral  02-24-24   No growth at 48 Hours  --  --      .Blood Blood-Peripheral  02-21-24   Growth in aerobic bottle: Roseomonas mucosa "Susceptibilities not  performed"  Direct identification is available within approximately 3-5  hours either by Blood Panel Multiplexed PCR or Direct  MALDI-TOF. Details: [email or web address]  --  Blood Culture PCR      .Blood Blood-Peripheral  02-21-24   No growth at 4 days  --  --      < from: CT Angio Abdomen and Pelvis w/ IV Cont (02.21.24 @ 16:23) >  ACC: [id number] EXAM:  CT ANGIO ABD PELV (W)AW IC   ORDERED BY:  KENNY CARLSON     PROCEDURE DATE:  02/21/2024          INTERPRETATION:  CLINICAL INFORMATION: Right-sided abdominal pain after   breakfast, nonbilious vomiting    COMPARISON: CT abdomen pelvis 11/23/2018    CONTRAST/COMPLICATIONS:  IV Contrast: Omnipaque 350  90 cc administered   10 cc discarded  Oral Contrast: NONE  Complications: None reported at time of study completion    PROCEDURE:  CT Angiography of the Abdomen and Pelvis was performed.  Arterial and venous phases were acquired.  Sagittal and coronal reformats were performed as well as 3D (MIP)   reconstructions.    FINDINGS:  LOWER CHEST: Moderate left and small right pleural effusions.   Cardiomegaly.    LIVER: Within normal limits.  BILE DUCTS: Dilated common bile duct measuring up to 1.7 cm in diameter.   A hyperdensity measuring 1.0 cm within the distal CBD, which may   represent stone.  GALLBLADDER: Distended.  SPLEEN: Within normal limits.  PANCREAS: Within normal limits.  ADRENALS: Within normal limits.  KIDNEYS/URETERS: Right mid renal indeterminate lesion is mildly increased   in size measuring 2.5 cm. Additional renal cysts and hypodensities too   small to characterize are unchanged.    BLADDER: Within normal limits.  REPRODUCTIVE ORGANS: Hysterectomy.    BOWEL: Right hemicolectomy. No bowel obstruction. No evidence of   mesenteric ischemia. Multiple jejunal outpouchings, likely representing   diverticulosis with surrounding fat stranding (601-37, 304-68). No   discrete evidence of perforation or extraluminal abscess. Colonic   diverticulosis.  PERITONEUM: No ascites.  VESSELS: Atherosclerotic changes.  RETROPERITONEUM/LYMPH NODES: No lymphadenopathy.  ABDOMINAL WALL: Within normal limits.  BONES: Left proximal femur ORIF. Mild scoliosis. Degenerative changes.    IMPRESSION:  No evidence of mesenteric ischemia.  Findings are compatible with jejunal diverticulitis. No evidence of   perforation or abscess.  Right mid renal indeterminate lesion is mildly increased in size, now   measuring 2.5 cm.  Dilated CBD measuring up to 1.7 cm with intraluminal hyperdensity   measuring 1.0 cm, likely a stone. If clinically warranted, MR abdomen can   be considered.        --- End of Report ---    < end of copied text >    < from: US Abdomen Upper Quadrant Right (02.21.24 @ 17:28) >  IMPRESSION:  Mild intrahepatic ductal dilatation with dilatation of the CBD up to 12   mm consistent with CT appearance. No cholelithiasis on study.   Choledocholithiasis suspected on CT is not appreciated on ultrasound.   Consider further evaluation with MRCP.    --- End of Report ---      < end of copied text >      < from: Xray Pelvis AP only (02.26.24 @ 10:59) >  IMPRESSION:    No acute fracture. Mild degenerative changes of both hips.    --- End of Report ---    < end of copied text >      < from: Xray Lumbar Spine AP + Lateral (02.26.24 @ 10:58) >    INTERPRETATION:  HISTORY: Back pain, bacteremia    TECHNIQUE: 2 views of the lumbar spine    COMPARISON: CT dated 2/1/2021    IMPRESSION:    Evaluation of the lumbar spine is limited due to overlying soft tissues   on the lateral view.    Stable mild compression deformity of L1 when compared to prior CT dated   2/1/2021. Dextroscoliosis of the lumbar spine. Multilevel degenerative   disc disease and facet arthrosis throughout the lumbar spine.    Surgical clips throughout the pelvis.    Please note that MRI would be a more sensitive and specific test to   evaluate for septic arthritis/osteomyelitis.    --- End of Report ---    < end of copied text >      WBC Count: 8.33 (02-25-24 @ 09:19)  WBC Count: 6.94 (02-24-24 @ 07:34)  WBC Count: 5.70 (02-23-24 @ 06:54)  WBC Count: 9.38 (02-22-24 @ 12:40)  WBC Count: 6.74 (02-22-24 @ 05:38)  WBC Count: 11.39 (02-21-24 @ 15:35)       Bilirubin Total: 0.3 mg/dL (02-23-24 @ 06:54)  Aspartate Aminotransferase (AST/SGOT): 16 U/L (02-23-24 @ 06:54)  Alanine Aminotransferase (ALT/SGPT): 7 U/L (02-23-24 @ 06:54)  Alkaline Phosphatase: 75 U/L (02-23-24 @ 06:54)  Bilirubin Total: 0.4 mg/dL (02-22-24 @ 05:38)  Aspartate Aminotransferase (AST/SGOT): 13 U/L (02-22-24 @ 05:38)  Alanine Aminotransferase (ALT/SGPT): 9 U/L (02-22-24 @ 05:38)  Alkaline Phosphatase: 64 U/L (02-22-24 @ 05:38)      < from: US Renal (02.24.24 @ 21:33) >    Bilateral pleural effusions are present.    IMPRESSION:  Limited exam.    No hydronephrosis right kidney. Simple right renal cysts.    Left kidney and bladder are not evaluated.    < end of copied text >

## 2024-02-26 NOTE — PROGRESS NOTE ADULT - NSPROGADDITIONALINFOA_GEN_ALL_CORE
The necessity of the time spent during the encounter on this date of service was due to:   - Ordering, reviewing, and interpreting labs, testing, and imaging.  - Independently obtaining a review of systems and performing a physical exam  - Reviewing prior hospitalization and where necessary, outpatient records.  - Counselling and educating patient and family regarding interpretation of aforementioned items and plan of care.    Time-based billing (NON-critical care). Total minutes spent: 54

## 2024-02-26 NOTE — PROGRESS NOTE ADULT - SUBJECTIVE AND OBJECTIVE BOX
Jasbir Munoz MD  Division of Hospital Medicine  Available on MS teams until 7pm  If no response or off-hours, page [phone]  -------------------------------------    Patient is a 89y old  Female who presents with a chief complaint of Abd pain (25 Feb 2024 13:56)      SUBJECTIVE / OVERNIGHT EVENTS: none acute  ADDITIONAL REVIEW OF SYSTEMS: pt repeatedly coughing and clearing her throat- complains about the food texture, otherwise abd pain improved, no other new complaints.     MEDICATIONS  (STANDING):  albuterol/ipratropium for Nebulization 3 milliLiter(s) Nebulizer every 6 hours  amLODIPine   Tablet 2.5 milliGRAM(s) Oral daily  cefTRIAXone   IVPB 2000 milliGRAM(s) IV Intermittent every 24 hours  ferrous    sulfate 325 milliGRAM(s) Oral daily  gabapentin 100 milliGRAM(s) Oral every 8 hours  latanoprost 0.005% Ophthalmic Solution 1 Drop(s) Both EYES at bedtime  lidocaine   4% Patch 1 Patch Transdermal daily  metroNIDAZOLE    Tablet 500 milliGRAM(s) Oral every 8 hours  polyethylene glycol 3350 17 Gram(s) Oral daily  senna 2 Tablet(s) Oral at bedtime  sodium chloride 0.9%. 500 milliLiter(s) (50 mL/Hr) IV Continuous <Continuous>  sodium chloride 0.9%. 500 milliLiter(s) (50 mL/Hr) IV Continuous <Continuous>  sucralfate suspension 1 Gram(s) Oral <User Schedule>    MEDICATIONS  (PRN):  acetaminophen     Tablet .. 650 milliGRAM(s) Oral every 6 hours PRN Temp greater or equal to 38C (100.4F), Mild Pain (1 - 3)  aluminum hydroxide/magnesium hydroxide/simethicone Suspension 30 milliLiter(s) Oral every 4 hours PRN Dyspepsia  melatonin 3 milliGRAM(s) Oral at bedtime PRN Insomnia  ondansetron Injectable 4 milliGRAM(s) IV Push every 8 hours PRN Nausea and/or Vomiting  oxycodone    5 mG/acetaminophen 325 mG 2 Tablet(s) Oral every 12 hours PRN Moderate Pain (4 - 6)      CAPILLARY BLOOD GLUCOSE        I&O's Summary    25 Feb 2024 07:01  -  26 Feb 2024 07:00  --------------------------------------------------------  IN: 610 mL / OUT: 250 mL / NET: 360 mL    26 Feb 2024 07:01  -  26 Feb 2024 15:32  --------------------------------------------------------  IN: 200 mL / OUT: 0 mL / NET: 200 mL        PHYSICAL EXAM:  Vital Signs Last 24 Hrs  T(C): 36.3 (26 Feb 2024 11:23), Max: 36.8 (25 Feb 2024 20:53)  T(F): 97.4 (26 Feb 2024 11:23), Max: 98.2 (25 Feb 2024 20:53)  HR: 47 (26 Feb 2024 11:23) (45 - 47)  BP: 153/68 (26 Feb 2024 11:23) (120/69 - 153/68)  BP(mean): --  RR: 18 (26 Feb 2024 11:23) (18 - 18)  SpO2: 92% (26 Feb 2024 11:23) (91% - 92%)    Parameters below as of 26 Feb 2024 11:23  Patient On (Oxygen Delivery Method): room air      CONSTITUTIONAL: NAD  EYES: PERRLA; conjunctiva and sclera clear  ENMT: MMM  NECK: Supple  RESPIRATORY: diffuse bilatearly rhonchi and predominantly L sided wheezes at the base  CARDIOVASCULAR: RRR, no JVD, no peripheral edema   ABDOMEN: Nontender to palpation, normoactive BS, no guarding/rigidity  MUSCLOSKELETAL:  no clubbing/cyanosis, no joint swelling or tenderness to palpation  PSYCH: A+O x 3, affect normal  NEUROLOGY: CN 2-12 are intact and symmetric; no gross sensory or motor deficits  SKIN: No rashes; no palpable lesions    LABS:                        8.5    8.33  )-----------( 256      ( 25 Feb 2024 09:19 )             28.0     02-26    142  |  112<H>  |  28<H>  ----------------------------<  131<H>  3.6   |  19<L>  |  1.67<H>    Ca    9.0      26 Feb 2024 11:56            Urinalysis Basic - ( 26 Feb 2024 11:56 )    Color: x / Appearance: x / SG: x / pH: x  Gluc: 131 mg/dL / Ketone: x  / Bili: x / Urobili: x   Blood: x / Protein: x / Nitrite: x   Leuk Esterase: x / RBC: x / WBC x   Sq Epi: x / Non Sq Epi: x / Bacteria: x        Culture - Blood (collected 24 Feb 2024 12:00)  Source: .Blood Blood-Peripheral  Preliminary Report (25 Feb 2024 17:01):    No growth at 24 hours    Culture - Blood (collected 24 Feb 2024 11:45)  Source: .Blood Blood-Peripheral  Preliminary Report (25 Feb 2024 17:01):    No growth at 24 hours        RADIOLOGY & ADDITIONAL TESTS:  Results Reviewed:   Imaging Personally Reviewed:  Electrocardiogram Personally Reviewed:    COORDINATION OF CARE:  Care Discussed with Consultants/Other Providers [Y/N]:  Prior or Outpatient Records Reviewed [Y/N]:

## 2024-02-26 NOTE — PROGRESS NOTE ADULT - PROBLEM SELECTOR PLAN 11
- DVT PPx: SCDs  - Dispo: Likely to home with HPT 2/26 if sCr improves and pending stable respiratory status, transition to PO abx- ancitipcate in the next day or so. Pt in agreement with plan

## 2024-02-27 NOTE — PROGRESS NOTE ADULT - PROBLEM SELECTOR PLAN 11
- DVT PPx: SCDs  - Dispo: Likely to home with HPT 2/26 if sCr improves and pending stable respiratory status, transition to PO abx and stable abdominal exam, possibly in next 1-2 days    discussed with and agreed on by granddaughter at bedside, and patient

## 2024-02-27 NOTE — PROGRESS NOTE ADULT - SUBJECTIVE AND OBJECTIVE BOX
Patient is a 89y old  Female who presents with a chief complaint of abd pain (26 Feb 2024 15:31)    Being followed by ID for        Interval history:  No other acute events      ROS:  No cough,SOB,CP  No N/V/D  No abd pain  No urinary complaints  No HA  No joint or limb pain  No other complaints    PAST MEDICAL & SURGICAL HISTORY:  Hypertension      Gallstone      GERD (Gastroesophageal Reflux Disease)      Hard of Hearing      Colon cancer  6 yrs ago. did not require chemo      Lymphedema, limb  BLE      B12 deficiency      S/P CAREY (Total Abdominal Hysterectomy)      S/P colectomy  partial colectomy due to colon ca      H/O exploratory laparotomy  for SBO?      History of open reduction and internal fixation (ORIF) procedure        Allergies    penicillin (Unknown)    Intolerances      Antimicrobials:    meropenem  IVPB        MEDICATIONS  (STANDING):  albuterol/ipratropium for Nebulization 3 milliLiter(s) Nebulizer every 6 hours  amLODIPine   Tablet 2.5 milliGRAM(s) Oral daily  ferrous    sulfate 325 milliGRAM(s) Oral daily  fluticasone propionate 50 MICROgram(s)/spray Nasal Spray 1 Spray(s) Both Nostrils two times a day  gabapentin 100 milliGRAM(s) Oral every 8 hours  latanoprost 0.005% Ophthalmic Solution 1 Drop(s) Both EYES at bedtime  lidocaine   4% Patch 1 Patch Transdermal daily  meropenem  IVPB      polyethylene glycol 3350 17 Gram(s) Oral daily  senna 2 Tablet(s) Oral at bedtime  sucralfate suspension 1 Gram(s) Oral <User Schedule>      Vital Signs Last 24 Hrs  T(C): 36.4 (02-27-24 @ 11:09), Max: 36.9 (02-27-24 @ 05:07)  T(F): 97.5 (02-27-24 @ 11:09), Max: 98.5 (02-27-24 @ 05:07)  HR: 43 (02-27-24 @ 11:09) (42 - 47)  BP: 115/76 (02-27-24 @ 11:09) (114/51 - 150/60)  BP(mean): --  RR: 18 (02-27-24 @ 11:09) (17 - 18)  SpO2: 93% (02-27-24 @ 11:09) (90% - 96%)    Physical Exam:    Constitutional well preserved,comfortable,pleasant    HEENT PERRLA EOMI,No pallor or icterus    No oral exudate or erythema    Neck supple no JVD or LN    Chest Good AE,CTA    CVS RRR S1 S2 WNl No murmur or rub or gallop    Abd soft BS normal No tenderness no masses    Ext No cyanosis clubbing or edema    IV site no erythema tenderness or discharge    Joints no swelling or LOM    CNS AAO X 3 no focal    Lab Data:        02-27    144  |  112<H>  |  27<H>  ----------------------------<  94  3.4<L>   |  20<L>  |  1.65<H>    Ca    8.8      27 Feb 2024 07:28        Urinalysis Basic - ( 27 Feb 2024 07:28 )    Color: x / Appearance: x / SG: x / pH: x  Gluc: 94 mg/dL / Ketone: x  / Bili: x / Urobili: x   Blood: x / Protein: x / Nitrite: x   Leuk Esterase: x / RBC: x / WBC x   Sq Epi: x / Non Sq Epi: x / Bacteria: x        .Blood Blood-Peripheral  02-24-24   No growth at 48 Hours  --  --      .Blood Blood-Peripheral  02-24-24   No growth at 48 Hours  --  --      .Blood Blood-Peripheral  02-21-24   Growth in aerobic bottle: Roseomonas mucosa "Susceptibilities not  performed"  Direct identification is available within approximately 3-5  hours either by Blood Panel Multiplexed PCR or Direct  MALDI-TOF. Details: [email or web address]  --  Blood Culture PCR      .Blood Blood-Peripheral  02-21-24   No growth at 5 days  --  --                    WBC Count: 8.33 (02-25-24 @ 09:19)  WBC Count: 6.94 (02-24-24 @ 07:34)  WBC Count: 5.70 (02-23-24 @ 06:54)  WBC Count: 9.38 (02-22-24 @ 12:40)  WBC Count: 6.74 (02-22-24 @ 05:38)  WBC Count: 11.39 (02-21-24 @ 15:35)       Bilirubin Total: 0.3 mg/dL (02-23-24 @ 06:54)  Aspartate Aminotransferase (AST/SGOT): 16 U/L (02-23-24 @ 06:54)  Alanine Aminotransferase (ALT/SGPT): 7 U/L (02-23-24 @ 06:54)  Alkaline Phosphatase: 75 U/L (02-23-24 @ 06:54)         Patient is a 89y old  Female who presents with a chief complaint of abd pain (26 Feb 2024 15:31)    Being followed by ID for bacteremia        Interval history:  pt c/o abdominal pain  no fever  breathing stable  No other acute events          PAST MEDICAL & SURGICAL HISTORY:  Hypertension      Gallstone      GERD (Gastroesophageal Reflux Disease)      Hard of Hearing      Colon cancer  6 yrs ago. did not require chemo      Lymphedema, limb  BLE      B12 deficiency      S/P CAREY (Total Abdominal Hysterectomy)      S/P colectomy  partial colectomy due to colon ca      H/O exploratory laparotomy  for SBO?      History of open reduction and internal fixation (ORIF) procedure        Allergies    penicillin (Unknown)    Intolerances      Antimicrobials:    meropenem  IVPB        MEDICATIONS  (STANDING):  albuterol/ipratropium for Nebulization 3 milliLiter(s) Nebulizer every 6 hours  amLODIPine   Tablet 2.5 milliGRAM(s) Oral daily  ferrous    sulfate 325 milliGRAM(s) Oral daily  fluticasone propionate 50 MICROgram(s)/spray Nasal Spray 1 Spray(s) Both Nostrils two times a day  gabapentin 100 milliGRAM(s) Oral every 8 hours  latanoprost 0.005% Ophthalmic Solution 1 Drop(s) Both EYES at bedtime  lidocaine   4% Patch 1 Patch Transdermal daily  meropenem  IVPB      polyethylene glycol 3350 17 Gram(s) Oral daily  senna 2 Tablet(s) Oral at bedtime  sucralfate suspension 1 Gram(s) Oral <User Schedule>      Vital Signs Last 24 Hrs  T(C): 36.4 (02-27-24 @ 11:09), Max: 36.9 (02-27-24 @ 05:07)  T(F): 97.5 (02-27-24 @ 11:09), Max: 98.5 (02-27-24 @ 05:07)  HR: 43 (02-27-24 @ 11:09) (42 - 47)  BP: 115/76 (02-27-24 @ 11:09) (114/51 - 150/60)  BP(mean): --  RR: 18 (02-27-24 @ 11:09) (17 - 18)  SpO2: 93% (02-27-24 @ 11:09) (90% - 96%)    Physical Exam:    Constitutional well preserved,comfortable,pleasant    HEENT PERRLA EOMI,No pallor or icterus    No oral exudate or erythema    Neck supple no JVD or LN    Chest Good AE,CTA    CVS  S1 S2    Abd soft BS normal mild left sided tenderness    Ext  edema    IV site no erythema tenderness or discharge    Joints no swelling or LOM        Lab Data:        02-27    144  |  112<H>  |  27<H>  ----------------------------<  94  3.4<L>   |  20<L>  |  1.65<H>    Ca    8.8      27 Feb 2024 07:28            .Blood Blood-Peripheral  02-24-24   No growth at 48 Hours  --  --      .Blood Blood-Peripheral  02-24-24   No growth at 48 Hours  --  --      .Blood Blood-Peripheral  02-21-24   Growth in aerobic bottle: Roseomonas mucosa "Susceptibilities not  performed"  Direct identification is available within approximately 3-5  hours either by Blood Panel Multiplexed PCR or Direct  MALDI-TOF. Details: [email or web address]  --  Blood Culture PCR      .Blood Blood-Peripheral  02-21-24   No growth at 5 days  --  --                    WBC Count: 8.33 (02-25-24 @ 09:19)  WBC Count: 6.94 (02-24-24 @ 07:34)  WBC Count: 5.70 (02-23-24 @ 06:54)  WBC Count: 9.38 (02-22-24 @ 12:40)  WBC Count: 6.74 (02-22-24 @ 05:38)  WBC Count: 11.39 (02-21-24 @ 15:35)       Bilirubin Total: 0.3 mg/dL (02-23-24 @ 06:54)  Aspartate Aminotransferase (AST/SGOT): 16 U/L (02-23-24 @ 06:54)  Alanine Aminotransferase (ALT/SGPT): 7 U/L (02-23-24 @ 06:54)  Alkaline Phosphatase: 75 U/L (02-23-24 @ 06:54)

## 2024-02-27 NOTE — PROGRESS NOTE ADULT - PROBLEM SELECTOR PLAN 2
BCx with GNR, Roseomonas mucosa 1 out of 4  ID recs appreciated, likely contaminant  Repeat blood cx sent 2/24, NGTD  On abx as above

## 2024-02-27 NOTE — PROGRESS NOTE ADULT - SUBJECTIVE AND OBJECTIVE BOX
Jasbir Munoz MD  Division of Hospital Medicine  Available on MS teams until 7pm  If no response or off-hours, page [phone]  -------------------------------------    Patient is a 89y old  Female who presents with a chief complaint of abd pain (26 Feb 2024 15:31)      SUBJECTIVE / OVERNIGHT EVENTS: none acute  ADDITIONAL REVIEW OF SYSTEMS: pt crying in pain this afternoon, noting severe stabbing epigastric pain radiating to the back, comes in waves, by the time of physical exam completion, pain had subsided. No bloody bm noted    MEDICATIONS  (STANDING):  albuterol/ipratropium for Nebulization 3 milliLiter(s) Nebulizer every 6 hours  amLODIPine   Tablet 2.5 milliGRAM(s) Oral daily  ferrous    sulfate 325 milliGRAM(s) Oral daily  fluticasone propionate 50 MICROgram(s)/spray Nasal Spray 1 Spray(s) Both Nostrils two times a day  gabapentin 100 milliGRAM(s) Oral every 8 hours  latanoprost 0.005% Ophthalmic Solution 1 Drop(s) Both EYES at bedtime  lidocaine   4% Patch 1 Patch Transdermal daily  meropenem  IVPB      meropenem  IVPB 500 milliGRAM(s) IV Intermittent once  polyethylene glycol 3350 17 Gram(s) Oral daily  senna 2 Tablet(s) Oral at bedtime  sucralfate suspension 1 Gram(s) Oral <User Schedule>    MEDICATIONS  (PRN):  acetaminophen     Tablet .. 650 milliGRAM(s) Oral every 6 hours PRN Temp greater or equal to 38C (100.4F), Mild Pain (1 - 3)  aluminum hydroxide/magnesium hydroxide/simethicone Suspension 30 milliLiter(s) Oral every 4 hours PRN Dyspepsia  melatonin 3 milliGRAM(s) Oral at bedtime PRN Insomnia  ondansetron Injectable 4 milliGRAM(s) IV Push every 8 hours PRN Nausea and/or Vomiting  oxycodone    5 mG/acetaminophen 325 mG 2 Tablet(s) Oral every 12 hours PRN Moderate Pain (4 - 6)      CAPILLARY BLOOD GLUCOSE        I&O's Summary    26 Feb 2024 07:01  -  27 Feb 2024 07:00  --------------------------------------------------------  IN: 250 mL / OUT: 0 mL / NET: 250 mL    27 Feb 2024 07:01  -  27 Feb 2024 15:44  --------------------------------------------------------  IN: 200 mL / OUT: 0 mL / NET: 200 mL        PHYSICAL EXAM:  Vital Signs Last 24 Hrs  T(C): 36.4 (27 Feb 2024 11:09), Max: 36.9 (27 Feb 2024 05:07)  T(F): 97.5 (27 Feb 2024 11:09), Max: 98.5 (27 Feb 2024 05:07)  HR: 43 (27 Feb 2024 11:09) (42 - 47)  BP: 115/76 (27 Feb 2024 11:09) (114/51 - 150/60)  BP(mean): --  RR: 18 (27 Feb 2024 11:09) (17 - 18)  SpO2: 93% (27 Feb 2024 11:09) (90% - 96%)    Parameters below as of 27 Feb 2024 11:09  Patient On (Oxygen Delivery Method): room air      CONSTITUTIONAL: patient crying in pain  EYES: PERRLA; conjunctiva and sclera clear  ENMT: MMM  NECK: Supple  RESPIRATORY: Normal respiratory effort; CTAB  CARDIOVASCULAR: RRR, no JVD, no peripheral edema   ABDOMEN: abdomen fully nontender throughout even to deep palpation  MUSCLOSKELETAL:  no clubbing/cyanosis, no joint swelling or tenderness to palpation  PSYCH: A+O x 2, affect in distress  NEUROLOGY: CN 2-12 are intact and symmetric; no gross sensory or motor deficits  SKIN: No rashes; no palpable lesions    LABS:    02-27    144  |  112<H>  |  27<H>  ----------------------------<  94  3.4<L>   |  20<L>  |  1.65<H>    Ca    8.8      27 Feb 2024 07:28            Urinalysis Basic - ( 27 Feb 2024 07:28 )    Color: x / Appearance: x / SG: x / pH: x  Gluc: 94 mg/dL / Ketone: x  / Bili: x / Urobili: x   Blood: x / Protein: x / Nitrite: x   Leuk Esterase: x / RBC: x / WBC x   Sq Epi: x / Non Sq Epi: x / Bacteria: x          RADIOLOGY & ADDITIONAL TESTS:  Results Reviewed:   Imaging Personally Reviewed:  Electrocardiogram Personally Reviewed:    COORDINATION OF CARE:  Care Discussed with Consultants/Other Providers [Y/N]: infectious disease attg  Prior or Outpatient Records Reviewed [Y/N]:

## 2024-02-28 NOTE — PROGRESS NOTE ADULT - NSPROGADDITIONALINFOA_GEN_ALL_CORE
The necessity of the time spent during the encounter on this date of service was due to:   - Ordering, reviewing, and interpreting labs, testing, and imaging.  - Independently obtaining a review of systems and performing a physical exam  - Reviewing prior hospitalization and where necessary, outpatient records.  - Counselling and educating patient and family regarding interpretation of aforementioned items and plan of care.    Time-based billing (NON-critical care). Total minutes spent: 42

## 2024-02-28 NOTE — PROGRESS NOTE ADULT - PROBLEM SELECTOR PLAN 6
Patient with elevated troponin 75, likely in setting of infection + DIOGENES, low suspicion for cardiac process  EKG reviewed showing aflutter with left axis deviation, no signs of acute ischemic change  - repeat 76; doubt that her abd pain was atypical chest pain, may stop trending

## 2024-02-28 NOTE — PROGRESS NOTE ADULT - SUBJECTIVE AND OBJECTIVE BOX
Follow Up:  bacteremia    Interval History/ROS:  Overnight: No acute events.  Patient remains afebrile.  Otherwise hemodynamically stable on room air.  Latest labs show no leukocytosis, BMP with elevated creatinine to 1.7, blood cultures from 2/24/2024 continue to be negative.  CT imaging obtained on 2/27/2024 shows small bilateral pleural effusions, complete collapse of left lower lobe with near complete collapse of right lower lobe trace ascites noted on CT abdomen.    Patient seen examined at bedside.  No new complaints.  Allergies  penicillin (Unknown)        ANTIMICROBIALS:  meropenem  IVPB 500 every 12 hours  meropenem  IVPB        OTHER MEDS:  MEDICATIONS  (STANDING):  acetaminophen     Tablet .. 650 every 6 hours PRN  albuterol/ipratropium for Nebulization 3 every 6 hours  aluminum hydroxide/magnesium hydroxide/simethicone Suspension 30 every 4 hours PRN  amLODIPine   Tablet 2.5 daily  furosemide   Injectable 20 once  gabapentin 100 every 8 hours  melatonin 3 at bedtime PRN  ondansetron Injectable 4 every 8 hours PRN  oxycodone    5 mG/acetaminophen 325 mG 2 every 12 hours PRN  polyethylene glycol 3350 17 daily  senna 2 at bedtime  sucralfate suspension 1 <User Schedule>      Vital Signs Last 24 Hrs  T(C): 36.8 (28 Feb 2024 11:41), Max: 36.9 (27 Feb 2024 20:19)  T(F): 98.2 (28 Feb 2024 11:41), Max: 98.5 (27 Feb 2024 20:19)  HR: 65 (28 Feb 2024 12:30) (47 - 65)  BP: 143/65 (28 Feb 2024 12:30) (114/70 - 153/57)  BP(mean): 89 (27 Feb 2024 20:19) (89 - 89)  RR: 18 (28 Feb 2024 11:41) (18 - 18)  SpO2: 93% (28 Feb 2024 12:30) (90% - 93%)    Parameters below as of 28 Feb 2024 12:30  Patient On (Oxygen Delivery Method): room air        PHYSICAL EXAM:  Constitutional well preserved,comfortable,pleasant  HEENT PERRLA EOMI,No pallor or icterus  No oral exudate or erythema  Neck supple no JVD or LN  Chest Good AE,CTA  CVS  S1 S2  Abd soft BS normal mild left sided tenderness  Ext  edema  IV site no erythema tenderness or discharge  Joints no swelling or LOM          02-28    143  |  112<H>  |  26<H>  ----------------------------<  97  3.9   |  19<L>  |  1.69<H>    Ca    9.1      28 Feb 2024 06:51        Urinalysis Basic - ( 28 Feb 2024 06:51 )    Color: x / Appearance: x / SG: x / pH: x  Gluc: 97 mg/dL / Ketone: x  / Bili: x / Urobili: x   Blood: x / Protein: x / Nitrite: x   Leuk Esterase: x / RBC: x / WBC x   Sq Epi: x / Non Sq Epi: x / Bacteria: x        MICROBIOLOGY:  v    Culture - Blood (collected 24 Feb 2024 12:00)  Source: .Blood Blood-Peripheral  Preliminary Report (27 Feb 2024 17:01):    No growth at 72 Hours    Culture - Blood (collected 24 Feb 2024 11:45)  Source: .Blood Blood-Peripheral  Preliminary Report (27 Feb 2024 17:01):    No growth at 72 Hours                    RADIOLOGY:  Imaging reviewed

## 2024-02-28 NOTE — PROGRESS NOTE ADULT - PROBLEM SELECTOR PLAN 11
- DVT PPx: SCDs  - Dispo: Likely to home with HPT pending transition to PO abx and stable abdominal exam and reponse to lasix- possibly tomorrow    called both emergency contacts in sunrise and left VM to notify of plan

## 2024-02-29 NOTE — PROGRESS NOTE ADULT - SUBJECTIVE AND OBJECTIVE BOX
Patient is a 89y old  Female who presents with a chief complaint of abd pain (28 Feb 2024 13:24)    Being followed by ID for        Interval history:  No other acute events      ROS:  No cough,SOB,CP  No N/V/D  No abd pain  No urinary complaints  No HA  No joint or limb pain  No other complaints    PAST MEDICAL & SURGICAL HISTORY:  Hypertension      Gallstone      GERD (Gastroesophageal Reflux Disease)      Hard of Hearing      Colon cancer  6 yrs ago. did not require chemo      Lymphedema, limb  BLE      B12 deficiency      S/P CAREY (Total Abdominal Hysterectomy)      S/P colectomy  partial colectomy due to colon ca      H/O exploratory laparotomy  for SBO?      History of open reduction and internal fixation (ORIF) procedure        Allergies    penicillin (Unknown)    Intolerances      Antimicrobials:    meropenem  IVPB      meropenem  IVPB 500 milliGRAM(s) IV Intermittent every 12 hours    MEDICATIONS  (STANDING):  albuterol/ipratropium for Nebulization 3 milliLiter(s) Nebulizer every 6 hours  amLODIPine   Tablet 2.5 milliGRAM(s) Oral daily  ferrous    sulfate 325 milliGRAM(s) Oral daily  fluticasone propionate 50 MICROgram(s)/spray Nasal Spray 1 Spray(s) Both Nostrils two times a day  gabapentin 100 milliGRAM(s) Oral every 8 hours  latanoprost 0.005% Ophthalmic Solution 1 Drop(s) Both EYES at bedtime  lidocaine   4% Patch 1 Patch Transdermal daily  meropenem  IVPB 500 milliGRAM(s) IV Intermittent every 12 hours  meropenem  IVPB      polyethylene glycol 3350 17 Gram(s) Oral daily  senna 2 Tablet(s) Oral at bedtime  sucralfate suspension 1 Gram(s) Oral <User Schedule>      Vital Signs Last 24 Hrs  T(C): 36.7 (02-29-24 @ 11:57), Max: 36.8 (02-28-24 @ 20:42)  T(F): 98.1 (02-29-24 @ 11:57), Max: 98.2 (02-28-24 @ 20:42)  HR: 53 (02-29-24 @ 11:57) (45 - 59)  BP: 133/77 (02-29-24 @ 11:57) (117/76 - 145/72)  BP(mean): --  RR: 18 (02-29-24 @ 11:57) (18 - 18)  SpO2: 92% (02-29-24 @ 11:57) (90% - 92%)    Physical Exam:    Constitutional well preserved,comfortable,pleasant    HEENT PERRLA EOMI,No pallor or icterus    No oral exudate or erythema    Neck supple no JVD or LN    Chest Good AE,CTA    CVS RRR S1 S2 WNl No murmur or rub or gallop    Abd soft BS normal No tenderness no masses    Ext No cyanosis clubbing or edema    IV site no erythema tenderness or discharge    Joints no swelling or LOM    CNS AAO X 3 no focal    Lab Data:                          7.9    8.28  )-----------( 254      ( 29 Feb 2024 07:08 )             26.0       02-29    144  |  112<H>  |  26<H>  ----------------------------<  94  3.6   |  19<L>  |  1.62<H>    Ca    9.3      29 Feb 2024 07:08        Urinalysis Basic - ( 29 Feb 2024 07:08 )    Color: x / Appearance: x / SG: x / pH: x  Gluc: 94 mg/dL / Ketone: x  / Bili: x / Urobili: x   Blood: x / Protein: x / Nitrite: x   Leuk Esterase: x / RBC: x / WBC x   Sq Epi: x / Non Sq Epi: x / Bacteria: x        .Blood Blood-Peripheral  02-24-24   No growth at 4 days  --  --      .Blood Blood-Peripheral  02-24-24   No growth at 4 days  --  --      < from: CT Abdomen and Pelvis w/ Oral Cont (02.27.24 @ 18:55) >  CONTRAST/COMPLICATIONS:  IV Contrast: NONE  Oral Contrast: Omnipaque 300  Complications: None reported at time of study completion    PROCEDURE:  CT of the Chest, Abdomen and Pelvis was performed.  Sagittal and coronal reformats were performed.    FINDINGS:  Evaluation of the solid visceral organs and vasculature is limited   without the administration of intravenous contrast.    Motion limited study.    CHEST:  LUNGS AND LARGE AIRWAYS: Impaction of several bilateral lower lobe   airways, left greater than right, with near complete opacification of the   left lower lobe, previously partially atelectatic, and increasing   consolidative and ground glass opacities in the right lower lobe since   2/21/2024. Additionally, new ground glass opacities in the right middle   lobe and scattered tree-in-bud nodular opacities in the right upper lobe.  PLEURA: Smallto moderate bilateral pleural effusions, increased from   trace to small on 2/21/2024.  VESSELS: Atherosclerotic change of a normal caliber thoracic aorta. Main   pulmonary artery is normal in caliber.  HEART: Cardiomegaly. No pericardial effusion. Aortic valve, coronary   artery and mitral annular calcification.  MEDIASTINUM AND CASSIDY: No lymphadenopathy.  CHEST WALL AND LOWER NECK: Multinodular thyroid, essentially unchanged   dating back to 2014 with a reference nodule on the left measuring 2.2cm   (3:28).    ABDOMEN AND PELVIS:  LIVER: Within normal limits.  BILE DUCTS: Redemonstrated mild intrahepatic and extra hepatic biliary   ductal dilatation with hyperattenuating material in the distal duct,   question stone.  GALLBLADDER: Within normal limits.  SPLEEN: Within normal limits.  PANCREAS: Within normal limits.  ADRENALS: Stable bilateral adrenal gland nodularity.  KIDNEYS/URETERS: Bilateral cysts and additional subcentimeter   hypodensities that are too small to characterize. A 2.5cm posterior   right interpole slightly higher than fluid attenuation lesion (3:134),   possible hyperdense cyst, is unchanged from 2/21/2024, but increased from   11/23/2018, remains indeterminate.    BLADDER: Within normal limits.  REPRODUCTIVE ORGANS: Hysterectomy.    BOWEL: Enteric contrast opacifies nearly the entire length of the   thoracic esophagus, the stomach and multiple proximal small bowel loops.   Presence of enteric contrast in the esophagus raises concern for   gastroesophageal reflux. Redemonstrated jejunal diverticulosis with   inflammation of several jejunal diverticula in the left hemiabdomen   (3:180-185), compatible with jejunal diverticulitis. As on prior, there   is no drainable collection or large distant free air. Nobowel   obstruction. Colonic diverticulosis without acute diverticulitis. Right   hemicolectomy. Small bowel anastomosis in the pelvis. Moderate colonic   stool burden.  PERITONEUM: Trace ascites.  VESSELS: Atherosclerotic changes.  RETROPERITONEUM/LYMPH NODES: No lymphadenopathy.  ABDOMINAL WALL: Diffuse subcutaneous edema.  BONES: Degenerative changes. Osteopenia. Left hip ORIF.    IMPRESSION:    Limited noncontrast study.    Study also limited by motion.    Near complete opacification of the left lower lobe, increased since   2/21/2024 CT, and increasing consolidative and ground glass opacities in   the right lung since prior, suspicious for pneumonia superimposed on   atelectasis. Small to moderate bilateral pleural effusions, increased   from trace to small on prior.    Enteric contrast in the thoracic esophagus, raising concern for   gastroesophageal reflux. Correlate clinically.    Redemonstrated jejunal diverticulitis without drainable collection or   large free air.    Redemonstrated mild biliary ductal dilatation with hyperattenuating   material in the distal CBD, as on 2/21/2024 CT, raising concern for   choledocholithiasis. Differential also includes a mass/neoplasm. More   definitive characterization may be obtained with contrast enhanced   MRI/MRCP.    As on recent prior, there is 2.5 cm right renal interpole lesion that   remains indeterminate, question hyperdense cyst. If clinically warranted   and feasible, consider more definitive characterization with targeted   ultrasound or multiphase renal protocol CT or MR.      --- End of Report ---      < end of copied text >                WBC Count: 8.28 (02-29-24 @ 07:08)  WBC Count: 8.33 (02-25-24 @ 09:19)  WBC Count: 6.94 (02-24-24 @ 07:34)  WBC Count: 5.70 (02-23-24 @ 06:54)              Patient is a 89y old  Female who presents with a chief complaint of abd pain (28 Feb 2024 13:24)    Being followed by ID for bactremia        Interval history:  pt denies abdominal pain  pt resting quietly  No other acute events          PAST MEDICAL & SURGICAL HISTORY:  Hypertension      Gallstone      GERD (Gastroesophageal Reflux Disease)      Hard of Hearing      Colon cancer  6 yrs ago. did not require chemo      Lymphedema, limb  BLE      B12 deficiency      S/P CAREY (Total Abdominal Hysterectomy)      S/P colectomy  partial colectomy due to colon ca      H/O exploratory laparotomy  for SBO?      History of open reduction and internal fixation (ORIF) procedure        Allergies    penicillin (Unknown)    Intolerances      Antimicrobials:    meropenem  IVPB      meropenem  IVPB 500 milliGRAM(s) IV Intermittent every 12 hours    MEDICATIONS  (STANDING):  albuterol/ipratropium for Nebulization 3 milliLiter(s) Nebulizer every 6 hours  amLODIPine   Tablet 2.5 milliGRAM(s) Oral daily  ferrous    sulfate 325 milliGRAM(s) Oral daily  fluticasone propionate 50 MICROgram(s)/spray Nasal Spray 1 Spray(s) Both Nostrils two times a day  gabapentin 100 milliGRAM(s) Oral every 8 hours  latanoprost 0.005% Ophthalmic Solution 1 Drop(s) Both EYES at bedtime  lidocaine   4% Patch 1 Patch Transdermal daily  meropenem  IVPB 500 milliGRAM(s) IV Intermittent every 12 hours  meropenem  IVPB      polyethylene glycol 3350 17 Gram(s) Oral daily  senna 2 Tablet(s) Oral at bedtime  sucralfate suspension 1 Gram(s) Oral <User Schedule>      Vital Signs Last 24 Hrs  T(C): 36.7 (02-29-24 @ 11:57), Max: 36.8 (02-28-24 @ 20:42)  T(F): 98.1 (02-29-24 @ 11:57), Max: 98.2 (02-28-24 @ 20:42)  HR: 53 (02-29-24 @ 11:57) (45 - 59)  BP: 133/77 (02-29-24 @ 11:57) (117/76 - 145/72)  BP(mean): --  RR: 18 (02-29-24 @ 11:57) (18 - 18)  SpO2: 92% (02-29-24 @ 11:57) (90% - 92%)    Physical Exam:    Constitutional pt nontoxic    HEENT PERRLA EOMI,No pallor or icterus    No oral exudate or erythema    Neck supple no JVD or LN    Chest Good AE,CTA    CVS  S1 S2     Abd soft BS normal No tenderness     Ext No cyanosis clubbing or edema    IV site no erythema tenderness or discharge    Joints no swelling or LOM      Lab Data:                          7.9    8.28  )-----------( 254      ( 29 Feb 2024 07:08 )             26.0       02-29    144  |  112<H>  |  26<H>  ----------------------------<  94  3.6   |  19<L>  |  1.62<H>    Ca    9.3      29 Feb 2024 07:08      Culture - Blood (02.21.24 @ 15:34)    -  Blood PCR Panel: NEG   Gram Stain:   Growth in aerobic bottle: Gram Negative Rods   Specimen Source: .Blood Blood-Peripheral   Organism: Blood Culture PCR   Culture Results:   Growth in aerobic bottle: Roseomonas mucosa "Susceptibilities not  performed"  Direct identification is available within approximately 3-5  hours either by Blood Panel Multiplexed PCR or Direct  MALDI-TOF. Details: [email or web address]   Organism Identification: Blood Culture PCR   Method Type: PCR          .Blood Blood-Peripheral  02-24-24   No growth at 4 days  --  --      .Blood Blood-Peripheral  02-24-24   No growth at 4 days  --  --      < from: CT Abdomen and Pelvis w/ Oral Cont (02.27.24 @ 18:55) >  CONTRAST/COMPLICATIONS:  IV Contrast: NONE  Oral Contrast: Omnipaque 300  Complications: None reported at time of study completion    PROCEDURE:  CT of the Chest, Abdomen and Pelvis was performed.  Sagittal and coronal reformats were performed.    FINDINGS:  Evaluation of the solid visceral organs and vasculature is limited   without the administration of intravenous contrast.    Motion limited study.    CHEST:  LUNGS AND LARGE AIRWAYS: Impaction of several bilateral lower lobe   airways, left greater than right, with near complete opacification of the   left lower lobe, previously partially atelectatic, and increasing   consolidative and ground glass opacities in the right lower lobe since   2/21/2024. Additionally, new ground glass opacities in the right middle   lobe and scattered tree-in-bud nodular opacities in the right upper lobe.  PLEURA: Smallto moderate bilateral pleural effusions, increased from   trace to small on 2/21/2024.  VESSELS: Atherosclerotic change of a normal caliber thoracic aorta. Main   pulmonary artery is normal in caliber.  HEART: Cardiomegaly. No pericardial effusion. Aortic valve, coronary   artery and mitral annular calcification.  MEDIASTINUM AND CASSIDY: No lymphadenopathy.  CHEST WALL AND LOWER NECK: Multinodular thyroid, essentially unchanged   dating back to 2014 with a reference nodule on the left measuring 2.2cm   (3:28).    ABDOMEN AND PELVIS:  LIVER: Within normal limits.  BILE DUCTS: Redemonstrated mild intrahepatic and extra hepatic biliary   ductal dilatation with hyperattenuating material in the distal duct,   question stone.  GALLBLADDER: Within normal limits.  SPLEEN: Within normal limits.  PANCREAS: Within normal limits.  ADRENALS: Stable bilateral adrenal gland nodularity.  KIDNEYS/URETERS: Bilateral cysts and additional subcentimeter   hypodensities that are too small to characterize. A 2.5cm posterior   right interpole slightly higher than fluid attenuation lesion (3:134),   possible hyperdense cyst, is unchanged from 2/21/2024, but increased from   11/23/2018, remains indeterminate.    BLADDER: Within normal limits.  REPRODUCTIVE ORGANS: Hysterectomy.    BOWEL: Enteric contrast opacifies nearly the entire length of the   thoracic esophagus, the stomach and multiple proximal small bowel loops.   Presence of enteric contrast in the esophagus raises concern for   gastroesophageal reflux. Redemonstrated jejunal diverticulosis with   inflammation of several jejunal diverticula in the left hemiabdomen   (3:180-185), compatible with jejunal diverticulitis. As on prior, there   is no drainable collection or large distant free air. Nobowel   obstruction. Colonic diverticulosis without acute diverticulitis. Right   hemicolectomy. Small bowel anastomosis in the pelvis. Moderate colonic   stool burden.  PERITONEUM: Trace ascites.  VESSELS: Atherosclerotic changes.  RETROPERITONEUM/LYMPH NODES: No lymphadenopathy.  ABDOMINAL WALL: Diffuse subcutaneous edema.  BONES: Degenerative changes. Osteopenia. Left hip ORIF.    IMPRESSION:    Limited noncontrast study.    Study also limited by motion.    Near complete opacification of the left lower lobe, increased since   2/21/2024 CT, and increasing consolidative and ground glass opacities in   the right lung since prior, suspicious for pneumonia superimposed on   atelectasis. Small to moderate bilateral pleural effusions, increased   from trace to small on prior.    Enteric contrast in the thoracic esophagus, raising concern for   gastroesophageal reflux. Correlate clinically.    Redemonstrated jejunal diverticulitis without drainable collection or   large free air.    Redemonstrated mild biliary ductal dilatation with hyperattenuating   material in the distal CBD, as on 2/21/2024 CT, raising concern for   choledocholithiasis. Differential also includes a mass/neoplasm. More   definitive characterization may be obtained with contrast enhanced   MRI/MRCP.    As on recent prior, there is 2.5 cm right renal interpole lesion that   remains indeterminate, question hyperdense cyst. If clinically warranted   and feasible, consider more definitive characterization with targeted   ultrasound or multiphase renal protocol CT or MR.      --- End of Report ---      < end of copied text >          WBC Count: 8.28 (02-29-24 @ 07:08)  WBC Count: 8.33 (02-25-24 @ 09:19)  WBC Count: 6.94 (02-24-24 @ 07:34)  WBC Count: 5.70 (02-23-24 @ 06:54)              Patient is a 89y old  Female who presents with a chief complaint of abd pain (28 Feb 2024 13:24)    Being followed by ID for bactremia        Interval history:  pt denies abdominal pain  pt resting quietly  No other acute events          PAST MEDICAL & SURGICAL HISTORY:  Hypertension      Gallstone      GERD (Gastroesophageal Reflux Disease)      Hard of Hearing      Colon cancer  6 yrs ago. did not require chemo      Lymphedema, limb  BLE      B12 deficiency      S/P CAREY (Total Abdominal Hysterectomy)      S/P colectomy  partial colectomy due to colon ca      H/O exploratory laparotomy  for SBO?      History of open reduction and internal fixation (ORIF) procedure        Allergies    penicillin (Unknown)    Intolerances      Antimicrobials:    meropenem  IVPB      meropenem  IVPB 500 milliGRAM(s) IV Intermittent every 12 hours    MEDICATIONS  (STANDING):  albuterol/ipratropium for Nebulization 3 milliLiter(s) Nebulizer every 6 hours  amLODIPine   Tablet 2.5 milliGRAM(s) Oral daily  ferrous    sulfate 325 milliGRAM(s) Oral daily  fluticasone propionate 50 MICROgram(s)/spray Nasal Spray 1 Spray(s) Both Nostrils two times a day  gabapentin 100 milliGRAM(s) Oral every 8 hours  latanoprost 0.005% Ophthalmic Solution 1 Drop(s) Both EYES at bedtime  lidocaine   4% Patch 1 Patch Transdermal daily  meropenem  IVPB 500 milliGRAM(s) IV Intermittent every 12 hours  meropenem  IVPB      polyethylene glycol 3350 17 Gram(s) Oral daily  senna 2 Tablet(s) Oral at bedtime  sucralfate suspension 1 Gram(s) Oral <User Schedule>      Vital Signs Last 24 Hrs  T(C): 36.7 (02-29-24 @ 11:57), Max: 36.8 (02-28-24 @ 20:42)  T(F): 98.1 (02-29-24 @ 11:57), Max: 98.2 (02-28-24 @ 20:42)  HR: 53 (02-29-24 @ 11:57) (45 - 59)  BP: 133/77 (02-29-24 @ 11:57) (117/76 - 145/72)  BP(mean): --  RR: 18 (02-29-24 @ 11:57) (18 - 18)  SpO2: 92% (02-29-24 @ 11:57) (90% - 92%)    Physical Exam:    Constitutional pt nontoxic    HEENT PERRLA EOMI,No pallor or icterus    No oral exudate or erythema    Neck supple no JVD or LN    Chest Good AE,CTA    CVS  S1 S2     Abd soft BS normal No tenderness     Ext  chronic edema    IV site no erythema tenderness or discharge    Joints no swelling or LOM      Lab Data:                          7.9    8.28  )-----------( 254      ( 29 Feb 2024 07:08 )             26.0       02-29    144  |  112<H>  |  26<H>  ----------------------------<  94  3.6   |  19<L>  |  1.62<H>    Ca    9.3      29 Feb 2024 07:08      Culture - Blood (02.21.24 @ 15:34)    -  Blood PCR Panel: NEG   Gram Stain:   Growth in aerobic bottle: Gram Negative Rods   Specimen Source: .Blood Blood-Peripheral   Organism: Blood Culture PCR   Culture Results:   Growth in aerobic bottle: Roseomonas mucosa "Susceptibilities not  performed"  Direct identification is available within approximately 3-5  hours either by Blood Panel Multiplexed PCR or Direct  MALDI-TOF. Details: [email or web address]   Organism Identification: Blood Culture PCR   Method Type: PCR          .Blood Blood-Peripheral  02-24-24   No growth at 4 days  --  --      .Blood Blood-Peripheral  02-24-24   No growth at 4 days  --  --      < from: CT Abdomen and Pelvis w/ Oral Cont (02.27.24 @ 18:55) >  CONTRAST/COMPLICATIONS:  IV Contrast: NONE  Oral Contrast: Omnipaque 300  Complications: None reported at time of study completion    PROCEDURE:  CT of the Chest, Abdomen and Pelvis was performed.  Sagittal and coronal reformats were performed.    FINDINGS:  Evaluation of the solid visceral organs and vasculature is limited   without the administration of intravenous contrast.    Motion limited study.    CHEST:  LUNGS AND LARGE AIRWAYS: Impaction of several bilateral lower lobe   airways, left greater than right, with near complete opacification of the   left lower lobe, previously partially atelectatic, and increasing   consolidative and ground glass opacities in the right lower lobe since   2/21/2024. Additionally, new ground glass opacities in the right middle   lobe and scattered tree-in-bud nodular opacities in the right upper lobe.  PLEURA: Smallto moderate bilateral pleural effusions, increased from   trace to small on 2/21/2024.  VESSELS: Atherosclerotic change of a normal caliber thoracic aorta. Main   pulmonary artery is normal in caliber.  HEART: Cardiomegaly. No pericardial effusion. Aortic valve, coronary   artery and mitral annular calcification.  MEDIASTINUM AND CASSIDY: No lymphadenopathy.  CHEST WALL AND LOWER NECK: Multinodular thyroid, essentially unchanged   dating back to 2014 with a reference nodule on the left measuring 2.2cm   (3:28).    ABDOMEN AND PELVIS:  LIVER: Within normal limits.  BILE DUCTS: Redemonstrated mild intrahepatic and extra hepatic biliary   ductal dilatation with hyperattenuating material in the distal duct,   question stone.  GALLBLADDER: Within normal limits.  SPLEEN: Within normal limits.  PANCREAS: Within normal limits.  ADRENALS: Stable bilateral adrenal gland nodularity.  KIDNEYS/URETERS: Bilateral cysts and additional subcentimeter   hypodensities that are too small to characterize. A 2.5cm posterior   right interpole slightly higher than fluid attenuation lesion (3:134),   possible hyperdense cyst, is unchanged from 2/21/2024, but increased from   11/23/2018, remains indeterminate.    BLADDER: Within normal limits.  REPRODUCTIVE ORGANS: Hysterectomy.    BOWEL: Enteric contrast opacifies nearly the entire length of the   thoracic esophagus, the stomach and multiple proximal small bowel loops.   Presence of enteric contrast in the esophagus raises concern for   gastroesophageal reflux. Redemonstrated jejunal diverticulosis with   inflammation of several jejunal diverticula in the left hemiabdomen   (3:180-185), compatible with jejunal diverticulitis. As on prior, there   is no drainable collection or large distant free air. Nobowel   obstruction. Colonic diverticulosis without acute diverticulitis. Right   hemicolectomy. Small bowel anastomosis in the pelvis. Moderate colonic   stool burden.  PERITONEUM: Trace ascites.  VESSELS: Atherosclerotic changes.  RETROPERITONEUM/LYMPH NODES: No lymphadenopathy.  ABDOMINAL WALL: Diffuse subcutaneous edema.  BONES: Degenerative changes. Osteopenia. Left hip ORIF.    IMPRESSION:    Limited noncontrast study.    Study also limited by motion.    Near complete opacification of the left lower lobe, increased since   2/21/2024 CT, and increasing consolidative and ground glass opacities in   the right lung since prior, suspicious for pneumonia superimposed on   atelectasis. Small to moderate bilateral pleural effusions, increased   from trace to small on prior.    Enteric contrast in the thoracic esophagus, raising concern for   gastroesophageal reflux. Correlate clinically.    Redemonstrated jejunal diverticulitis without drainable collection or   large free air.    Redemonstrated mild biliary ductal dilatation with hyperattenuating   material in the distal CBD, as on 2/21/2024 CT, raising concern for   choledocholithiasis. Differential also includes a mass/neoplasm. More   definitive characterization may be obtained with contrast enhanced   MRI/MRCP.    As on recent prior, there is 2.5 cm right renal interpole lesion that   remains indeterminate, question hyperdense cyst. If clinically warranted   and feasible, consider more definitive characterization with targeted   ultrasound or multiphase renal protocol CT or MR.      --- End of Report ---      < end of copied text >          WBC Count: 8.28 (02-29-24 @ 07:08)  WBC Count: 8.33 (02-25-24 @ 09:19)  WBC Count: 6.94 (02-24-24 @ 07:34)  WBC Count: 5.70 (02-23-24 @ 06:54)

## 2024-02-29 NOTE — PROGRESS NOTE ADULT - CONVERSATION DETAILS
Discussed clinical status and GOC/advance directives with pt's merna Johns- who is HCP. Notes while patient would want all measures to save her life, she would not want to be hooked up to machines and therefore would elect DNR/DNI. DANDRE discussed and filled out per his verbal consent. For GOC- pt was ambulatory at baseline at home so aim is to restore her to her pre-hospital level of functioning.

## 2024-02-29 NOTE — PROGRESS NOTE ADULT - PROBLEM SELECTOR PLAN 11
- DVT PPx: SCDs  - Dispo: Likely to home with HPT pending transition to PO abx pending ID recs- anticipate tomorrow    plan d/w and agreed on by HCP Andreas

## 2024-03-01 NOTE — DISCHARGE NOTE NURSING/CASE MANAGEMENT/SOCIAL WORK - NSDCPEFALRISK_GEN_ALL_CORE
For information on Fall & Injury Prevention, visit: [email or web address] OR  [email or web address] OR  https://www.cdc.gov/steadi/patient.html no

## 2024-03-01 NOTE — DISCHARGE NOTE NURSING/CASE MANAGEMENT/SOCIAL WORK - PATIENT PORTAL LINK FT
You can access the FollowMyHealth Patient Portal offered by [facility] by registering at the following website: [email or web address]. By joining [employer]’s FollowMyHealth portal, you will also be able to view your health information using other applications (apps) compatible with our system.

## 2024-03-01 NOTE — DISCHARGE NOTE PROVIDER - HOSPITAL COURSE
HPI:  Patient is a 89-year-old female with a medical history of hypertension, B12 deficiency, colon cancer 6 years ago not requiring chemotherapy, gallstones, GERD, lymphedema of both lower extremities, and hard of hearing, who presented with a chief complaint of right-sided abdominal pain worsening after breakfast with nausea but no vomiting, and is being admitted to medicine for management of jejunal diverticulitis and bradycardia.     The patient was brought in by EMS earlier today, on her birthday, February 21, 2024, due to the abdominal pain which she described as a burning and stabbing sensation, rating it 10/10 in severity. She reported a history of being advised to undergo gallbladder removal a few years prior but did not proceed with the surgery. The pain was similar to her previous gallbladder episodes, with no accompanying diarrhea, chest pain, or shortness of breath. On review of systems, she reported increased abdominal pain. Her physical exam in the emergency department revealed her to be AOx3, in moderate distress, with a soft abdomen, normal bowel sounds, and tenderness in the right upper quadrant, but no other significant findings.    In the ED, the patient was administered one dose of metronidazole, one dose of aztreonam, and 2 grams of intravenous Tylenol. Her workup included an EKG that showed a rate of 39 with atrial flutter and left deviation. Imaging with ultrasound of the abdomen showed mild intrahepatic ductal dilatation without evidence of cholelithiasis, and a CT angiogram of the abdomen/pelvis revealed findings compatible with jejunal diverticulitis, a dilated common bile duct with a likely stone, and an indeterminate lesion on the right kidney which had increased in size.     The patient is now being admitted to medicine for further management of her diagnosis of jejunal diverticulitis and bradycardia. (21 Feb 2024 21:19)    Hospital Course:      Important Medication Changes and Reason:    Active or Pending Issues Requiring Follow-up:    Advanced Directives:   [ ] Full code  [ X] DNR  [ ] Hospice    Discharge Diagnoses:         HPI:  Patient is a 89-year-old female with a medical history of hypertension, B12 deficiency, colon cancer 6 years ago not requiring chemotherapy, gallstones, GERD, lymphedema of both lower extremities, and hard of hearing, who presented with a chief complaint of right-sided abdominal pain worsening after breakfast with nausea but no vomiting, and is being admitted to medicine for management of jejunal diverticulitis and bradycardia.     The patient was brought in by EMS earlier today, on her birthday, February 21, 2024, due to the abdominal pain which she described as a burning and stabbing sensation, rating it 10/10 in severity. She reported a history of being advised to undergo gallbladder removal a few years prior but did not proceed with the surgery. The pain was similar to her previous gallbladder episodes, with no accompanying diarrhea, chest pain, or shortness of breath. On review of systems, she reported increased abdominal pain. Her physical exam in the emergency department revealed her to be AOx3, in moderate distress, with a soft abdomen, normal bowel sounds, and tenderness in the right upper quadrant, but no other significant findings.    In the ED, the patient was administered one dose of metronidazole, one dose of aztreonam, and 2 grams of intravenous Tylenol. Her workup included an EKG that showed a rate of 39 with atrial flutter and left deviation. Imaging with ultrasound of the abdomen showed mild intrahepatic ductal dilatation without evidence of cholelithiasis, and a CT angiogram of the abdomen/pelvis revealed findings compatible with jejunal diverticulitis, a dilated common bile duct with a likely stone, and an indeterminate lesion on the right kidney which had increased in size.     The patient is now being admitted to medicine for further management of her diagnosis of jejunal diverticulitis and bradycardia. (21 Feb 2024 21:19)    Hospital Course: pt treated with IV ceftriaxone/flagyl for intraabdominal infection, noted to have roseomonas mucosa in blood one set of bottles, ID consulted. Had ongoing abd pain and rhoncherous lung exam for which CT C/A/P was obtained and abx escalated to meropenem. Pan CT showed pleff and infectious infiltrates on chest and improving diverticulitis on a/p- plan to complete 5 days of meropenem followed by 2 more days of levaquin/flagyl thereafter. Pt was seen by PT, recommended for HPT. Per discussion with HCP pt deemed to be DNR/DNI and MOLST filled out.       Important Medication Changes and Reason:    Active or Pending Issues Requiring Follow-up:    Advanced Directives:   [ ] Full code  [ X] DNR  [ ] Hospice    Discharge Diagnoses:

## 2024-03-01 NOTE — PROGRESS NOTE ADULT - PROBLEM SELECTOR PLAN 11
- DVT PPx: SCDs  - Dispo: Likely to home with HPT pending transition to PO abx pending ID recs- anticipate sunday    plan d/w and agreed on by HCP Andreas

## 2024-03-01 NOTE — PROGRESS NOTE ADULT - SUBJECTIVE AND OBJECTIVE BOX
Patient is a 89y old  Female who presents with a chief complaint of abd pain (01 Mar 2024 12:54)    Being followed by ID for        Interval history:  pt seems comfortable   telling stories about when she was young   No other acute events      ROS:  No cough,SOB,CP  No N/V/D  No abd pain  No urinary complaints  No HA  No joint or limb pain  No other complaints    PAST MEDICAL & SURGICAL HISTORY:  Hypertension      Gallstone      GERD (Gastroesophageal Reflux Disease)      Hard of Hearing      Colon cancer  6 yrs ago. did not require chemo      Lymphedema, limb  BLE      B12 deficiency      S/P CAREY (Total Abdominal Hysterectomy)      S/P colectomy  partial colectomy due to colon ca      H/O exploratory laparotomy  for SBO?      History of open reduction and internal fixation (ORIF) procedure        Allergies    penicillin (Unknown)    Intolerances      Antimicrobials:    meropenem  IVPB      meropenem  IVPB 500 milliGRAM(s) IV Intermittent every 12 hours    MEDICATIONS  (STANDING):  albuterol/ipratropium for Nebulization 3 milliLiter(s) Nebulizer every 6 hours  amLODIPine   Tablet 2.5 milliGRAM(s) Oral daily  ferrous    sulfate 325 milliGRAM(s) Oral daily  fluticasone propionate 50 MICROgram(s)/spray Nasal Spray 1 Spray(s) Both Nostrils two times a day  gabapentin 100 milliGRAM(s) Oral every 8 hours  latanoprost 0.005% Ophthalmic Solution 1 Drop(s) Both EYES at bedtime  lidocaine   4% Patch 1 Patch Transdermal daily  meropenem  IVPB 500 milliGRAM(s) IV Intermittent every 12 hours  meropenem  IVPB      polyethylene glycol 3350 17 Gram(s) Oral daily  senna 2 Tablet(s) Oral at bedtime  sucralfate suspension 1 Gram(s) Oral <User Schedule>      Vital Signs Last 24 Hrs  T(C): 36.8 (03-01-24 @ 11:44), Max: 36.8 (03-01-24 @ 11:44)  T(F): 98.3 (03-01-24 @ 11:44), Max: 98.3 (03-01-24 @ 11:44)  HR: 44 (03-01-24 @ 11:44) (44 - 50)  BP: 146/57 (03-01-24 @ 11:44) (146/57 - 154/55)  BP(mean): 88 (02-29-24 @ 20:15) (88 - 88)  RR: 28 (03-01-24 @ 11:44) (18 - 28)  SpO2: 95% (03-01-24 @ 11:44) (92% - 95%)    Physical Exam:    Constitutional well preserved,comfortable,pleasant    HEENT PERRLA EOMI,No pallor or icterus    No oral exudate or erythema    Neck supple no JVD or LN    Chest Good AE,CTA    CVS RRR S1 S2 WNl No murmur or rub or gallop    Abd soft BS normal No tenderness no masses    Ext No cyanosis clubbing or edema    IV site no erythema tenderness or discharge    Joints no swelling or LOM    CNS AAO X 3 no focal    Lab Data:                          8.4    8.14  )-----------( 291      ( 01 Mar 2024 08:41 )             27.3       03-01    145  |  114<H>  |  24<H>  ----------------------------<  86  3.9   |  18<L>  |  1.45<H>    Ca    9.4      01 Mar 2024 08:41        Urinalysis (03.28.22 @ 15:43)    Blood, Urine: Negative   Glucose Qualitative, Urine: Negative   pH Urine: 6.0   Color: Yellow   Urine Appearance: Clear   Bilirubin: Negative   Ketone - Urine: Negative   Specific Gravity: 1.018   Protein, Urine: 30 mg/dL   Urobilinogen: Negative   Nitrite: Negative   Leukocyte Esterase Concentration: Negative            .Blood Blood-Peripheral  02-24-24   No growth at 5 days  --  --      .Blood Blood-Peripheral  02-24-24   No growth at 5 days  --  --                    WBC Count: 8.14 (03-01-24 @ 08:41)  WBC Count: 8.28 (02-29-24 @ 07:08)  WBC Count: 8.33 (02-25-24 @ 09:19)  WBC Count: 6.94 (02-24-24 @ 07:34)              Patient is a 89y old  Female who presents with a chief complaint of abd pain (01 Mar 2024 12:54)    Being followed by ID for        Interval history:  pt seems comfortable   telling stories about when she was young   No other acute events        PAST MEDICAL & SURGICAL HISTORY:  Hypertension      Gallstone      GERD (Gastroesophageal Reflux Disease)      Hard of Hearing      Colon cancer  6 yrs ago. did not require chemo      Lymphedema, limb  BLE      B12 deficiency      S/P CAREY (Total Abdominal Hysterectomy)      S/P colectomy  partial colectomy due to colon ca      H/O exploratory laparotomy  for SBO?      History of open reduction and internal fixation (ORIF) procedure        Allergies    penicillin (Unknown)    Intolerances      Antimicrobials:    meropenem  IVPB      meropenem  IVPB 500 milliGRAM(s) IV Intermittent every 12 hours    MEDICATIONS  (STANDING):  albuterol/ipratropium for Nebulization 3 milliLiter(s) Nebulizer every 6 hours  amLODIPine   Tablet 2.5 milliGRAM(s) Oral daily  ferrous    sulfate 325 milliGRAM(s) Oral daily  fluticasone propionate 50 MICROgram(s)/spray Nasal Spray 1 Spray(s) Both Nostrils two times a day  gabapentin 100 milliGRAM(s) Oral every 8 hours  latanoprost 0.005% Ophthalmic Solution 1 Drop(s) Both EYES at bedtime  lidocaine   4% Patch 1 Patch Transdermal daily  meropenem  IVPB 500 milliGRAM(s) IV Intermittent every 12 hours  meropenem  IVPB      polyethylene glycol 3350 17 Gram(s) Oral daily  senna 2 Tablet(s) Oral at bedtime  sucralfate suspension 1 Gram(s) Oral <User Schedule>      Vital Signs Last 24 Hrs  T(C): 36.8 (03-01-24 @ 11:44), Max: 36.8 (03-01-24 @ 11:44)  T(F): 98.3 (03-01-24 @ 11:44), Max: 98.3 (03-01-24 @ 11:44)  HR: 44 (03-01-24 @ 11:44) (44 - 50)  BP: 146/57 (03-01-24 @ 11:44) (146/57 - 154/55)  BP(mean): 88 (02-29-24 @ 20:15) (88 - 88)  RR: 28 (03-01-24 @ 11:44) (18 - 28)  SpO2: 95% (03-01-24 @ 11:44) (92% - 95%)    Physical Exam:    Constitutional well preserved,comfortable,pleasant    HEENT PERRLA EOMI,No pallor or icterus    No oral exudate or erythema    Neck supple no JVD or LN    Chest Good AE,CTA    CVS  S1 S2     Abd soft BS normal No tenderness     Ext No cyanosis clubbing or edema    IV site no erythema tenderness or discharge    Joints no swelling or LOM    CNS AAO X 3 no focal    Lab Data:                          8.4    8.14  )-----------( 291      ( 01 Mar 2024 08:41 )             27.3       03-01    145  |  114<H>  |  24<H>  ----------------------------<  86  3.9   |  18<L>  |  1.45<H>    Ca    9.4      01 Mar 2024 08:41        Urinalysis (03.28.22 @ 15:43)    Blood, Urine: Negative   Glucose Qualitative, Urine: Negative   pH Urine: 6.0   Color: Yellow   Urine Appearance: Clear   Bilirubin: Negative   Ketone - Urine: Negative   Specific Gravity: 1.018   Protein, Urine: 30 mg/dL   Urobilinogen: Negative   Nitrite: Negative   Leukocyte Esterase Concentration: Negative        .Blood Blood-Peripheral  02-24-24   No growth at 5 days  --  --      .Blood Blood-Peripheral  02-24-24   No growth at 5 days  --  --                    WBC Count: 8.14 (03-01-24 @ 08:41)  WBC Count: 8.28 (02-29-24 @ 07:08)  WBC Count: 8.33 (02-25-24 @ 09:19)  WBC Count: 6.94 (02-24-24 @ 07:34)

## 2024-03-01 NOTE — DISCHARGE NOTE PROVIDER - NSDCCPCAREPLAN_GEN_ALL_CORE_FT
PRINCIPAL DISCHARGE DIAGNOSIS  Diagnosis: Diverticulitis  Assessment and Plan of Treatment: Complete levofloxacin and flagyl antibiotic on monday. Follow up with your primary care doctor within 1-2 weeks post discharge.      SECONDARY DISCHARGE DIAGNOSES  Diagnosis: Bradycardia  Assessment and Plan of Treatment: Avoid medications that can lower the heart rate- follow up with your cardiologist or primary care doctor for further guidance and management.    Diagnosis: Pneumonia, aspiration  Assessment and Plan of Treatment: You will be contacted by the HOME pulmonary program for follow up televisit. You should complete your antibiotics levofloxacin and flagyl on monday. Work with physical therapy to improve your breathing mechanics with physical activity and improve lung oxygenation.

## 2024-03-01 NOTE — PROGRESS NOTE ADULT - NSPROGADDITIONALINFOA_GEN_ALL_CORE
The necessity of the time spent during the encounter on this date of service was due to:   - Ordering, reviewing, and interpreting labs, testing, and imaging.  - Independently obtaining a review of systems and performing a physical exam  - Reviewing prior hospitalization and where necessary, outpatient records.  - Counselling and educating patient and family regarding interpretation of aforementioned items and plan of care.    Time-based billing (NON-critical care). Total minutes spent: 40

## 2024-03-01 NOTE — DISCHARGE NOTE NURSING/CASE MANAGEMENT/SOCIAL WORK - NSDCVIVACCINE_GEN_ALL_CORE_FT
influenza, injectable, quadrivalent, preservative free; 21-Oct-2014 17:12; Arti Hand (RN); [employer]; [id number]; IntraMuscular; Deltoid Right.; 0.5 milliLiter(s);

## 2024-03-01 NOTE — DISCHARGE NOTE PROVIDER - NSDCFUADDAPPT_GEN_ALL_CORE_FT
APPTS ARE READY TO BE MADE: [x ] YES    Best Family or Patient Contact (if needed): Andreas Sandhu [phone]    Additional Information about above appointments (if needed):    1: House calls program- Olinda Duarte 1-2 weeks  2: HOME pulmonary program- Dr. Gita Lisker within 1 week  3:     Other comments or requests:    APPTS ARE READY TO BE MADE: [x ] YES    Best Family or Patient Contact (if needed): Andreas Sandhu [phone]    Additional Information about above appointments (if needed):    1: House calls program- Olinda Duarte 1-2 weeks  2: HOME pulmonary program- Dr. Gita Lisker within 1 week  3:     Other comments or requests:   Patient was outreached, however they advised they were readmitted to the hospital.

## 2024-03-01 NOTE — DISCHARGE NOTE PROVIDER - CARE PROVIDER_API CALL
Olinda Duarte  NP in [facility]  [address] [place] [address], [address] [address]  Phone: [phone]  Fax: [phone]  Established Patient  Follow Up Time: 2 weeks

## 2024-03-01 NOTE — PROGRESS NOTE ADULT - SUBJECTIVE AND OBJECTIVE BOX
Jasbir Munoz MD  Division of Hospital Medicine  Available on MS teams until 7pm  If no response or off-hours, page [phone]  -------------------------------------    Patient is a 89y old  Female who presents with a chief complaint of abd pain (29 Feb 2024 15:30)      SUBJECTIVE / OVERNIGHT EVENTS: none acute  ADDITIONAL REVIEW OF SYSTEMS: pt feels well, no abd pain, no fevers/chills, no sob/cough. In good spirits    MEDICATIONS  (STANDING):  albuterol/ipratropium for Nebulization 3 milliLiter(s) Nebulizer every 6 hours  amLODIPine   Tablet 2.5 milliGRAM(s) Oral daily  ferrous    sulfate 325 milliGRAM(s) Oral daily  fluticasone propionate 50 MICROgram(s)/spray Nasal Spray 1 Spray(s) Both Nostrils two times a day  gabapentin 100 milliGRAM(s) Oral every 8 hours  latanoprost 0.005% Ophthalmic Solution 1 Drop(s) Both EYES at bedtime  lidocaine   4% Patch 1 Patch Transdermal daily  meropenem  IVPB 500 milliGRAM(s) IV Intermittent every 12 hours  meropenem  IVPB      polyethylene glycol 3350 17 Gram(s) Oral daily  senna 2 Tablet(s) Oral at bedtime  sucralfate suspension 1 Gram(s) Oral <User Schedule>    MEDICATIONS  (PRN):  acetaminophen     Tablet .. 650 milliGRAM(s) Oral every 6 hours PRN Temp greater or equal to 38C (100.4F), Mild Pain (1 - 3)  aluminum hydroxide/magnesium hydroxide/simethicone Suspension 30 milliLiter(s) Oral every 4 hours PRN Dyspepsia  melatonin 3 milliGRAM(s) Oral at bedtime PRN Insomnia  ondansetron Injectable 4 milliGRAM(s) IV Push every 8 hours PRN Nausea and/or Vomiting  oxycodone    5 mG/acetaminophen 325 mG 2 Tablet(s) Oral every 12 hours PRN Moderate Pain (4 - 6)      CAPILLARY BLOOD GLUCOSE        I&O's Summary      PHYSICAL EXAM:  Vital Signs Last 24 Hrs  T(C): 36.8 (01 Mar 2024 11:44), Max: 36.8 (01 Mar 2024 11:44)  T(F): 98.3 (01 Mar 2024 11:44), Max: 98.3 (01 Mar 2024 11:44)  HR: 44 (01 Mar 2024 11:44) (44 - 50)  BP: 146/57 (01 Mar 2024 11:44) (146/57 - 154/55)  BP(mean): 88 (29 Feb 2024 20:15) (88 - 88)  RR: 28 (01 Mar 2024 11:44) (18 - 28)  SpO2: 95% (01 Mar 2024 11:44) (92% - 95%)    Parameters below as of 01 Mar 2024 05:56  Patient On (Oxygen Delivery Method): room air      CONSTITUTIONAL: NAD  EYES: PERRLA; conjunctiva and sclera clear  ENMT: MMM  NECK: Supple  RESPIRATORY: diffuse crackles, no wheeze  CARDIOVASCULAR: RRR, no JVD, no peripheral edema   ABDOMEN: Nontender to palpation, normoactive BS, no guarding/rigidity  MUSCLOSKELETAL:  no clubbing/cyanosis, no joint swelling or tenderness to palpation  PSYCH: A+O x 3, affect normal  NEUROLOGY: CN 2-12 are intact and symmetric; no gross sensory or motor deficits  SKIN: No rashes; no palpable lesions    LABS:                        8.4    8.14  )-----------( 291      ( 01 Mar 2024 08:41 )             27.3     03-01    145  |  114<H>  |  24<H>  ----------------------------<  86  3.9   |  18<L>  |  1.45<H>    Ca    9.4      01 Mar 2024 08:41            Urinalysis Basic - ( 01 Mar 2024 08:41 )    Color: x / Appearance: x / SG: x / pH: x  Gluc: 86 mg/dL / Ketone: x  / Bili: x / Urobili: x   Blood: x / Protein: x / Nitrite: x   Leuk Esterase: x / RBC: x / WBC x   Sq Epi: x / Non Sq Epi: x / Bacteria: x          RADIOLOGY & ADDITIONAL TESTS:  Results Reviewed:   Imaging Personally Reviewed:  Electrocardiogram Personally Reviewed:    COORDINATION OF CARE:  Care Discussed with Consultants/Other Providers [Y/N]:  Prior or Outpatient Records Reviewed [Y/N]:

## 2024-03-01 NOTE — DISCHARGE NOTE PROVIDER - NSDCMRMEDTOKEN_GEN_ALL_CORE_FT
ferrous sulfate 325 mg (65 mg elemental iron) oral delayed release tablet: 1 tab(s) orally once a day   fluticasone 50 mcg/inh inhalation powder: 1 spray(s) in each nostril 2 times a day  gabapentin 100 mg oral capsule: 1 cap(s) orally 3 times a day  latanoprost 0.005% ophthalmic solution: 1 drop(s) to each affected eye once a day (in the evening)  lidocaine 5% topical film: Apply topically to affected area once a day  pantoprazole 40 mg oral delayed release tablet: 1 tab(s) orally once a day (before a meal)  Percocet 7.5/325 oral tablet: 1 tab(s) orally once a day, As Needed  polyethylene glycol 3350 oral powder for reconstitution: 17 gram(s) orally once a day  senna oral tablet: 2 tab(s) orally once a day (at bedtime)  Vitamin B-12 1000 mcg oral tablet: 1 tab(s) orally once a day   acetaminophen 325 mg oral tablet: 2 tab(s) orally every 6 hours As needed Temp greater or equal to 38C (100.4F), Mild Pain (1 - 3)  amLODIPine 2.5 mg oral tablet: 1 tab(s) orally once a day  ferrous sulfate 325 mg (65 mg elemental iron) oral delayed release tablet: 1 tab(s) orally once a day   fluticasone 50 mcg/inh inhalation powder: 1 spray(s) in each nostril 2 times a day  gabapentin 100 mg oral capsule: 1 cap(s) orally 3 times a day  latanoprost 0.005% ophthalmic solution: 1 drop(s) to each affected eye once a day (in the evening)  lidocaine 5% topical film: Apply topically to affected area once a day  pantoprazole 40 mg oral delayed release tablet: 1 tab(s) orally once a day (before a meal)  Percocet 7.5/325 oral tablet: 1 tab(s) orally once a day, As Needed  polyethylene glycol 3350 oral powder for reconstitution: 17 gram(s) orally once a day  senna oral tablet: 2 tab(s) orally once a day (at bedtime)  Vitamin B-12 1000 mcg oral tablet: 1 tab(s) orally once a day   acetaminophen 325 mg oral tablet: 2 tab(s) orally every 6 hours As needed Temp greater or equal to 38C (100.4F), Mild Pain (1 - 3)  Ambien 5 mg oral tablet: 1 tab(s) orally once a day (at bedtime) as needed for  insomnia  amLODIPine 2.5 mg oral tablet: 1 tab(s) orally once a day  Carafate 1 g/10 mL oral suspension: 10 milliliter(s) orally 4 times a day  diclofenac 1% topical gel: Apply topically to affected area 4 times a day  fluticasone 50 mcg/inh inhalation powder: 1 spray(s) in each nostril 2 times a day  latanoprost 0.005% ophthalmic solution: 1 drop(s) to each affected eye once a day (in the evening)  lidocaine 5% topical film: Apply topically to affected area once a day  senna oral tablet: 2 tab(s) orally once a day (at bedtime)  Vitamin B-12 1000 mcg oral tablet: 1 tab(s) orally once a day   acetaminophen 325 mg oral tablet: 2 tab(s) orally every 6 hours As needed Temp greater or equal to 38C (100.4F), Mild Pain (1 - 3)  Ambien 5 mg oral tablet: 1 tab(s) orally once a day (at bedtime) as needed for  insomnia  amLODIPine 2.5 mg oral tablet: 1 tab(s) orally once a day  Carafate 1 g/10 mL oral suspension: 10 milliliter(s) orally 4 times a day  diclofenac 1% topical gel: Apply topically to affected area 4 times a day  fluticasone 50 mcg/inh inhalation powder: 1 spray(s) in each nostril 2 times a day  latanoprost 0.005% ophthalmic solution: 1 drop(s) to each affected eye once a day (in the evening)  levoFLOXacin 500 mg oral tablet: 1 tab(s) orally once a day  lidocaine 5% topical film: Apply topically to affected area once a day  metroNIDAZOLE 500 mg oral tablet: 1 tab(s) orally every 8 hours MDD: 3  senna oral tablet: 2 tab(s) orally once a day (at bedtime)  Vitamin B-12 1000 mcg oral tablet: 1 tab(s) orally once a day   acetaminophen 325 mg oral tablet: 2 tab(s) orally every 6 hours As needed Temp greater or equal to 38C (100.4F), Mild Pain (1 - 3)  Ambien 5 mg oral tablet: 1 tab(s) orally once a day (at bedtime) as needed for  insomnia  amLODIPine 2.5 mg oral tablet: 1 tab(s) orally once a day  Carafate 1 g/10 mL oral suspension: 10 milliliter(s) orally 4 times a day  diclofenac 1% topical gel: Apply topically to affected area 4 times a day  fluticasone 50 mcg/inh inhalation powder: 1 spray(s) in each nostril 2 times a day  latanoprost 0.005% ophthalmic solution: 1 drop(s) to each affected eye once a day (in the evening)  levoFLOXacin 750 mg oral tablet: 1 tab(s) orally once a day MDD: 1  lidocaine 5% topical film: Apply topically to affected area once a day  metroNIDAZOLE 500 mg oral tablet: 1 tab(s) orally 3 times a day  senna oral tablet: 2 tab(s) orally once a day (at bedtime)  Vitamin B-12 1000 mcg oral tablet: 1 tab(s) orally once a day

## 2024-03-02 NOTE — PROGRESS NOTE ADULT - PROBLEM SELECTOR PLAN 11
- DVT PPx: SCDs  - Dispo: Likely to home with HPT pending transition to PO abx pending ID recs- anticipate sunday    left VM for HCP Andreas Sandhu

## 2024-03-02 NOTE — PROGRESS NOTE ADULT - PROVIDER SPECIALTY LIST ADULT
Infectious Disease
Hospitalist

## 2024-03-02 NOTE — PROGRESS NOTE ADULT - PROBLEM SELECTOR PLAN 3
Pt has elevated Cr, Baseline appears to be 1.4-1.5 based on previous records in [place]    Today’s creatinine: 1.68  DIOGENES likely prerenal in setting of infection  - encouraged fluid intake  - Trend BUN/Cr.  - Avoid neprhotoxic agents. Hold ACE-I in setting of DIOGENES
Pt has elevated Cr, Baseline appears to be 1.4-1.5 based on previous records in [place]  DIOGENES likely prerenal in setting of infection, now improved s/p gentle fluid challenge  - encouraged fluid intake  - Trend BUN/Cr.  - Avoid neprhotoxic agents. Hold ACE-I in setting of DIOGENES  - Renal US without HN  - F/u UA
Pt has elevated Cr, Baseline appears to be 1.4-1.5 based on previous records in [place]  DIOGENES likely prerenal in setting of infection, now improved s/p gentle fluid challenge  - encouraged fluid intake  - Trend BUN/Cr.  - Avoid neprhotoxic agents. Hold ACE-I in setting of DIOGENES  - Renal US without HN  - Dose another fluid challenge today  - F/u UA
Pt has elevated Cr, Baseline appears to be 1.4-1.5 based on previous records in [place]    Today’s creatinine: 1.68  DIOGENES likely prerenal in setting of infection  - Send urine creatinine, urea and lytes  - Trend BUN/Cr.  - Avoid neprhotoxic agents. Hold ACE-I in setting of DIOGENES
Pt has elevated Cr, Baseline appears to be 1.4-1.5 based on previous records in [place]  DIOGENES likely prerenal in setting of infection  - encouraged fluid intake  - Trend BUN/Cr.  - Avoid neprhotoxic agents. Hold ACE-I in setting of DIOGENES  - Obtain UA, renal US  - fluid challenge
Pt has elevated Cr, Baseline appears to be 1.4-1.5 based on previous records in [place]  DIOGENES likely prerenal in setting of infection, now improved s/p gentle fluid challenge  - encouraged fluid intake  - Trend BUN/Cr.  - Avoid neprhotoxic agents. Hold ACE-I in setting of DIOGENES  - Renal US without HN  - F/u UA
Pt has elevated Cr, Baseline appears to be 1.4-1.5 based on previous records in [place]  DIOGENES likely prerenal in setting of infection, now improved s/p gentle fluid challenge  - encouraged fluid intake  - Trend BUN/Cr.  - Avoid neprhotoxic agents. Hold ACE-I in setting of DIOGENES  - Renal US without HN  - F/u UA
Pt has elevated Cr, Baseline appears to be 1.4-1.5 based on previous records in [place]  DIOGENES likely prerenal in setting of infection, now improved s/p gentle fluid challenge  - encouraged fluid intake  - Trend BUN/Cr.  - Avoid neprhotoxic agents. Hold ACE-I in setting of DIOGENES  - Renal US without HN  - F/u UA
Pt has elevated Cr, Baseline appears to be 1.4-1.5 based on previous records in [place]  DIOGENES likely prerenal in setting of infection, now improved s/p gentle fluid challenge  - encouraged fluid intake  - Trend BUN/Cr.  - Avoid neprhotoxic agents. Hold ACE-I in setting of DIOGENES  - Renal US without HN  - F/u UA
Pt has elevated Cr, Baseline appears to be 1.4-1.5 based on previous records in [place]  DIOGENES likely prerenal in setting of infection, now improved s/p gentle fluid challenge  - encouraged fluid intake  - Trend BUN/Cr.  - Avoid neprhotoxic agents. Hold ACE-I in setting of DIOGENES  - Renal US without HN  - F/u UA

## 2024-03-02 NOTE — PROGRESS NOTE ADULT - ASSESSMENT
89F with hypertension, B12 deficiency, colon cancer 6 years ago not requiring chemotherapy, gallstones, GERD, lymphedema of both lower extremities, and hard of hearing, initially presented with right-sided abdominal pain worsening after breakfast with nausea, found to be afebrile, WBC 11, CT AP with jejunal diverticulitis without perforation or abscess, US abdomen with mild CBD dilatation, BCx with GNR, Roseomonas mucosa 1 out of 4, ID consulted for assistance.    Reports that her abdominal pain has resolved, but now has L hip and back pain worsening since yesterday  Denies any fever or chills  No recent trauma  Denies cough, dysuria, diarrhea, n/v, URI symptoms  PCN allergy listed, but cannot recall reaction, states it occurred years ago    ANTIBIOTIC:  CTX (2/22 ---> )  Flagyl (2/22 ---> )    IMPRESSION:  #Jejunal Diverticulitis  #Roseomonas mucosa in Blood  #Back Pain, L Hip Pain  - symptoms of diverticulitis resolved  - unclear if Roseomonas is real pathogen or contaminant (part of human skin microbiome, also in soil and water environment)  - Roseomonas often resistant to 2nd/3rd Gen Cephalosporin, which this patient has been on CTX throughout, yet remains afebrile without leukocytosis, favoring contaminant- repeat cultures pending  - back pain ?msk or something else  CT A/P with ascites, CT Chest with evidence for lower lobe collapse secondary to effusion, official read pending  Abdominal pain remains present  Bcx on 2/24 negative to date    Recommendations  Continue meropenem  - check lactate  Follow repeat blood cultures  - may need MRI of LS spine and MRI of pelvis if pain persists   - may need to do hida scan depending on CT findings   Follow fever curve and WBC count    Amadeo Concepcion MD   please reach via teams   If no answer, or after 5PM/ weekends,  then please call  [phone]        
  89F with hypertension, B12 deficiency, colon cancer 6 years ago not requiring chemotherapy, gallstones, GERD, lymphedema of both lower extremities, and hard of hearing, initially presented with right-sided abdominal pain worsening after breakfast with nausea, found to be afebrile, WBC 11, CT AP with jejunal diverticulitis without perforation or abscess, US abdomen with mild CBD dilatation, BCx with GNR, Roseomonas mucosa 1 out of 4, ID consulted for assistance.    Reports that her abdominal pain has resolved, but now has L hip and back pain worsening since yesterday  Denies any fever or chills  No recent trauma  Denies cough, dysuria, diarrhea, n/v, URI symptoms  PCN allergy listed, but cannot recall reaction, states it occurred years ago    ANTIBIOTIC:  CTX (2/22 ---> )  Flagyl (2/22 ---> )    IMPRESSION:  #Jejunal Diverticulitis  #Roseomonas mucosa in Blood  #Back Pain, L Hip Pain  - symptoms of diverticulitis resolved  - unclear if Roseomonas is real pathogen or contaminant (part of human skin microbiome, also in soil and water environment)  - Roseomonas often resistant to 2nd/3rd Gen Cephalosporin, which this patient has been on CTX throughout, yet remains afebrile without leukocytosis, favoring contaminant- repeat cultures pending  - back pain ?msk or something else    SUGGESTIONS:  - abd pain returned  - will change to meropenem  - check lactate  - check ct of abd /pelvis  - monitor temperature curve  - trend WBC  - repeat BCx x2- pending  - may need MRI of LS spine and MRI of pelvis if pain persists   - may need to do hida scan depending on CT findings     Paige Matthews M.D. ,   please reach via teams   If no answer, or after 5PM/ weekends,  then please call  [phone]      Assessment and plan discussed with the primary team .    I will be away  tomorrow. My associates will be covering. Please call [phone] with acute issues, questions.      
  89F with hypertension, B12 deficiency, colon cancer 6 years ago not requiring chemotherapy, gallstones, GERD, lymphedema of both lower extremities, and hard of hearing, initially presented with right-sided abdominal pain worsening after breakfast with nausea, found to be afebrile, WBC 11, CT AP with jejunal diverticulitis without perforation or abscess, US abdomen with mild CBD dilatation, BCx with GNR, Roseomonas mucosa 1 out of 4, ID consulted for assistance.    Reports that her abdominal pain has resolved, but now has L hip and back pain worsening since yesterday  Denies any fever or chills  No recent trauma  Denies cough, dysuria, diarrhea, n/v, URI symptoms  PCN allergy listed, but cannot recall reaction, states it occurred years ago    ANTIBIOTIC:  CTX (2/22 ---> )  Flagyl (2/22 ---> )    IMPRESSION:  #Jejunal Diverticulitis  #Roseomonas mucosa in Blood  #Back Pain, L Hip Pain  - symptoms of diverticulitis resolved  - unclear if Roseomonas is real pathogen or contaminant (part of human skin microbiome, also in soil and water environment)  - Roseomonas often resistant to 2nd/3rd Gen Cephalosporin, which this patient has been on CTX throughout, yet remains afebrile without leukocytosis, favoring contaminant- repeat cultures pending  - back pain ?msk or something else    SUGGESTIONS:  - abd pain esolved. suggest HIDA if returns  - continue CTX and Flagyl   - monitor temperature curve  - trend WBC  - repeat BCx x2- pending  - may need MRI of LS spine and MRI of pelvis if pain persists   - check flu/covid/RSV swab for congestion    Paige Matthews M.D. ,   please reach via teams   If no answer, or after 5PM/ weekends,  then please call  [phone]      Assessment and plan discussed with the primary team .    
  89F with hypertension, B12 deficiency, colon cancer 6 years ago not requiring chemotherapy, gallstones, GERD, lymphedema of both lower extremities, and hard of hearing, initially presented with right-sided abdominal pain worsening after breakfast with nausea, found to be afebrile, WBC 11, CT AP with jejunal diverticulitis without perforation or abscess, US abdomen with mild CBD dilatation, BCx with GNR, Roseomonas mucosa 1 out of 4, ID consulted for assistance.    Reports that her abdominal pain has resolved, but now has L hip and back pain worsening since yesterday  Denies any fever or chills  No recent trauma  Denies cough, dysuria, diarrhea, n/v, URI symptoms  PCN allergy listed, but cannot recall reaction, states it occurred years ago    ANTIBIOTIC:  CTX (2/22 ---> )  Flagyl (2/22 ---> )    IMPRESSION:  #Jejunal Diverticulitis  #Roseomonas mucosa in Blood  #Back Pain, L Hip Pain  - symptoms of diverticulitis resolved  - unclear if Roseomonas is real pathogen or contaminant (part of human skin microbiome, also in soil and water environment)  - Roseomonas often resistant to 2nd/3rd Gen Cephalosporin, which this patient has been on CTX throughout, yet remains afebrile without leukocytosis, favoring contaminant- repeat cultures pending  - back pain ?msk or something else  Abdominal pain remains present  Bcx on 2/24 negative to date    Recommendations  Continue meropenem  - check lactate  Follow repeat blood cultures  - may need MRI of LS spine and MRI of pelvis if pain persists   - may need to do hida scan depending on CT findings   Follow fever curve and WBC count    team addressed GOC with family  suspect the jejuenal diverticultis was real  unsure what biliary process was - up to team/family whether should procede with MRCP  unclear if pneumonia  family is leaning toward  trying to getting pt home asap as deteriorating in hospital  as we aren't sure significance of the Roseonomonas suggest :  continue course of meropenem if family allows- this would address all concerns  if anxious to get pt home , then after 5 days change to levaquin flagyl to complete 10 day course , dosed for renal function    Paige Matthews M.D. ,   please reach via teams   If no answer, or after 5PM/ weekends,  then please call  [phone]    Assessment and plan discussed with the primary team .      ID will follow the patient PRN. Please recontact ID if we can be of further assistance . [phone]          
  89F with hypertension, B12 deficiency, colon cancer 6 years ago not requiring chemotherapy, gallstones, GERD, lymphedema of both lower extremities, and hard of hearing, initially presented with right-sided abdominal pain worsening after breakfast with nausea, found to be afebrile, WBC 11, CT AP with jejunal diverticulitis without perforation or abscess, US abdomen with mild CBD dilatation, BCx with GNR, Roseomonas mucosa 1 out of 4, ID consulted for assistance.    Reports that her abdominal pain has resolved, but now has L hip and back pain worsening since yesterday  Denies any fever or chills  No recent trauma  Denies cough, dysuria, diarrhea, n/v, URI symptoms  PCN allergy listed, but cannot recall reaction, states it occurred years ago    ANTIBIOTIC:  CTX (2/22 ---> )  Flagyl (2/22 ---> )    IMPRESSION:  #Jejunal Diverticulitis  #Roseomonas mucosa in Blood  #Back Pain, L Hip Pain  - symptoms of diverticulitis resolved  - unclear if Roseomonas is real pathogen or contaminant (part of human skin microbiome, also in soil and water environment)  - Roseomonas often resistant to 2nd/3rd Gen Cephalosporin, which this patient has been on CTX throughout, yet remains afebrile without leukocytosis, favoring contaminant- repeat cultures pending  - back pain ?msk or something else  Abdominal pain remains present  Bcx on 2/24 negative to date    Recommendations  Continue meropenem  - check lactate  Follow repeat blood cultures  - may need MRI of LS spine and MRI of pelvis if pain persists   - may need to do hida scan depending on CT findings   Follow fever curve and WBC count    team addressed GOC with family  suspect the jejuenal diverticultis was real  unsure what biliary process was - up to team/family whether should procede with MRCP  unclear if pneumonia  family is leaning toward  trying to getting pt home asap as deteriorating in hospital  as we aren't sure significance of the Roseonomonas suggest :  continue course of meropenem if family allows- this would address all concerns  if anxious to get pt home , then after 5 days change to levaquin flagyl to complete 10 day course , dosed for renal function    Paige Matthews M.D. ,   please reach via teams   If no answer, or after 5PM/ weekends,  then please call  [phone]    Assessment and plan discussed with the primary team .          
Patient is a 89-year-old female with a medical history of hypertension, B12 deficiency, colon cancer 6 years ago not requiring chemotherapy, gallstones, GERD, lymphedema of both lower extremities, and hard of hearing, who presented with a chief complaint of right-sided abdominal pain worsening after breakfast with nausea but no vomiting, and is being admitted to medicine for management of jejunal diverticulitis and bradycardia.

## 2024-03-02 NOTE — PROGRESS NOTE ADULT - PROBLEM SELECTOR PLAN 4
Asymptomatic  Patient hemodynamically stable, ctm  Seen by cardio in the past-no aggressive intervention due to patient age  TTE reviewed, EF 55%, mod DD, mild- Mod TR
Patient bradycardic to 30s, priorly baseline was in 50s.   Patient hemodynamically stable, ctm  Seen by cardio in the past-no aggressive intervention due to patient age  TTE pending
Asymptomatic  Patient hemodynamically stable, ctm  Seen by cardio in the past-no aggressive intervention due to patient age  TTE reviewed, EF 55%, mod DD, mild- Mod TR
Asymptomatic  Patient hemodynamically stable, ctm  Seen by cardio in the past-no aggressive intervention due to patient age  TTE reviewed, EF 55%, mod DD, mild- Mod TR
Patient bradycardic to 30s, priorly baseline was in 50s.   Patient hemodynamically stable, ctm  Seen by cardio in the past-no aggressive intervention due to patient age  TTE reviewed, EF 55%, mod DD, mild- Mod TR
Asymptomatic  Patient hemodynamically stable, ctm  Seen by cardio in the past-no aggressive intervention due to patient age  TTE reviewed, EF 55%, mod DD, mild- Mod TR
Patient bradycardic to 30s, priorly baseline was in 50s.   Patient hemodynamically stable, ctm  Seen by cardio in the past-no aggressive intervention due to patient age  TTE
Asymptomatic  Patient hemodynamically stable, ctm  Seen by cardio in the past-no aggressive intervention due to patient age  TTE reviewed, EF 55%, mod DD, mild- Mod TR

## 2024-03-02 NOTE — PROGRESS NOTE ADULT - PROBLEM SELECTOR PROBLEM 3
DIOGENES (acute kidney injury)

## 2024-03-02 NOTE — PROGRESS NOTE ADULT - PROBLEM SELECTOR PLAN 1
CT with jejunal diverticulitis  Continue Ceftriaxone/Flagyl X 7 days  Advanced to regular diet
CT with jejunal diverticulitis  Continue Ceftriaxone/Flagyl X 7 days- transition to meropenem based on new abdominal pain  Advanced to regular diet    #cough- repeated coughing with meals and pt reports chronic 'mucous going down her throat' states she is clearing her throat but there is obvious reactive cough noted on interview. given CT chest findings would treat as PNA likely aspiration. Also has some areas of atelectasis likely 2/2 deconditioning from bedrest as well as component of pulm effusion  - s/p lasix 20mg iv x 1 2/28, continue monitoring off lasix for now  - pt given IS, encourage use and OOBC  - start duoneb q6 hours  - saturating > 90% on RA  - anticipate 7 days of empiric abx therapy- will f/u ID recs on when to switch  - PT rec HPT    #severe abdominal pain- out of proportion to exam. ddx includes gas pains vs. possible evolving pancreatitis vs. must r/o ischemic colitis. Resolved- suspect gas pains  - f/u lactate, amylase, lipase  - f/u CT a/p with PO contrast per ID- diminished mesenteric fat stranding, f/u final report  - serial abd exams  - pain resolved by the time exam was completed
CT with jejunal diverticulitis  Continue Ceftriaxone/Flagyl X 7 days- transition to meropenem based on new abdominal pain  Advanced to regular diet    #cough- repeated coughing with meals and pt reports chronic 'mucous going down her throat' states she is clearing her throat but there is obvious reactive cough noted on interview. ddx includes reactive airway disease vs. aspiration pneumonitis vs. less likely pna  - CXR with bilateral infiltrates, unclear etiology- f/u CT chest non con  - start duoneb q6 hours  - hold off on any lasix due to relative stability    #severe abdominal pain- out of proportion to exam. ddx includes gas pains vs. possible evolving pancreatitis vs. must r/o ischemic colitis  - f/u lactate, amylase, lipase  - f/u CT a/p with PO contrast per ID  - serial abd exams  - pain resolved by the time exam was completed
CT with jejunal diverticulitis  Continue Ceftriaxone/Flagyl X 7 days  Advanced to regular diet
CT with jejunal diverticulitis  Continue Ceftriaxone/Flagyl X 7 days- transition to PO in the next 1-2 days  Advanced to regular diet    #cough- repeated coughing with meals and pt reports chronic 'mucous going down her throat' states she is clearing her throat but there is obvious reactive cough noted on interview. ddx includes reactive airway disease vs. aspiration pneumonitis vs. less likely pna  - diffuse rhonchi and wheezing bilaterally- no documented hx of pulmonary dz- get CXR  - start duoneb q6 hours
CT with jejunal diverticulitis  Continue Ceftriaxone/Flagyl X 7 days- transition to meropenem based on new abdominal pain  Advanced to regular diet    #cough- repeated coughing with meals and pt reports chronic 'mucous going down her throat' states she is clearing her throat but there is obvious reactive cough noted on interview. ddx includes reactive airway disease vs. aspiration pneumonitis vs. less likely pna  - CXR with bilateral infiltrates, unclear etiology- f/u CT chest non con  - start duoneb q6 hours  - give lasix 20mg iv x 1, monitor response  - consider repeat CXR tomorrow    #severe abdominal pain- out of proportion to exam. ddx includes gas pains vs. possible evolving pancreatitis vs. must r/o ischemic colitis. Resolved- suspect gas pains  - f/u lactate, amylase, lipase  - f/u CT a/p with PO contrast per ID- diminished mesenteric fat stranding, f/u final report  - serial abd exams  - pain resolved by the time exam was completed
CT with jejunal diverticulitis  Continue Ceftriaxone/Flagyl X 7 days- transition to meropenem based on new abdominal pain  Advanced to regular diet    #cough- repeated coughing with meals and pt reports chronic 'mucous going down her throat' states she is clearing her throat but there is obvious reactive cough noted on interview. given CT chest findings would treat as PNA likely aspiration. Also has some areas of atelectasis likely 2/2 deconditioning from bedrest as well as component of pulm effusion  - s/p lasix 20mg iv x 1 2/28, continue monitoring off lasix for now  - pt given IS, encourage use and OOBC  - start duoneb q6 hours  - saturating > 90% on RA  - anticipate 7 days of empiric abx therapy- ID recommends IV meropenem through saturday, transition to levaquin/flagyl to complete 2 additional days thereafter at home  - PT rec HPT  - will set up for HOME pulm program    #severe abdominal pain- out of proportion to exam. ddx includes gas pains vs. possible evolving pancreatitis vs. must r/o ischemic colitis. Resolved- suspect gas pains  - f/u lactate, amylase, lipase  - f/u CT a/p with PO contrast per ID- diminished mesenteric fat stranding, f/u final report  - serial abd exams  - pain resolved by the time exam was completed, no further episodes
CT with jejunal diverticulitis  Continue Ceftriaxone/Flagyl  Advanced to regular diet
CT with jejunal diverticulitis  Continue Ceftriaxone/Flagyl  Advanced to regular diet
CT with jejunal diverticulitis  Continue Ceftriaxone/Flagyl X 7 days- transition to meropenem based on new abdominal pain  Advanced to regular diet    #cough- repeated coughing with meals and pt reports chronic 'mucous going down her throat' states she is clearing her throat but there is obvious reactive cough noted on interview. given CT chest findings would treat as PNA likely aspiration. Also has some areas of atelectasis likely 2/2 deconditioning from bedrest as well as component of pulm effusion  - s/p lasix 20mg iv x 1 2/28, continue monitoring off lasix for now  - pt given IS, encourage use and OOBC  - start duoneb q6 hours  - saturating > 90% on RA  - anticipate 7 days of empiric abx therapy- ID recommends IV meropenem through saturday, transition to levaquin/flagyl to complete 2 additional days thereafter at home  - PT rec HPT  - will set up for HOME pulm program    #severe abdominal pain- out of proportion to exam. ddx includes gas pains vs. possible evolving pancreatitis vs. must r/o ischemic colitis. Resolved- suspect gas pains  - f/u lactate, amylase, lipase  - f/u CT a/p with PO contrast per ID- diminished mesenteric fat stranding, f/u final report  - serial abd exams  - pain resolved by the time exam was completed, no further episodes

## 2024-03-02 NOTE — PROGRESS NOTE ADULT - PROBLEM SELECTOR PROBLEM 1
Diverticulitis

## 2024-03-03 NOTE — ED PROVIDER NOTE - ATTENDING CONTRIBUTION TO CARE
89-year-old female patient past medical history hypertension lives alone with 24/7 aide brought in by EMS For mechanical fall noted to have a contusion to right forehead denies any LOC.  Patient at baseline mental status moving all extremities well.  Gait was not able to pick her up but she felt today sodium 911 was called may have to get her up she normally uses a walker not any blood thinners CT head and CT C-spine were ordered patient has no complaints reassess.

## 2024-03-03 NOTE — ED ADULT NURSE NOTE - NSFALLHARMRISKINTERV_ED_ALL_ED
Assistance OOB with selected safe patient handling equipment if applicable/Assistance with ambulation/Communicate risk of Fall with Harm to all staff, patient, and family/Monitor gait and stability/Provide visual cue: red socks, yellow wristband, yellow gown, etc/Reinforce activity limits and safety measures with patient and family/Bed in lowest position, wheels locked, appropriate side rails in place/Call bell, personal items and telephone in reach/Instruct patient to call for assistance before getting out of bed/chair/stretcher/Non-slip footwear applied when patient is off stretcher/[place] to call system/Physically safe environment - no spills, clutter or unnecessary equipment/Purposeful Proactive Rounding/Room/bathroom lighting operational, light cord in reach

## 2024-03-03 NOTE — ED ADULT NURSE REASSESSMENT NOTE - NS ED NURSE REASSESS COMMENT FT1
Pt was able to provide a urine sample with out the need of being straight cath. Pt was cleaned, readjusted on stretcher to comfort. No further RN interventions are needed at this time. Pt placed in position of comfort. Bed in lowest position, wheels locked, appropriate side rails raised. Pt denies needs at this time.

## 2024-03-03 NOTE — ED ADULT NURSE NOTE - ED CARDIAC RHYTHM
PATIENT INTERVIEWED IN PREOP. NAME AND ALLERGY  BAND VISIBLE AND CORRECT PER PATIENT. PATIENT HAS UNDERSTANDING OF PROCEDURE AND SURGICAL SITE. EDUCATIONAL NEEDS MET. PATIENT STATES NO PAIN AT THIS TIME. regular

## 2024-03-03 NOTE — ED PROVIDER NOTE - PATIENT PORTAL LINK FT
You can access the FollowMyHealth Patient Portal offered by [facility] by registering at the following website: [email or web address]. By joining [employer]’s FollowMyHealth portal, you will also be able to view your health information using other applications (apps) compatible with our system.

## 2024-03-03 NOTE — ED PROVIDER NOTE - PROGRESS NOTE DETAILS
Spoke with Andreas Sandhu over the phone; he is unhappy with the care at [facility] and does not think the patient should be discharged because she lives alone and is unable to be taken care of. Pt has 24/7 aids and is still falling. He states she should not have been discharged from her last visit at [facility] because she needed PT. He stated that if she gets discharged he will get a  involved. Agreed to admit pt to medicine for placement. Daryn PGY1: discussed with hospitalist regarding anticoagulation initiation given new afib, will have admitter evaluate and have GOC conversation prior to decision.

## 2024-03-03 NOTE — ED ADULT NURSE NOTE - CHPI ED NUR TIMING2
Subjective:       Patient ID: Sparkle Jose is a 41 y.o. female.    Chief Complaint: Disease Management, Pain, Swelling, and Follow-up    Follow up: 41 year old female  AS on cosentyx 300 mgt severe lower back pain. She is barely prednisone PRN. She complains of joint pain involving her low back, hips, and shoulders with prolonged morning stiffness lasting between 30 minutes to 1 hour. No serious infections in the last 6 months. She complains of increasing pain at night and difficulty sleeping. Skelaxin was not as effective as flexeril and she wants to change back to this treatment. She  was on celebrex 200 mg  But pcp said it would hurt kidney.  She is followed by pain management.    Current tx:  1. cosentyx  2. Prednisone    Prior tx:  1. Humira  2. Remicade (drug induced lupus)    Review of Systems   Constitutional: Positive for activity change and fatigue. Negative for appetite change.   Eyes: Negative for visual disturbance.   Respiratory: Negative for shortness of breath.    Cardiovascular: Negative for palpitations and leg swelling.   Gastrointestinal: Negative for constipation and diarrhea.   Musculoskeletal: Positive for back pain, joint swelling and myalgias.   Allergic/Immunologic: Positive for immunocompromised state.   Neurological: Negative for dizziness and light-headedness.         Objective:   There were no vitals filed for this visit.    Past Medical History:   Diagnosis Date    Ankylosing spondylitis     Arthritis     Celiac disease     Depression     Family history of DVT     MGM DVT, mother spleenic    Family history of factor V Leiden mutation     mother and sister    GERD (gastroesophageal reflux disease)     Migraine     Osteoarthritis     Psoriasis     Psoriatic arthritis mutilans      Past Surgical History:   Procedure Laterality Date    brain decompression   2006    BRAIN SURGERY      TUBAL LIGATION            Physical Exam   Constitutional: She is oriented to  person, place, and time and well-developed, well-nourished, and in no distress.   Neurological: She is alert and oriented to person, place, and time.   Psychiatric: Mood, affect and judgment normal.         Results for orders placed or performed in visit on 07/31/20   CBC auto differential   Result Value Ref Range    WBC 12.81 (H) 3.90 - 12.70 K/uL    RBC 4.87 4.00 - 5.40 M/uL    Hemoglobin 14.7 12.0 - 16.0 g/dL    Hematocrit 47.0 37.0 - 48.5 %    MCV 97 82 - 98 fL    MCH 30.2 27.0 - 31.0 pg    MCHC 31.3 (L) 32.0 - 36.0 g/dL    RDW 14.0 11.5 - 14.5 %    Platelets 440 (H) 150 - 350 K/uL    MPV 10.3 9.2 - 12.9 fL    Immature Granulocytes 0.5 0.0 - 0.5 %    Gran # (ANC) 7.5 1.8 - 7.7 K/uL    Immature Grans (Abs) 0.06 (H) 0.00 - 0.04 K/uL    Lymph # 4.1 1.0 - 4.8 K/uL    Mono # 0.9 0.3 - 1.0 K/uL    Eos # 0.2 0.0 - 0.5 K/uL    Baso # 0.10 0.00 - 0.20 K/uL    nRBC 0 0 /100 WBC    Gran % 58.3 38.0 - 73.0 %    Lymph % 31.8 18.0 - 48.0 %    Mono % 7.3 4.0 - 15.0 %    Eosinophil % 1.3 0.0 - 8.0 %    Basophil % 0.8 0.0 - 1.9 %    Differential Method Automated    Comprehensive metabolic panel   Result Value Ref Range    Sodium 142 136 - 145 mmol/L    Potassium 4.4 3.5 - 5.1 mmol/L    Chloride 106 95 - 110 mmol/L    CO2 24 23 - 29 mmol/L    Glucose 80 70 - 110 mg/dL    BUN 12 6 - 20 mg/dL    Creatinine 0.7 0.5 - 1.4 mg/dL    Calcium 10.2 8.7 - 10.5 mg/dL    Total Protein 7.6 6.0 - 8.4 g/dL    Albumin 4.0 3.5 - 5.2 g/dL    Total Bilirubin 0.3 0.1 - 1.0 mg/dL    Alkaline Phosphatase 67 55 - 135 U/L    AST 16 10 - 40 U/L    ALT 13 10 - 44 U/L    Anion Gap 12 8 - 16 mmol/L    eGFR if African American >60.0 >60 mL/min/1.73 m^2    eGFR if non African American >60.0 >60 mL/min/1.73 m^2   C-Reactive Protein   Result Value Ref Range    CRP 9.8 (H) 0.0 - 8.2 mg/L   Sedimentation rate   Result Value Ref Range    Sed Rate 8 0 - 20 mm/Hr       Assessment:       1. Ankylosing spondylitis, unspecified site of spine    2. Psoriatic arthritis     3. Protein S deficiency    4. Anxiety    5. 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    6. Abnormal platelets            Plan:       Ankylosing spondylitis, unspecified site of spine  -     CBC Auto Differential; Future; Expected date: 11/04/2020  -     Comprehensive Metabolic Panel; Future; Expected date: 11/04/2020  -     Sedimentation rate; Future; Expected date: 11/04/2020  -     C-Reactive Protein; Future; Expected date: 11/04/2020  -     TSH; Future; Expected date: 11/04/2020  -     T4, Free; Future; Expected date: 11/04/2020  -     T3, Free; Future; Expected date: 11/04/2020  -     Protein Electrophoresis, Serum; Future; Expected date: 11/04/2020    Psoriatic arthritis  -     celecoxib (CELEBREX) 100 MG capsule; Take 1 capsule (100 mg total) by mouth once daily.  Dispense: 30 capsule; Refill: 5  -     CBC Auto Differential; Future; Expected date: 11/04/2020  -     Comprehensive Metabolic Panel; Future; Expected date: 11/04/2020  -     Sedimentation rate; Future; Expected date: 11/04/2020  -     C-Reactive Protein; Future; Expected date: 11/04/2020  -     TSH; Future; Expected date: 11/04/2020  -     T4, Free; Future; Expected date: 11/04/2020  -     T3, Free; Future; Expected date: 11/04/2020  -     Protein Electrophoresis, Serum; Future; Expected date: 11/04/2020    Protein S deficiency  -     CBC Auto Differential; Future; Expected date: 11/04/2020  -     Comprehensive Metabolic Panel; Future; Expected date: 11/04/2020  -     Sedimentation rate; Future; Expected date: 11/04/2020  -     C-Reactive Protein; Future; Expected date: 11/04/2020  -     TSH; Future; Expected date: 11/04/2020  -     T4, Free; Future; Expected date: 11/04/2020  -     T3, Free; Future; Expected date: 11/04/2020  -     Protein Electrophoresis, Serum; Future; Expected date: 11/04/2020    Anxiety  -     CBC Auto Differential; Future; Expected date: 11/04/2020  -     Comprehensive Metabolic Panel; Future; Expected date: 11/04/2020  -      Sedimentation rate; Future; Expected date: 11/04/2020  -     C-Reactive Protein; Future; Expected date: 11/04/2020  -     TSH; Future; Expected date: 11/04/2020  -     T4, Free; Future; Expected date: 11/04/2020  -     T3, Free; Future; Expected date: 11/04/2020  -     Protein Electrophoresis, Serum; Future; Expected date: 11/04/2020    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  -     CBC Auto Differential; Future; Expected date: 11/04/2020  -     Comprehensive Metabolic Panel; Future; Expected date: 11/04/2020  -     Sedimentation rate; Future; Expected date: 11/04/2020  -     C-Reactive Protein; Future; Expected date: 11/04/2020  -     TSH; Future; Expected date: 11/04/2020  -     T4, Free; Future; Expected date: 11/04/2020  -     T3, Free; Future; Expected date: 11/04/2020  -     Protein Electrophoresis, Serum; Future; Expected date: 11/04/2020    Abnormal platelets  -     CBC Auto Differential; Future; Expected date: 11/04/2020  -     Comprehensive Metabolic Panel; Future; Expected date: 11/04/2020  -     Sedimentation rate; Future; Expected date: 11/04/2020  -     C-Reactive Protein; Future; Expected date: 11/04/2020  -     TSH; Future; Expected date: 11/04/2020  -     T4, Free; Future; Expected date: 11/04/2020  -     T3, Free; Future; Expected date: 11/04/2020  -     Protein Electrophoresis, Serum; Future; Expected date: 11/04/2020        Assessment:  41 year old female with  Psoriatic arthritis, Ankylosing spondylitis on cosentyx 300 mg  --chronic insomnia on trazodone  --anxiety on prozac  --hx of brain surgery  --persistent leukocytosis and thrombocytosis, hematology work up unrevealing  --thyroid nodule  --pulmonary nodule    The patient location is: home  The chief complaint leading to consultation is: psa, as    Visit type: audiovisual    Face to Face time with patient: 30 minutes of total time spent on the encounter, which includes face to face time and non-face to face time preparing to see the  sudden onset patient (eg, review of tests), Obtaining and/or reviewing separately obtained history, Documenting clinical information in the electronic or other health record, Independently interpreting results (not separately reported) and communicating results to the patient/family/caregiver, or Care coordination (not separately reported).         Each patient to whom he or she provides medical services by telemedicine is:  (1) informed of the relationship between the physician and patient and the respective role of any other health care provider with respect to management of the patient; and (2) notified that he or she may decline to receive medical services by telemedicine and may withdraw from such care at any time.          celebrex 100 mg daily  No change in biologic labs in 4 months and on Friday at [facility]

## 2024-03-03 NOTE — ED PROVIDER NOTE - OBJECTIVE STATEMENT
89-year-old female patient past medical history hypertension lives alone with 24/7 aide brought in by EMS for unwitnessed fall.  Patient was found on floor awake by aide. Aide was not able to pick pt up from floor and called ambulance. En route pt's O2 dropped to 80s and given 8L NC. denies fevers, chills, body aches, abdominal pain, n/v/d. 89-year-old female patient past medical history hypertension lives alone with 24/7 aide brought in by EMS for unwitnessed fall.  Patient was found on floor awake by aide. Aide was not able to pick pt up from floor and called ambulance. En route pt's O2 dropped to 80s and given 8L NC. denies fevers, chills, body aches, abdominal pain, n/v/d. No blood thinners 89-year-old female patient past medical history hypertension lives alone with 24/7 aide brought in by EMS for unwitnessed fall.  Patient was found on floor awake by aide. Aide was not able to pick pt up from floor and called ambulance. En route pt's O2 dropped to 80s and given 8L NC. denies fevers, chills, body aches, abdominal pain, n/v/d. No blood thinners.

## 2024-03-03 NOTE — ED CLERICAL - NS ED CARE COORDINATION INFORMATION

## 2024-03-03 NOTE — ED ADULT NURSE NOTE - OBJECTIVE STATEMENT
89 year old female presented to ED via [facility] from home with c/o of mechanical trip and fall today, no LOC, no blood thinners, denies pain. abrasion to right side of forehead. pt reports EMS was called due to her AIDE being unable to pick her up off the floor because the AIDE has a broken shoulder. hx GERD, HTN. pt denies CP, SOB, nausea/vomiting, numbness/tingling, fever, cough, chills, dizziness, headache, blurred vision, neuro intact. pt a&ox3, lung sounds clear, heart rate regular, abdomen soft nontender nondistended to palp. skin intact. call bell within reach, bed in lowest position, side rails up for safety. Will continue to monitor and assess while offering support and reassurance.

## 2024-03-03 NOTE — ED PROVIDER NOTE - PHYSICAL EXAMINATION
GENERAL: NAD  HEENT:  Atraumatic  CHEST/LUNG: Chest rise equal bilaterally, expiratory wheezing b/l  HEART: Regular rate and rhythm  ABDOMEN: Soft, Nontender, Nondistended  EXTREMITIES:  Extremities warm  PSYCH: A&Ox3  SKIN: No obvious rashes or lesions  MSK: No cervical spine TTP, able to range neck to the left and right/ No midline spinal TTP  NEUROLOGY: strength and sensation intact in all extremities. CN 2 - 12 intact. No pronator drift. Ambulatory without difficulty.

## 2024-03-04 NOTE — H&P ADULT - ASSESSMENT
89F w/ hx of HTN, B12 deficiency, colon cancer 6 years ago no chemo, gallstones, GERD, lymphedema of both legs, [place] p/w fall after recent admission for diverticulitis

## 2024-03-04 NOTE — H&P ADULT - PROBLEM SELECTOR PLAN 1
Pt thought to have mechanical fall at home. Denies any LOC. Currently attributed to deconditioning after prolonged hospital stay. CTH negative  -Falls precautions  -Pt consult, Dispo planning for [facility]  -F/u L arm x-ray given pt's pain, pt unable to specify if this pain is from before or after fall.  -Pain control

## 2024-03-04 NOTE — H&P ADULT - HISTORY OF PRESENT ILLNESS
89F w/ hx of HTN, B12 deficiency, colon cancer 6 years ago no chemo, gallstones, GERD, lymphedema of both legs, [place] p/w fall after recent admission. Pt was admitted a little over a week ago for abdominal pain and found to have diverticulitis and a positive blood culture bottle. ID consulted and recommended IV Meropenem with 2 days of PO antibiotics after discharge. BC bottle felt to be contaminant. Was made DNR/DNI. Reportedly also had aflutter episode, decision made not to initiate AC during that admission. After going home pt felt deconditioned and too weak. Was sitting on chair in kitchen when she tried to get up she collapsed and fell unwitnessed. Found on floor by aide and ambulance called. Pt denies any LOC during fall.    In ER: Given Tylenol 650mg

## 2024-03-04 NOTE — PHYSICAL THERAPY INITIAL EVALUATION ADULT - IMPAIRED TRANSFERS: SIT/STAND, REHAB EVAL
impaired balance/decreased flexibility/narrow base of support/impaired postural control/impaired sensory feedback/decreased strength

## 2024-03-04 NOTE — ED ADULT NURSE REASSESSMENT NOTE - NS ED NURSE REASSESS COMMENT FT1
Pt was informed that percocet wasn't in medication regiment, pt was offered oxy once again and stated she'll take the oxy.

## 2024-03-04 NOTE — H&P ADULT - PROBLEM SELECTOR PLAN 2
Jejunal diverticulitis on recent admission as well as ID consulted. Planned to cont. Cipro and Flagyl for 2 days after discharge. Pt states she never picked up this medication  -Cont. Cipro and flagyl Jejunal diverticulitis on recent admission as well as ID consulted. Planned to cont. Cipro and Flagyl for 2 days after discharge. Pt states she never picked up this medication  -Cont. Levaquin and flagyl x2 days. Levaquin dosed to Q48hrs based on renal function  -Pain control

## 2024-03-04 NOTE — PHYSICAL THERAPY INITIAL EVALUATION ADULT - PERTINENT HX OF CURRENT PROBLEM, REHAB EVAL
89F w/ hx of HTN, B12 deficiency, colon cancer 6 years ago no chemo, gallstones, GERD, lymphedema of both legs, [place] p/w fall after recent admission. Pt was admitted a little over a week ago for abdominal pain and found to have diverticulitis and a positive blood culture bottle. ID consulted and recommended IV Meropenem with 2 days of PO antibiotics after discharge. BC bottle felt to be contaminant. Was made DNR/DNI. Reportedly also had aflutter episode, decision made not to initiate AC during that admission. After going home pt felt deconditioned and too weak. Was sitting on chair in kitchen when she tried to get up she collapsed and fell unwitnessed. Found on floor by aide and ambulance called. Pt denies any LOC during fall. In ER: Given Tylenol 650mg. CT Head: No acute displaced calvarial fracture. No acute hemorrhage or mass effect. CT CERVICAL SPINE: No acute displaced fracture or traumatic subluxation. CXR: Pulmonary vascular congestion. Small bilateral pleural effusions with associated passive atelectasis. No pneumothorax or acute displaced rib fractures.

## 2024-03-04 NOTE — H&P ADULT - NSHPSOCIALHISTORY_GEN_ALL_CORE
Patient , lives alone with an aid 7 days a week five hours a day.   No substance use, smoking history, or alcohol history

## 2024-03-04 NOTE — PHYSICAL THERAPY INITIAL EVALUATION ADULT - ADDITIONAL COMMENTS
Pt lives alone in a co-op w/ steps to enter. Pt has HHA 7 days/week, from 8am-1pm per pt. Pt amb w/ rolling walker, occasionally a cane outdoors. Pt states granddaughter comes by when HHA leaves in evening.    Per care coordination note - pt was partial assist PTA. Private HHA (8H/7D).

## 2024-03-04 NOTE — ED ADULT NURSE REASSESSMENT NOTE - NS ED NURSE REASSESS COMMENT FT1
Pt continues to yell requesting night medication, pt is educated on the requirement for MD to review her medication and to place the appropriate medication. Pt was provided an other blanket and adjusted to comfort on stretcher.

## 2024-03-04 NOTE — ED ADULT NURSE REASSESSMENT NOTE - NS ED NURSE REASSESS COMMENT FT1
As per ACP Gary florez stated: "Okay. I will contact attending who will admit patient. They will do med rec".

## 2024-03-04 NOTE — ED ADULT NURSE REASSESSMENT NOTE - NS ED NURSE REASSESS COMMENT FT1
ACP Obie florez states "waiting for attending that saw her to respond" in regards to home percocet dose.

## 2024-03-04 NOTE — PATIENT PROFILE ADULT - FALL HARM RISK - HARM RISK INTERVENTIONS
Assistance with ambulation/Assistance OOB with selected safe patient handling equipment/Communicate Risk of Fall with Harm to all staff/Discuss with provider need for PT consult/Monitor for mental status changes/Monitor gait and stability/Provide patient with walking aids - walker, cane, crutches/Reinforce activity limits and safety measures with patient and family/Reorient to person, place and time as needed/Review medications for side effects contributing to fall risk/Sit up slowly, dangle for a short time, stand at bedside before walking/Tailored Fall Risk Interventions/Use of alarms - bed, chair and/or voice tab/Visual Cue: Yellow wristband and red socks/Bed in lowest position, wheels locked, appropriate side rails in place/Call bell, personal items and telephone in reach/Instruct patient to call for assistance before getting out of bed or chair/Non-slip footwear when patient is out of bed/[place] to call system/Physically safe environment - no spills, clutter or unnecessary equipment/Purposeful Proactive Rounding/Room/bathroom lighting operational, light cord in reach

## 2024-03-04 NOTE — H&P ADULT - TIME BILLING
Need to interview and examine patient, discuss care with Dr. Art ER attending, provide counseling, coordinate care, place orders, document, personally review imaging, ekg and review prior medical records

## 2024-03-04 NOTE — ED ADULT NURSE REASSESSMENT NOTE - NS ED NURSE REASSESS COMMENT FT1
ACP Obie Rojo contacted on teams, informed of current situation with pt yelling and requesting home medication.

## 2024-03-04 NOTE — ED ADULT NURSE REASSESSMENT NOTE - NS ED NURSE REASSESS COMMENT FT1
Pt is refusing to take Tylenol pt is yelling "no honey I need percocet, I need my percocet and muscle relaxer, Honey im not taking no Tylenol.". At this time patient doesn't have a list of home medication to reference to. Pt at this time will not allow me to dysaphia screen her, to see if she can tolerate PO medication. MD Stearns made aware.

## 2024-03-04 NOTE — H&P ADULT - NSHPPHYSICALEXAM_GEN_ALL_CORE
Vital Signs Last 24 Hrs  T(C): 36.6 (03-03-24 @ 19:37), Max: 36.8 (03-03-24 @ 04:31)  T(F): 97.9 (03-03-24 @ 19:37), Max: 98.3 (03-03-24 @ 04:31)  HR: 55 (03-04-24 @ 01:08) (51 - 87)  BP: 171/67 (03-03-24 @ 19:37) (109/63 - 171/67)  BP(mean): 101 (03-03-24 @ 19:37) (101 - 108)  RR: 18 (03-03-24 @ 19:37) (16 - 20)  SpO2: 94% (03-03-24 @ 19:37) (92% - 99%)

## 2024-03-04 NOTE — ED ADULT NURSE REASSESSMENT NOTE - NS ED NURSE REASSESS COMMENT FT1
Pt is refusing Oxy, is very persistent about getting her nightly percocet. continues to yell "No I want my percocet, call the 3rd floor they know, I want my percocet." TERENCE Rojo made aware.

## 2024-03-04 NOTE — H&P ADULT - NSHPLABSRESULTS_GEN_ALL_CORE
I have personally reviewed the labs, imaging and ekg. Ekg with afib HR 64 QTc 307 non-specific ST segment findings, CXR w/ small bilateral pleural effusions

## 2024-03-06 NOTE — PROVIDER CONTACT NOTE (OTHER) - ASSESSMENT
Patient denies cp, dizziness or discomfort. VSS. EKG done; showing SB 1st degree AV block with frequent PVCs.

## 2024-03-06 NOTE — CHART NOTE - NSCHARTNOTEFT_GEN_A_CORE
Notified by RN patient in AV block.   d/w Tele tech. At 11:39, patient in 1st degree AVB with 2;1 AVB.   EKG noted to have SB with 1st degree AVB.   Patient med list reviewed, no BB on board, only on amlodipine.   D/w Dr. Collins, as patient asymptomatic will continue to monitor on tele. No cardiac consult warranted at this time.     [id number]
Wound Care Team Note:    Request for wound care consult for foot/toe wound received and referred to Podiatry. Please refer to Podiatry for management. Will not follow.    Radha Nolen NP-C, CWOCN via TEAMS
For full details see H&P from earlier today.    89 year old female with PMH HTN, B12 deficiency, colo CA, lymphedema, [place] who presented after a fall at home.    -PT pending, will likely need placement. Otherwise medically stable at this point. Will start dispo planning once final recs from PT complete.
ISTOP reviewed Reference #: [id number]    PDI	Current Rx	Drug Type	Rx Written	Rx Dispensed	Drug	Quantity	Days Supply	Prescriber Name	Prescriber JUANITO #	Payment Method	Dispenser  A	N	O	02/02/2024	02/02/2024	oxycodone-acetaminophen 7.5-325 mg tablet	7	7	DuarteOlinda givens	[id number]	Insurance	[facility]  A	N		01/25/2024	01/26/2024	zolpidem tartrate 5 mg tablet	30	30	DuarteVandanaOlinda	[id number]	Insurance	[facility]  A	N	O	12/19/2023	12/19/2023	oxycodone-acetaminophen 7.5-325 mg tablet	7	7	DuarteOlinda	[id number]	Insurance	[facility]  A	N		12/06/2023	12/06/2023	zolpidem tartrate 5 mg tablet	30	30	Antonietta Eddy	[id number]	Insurance	[facility]  A	N		10/13/2023	10/13/2023	zolpidem tartrate 5 mg tablet	30	30	DuarteOlinda givens	[id number]	Insurance	[facility]  A	N		08/29/2023	08/30/2023	zolpidem tartrate 5 mg tablet	30	30	DuarteOlinda	[id number]	Insurance	[facility]  A	N		07/05/2023	07/06/2023	zolpidem tartrate 5 mg tablet	30	30	DuarteOlinda	[id number]	Insurance	[facility]  A	N	O	05/22/2023	05/23/2023	oxycodone-acetaminophen 7.5-325 mg tablet	7	7	Olinda Duarte	[id number]	Insurance	[facility]  A	N		05/15/2023	05/15/2023	zolpidem tartrate 5 mg tablet	30	30	DuarteOlinda givens	[id number]	Insurance	[facility]

## 2024-03-06 NOTE — PROVIDER CONTACT NOTE (OTHER) - BACKGROUND
89-year-old female patient past medical history hypertension lives alone with 24/7 aide brought in by EMS for unwitnessed fall.

## 2024-03-06 NOTE — PROVIDER CONTACT NOTE (OTHER) - ACTION/TREATMENT ORDERED:
PATRICIA Larios aware; EKG completed and given to PA to assess; cardiology and tele follow up. EKG placed in chart.

## 2024-03-07 NOTE — DISCHARGE NOTE PROVIDER - PROVIDER TOKENS
FREE:[LAST:[Follow up],PHONE:[(   )    -],FAX:[(   )    -],ADDRESS:[with Primary Care Provider or at [facility]],FOLLOWUP:[1 week]]

## 2024-03-07 NOTE — PROGRESS NOTE ADULT - PROBLEM SELECTOR PLAN 1
Pt thought to have mechanical fall at home. Denies any LOC. Currently attributed to deconditioning after prolonged hospital stay. CTH negative  -Falls precautions  -Pt consult, Dispo planning for [facility]

## 2024-03-07 NOTE — DISCHARGE NOTE PROVIDER - HOSPITAL COURSE
HPI:  89F w/ hx of HTN, B12 deficiency, colon cancer 6 years ago no chemo, gallstones, GERD, lymphedema of both legs, [place] p/w fall after recent admission. Pt was admitted a little over a week ago for abdominal pain and found to have diverticulitis and a positive blood culture bottle. ID consulted and recommended IV Meropenem with 2 days of PO antibiotics after discharge. BC bottle felt to be contaminant. Was made DNR/DNI. Reportedly also had aflutter episode, decision made not to initiate AC during that admission. After going home pt felt deconditioned and too weak. Was sitting on chair in kitchen when she tried to get up she collapsed and fell unwitnessed. Found on floor by aide and ambulance called. Pt denies any LOC during fall.    In ER: Given Tylenol 650mg (04 Mar 2024 02:31)    Hospital Course:  Patient admitted with weakness and fall. Patient was recently admitted for  diverticulitis. Patient thought to have mechanical fall at home. Denies any LOC. Currently attributed to deconditioning after prolonged hospital stay. CTH negative. Pt consult and recommended  [facility].    Important Medication Changes and Reason:    Active or Pending Issues Requiring Follow-up:    Advanced Directives:   [ ] Full code  [ ] DNR  [ ] Hospice    Discharge Diagnoses:  Fall  Weakness         HPI:  89F w/ hx of HTN, B12 deficiency, colon cancer 6 years ago no chemo, gallstones, GERD, lymphedema of both legs, [place] p/w fall after recent admission. Pt was admitted a little over a week ago for abdominal pain and found to have diverticulitis and a positive blood culture bottle. ID consulted and recommended IV Meropenem with 2 days of PO antibiotics after discharge. BC bottle felt to be contaminant. Was made DNR/DNI. Reportedly also had aflutter episode, decision made not to initiate AC during that admission. After going home pt felt deconditioned and too weak. Was sitting on chair in kitchen when she tried to get up she collapsed and fell unwitnessed. Found on floor by aide and ambulance called. Pt denies any LOC during fall.    In ER: Given Tylenol 650mg (04 Mar 2024 02:31)    Hospital Course:  Patient admitted with weakness and fall. Patient was recently admitted for  diverticulitis. Patient thought to have mechanical fall at home. Denies any LOC. Currently attributed to deconditioning after prolonged hospital stay. CTH negative. Pt consult and recommended  [facility].    Important Medication Changes and Reason:    Active or Pending Issues Requiring Follow-up:    Advanced Directives:   [x ] Full code  [ ] DNR  [ ] Hospice    Discharge Diagnoses:  Fall  Weakness

## 2024-03-07 NOTE — PROGRESS NOTE ADULT - PROBLEM SELECTOR PLAN 5
Hgb in the 8s stable compared to discharge  -Cont. Vitamin B12
Hgb 8.7 stable compared to discharge  -Cont. Vitamin B12
Hgb in the 8s stable compared to discharge  -Cont. Vitamin B12

## 2024-03-07 NOTE — PROGRESS NOTE ADULT - NSPROGADDITIONALINFOA_GEN_ALL_CORE
Discussed with patient, 4 [place] ACP, grandambrose Johns. Discussed with patient, 4 [place] ACP, left  for getachew Johns.

## 2024-03-07 NOTE — DISCHARGE NOTE PROVIDER - NSDCFUADDAPPT_GEN_ALL_CORE_FT
Follow up with PMD in [facility] APPTS ARE READY TO BE MADE: [ x] YES    Best Family or Patient Contact (if needed):    Additional Information about above appointments (if needed):    1: Follow up with Please follow up with your primary care physician regarding your hospitalization or [facility] clinic   2:   3:     Other comments or requests:    APPTS ARE READY TO BE MADE: [ x] YES    Best Family or Patient Contact (if needed):    Additional Information about above appointments (if needed):    1: Follow up with Please follow up with your primary care physician regarding your hospitalization or [facility] clinic   2:   3:     Other comments or requests:   Patient is being discharged to rehab. Patient/caregiver will arrange follow up appointments.

## 2024-03-07 NOTE — DISCHARGE NOTE PROVIDER - ATTENDING DISCHARGE PHYSICAL EXAMINATION:
.  VITAL SIGNS:  T(C): 36.4 (03-08-24 @ 10:58), Max: 36.9 (03-07-24 @ 20:56)  T(F): 97.5 (03-08-24 @ 10:58), Max: 98.4 (03-07-24 @ 20:56)  HR: 73 (03-08-24 @ 10:58) (46 - 73)  BP: 150/62 (03-08-24 @ 10:58) (133/72 - 153/61)  BP(mean): --  RR: 18 (03-08-24 @ 10:58) (18 - 18)  SpO2: 95% (03-08-24 @ 10:58) (95% - 97%)  Wt(kg): --    PHYSICAL EXAM:    Constitutional: Elderly female laying in bed in NAD  Respiratory: CTA B/L; no W/R/R  Cardiac: +S1/S2; RRR; no M/R/G  Gastrointestinal: soft, NT/ND; no rebound or guarding  Neurologic: AAOx3  Psychiatric: affect and characteristics of appearance, verbalizations, behaviors are appropriate

## 2024-03-07 NOTE — PROGRESS NOTE ADULT - ASSESSMENT
Assessment/plan:    Early signs of bilateral heel DTI: Stable, noninfected, present admission.    Recommend continue decubitus precautions while in house.  Continue with use of Z flow boots.  Recommend [place] to heels every other day.  Reconsult podiatry as needed.
89F w/ hx of HTN, B12 deficiency, colon cancer 6 years ago no chemo, gallstones, GERD, lymphedema of both legs, [place] p/w fall after recent admission for diverticulitis
89F w/ hx of HTN, B12 deficiency, colon cancer 6 years ago no chemo, gallstones, GERD, lymphedema of both legs, [place] p/w fall after recent admission for diverticulitis
89F w/ hx of HTN, B12 deficiency, colon cancer 6 years ago no chemo, gallstones, GERD, lymphedema of both legs, [place] p/w fall after recent admission for diverticulitis

## 2024-03-07 NOTE — PROGRESS NOTE ADULT - PROBLEM SELECTOR PLAN 4
-Trend BP  -Cont. amlodipine daily

## 2024-03-07 NOTE — PROGRESS NOTE ADULT - PROBLEM SELECTOR PLAN 7
DVT PPx  -Hep subq BID  -GOC: Discussed during recent admission, DNR/DNI

## 2024-03-07 NOTE — DISCHARGE NOTE PROVIDER - NSDCCPCAREPLAN_GEN_ALL_CORE_FT
PRINCIPAL DISCHARGE DIAGNOSIS  Diagnosis: Head injury  Assessment and Plan of Treatment: CT head negative      SECONDARY DISCHARGE DIAGNOSES  Diagnosis: Weakness  Assessment and Plan of Treatment: Cont PT in [facility]    Diagnosis: Diverticulitis  Assessment and Plan of Treatment: Continue ABT as ordered     PRINCIPAL DISCHARGE DIAGNOSIS  Diagnosis: Fall  Assessment and Plan of Treatment: You came in after a fall due to weakness. You will be discharged to a subacute rehabilitation to work on strength and balance exercises prior to going home.

## 2024-03-07 NOTE — PROGRESS NOTE ADULT - TIME BILLING
Review of tests, imaging, labs, documents, medical management, coordination of care and counseling.

## 2024-03-07 NOTE — DISCHARGE NOTE PROVIDER - NSDCMRMEDTOKEN_GEN_ALL_CORE_FT
acetaminophen 325 mg oral tablet: 2 tab(s) orally every 6 hours As needed Temp greater or equal to 38C (100.4F), Mild Pain (1 - 3)  Ambien 5 mg oral tablet: 1 tab(s) orally once a day (at bedtime) as needed for  insomnia  amLODIPine 2.5 mg oral tablet: 1 tab(s) orally once a day  Carafate 1 g/10 mL oral suspension: 10 milliliter(s) orally 4 times a day  diclofenac 1% topical gel: Apply topically to affected area 4 times a day  fluticasone 50 mcg/inh inhalation powder: 1 spray(s) in each nostril 2 times a day  latanoprost 0.005% ophthalmic solution: 1 drop(s) to each affected eye once a day (in the evening)  levoFLOXacin 750 mg oral tablet: 1 tab(s) orally once a day MDD: 1  lidocaine 5% topical film: Apply topically to affected area once a day  metroNIDAZOLE 500 mg oral tablet: 1 tab(s) orally 3 times a day  senna oral tablet: 2 tab(s) orally once a day (at bedtime)  Vitamin B-12 1000 mcg oral tablet: 1 tab(s) orally once a day   amLODIPine 2.5 mg oral tablet: 1 tab(s) orally every 24 hours  cyanocobalamin 1000 mcg oral tablet: 1 tab(s) orally once a day  fluticasone 50 mcg/inh nasal spray: 1 spray(s) nasal 2 times a day  latanoprost 0.005% ophthalmic solution: 1 drop(s) to each affected eye once a day (at bedtime)  lidocaine 4% topical film: Apply topically to affected area once a day  oxycodone-acetaminophen 5 mg-325 mg oral tablet: 2 tab(s) orally every 12 hours As needed Severe Pain (7 - 10)  senna leaf extract oral tablet: 2 tab(s) orally once a day (at bedtime)  sucralfate 1 g/10 mL oral suspension: 10 milliliter(s) orally 4 times a day  zolpidem 5 mg oral tablet: 1 tab(s) orally once a day (at bedtime) As needed Insomnia

## 2024-03-07 NOTE — DISCHARGE NOTE PROVIDER - CARE PROVIDER_API CALL
Follow up,   with Primary Care Provider or at [facility]  Phone: (   )    -  Fax: (   )    -  Follow Up Time: 1 week

## 2024-03-07 NOTE — PROGRESS NOTE ADULT - SUBJECTIVE AND OBJECTIVE BOX
Podiatry pager #: [phone]/ [phone]    Patient is a 89y old  Female who presents with a chief complaint of Fall, weakness (05 Mar 2024 16:13)Podiatry consultation for evaluation of bilateral lower extremity wounds.      HPI:  89F w/ hx of HTN, B12 deficiency, colon cancer 6 years ago no chemo, gallstones, GERD, lymphedema of both legs, [place] p/w fall after recent admission. Pt was admitted a little over a week ago for abdominal pain and found to have diverticulitis and a positive blood culture bottle. ID consulted and recommended IV Meropenem with 2 days of PO antibiotics after discharge. BC bottle felt to be contaminant. Was made DNR/DNI. Reportedly also had aflutter episode, decision made not to initiate AC during that admission. After going home pt felt deconditioned and too weak. Was sitting on chair in kitchen when she tried to get up she collapsed and fell unwitnessed. Found on floor by aide and ambulance called. Pt denies any LOC during fall.    In ER: Given Tylenol 650mg (04 Mar 2024 02:31)      PAST MEDICAL & SURGICAL HISTORY:  Hypertension      Gallstone      GERD (Gastroesophageal Reflux Disease)      Hard of Hearing      Colon cancer  6 yrs ago. did not require chemo      Lymphedema, limb  BLE      B12 deficiency      S/P CAREY (Total Abdominal Hysterectomy)      S/P colectomy  partial colectomy due to colon ca      H/O exploratory laparotomy  for SBO?      History of open reduction and internal fixation (ORIF) procedure          MEDICATIONS  (STANDING):  amLODIPine   Tablet 2.5 milliGRAM(s) Oral every 24 hours  chlorhexidine 2% Cloths 1 Application(s) Topical daily  cyanocobalamin 1000 MICROGram(s) Oral daily  fluticasone propionate 50 MICROgram(s)/spray Nasal Spray 1 Spray(s) Both Nostrils two times a day  heparin   Injectable 5000 Unit(s) SubCutaneous every 12 hours  latanoprost 0.005% Ophthalmic Solution 1 Drop(s) Both EYES at bedtime  lidocaine   4% Patch 1 Patch Transdermal daily  metroNIDAZOLE    Tablet 500 milliGRAM(s) Oral three times a day  senna 2 Tablet(s) Oral at bedtime  sucralfate suspension 1 Gram(s) Oral four times a day    MEDICATIONS  (PRN):  melatonin 3 milliGRAM(s) Oral at bedtime PRN Insomnia  oxycodone    5 mG/acetaminophen 325 mG 2 Tablet(s) Oral every 12 hours PRN Severe Pain (7 - 10)  zolpidem 5 milliGRAM(s) Oral at bedtime PRN Insomnia      Allergies    penicillin (Unknown)    Intolerances        VITALS:    Vital Signs Last 24 Hrs  T(C): 36.6 (05 Mar 2024 11:18), Max: 36.8 (05 Mar 2024 05:04)  T(F): 97.9 (05 Mar 2024 11:18), Max: 98.3 (05 Mar 2024 05:04)  HR: 72 (05 Mar 2024 11:53) (45 - 81)  BP: 139/67 (05 Mar 2024 11:18) (131/54 - 139/67)  BP(mean): --  RR: 18 (05 Mar 2024 11:18) (17 - 18)  SpO2: 95% (05 Mar 2024 11:18) (88% - 97%)    Parameters below as of 05 Mar 2024 11:18  Patient On (Oxygen Delivery Method): nasal cannula  O2 Flow (L/min): 2      LABS:                          8.2    7.20  )-----------( 268      ( 05 Mar 2024 07:43 )             26.5       03-05    142  |  112<H>  |  19  ----------------------------<  82  4.3   |  19<L>  |  1.29    Ca    9.0      05 Mar 2024 07:41  Phos  2.8     03-05  Mg     2.0     03-05    TPro  4.9<L>  /  Alb  2.7<L>  /  TBili  0.3  /  DBili  x   /  AST  16  /  ALT  7<L>  /  AlkPhos  58  03-05      CAPILLARY BLOOD GLUCOSE              LOWER EXTREMITY PHYSICAL EXAM:    Vasular: DP/PT _1/4, B/L, CFT <_2 seconds B/L, Temperature gradient _wnl, B/L.   Neuro: Epicritic sensation intact_ to the level of toes_, B/L.  Skin: Bilateral heels early signs of DTI: Blanchable erythema.  No bulla.  No purulence fluctuance malodor or clinical signs of infection.  No open ulcerations.      RADIOLOGY & ADDITIONAL STUDIES:    
[facility] Division of Hospital Medicine  Oscar Collins DO  Reachable on [employer] Teams    Patient is a 89y old  Female who presents with a chief complaint of Fall, weakness (07 Mar 2024 13:32)    SUBJECTIVE / OVERNIGHT EVENTS: No acute events overnight. Patient seen and examined at bedside this morning, states she feels well, denies chest pain, shortness of breath.    REVIEW OF SYSTEMS:    CONSTITUTIONAL: No weakness, fevers or chills  EYES/ENT: No visual changes;  No vertigo or throat pain   NECK: No pain or stiffness  RESPIRATORY: No cough, wheezing, hemoptysis; No shortness of breath  CARDIOVASCULAR: No chest pain or palpitations  GASTROINTESTINAL: No abdominal or epigastric pain. No nausea, vomiting, or hematemesis; No diarrhea or constipation. No melena or hematochezia.  GENITOURINARY: No dysuria, frequency or hematuria  NEUROLOGICAL: No numbness or weakness  SKIN: No itching, burning, rashes, or lesions  MSK: No joint pain, no back pain  HEME: No easy bleeding, no easy bruising  All other review of systems is negative unless indicated above.    MEDICATIONS  (STANDING):  amLODIPine   Tablet 2.5 milliGRAM(s) Oral every 24 hours  chlorhexidine 2% Cloths 1 Application(s) Topical daily  cyanocobalamin 1000 MICROGram(s) Oral daily  fluticasone propionate 50 MICROgram(s)/spray Nasal Spray 1 Spray(s) Both Nostrils two times a day  heparin   Injectable 5000 Unit(s) SubCutaneous every 12 hours  latanoprost 0.005% Ophthalmic Solution 1 Drop(s) Both EYES at bedtime  lidocaine   4% Patch 1 Patch Transdermal daily  senna 2 Tablet(s) Oral at bedtime  sucralfate suspension 1 Gram(s) Oral four times a day    MEDICATIONS  (PRN):  melatonin 3 milliGRAM(s) Oral at bedtime PRN Insomnia  oxycodone    5 mG/acetaminophen 325 mG 2 Tablet(s) Oral every 12 hours PRN Severe Pain (7 - 10)  zolpidem 5 milliGRAM(s) Oral at bedtime PRN Insomnia      CAPILLARY BLOOD GLUCOSE        I&O's Summary    06 Mar 2024 07:01  -  07 Mar 2024 07:00  --------------------------------------------------------  IN: 900 mL / OUT: 0 mL / NET: 900 mL    07 Mar 2024 07:01  -  07 Mar 2024 18:02  --------------------------------------------------------  IN: 600 mL / OUT: 400 mL / NET: 200 mL        PHYSICAL EXAM:  Vital Signs Last 24 Hrs  T(C): 37 (07 Mar 2024 11:12), Max: 37.1 (06 Mar 2024 20:39)  T(F): 98.6 (07 Mar 2024 11:12), Max: 98.8 (06 Mar 2024 23:54)  HR: 54 (07 Mar 2024 11:12) (51 - 57)  BP: 148/61 (07 Mar 2024 11:12) (128/62 - 148/61)  BP(mean): --  RR: 18 (07 Mar 2024 11:12) (18 - 18)  SpO2: 93% (07 Mar 2024 11:12) (93% - 96%)    Parameters below as of 07 Mar 2024 11:12  Patient On (Oxygen Delivery Method): nasal cannula  O2 Flow (L/min): 1    CONSTITUTIONAL: Frail, elderly female laying in bed in NAD  RESPIRATORY: Normal respiratory effort; lungs are clear to auscultation bilaterally  CARDIOVASCULAR: Regular rate and rhythm, normal S1 and S2, no murmur/rub/gallop  ABDOMEN: Nontender to palpation, soft, nondistended  PSYCH: A+O to person, place, and time; affect appropriate    LABS:                        8.1    6.60  )-----------( 278      ( 06 Mar 2024 07:36 )             25.8     03-06    143  |  111<H>  |  17  ----------------------------<  78  3.6   |  22  |  1.34<H>    Ca    8.9      06 Mar 2024 07:35            Urinalysis Basic - ( 06 Mar 2024 07:35 )    Color: x / Appearance: x / SG: x / pH: x  Gluc: 78 mg/dL / Ketone: x  / Bili: x / Urobili: x   Blood: x / Protein: x / Nitrite: x   Leuk Esterase: x / RBC: x / WBC x   Sq Epi: x / Non Sq Epi: x / Bacteria: x  
[facility] Division of Hospital Medicine  Oscar Collins DO  Reachable on [employer] Teams    Patient is a 89y old  Female who presents with a chief complaint of Fall, weakness (05 Mar 2024 18:33)    SUBJECTIVE / OVERNIGHT EVENTS: No acute events overnight. Patient seen and examined at bedside this morning, states she feels well, denies chest pain, shortness of breath.    REVIEW OF SYSTEMS:    CONSTITUTIONAL: No weakness, fevers or chills  EYES/ENT: No visual changes;  No vertigo or throat pain   NECK: No pain or stiffness  RESPIRATORY: No cough, wheezing, hemoptysis; No shortness of breath  CARDIOVASCULAR: No chest pain or palpitations  GASTROINTESTINAL: No abdominal or epigastric pain. No nausea, vomiting, or hematemesis; No diarrhea or constipation. No melena or hematochezia.  GENITOURINARY: No dysuria, frequency or hematuria  NEUROLOGICAL: No numbness or weakness  SKIN: No itching, burning, rashes, or lesions  MSK: No joint pain, no back pain  HEME: No easy bleeding, no easy bruising  All other review of systems is negative unless indicated above.    MEDICATIONS  (STANDING):  amLODIPine   Tablet 2.5 milliGRAM(s) Oral every 24 hours  chlorhexidine 2% Cloths 1 Application(s) Topical daily  cyanocobalamin 1000 MICROGram(s) Oral daily  fluticasone propionate 50 MICROgram(s)/spray Nasal Spray 1 Spray(s) Both Nostrils two times a day  heparin   Injectable 5000 Unit(s) SubCutaneous every 12 hours  latanoprost 0.005% Ophthalmic Solution 1 Drop(s) Both EYES at bedtime  lidocaine   4% Patch 1 Patch Transdermal daily  senna 2 Tablet(s) Oral at bedtime  sucralfate suspension 1 Gram(s) Oral four times a day    MEDICATIONS  (PRN):  melatonin 3 milliGRAM(s) Oral at bedtime PRN Insomnia  oxycodone    5 mG/acetaminophen 325 mG 2 Tablet(s) Oral every 12 hours PRN Severe Pain (7 - 10)  zolpidem 5 milliGRAM(s) Oral at bedtime PRN Insomnia      CAPILLARY BLOOD GLUCOSE        I&O's Summary    05 Mar 2024 07:01  -  06 Mar 2024 07:00  --------------------------------------------------------  IN: 360 mL / OUT: 0 mL / NET: 360 mL    06 Mar 2024 07:01  -  06 Mar 2024 15:38  --------------------------------------------------------  IN: 780 mL / OUT: 0 mL / NET: 780 mL        PHYSICAL EXAM:  Vital Signs Last 24 Hrs  T(C): 36.9 (06 Mar 2024 03:11), Max: 37.3 (05 Mar 2024 20:45)  T(F): 98.5 (06 Mar 2024 03:11), Max: 99.1 (05 Mar 2024 20:45)  HR: 44 (06 Mar 2024 09:52) (44 - 57)  BP: 137/76 (06 Mar 2024 09:52) (129/62 - 137/76)  BP(mean): --  RR: 18 (06 Mar 2024 09:52) (18 - 18)  SpO2: 91% (06 Mar 2024 09:52) (91% - 98%)    Parameters below as of 06 Mar 2024 09:52  Patient On (Oxygen Delivery Method): nasal cannula  O2 Flow (L/min): 1    CONSTITUTIONAL: Frail, elderly female laying in bed in NAD  RESPIRATORY: Normal respiratory effort; lungs are clear to auscultation bilaterally  CARDIOVASCULAR: Regular rate and rhythm, normal S1 and S2, no murmur/rub/gallop  ABDOMEN: Nontender to palpation, soft, nondistended  PSYCH: A+O to person, place, and time; affect appropriate    LABS:                        8.1    6.60  )-----------( 278      ( 06 Mar 2024 07:36 )             25.8     03-06    143  |  111<H>  |  17  ----------------------------<  78  3.6   |  22  |  1.34<H>    Ca    8.9      06 Mar 2024 07:35  Phos  2.8     03-05  Mg     2.0     03-05    TPro  4.9<L>  /  Alb  2.7<L>  /  TBili  0.3  /  DBili  x   /  AST  16  /  ALT  7<L>  /  AlkPhos  58  03-05          Urinalysis Basic - ( 06 Mar 2024 07:35 )    Color: x / Appearance: x / SG: x / pH: x  Gluc: 78 mg/dL / Ketone: x  / Bili: x / Urobili: x   Blood: x / Protein: x / Nitrite: x   Leuk Esterase: x / RBC: x / WBC x   Sq Epi: x / Non Sq Epi: x / Bacteria: x        Culture - Urine (collected 03 Mar 2024 22:48)  Source: Clean Catch Clean Catch (Midstream)  Preliminary Report (05 Mar 2024 14:15):    50,000 - 99,000 CFU/mL Enterococcus faecium
[facility] Division of Hospital Medicine  Oscar Collins DO  Reachable on [employer] Teams    Patient is a 89y old  Female who presents with a chief complaint of Fall, weakness (04 Mar 2024 02:31)    SUBJECTIVE / OVERNIGHT EVENTS: No acute events overnight. Patient seen and examined at bedside this morning, has no complaints at this time, no chest pain or shortness of breath.    REVIEW OF SYSTEMS:    CONSTITUTIONAL: No weakness, fevers or chills  EYES/ENT: No visual changes;  No vertigo or throat pain   NECK: No pain or stiffness  RESPIRATORY: No cough, wheezing, hemoptysis; No shortness of breath  CARDIOVASCULAR: No chest pain or palpitations  GASTROINTESTINAL: No abdominal or epigastric pain. No nausea, vomiting, or hematemesis; No diarrhea or constipation. No melena or hematochezia.  GENITOURINARY: No dysuria, frequency or hematuria  NEUROLOGICAL: No numbness or weakness  SKIN: No itching, burning, rashes, or lesions  MSK: No joint pain, no back pain  HEME: No easy bleeding, no easy bruising  All other review of systems is negative unless indicated above.    MEDICATIONS  (STANDING):  amLODIPine   Tablet 2.5 milliGRAM(s) Oral every 24 hours  chlorhexidine 2% Cloths 1 Application(s) Topical daily  cyanocobalamin 1000 MICROGram(s) Oral daily  fluticasone propionate 50 MICROgram(s)/spray Nasal Spray 1 Spray(s) Both Nostrils two times a day  heparin   Injectable 5000 Unit(s) SubCutaneous every 12 hours  latanoprost 0.005% Ophthalmic Solution 1 Drop(s) Both EYES at bedtime  lidocaine   4% Patch 1 Patch Transdermal daily  metroNIDAZOLE    Tablet 500 milliGRAM(s) Oral three times a day  senna 2 Tablet(s) Oral at bedtime    MEDICATIONS  (PRN):  melatonin 3 milliGRAM(s) Oral at bedtime PRN Insomnia  oxycodone    5 mG/acetaminophen 325 mG 2 Tablet(s) Oral every 12 hours PRN Severe Pain (7 - 10)  zolpidem 5 milliGRAM(s) Oral at bedtime PRN Insomnia      CAPILLARY BLOOD GLUCOSE        I&O's Summary    04 Mar 2024 07:01  -  05 Mar 2024 07:00  --------------------------------------------------------  IN: 1270 mL / OUT: 0 mL / NET: 1270 mL    05 Mar 2024 07:01  -  05 Mar 2024 16:13  --------------------------------------------------------  IN: 120 mL / OUT: 0 mL / NET: 120 mL        PHYSICAL EXAM:  Vital Signs Last 24 Hrs  T(C): 36.6 (05 Mar 2024 11:18), Max: 36.8 (05 Mar 2024 05:04)  T(F): 97.9 (05 Mar 2024 11:18), Max: 98.3 (05 Mar 2024 05:04)  HR: 72 (05 Mar 2024 11:53) (45 - 81)  BP: 139/67 (05 Mar 2024 11:18) (131/54 - 139/67)  BP(mean): --  RR: 18 (05 Mar 2024 11:18) (17 - 18)  SpO2: 95% (05 Mar 2024 11:18) (88% - 97%)    Parameters below as of 05 Mar 2024 11:18  Patient On (Oxygen Delivery Method): nasal cannula  O2 Flow (L/min): 2    CONSTITUTIONAL: Frail, elderly female laying in bed in NAD  RESPIRATORY: Normal respiratory effort; lungs are clear to auscultation bilaterally  CARDIOVASCULAR: Regular rate and rhythm, normal S1 and S2, no murmur/rub/gallop  ABDOMEN: Nontender to palpation, soft, nondistended  PSYCH: A+O to person, place, and time; affect appropriate    LABS:                        8.2    7.20  )-----------( 268      ( 05 Mar 2024 07:43 )             26.5     03-05    142  |  112<H>  |  19  ----------------------------<  82  4.3   |  19<L>  |  1.29    Ca    9.0      05 Mar 2024 07:41  Phos  2.8     03-05  Mg     2.0     03-05    TPro  4.9<L>  /  Alb  2.7<L>  /  TBili  0.3  /  DBili  x   /  AST  16  /  ALT  7<L>  /  AlkPhos  58  03-05          Urinalysis Basic - ( 05 Mar 2024 07:41 )    Color: x / Appearance: x / SG: x / pH: x  Gluc: 82 mg/dL / Ketone: x  / Bili: x / Urobili: x   Blood: x / Protein: x / Nitrite: x   Leuk Esterase: x / RBC: x / WBC x   Sq Epi: x / Non Sq Epi: x / Bacteria: x        Culture - Urine (collected 03 Mar 2024 22:48)  Source: Clean Catch Clean Catch (Midstream)  Preliminary Report (05 Mar 2024 14:15):    50,000 - 99,000 CFU/mL Enterococcus faecium

## 2024-03-07 NOTE — PROGRESS NOTE ADULT - PROBLEM SELECTOR PLAN 2
Jejunal diverticulitis on recent admission as well as ID consulted. Planned to cont. Cipro and Flagyl for 2 days after discharge. Pt states she never picked up this medication  -Completed antibiotics while inpatient

## 2024-03-07 NOTE — PROGRESS NOTE ADULT - PROBLEM SELECTOR PLAN 3
Noted on prior admission, stopped AC 1/2019  -On telemetry

## 2024-03-08 NOTE — DISCHARGE NOTE NURSING/CASE MANAGEMENT/SOCIAL WORK - NSDCFUADDAPPT_GEN_ALL_CORE_FT
APPTS ARE READY TO BE MADE: [ x] YES    Best Family or Patient Contact (if needed):    Additional Information about above appointments (if needed):    1: Follow up with Please follow up with your primary care physician regarding your hospitalization or [facility] clinic   2:   3:     Other comments or requests:

## 2024-03-08 NOTE — DISCHARGE NOTE NURSING/CASE MANAGEMENT/SOCIAL WORK - PATIENT PORTAL LINK FT
You can access the FollowMyHealth Patient Portal offered by [facility] by registering at the following website: [email or web address]. By joining [email or web address] FollowMyHealth portal, you will also be able to view your health information using other applications (apps) compatible with our system.

## 2024-03-08 NOTE — DISCHARGE NOTE NURSING/CASE MANAGEMENT/SOCIAL WORK - NSDCPEFALRISK_GEN_ALL_CORE
For information on Fall & Injury Prevention, visit: [email or web address] OR  [email or web address] OR  https://www.cdc.gov/steadi/patient.html

## 2024-04-07 NOTE — ED CLERICAL - NS ED CLERK NOTE PRE-ARRIVAL INFORMATION; ADDITIONAL PRE-ARRIVAL INFORMATION

## 2024-04-07 NOTE — ED PROCEDURE NOTE - ATTENDING CONTRIBUTION TO CARE
Attending MD Hernandez: Risks, benefit and alternatives of procedure explained to patient and patient demonstrated verbal understanding and consent.  I was present during the key portions of the procedure.

## 2024-04-07 NOTE — ED ADULT NURSE NOTE - OBJECTIVE STATEMENT
The patient is a 89y female presenting to the ED from [facility] for complaints of decreased PO intake.  EMS endorses patient has a PMH of B12 deficiency, Colon cancer, Gallstone, GERD (Gastroesophageal Reflux Disease), Hard of Hearing (Bilateral Hearing Aides on patient @ this time.), Hypertension, and Lymphedema. EMS endorses Jimenez states @ baseline the patient is normally yelling, active, and pulling @ equipment but today the patient was less active and not acting like herself.  EMS states Tony reports the patient was both hypokalemic and Hypernatremic. The plan was for Jimenez to rehydrate patient via IV Fluids but EMS was called when access could not be achieved. Upon assessment patient is alert and awake but not responding to interview questions and repetitively asking for a cold glass of water. EMS endorses on arrival patient was only responding to pain so this is improvement. The patient is noted to have multiple areas of ecchymosis and mutliple dressings from IV access attempt. Patient has a STG 2  to the mid upper back as well as a STG 2 to the sacrum, Allevyns replaced @ this time. Patient lips and tongue are noted to be cracked and dry. PT also presents with bilateral 3+/4+ pitting edema to the bilateral lower extremities. Patient is a poor historian @ this time and unable to verbalize demands. Safety and comfort measures provided, bed locked and in lowest position, side rails up for safety. Call bell within reach. Awaiting results. The patient is a 89y female presenting to the ED from [facility] for complaints of decreased PO intake.  EMS endorses patient has a PMH of B12 deficiency, Colon cancer, Gallstone, GERD (Gastroesophageal Reflux Disease), Hard of Hearing (Bilateral Hearing Aides on patient @ this time.), Hypertension, and Lymphedema. EMS endorses Jimenez states @ baseline the patient is normally yelling, active, and pulling @ equipment but today the patient was less active and not acting like herself.  EMS states Tony reports the patient was both hypokalemic and Hypernatremic. The plan was for Jimenez to rehydrate patient via IV Fluids but EMS was called when access could not be achieved. Upon assessment patient is alert and awake but not responding to interview questions and repetitively asking for a cold glass of water. EMS endorses on arrival patient was only responding to pain so this is improvement. The patient is noted to have multiple areas of ecchymosis and multiple dressings from IV access attempt. Patient has a STG 2  to the mid upper back as well as a STG 2 to the sacrum, Allevyn replaced @ this time. Patient lips and tongue are noted to be cracked and dry. PT also presents with bilateral 3+/4+ pitting edema to the bilateral lower extremities. Patient is a poor historian @ this time and unable to verbalize demands. Safety and comfort measures provided, bed locked and in lowest position, side rails up for safety. Call bell within reach. Awaiting results.

## 2024-04-07 NOTE — ED ADULT NURSE NOTE - NSFALLHARMRISKINTERV_ED_ALL_ED

## 2024-04-07 NOTE — ED ADULT NURSE NOTE - NSICDXPASTSURGICALHX_GEN_ALL_CORE_FT
none PAST SURGICAL HISTORY:  H/O exploratory laparotomy for SBO?    History of open reduction and internal fixation (ORIF) procedure     S/P colectomy partial colectomy due to colon ca    S/P CAREY (Total Abdominal Hysterectomy)

## 2024-04-08 NOTE — PROGRESS NOTE ADULT - PROBLEM SELECTOR PLAN 5
Pt w/ AHRF likely 2/2 aspiration PNA  - will c/w Nasal cannula for comfort  - robinol 0.2mg for secretions Patient with AHRF likely 2/2 aspiration pneumonia.  -Continue with NC for comfort  -Robinol 0.2mg for secretions

## 2024-04-08 NOTE — CONSULT NOTE ADULT - ATTENDING COMMENTS
89 yo F with history of HTN, B12 deficiency, colon cancer 6 years ago s/p resection (no chemo), gallstones, GERD, lymphedema of both legs presents with severe sepsis likely d/t multifocal PNA  likely aspiration event iso ongoing encephalopathy. Cannot r/o cholangitis iso CBD dilation w/ distal calculi/stone, however family has opted for comfort measures only.  Andreas Sandhu is the surrogate decision maker as patient's daughter is the primary surrogate and has verbally deferred to him allowing for all medical decisions.      I have reviewed all documentation from prior primary team and consultants, as well as relevant imaging and laboratory data as this patient is new to me.    In the setting of parenteral controlled substance administration, clinical monitoring required for side effects such as respiratory depression, constipation and opioid induced neurotoxicity due to organ failure.    Hospice transition to be addressed if clinical condition permits.    Patient assessment and plan discussed on interdisciplinary team rounds today.

## 2024-04-08 NOTE — H&P ADULT - NSHPPHYSICALEXAM_GEN_ALL_CORE
Vital Signs Last 24 Hrs  T(C): 37.6 (08 Apr 2024 05:23), Max: 40 (07 Apr 2024 22:00)  T(F): 99.6 (08 Apr 2024 05:23), Max: 104 (07 Apr 2024 22:00)  HR: 52 (08 Apr 2024 06:23) (50 - 82)  BP: 79/54 (08 Apr 2024 06:25) (79/54 - 130/52)  BP(mean): 62 (08 Apr 2024 06:25) (59 - 90)  RR: 26 (08 Apr 2024 06:23) (20 - 31)  SpO2: 100% (08 Apr 2024 06:23) (95% - 100%)    Parameters below as of 08 Apr 2024 06:23  Patient On (Oxygen Delivery Method): nasal cannula  O2 Flow (L/min): 3      PHYSICAL EXAM:  GENERAL: NAD, well-developed  HEAD:  Atraumatic, Normocephalic  EYES: PERRLA, conjunctiva and sclera clear;   ENMT: Dry mucous membranes  NECK: Supple, No JVD, Normal thyroid  HEART: Regular rate and rhythm;   RESPIRATORY: Difficult examination as patient moaning/talking throughout. Sounded grossly roncherous   ABDOMEN: Soft, Nontender, Nondistended; Bowel sounds present  NEUROLOGY: A&Ox3, nonfocal, moving all extremities  EXTREMITIES:  3+ edema   SKIN: warm, dry, normal color, no rash or abnormal lesions

## 2024-04-08 NOTE — CONSULT NOTE ADULT - SUBJECTIVE AND OBJECTIVE BOX
Joanie Cristina,   Fellow, Hospice & Palliative Medicine    Date of [id number] @ 14:53  HPI:  89F w/ hx of HTN, B12 deficiency, colon cancer 6 years ago no chemo, gallstones, GERD, lymphedema of both legs, Presents to [facility] from [facility] rehab for fever. Information obtained from patient's great grandson. Per Andreas, he states that patient has had waxing and waning mental status since reaching [facility]. He states she has not been able to participate much in rehab. He notes deteriorating mental status throughout her time there. He is not aware of any medical changes while she was at rehab until the recent news of fever for which she was sent to the facility. On examination patient is awake but not oriented. She states she is not in pain but otherwise does not participate in meaningful conversation. Unable to obtain formal ROS    In the ED  Pt found to be febrile to 104. Tachypneic placed on 3L NC. CT scan w/ evidence of multifocal PNA and CBD dilation w/ calculi/stone in distal duct. Pt given Cefepime, vanc, flagyl in the ED. 1L IVF bolus and 75cc/hr  (2024 04:23)    Palliative Care consulted for evaluation of patient with progressive decline presenting from [facility] rehab with sepsis in the setting of multifocal pneumonia and possible infected gallstone. We met with her great grandson Andreas Sandhu at bedside. He reiterated that she although she had been somewhat independent (HHA for 5-8 hours per day) several months ago, she has been steadily declining since. She has been eating minimally and unable to participate in rehab at [facility]. He understands that she is dying and that he would want to focus on her comfort. 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    PERTINENT PM/SXH:   Hypertension    Gallstone    GERD (Gastroesophageal Reflux Disease)    Hard of Hearing    Colon cancer    Lymphedema, limb    B12 deficiency      S/P CAREY (Total Abdominal Hysterectomy)    S/P colectomy    H/O exploratory laparotomy    History of open reduction and internal fixation (ORIF) procedure      FAMILY HISTORY:    Family Hx substance abuse [ ]yes [ ]no  ITEMS NOT CHECKED ARE NOT PRESENT    SOCIAL HISTORY:   Significant other/partner[ ]  Children[ ]  Muslim/Spirituality:  Substance hx:  [ ]   Tobacco hx:  [ ]   Alcohol hx: [ ]   Home Opioid hx:  [ ] I-Stop Reference No:  Living Situation: [ ]Home  [ ]Long term care  [ ]Rehab [ ]Other    ADVANCE DIRECTIVES:    DNR/MOLST  [ ]  Living Will  [ ]   DECISION MAKER(s):  [ ] Health Care Proxy(s)  [x ] Surrogate(s)  [ ] Guardian           Name(s): Phone Number(s): Andreas Sandhu [phone]    BASELINE (I)ADL(s) (prior to admission):  [place]: [ ]Total  [ ] Moderate [x ]Dependent    Allergies    penicillin (Unknown)    Intolerances    MEDICATIONS  (STANDING):    MEDICATIONS  (PRN):  glycopyrrolate Injectable 0.2 milliGRAM(s) IV Push every 6 hours PRN Secretions  HYDROmorphone  Injectable 0.2 milliGRAM(s) IV Push every 1 hour PRN Moderate Pain (4 - 6)  HYDROmorphone  Injectable 0.5 milliGRAM(s) IV Push every 1 hour PRN Dyspnea  HYDROmorphone  Injectable 0.5 milliGRAM(s) IV Push every 1 hour PRN Severe Pain (7 - 10)  midazolam Injectable 1 milliGRAM(s) IV Push every 1 hour PRN Anxiety, agitation, intractable respiratory distress    PRESENT SYMPTOMS: [x ]Unable to self-report  [ ] CPOT [ ] PAINADs [ ] RDOS  Source if other than patient:  [ ]Family   [ ]Team     Pain: [ ]yes [ ]no  QOL impact -   Location -                    Aggravating factors -  Quality -  Radiation -  Timing-  Severity (0-10 scale):  Minimal acceptable level/pain goal (0-10 scale):     CPOT:    https://www.sccm.org/getattachment/ctf46v46-5o7s-1j5c-6r1r-1242s8315u8a/Critical-Care-Pain-Observation-Tool-(CPOT)    PAIN AD Score:   http://geriatrictoolkit.[place].[email or web address]/cog/painad.pdf (press ctrl +  left click to view)    Dyspnea:                           [ ]Mild [ ]Moderate [ ]Severe      RDOS:  0 to 2  minimal or no respiratory distress   3  mild distress  4 to 6 moderate distress  >7 severe distress  https://homecareinformation.net/handouts/hen/Respiratory_Distress_Observation_Scale.pdf (Ctrl +  left click to view)     Anxiety:                             [ ]Mild [ ]Moderate [ ]Severe  Fatigue:                             [ ]Mild [ ]Moderate [ ]Severe  Nausea:                             [ ]Mild [ ]Moderate [ ]Severe  Loss of appetite:              [ ]Mild [ ]Moderate [ ]Severe  Constipation:                    [ ]Mild [ ]Moderate [ ]Severe    PCSSQ[Palliative Care Spiritual Screening Question]   Severity (0-10):  Score of 4 or > indicate consideration of Chaplaincy referral.  Chaplaincy Referral: [ ] yes [ ] refused [ ] following [ ] Deferred     Caregiver [place]? : [ ] yes [ ] no [ ] Deferred [ ] Declined             Social work referral [ ] Patient & Family Centered Care Referral [ ]     Anticipatory Grief present?:  [ ] yes [ ] no  [ ] Deferred                  Social work referral [ ] Chaplaincy Referral[ ]      Other Symptoms:  [ ]All other review of systems negative     Palliative Performance Status Version 2:      20   %    http://[email or web address]/files/news/palliative_performance_scale_ppsv2.pdf  PHYSICAL EXAM:  Vital Signs Last 24 Hrs  T(C): 36.4 (2024 07:36), Max: 40 (2024 22:00)  T(F): 97.5 (2024 07:36), Max: 104 (2024 22:00)  HR: 51 (2024 07:36) (50 - 82)  BP: 108/50 (2024 07:36) (79/54 - 130/52)  BP(mean): 62 (2024 06:25) (59 - 90)  RR: 26 (2024 07:36) (20 - 31)  SpO2: 100% (2024 06:23) (95% - 100%)    Parameters below as of 2024 07:36  Patient On (Oxygen Delivery Method): nasal cannula  O2 Flow (L/min): 3   I&O's Summary    GENERAL: [ ]Cachexia    [ ]Alert  [ ]Oriented x   [x ]Lethargic  [ ]Unarousable  [ ]Verbal  [ ]Non-Verbal  Behavioral:   [ ] Anxiety  [ ] Delirium [ ] Agitation [ ] Other  HEENT:  [x ]Normal   [ ]Dry mouth   [ ]ET Tube/Trach  [ ]Oral lesions  PULMONARY:   [x ]Clear [ ]Tachypnea  [ ]Audible excessive secretions   [ ]Rhonchi        [ ]Right [ ]Left [ ]Bilateral  [ ]Crackles        [ ]Right [ ]Left [ ]Bilateral  [ ]Wheezing     [ ]Right [ ]Left [ ]Bilateral  [ ]Diminished breath sounds [ ]right [ ]left [ ]bilateral  CARDIOVASCULAR:    [ ]Regular [ ]Irregular [ ]Tachy  [ ]Dale [ ]Murmur [ ]Other  GASTROINTESTINAL:  [x ]Soft  [ ]Distended   [ ]+BS  [ ]Non tender [ ]Tender  [ ]Other [ ]PEG [ ]OGT/ NGT  Last BM:  GENITOURINARY:  [ ]Normal [ ] Incontinent   [ ]Oliguria/Anuria   [ ]Rene  MUSCULOSKELETAL:   [ ]Normal   [ ]Weakness  [x ]Bed/Wheelchair bound [ ]Edema  NEUROLOGIC:   [ ]No focal deficits  [x ]Cognitive impairment  [ ]Dysphagia [ ]Dysarthria [ ]Paresis [ ]Other   SKIN:   [ ]Normal  [ ]Rash  [ ]Other  [ ]Pressure ulcer(s)       Present on admission [ ]y [ ]n    CRITICAL CARE:  [ ] Shock Present  [x ]Septic [ ]Cardiogenic [ ]Neurologic [ ]Hypovolemic  [ ]  Vasopressors [ ]  Inotropes   [x ]Respiratory failure present [ ]Mechanical ventilation [ ]Non-invasive ventilatory support [ ]High flow    [ ]Acute  [ ]Chronic [x ]Hypoxic  [ ]Hypercarbic [ ]Other  [x ]Other organ failure - brain    LABS:                        10.0   10.65 )-----------( 244      ( 2024 21:50 )             31.4   04-07    146<H>  |  109<H>  |  43<H>  ----------------------------<  110<H>  3.7   |  22  |  1.80<H>    Ca    9.2      2024 21:50  Mg     1.5     04-07    TPro  5.5<L>  /  Alb  2.7<L>  /  TBili  1.3<H>  /  DBili  x   /  AST  17  /  ALT  11  /  AlkPhos  85  04-07  PT/INR - ( 2024 21:50 )   PT: 17.2 sec;   INR: 1.58 ratio         PTT - ( 2024 21:50 )  PTT:33.7 sec    Urinalysis Basic - ( 2024 21:50 )    Color: Dark Yellow / Appearance: Clear / S.016 / pH: x  Gluc: 110 mg/dL / Ketone: Trace mg/dL  / Bili: Negative / Urobili: 0.2 mg/dL   Blood: x / Protein: Trace mg/dL / Nitrite: Negative   Leuk Esterase: Negative / RBC: 22 /HPF / WBC 1 /HPF   Sq Epi: x / Non Sq Epi: 3 /HPF / Bacteria: Negative /HPF      RADIOLOGY & ADDITIONAL STUDIES:    PROTEIN CALORIE MALNUTRITION PRESENT: [ ]mild [ ]moderate [ ]severe [ ]underweight [ ]morbid obesity  https://www.andeal.org/vault/2440/web/files/ONC/Table_Clinical%20Characteristics%20to%20Document%20Malnutrition-White%20JV%20et%20al%2020.pdf    Height (cm): 165.1 (24 @ 20:56), 165.1 (24 @ 12:40), 165.1 (24 @ 14:01)  Weight (kg): 59 (24 @ 20:56), 65.8 (24 @ 12:40), 68 (24 @ 14:01)  BMI (kg/m2): 21.6 (24 @ 20:56), 24.1 (24 @ 12:40), 24.9 (24 @ 14:01)    [ ]PPSV2 < or = to 30% [ ]significant weight loss  [ ]poor nutritional intake  [ ]anasarca[ ]Artificial Nutrition      Other REFERRALS:  [ ]Hospice  [ ]Child Life  [ ]Social Work  [ ]Case management [ ]Holistic Therapy     Goals of Care Document:  Joanie Cristina,   Fellow, Hospice & Palliative Medicine    Date of [id number] @ 14:53  HPI:  89F w/ hx of HTN, B12 deficiency, colon cancer 6 years ago no chemo, gallstones, GERD, lymphedema of both legs, Presents to [facility] from [email or web address] rehab for fever. Information obtained from patient's great grandson. Per Andreas, he states that patient has had waxing and waning mental status since reaching [facility]. He states she has not been able to participate much in rehab. He notes deteriorating mental status throughout her time there. He is not aware of any medical changes while she was at rehab until the recent news of fever for which she was sent to the facility. On examination patient is awake but not oriented. She states she is not in pain but otherwise does not participate in meaningful conversation. Unable to obtain formal ROS    In the ED  Pt found to be febrile to 104. Tachypneic placed on 3L NC. CT scan w/ evidence of multifocal PNA and CBD dilation w/ calculi/stone in distal duct. Pt given Cefepime, vanc, flagyl in the ED. 1L IVF bolus and 75cc/hr  (2024 04:23)    Palliative Care consulted for evaluation of patient with progressive decline presenting from [email or web address] rehab with sepsis in the setting of multifocal pneumonia and possible infected gallstone. We met with her great grandson Andreas Sandhu at bedside. He reiterated that she although she had been somewhat independent (HHA for 5-8 hours per day) several months ago, she has been steadily declining since. She has been eating minimally and unable to participate in rehab at [email or web address] He understands that she is dying and that he would want to focus on her comfort. 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    PERTINENT PM/SXH:   Hypertension    Gallstone    GERD (Gastroesophageal Reflux Disease)    Hard of Hearing    Colon cancer    Lymphedema, limb    B12 deficiency      S/P CAREY (Total Abdominal Hysterectomy)    S/P colectomy    H/O exploratory laparotomy    History of open reduction and internal fixation (ORIF) procedure      FAMILY HISTORY: no family hx of gallstones    Family Hx substance abuse [ ]yes [x ]no  ITEMS NOT CHECKED ARE NOT PRESENT    SOCIAL HISTORY:   Significant other/partner[x ]  Children[x ]  Amish/Spirituality:   Substance hx:  [ ]   Tobacco hx:  [ ]   Alcohol hx: [ ]   Home Opioid hx:  [ ] I-Stop Reference No:  Living Situation: [ ]Home  [ ]Long term care  [ x]Rehab [ ]Other    ADVANCE DIRECTIVES:    DNR/MOLST  [x ]  Living Will  [ ]   DECISION MAKER(s):  [ ] Health Care Proxy(s)  [x ] Surrogate(s)  [ ] Guardian           Name(s): Phone Number(s): Andreas Sandhu [phone]    BASELINE (I)ADL(s) (prior to admission):  [place]: [ ]Total  [ ] Moderate [x ]Dependent    Allergies    penicillin (Unknown)    Intolerances    MEDICATIONS  (STANDING):    MEDICATIONS  (PRN):  glycopyrrolate Injectable 0.2 milliGRAM(s) IV Push every 6 hours PRN Secretions  HYDROmorphone  Injectable 0.2 milliGRAM(s) IV Push every 1 hour PRN Moderate Pain (4 - 6)  HYDROmorphone  Injectable 0.5 milliGRAM(s) IV Push every 1 hour PRN Dyspnea  HYDROmorphone  Injectable 0.5 milliGRAM(s) IV Push every 1 hour PRN Severe Pain (7 - 10)  midazolam Injectable 1 milliGRAM(s) IV Push every 1 hour PRN Anxiety, agitation, intractable respiratory distress    PRESENT SYMPTOMS: [x ]Unable to self-report  [ ] CPOT [ ] PAINADs [ ] RDOS  Source if other than patient:  [ ]Family   [ ]Team     Pain: [ ]yes [ ]no  QOL impact -   Location -                    Aggravating factors -  Quality -  Radiation -  Timing-  Severity (0-10 scale):  Minimal acceptable level/pain goal (0-10 scale):     Dyspnea:                           [ ]Mild [ ]Moderate [ ]Severe  Anxiety:                             [ ]Mild [ ]Moderate [ ]Severe  Fatigue:                             [ ]Mild [ ]Moderate [ ]Severe  Nausea:                             [ ]Mild [ ]Moderate [ ]Severe  Loss of appetite:              [ ]Mild [ ]Moderate [ ]Severe  Constipation:                    [ ]Mild [ ]Moderate [ ]Severe    PCSSQ[Palliative Care Spiritual Screening Question]   Severity (0-10):  Score of 4 or > indicate consideration of Chaplaincy referral.  Chaplaincy Referral: [ ] yes [ ] refused [ ] following x[ ] Deferred     Caregiver [place]? : [ ] yes [x ] no [ ] Deferred [ ] Declined             Social work referral [ ] Patient & Family Centered Care Referral [ ]     Anticipatory Grief present?:  [ ] yes [x ] no  [ ] Deferred                  Social work referral [ ] Chaplaincy Referral[ ]      Other Symptoms:  [ ]All other review of systems negative The patient is unable to self-report.     Palliative Performance Status Version 2:      20   %    [email or web address]/files/[email or web address]_performance_scale_[email or web address].pdf  PHYSICAL EXAM:  Vital Signs Last 24 Hrs  T(C): 36.4 (2024 07:36), Max: 40 (2024 22:00)  T(F): 97.5 (2024 07:36), Max: 104 (2024 22:00)  HR: 51 (2024 07:36) (50 - 82)  BP: 108/50 (2024 07:36) (79/54 - 130/52)  BP(mean): 62 (2024 06:25) (59 - 90)  RR: 26 (2024 07:36) (20 - 31)  SpO2: 100% (2024 06:23) (95% - 100%)    Parameters below as of 2024 07:36  Patient On (Oxygen Delivery Method): nasal cannula  O2 Flow (L/min): 3   I&O's Summary    GENERAL: [ ]Cachexia    [ ]Alert  [ ]Oriented x   [x ]Lethargic  [ ]Unarousable  [ ]Verbal  [ ]Non-Verbal  Behavioral:   [ ] Anxiety  [ ] Delirium [ ] Agitation [ ] Other  HEENT:  [x ]Normal   [ ]Dry mouth   [ ]ET Tube/Trach  [ ]Oral lesions  PULMONARY:   [x ]Clear [ ]Tachypnea  [ ]Audible excessive secretions   [ ]Rhonchi        [ ]Right [ ]Left [ ]Bilateral  [ ]Crackles        [ ]Right [ ]Left [ ]Bilateral  [ ]Wheezing     [ ]Right [ ]Left [ ]Bilateral  [ ]Diminished breath sounds [ ]right [ ]left [ ]bilateral  CARDIOVASCULAR:    [x ]Regular [ ]Irregular [ ]Tachy  [x ]Dale [ ]Murmur [ ]Other  GASTROINTESTINAL:  [x ]Soft  [ ]Distended   [ x]+BS  [ x]Non tender [ ]Tender  [ ]Other [ ]PEG [ ]OGT/ NGT  Last BM: unknown  GENITOURINARY:  [ ]Normal [ x] Incontinent   [ ]Oliguria/Anuria   [ ]Rene  MUSCULOSKELETAL:   [ ]Normal   [ ]Weakness  [x ]Bed/Wheelchair bound [ ]Edema  NEUROLOGIC:   [ ]No focal deficits  [x ]Cognitive impairment  [ ]Dysphagia [ ]Dysarthria [ ]Paresis [ ]Other   SKIN:   [ ]Normal  [ ]Rash  [ ]Other  [ ]Pressure ulcer(s)       Present on admission [ ]y [ ]n sacrum DTI, spine DTI    CRITICAL CARE:  [ ] Shock Present  [x ]Septic [ ]Cardiogenic [ ]Neurologic [ ]Hypovolemic  [ ]  Vasopressors [ ]  Inotropes   [ ]Respiratory failure present [ ]Mechanical ventilation [ ]Non-invasive ventilatory support [ ]High flow    [ ]Acute  [ ]Chronic [x ]Hypoxic  [ ]Hypercarbic [ ]Other  [x ]Other organ failure - brain    LABS:                        10.0   10.65 )-----------( 244      ( 2024 21:50 )             31.4   04-07    146<H>  |  109<H>  |  43<H>  ----------------------------<  110<H>  3.7   |  22  |  1.80<H>    Ca    9.2      2024 21:50  Mg     1.5     04-07    TPro  5.5<L>  /  Alb  2.7<L>  /  TBili  1.3<H>  /  DBili  x   /  AST  17  /  ALT  11  /  AlkPhos  85  04-07  PT/INR - ( 2024 21:50 )   PT: 17.2 sec;   INR: 1.58 ratio         PTT - ( 2024 21:50 )  PTT:33.7 sec    Urinalysis Basic - ( 2024 21:50 )    Color: Dark Yellow / Appearance: Clear / S.016 / pH: x  Gluc: 110 mg/dL / Ketone: Trace mg/dL  / Bili: Negative / Urobili: 0.2 mg/dL   Blood: x / Protein: Trace mg/dL / Nitrite: Negative   Leuk Esterase: Negative / RBC: 22 /HPF / WBC 1 /HPF   Sq Epi: x / Non Sq Epi: 3 /HPF / Bacteria: Negative /HPF      RADIOLOGY & ADDITIONAL STUDIES:    < from: CT Chest No Cont (24 @ 01:27) >  IMPRESSION:  1. Increased patchy airspace opacities in bilateral upper, lingula,   middle and right lower lobes likely infectious in etiology.  2. Moderate bilateral pleural effusions with adjacent compressive   atelectasis including near complete atelectasis of bilateral lower lobes.  3. Redemonstrated common bile duct dilatation now with more clearly   delineated calculi in the distal common bile duct.  4. Circumferential urinary bladder wall thickening which could be due to   underdistention versus a cystitis. Correlate with urinalysis.  5. Moderate amount of stool in the rectum which could be due to fecal   impaction.    --- End of Report ---           MAZIN GUIDRY MD; Resident Radiologist  This document has been electronically signed.  LILLIAN RAMIREZ MD; Attending Radiologist  This document has been electronically signed. 2024  3:06AM    < end of copied text >  < from: CT Head No Cont (24 @ 01:26) >  IMPRESSION:  Motion degraded examination without gross evidence of an acute   intracranial hemorrhage or mass effect.    --- End of Report ---           MAZIN GUIDRY MD; Resident Radiologist  This document has been electronically signed.  LILLIAN RAMIREZ MD; Attending Radiologist  This document has been electronically signed. 2024  2:42AM    < end of copied text >      PROTEIN CALORIE MALNUTRITION PRESENT: [ ]mild [ ]moderate [ ]severe [ ]underweight [ ]morbid obesity  https://www.andeal.org/vault/2440/web/files/ONC/Table_Clinical%20Characteristics%20to%20Document%20Malnutrition-White%20JV%20et%20al%299411.pdf    Height (cm): 165.1 (24 @ 20:56), 165.1 (24 @ 12:40), 165.1 (24 @ 14:01)  Weight (kg): 59 (24 @ 20:56), 65.8 (24 @ 12:40), 68 (24 @ 14:01)  BMI (kg/m2): 21.6 (24 @ 20:56), 24.1 (24 @ 12:40), 24.9 (02-21-24 @ 14:01)    [ ]PPSV2 < or = to 30% [ ]significant weight loss  [ ]poor nutritional intake  [ ]anasarca[ ]Artificial Nutrition      Other REFERRALS:  [ ]Hospice  [ ]Child Life  [ ]Social Work  [ ]Case management [ ]Holistic Therapy                                 Care Coordination Assessment 201    COGNITIVE/LEARNING 201  Mental Status    Answers: Unable to assess    Notes: Mental Status    Notes: Patient was restless and agitated when transfer to the PCU and was given  symptom medication and sleeping upon LMSW arrival to unit.    Ability to make needs known    Answers: Unable to understand (unrelated to language)    Notes: Ability to make needs known    Notes: Patient was restless and agitated when transfer to the PCU and was given  symptom medication and sleeping upon LMSW arrival to unit.    ADMISSION HISTORY 201  Admitted From    Answers: Skilled Nursing Facility-Short Term    Notes: [facility]    [facility]    Functional Status Prior to Admission    Answers: Assistive equipment,  Answers: Assistive person    Notes: Functional Status Prior to Admission    Notes: Ambulates with a RW and has NH staff to assist with ADLs.    Services Present on Admission    Answers: None    FINANCIAL 201  Does patient have financial concerns for discharge?    Answers: None    LIVING ARRANGEMENTS/SUPPORT 201  Housing Environment    Answers: Apartment    Notes: Housing Environment    Notes: Patient admitted from [email or web address] but has a coop in [place], [place]. Lives  alone, has aide [id number].    Living Arrangements    Answers: Other    Notes: Living Arrangements    Notes: Patient admitted from [email or web address] but has a coop in [place], [place]. Lives  alone, has aide [id number].    Sources of support/caregivers    Answers: Children    Notes: Sources of support    Notes: Granddaughter-Brooke Stephens [phone] and great grandson-Andreas Sandhu    CAREGIVER CONTACT 201  Does the patient wish to identify a Caregiver?    Answers: Unable to assess    EMERGENCY CONTACTS OUTSIDE HOME 201  Emergency Contact    Answers: Emergency Contact Name Andreas Sandhu,  Answers: Emergency Contact Phone # [phone],  Answers: Emergency Contact Relationship Great Grandson    DISCHARGE PLANNING 201  Potential Discharge Plan and Services    Answers: Hospice    SCREENING 201  Social Work Screen and Referral    Answers: Adjustment to Illness/Difficulty Coping,  Answers: Advanced Illness    SUMMARY 201  Initial Clinical Summary    Notes: Chart reviewed, patient is a 89F w/ hx of HTN, B12 deficiency, colon  cancer 6 years ago no chemo, gallstones, GERD, lymphedema of both legs,  Presents to [facility] from [email or web address] re[facility] for fever. Information obtained from  patient's great grandson. Per Andreas, he states that patient has had waxing and  waning mental status since reaching [facility]. He states she has not been able to  participate much in rehab. He notes deteriorating mental status throughout her  time there. He is not aware of any medical changes while she was at rehab until  the recent news of fever for which she was sent to the facility. On examination  patient is awake but not oriented. She states she is not in pain but otherwise  does not participate in meaningful conversation. Unable to obtain formal ROS        Patient was restless and agitated when transfer to the PCU and was given  symptom medication and sleeping upon LMSW arrival to unit. Patient admitted  from [email or web address] but has a coop in [place], [place]. Lives alone, has aide [id number].  Patient's caregivers are Granddaughter-Brooke Stephens [phone] and  great grandson-Andreas Sandhu or Andreas' wife Ronna [phone]. Reached out to  Brooke who defers to her son Andreas to make medical decision. Andreas expressed  interest in in-patient hospice. Discussed [facility] at [facility].  Patient has [employer] and Medicaid. PCP Oscar Borges [phone].       Electronically signed by:  Tamika Collins  Electronically signed on:  2024  14:44

## 2024-04-08 NOTE — CONSULT NOTE ADULT - PROBLEM SELECTOR RECOMMENDATION 9
Likely in the setting of multifocal pneumonia vs biliary etiology  - Symptom directed care, no antibiotics or fluids
16-May-2018

## 2024-04-08 NOTE — H&P ADULT - CONVERSATION/DISCUSSION
Diagnosis/Prognosis/MOLST Discussed/Chaplaincy Referral Diagnosis/Prognosis/MOLST Discussed/Chaplaincy Referral/Palliative Care Referral

## 2024-04-08 NOTE — PROGRESS NOTE ADULT - PROBLEM SELECTOR PLAN 3
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 Possible source of infection   - No further workup per goals of care  - No abx  - Palliative consult for PCU transfer Chronic CBD dilation with distal CBD calculi, possible source of infection.   -No further workup per GOC, no abx as above

## 2024-04-08 NOTE — CONSULT NOTE ADULT - PROBLEM SELECTOR RECOMMENDATION 3
Controlled with parenteral medications.   - Dilaudid 0.2 - 0.5 mg IV q1 PRN.  - Bowel regimen while on opiates.

## 2024-04-08 NOTE — ED PROVIDER NOTE - CLINICAL SUMMARY MEDICAL DECISION MAKING FREE TEXT BOX
89F with h/o HTN, b12 Deficiency, CRC (6 years ago), GERD, pAF (off AC) p/w fever TMax 104 (rectal), AMS, rhoncherous breath sounds c/f severe sepsis.     GEN: Awake, asking for cold water  HEENT: NCAT  CARDIO: RRR.   RESP: Diffusely rhoncherous breath sounds  ABD: Soft, NTND.   MSK: No obvious deformity or ROM deficit. DRY, dry skin; scattered ecchymosis  SKIN: Warm, dry.  NEURO: Moves all four extremities spontaneously    89F p/w AMS, fever c/f sepsis. 89F with h/o HTN, b12 Deficiency, CRC (6 years ago), GERD, pAF (off AC) p/w fever TMax 104 (rectal), AMS, rhoncherous breath sounds c/f severe sepsis. No clear HPI from patient d/t AMS.    GEN: Awake, asking for cold water but not meaningfully responding to questions   HEENT: NCAT  CARDIO: RRR.   RESP: Diffusely rhoncherous breath sounds  ABD: Soft, NTND.   MSK: No obvious deformity or ROM deficit. DRY, dry skin; scattered ecchymosis  SKIN: Warm, dry.  NEURO: Moves all four extremities spontaneously    89F with hx as above p/w reported AMS (unclear baseline), fever c/f sepsis of unclear source. Pt presenting from facility after recent admission for diverticulitis. Had been managed there but apparently had difficulty with access, so sent here. Patient warm, dry, tachycardic but otherwise hemodynamically appropriate for age. Mental status & lung sounds suggestive of possible aspirational component. Lower suspicion for drug-induced hyperthermia or environmental exposure given pt from facility.  - Labs, broad spectrum ABx  - IVF (limited d/t unclear end-organ function)

## 2024-04-08 NOTE — H&P ADULT - PROBLEM SELECTOR PLAN 5
Pt w/ AHRF likely 2/2 aspiration PNA  - will c/w Nasal cannula for comfort  - robinol 0.2mg for secretions

## 2024-04-08 NOTE — H&P ADULT - NSHPLABSRESULTS_GEN_ALL_CORE
.  LABS:                         10.0   10.65 )-----------( 244      ( 2024 21:50 )             31.4     04-    146<H>  |  109<H>  |  43<H>  ----------------------------<  110<H>  3.7   |  22  |  1.80<H>    Ca    9.2      2024 21:50  Mg     1.5     04-    TPro  5.5<L>  /  Alb  2.7<L>  /  TBili  1.3<H>  /  DBili  x   /  AST  17  /  ALT  11  /  AlkPhos  85  04-07    PT/INR - ( 2024 21:50 )   PT: 17.2 sec;   INR: 1.58 ratio         PTT - ( 2024 21:50 )  PTT:33.7 sec  Urinalysis Basic - ( 2024 21:50 )    Color: Dark Yellow / Appearance: Clear / S.016 / pH: x  Gluc: 110 mg/dL / Ketone: Trace mg/dL  / Bili: Negative / Urobili: 0.2 mg/dL   Blood: x / Protein: Trace mg/dL / Nitrite: Negative   Leuk Esterase: Negative / RBC: 22 /HPF / WBC 1 /HPF   Sq Epi: x / Non Sq Epi: 3 /HPF / Bacteria: Negative /HPF        RADIOLOGY, EKG & ADDITIONAL TESTS: Reviewed.

## 2024-04-08 NOTE — H&P ADULT - PROBLEM SELECTOR PLAN 1
Patient w/ fever 104F; tachypnea; leukocytosis; lactate 3.4. Severe sepsis likely 2/2 PNA cannot r/o infected CBD stone  - Patient s/p IVF  - In line with goals of care patient will not receive abx; blood draws, or IV fluids  - Patient to be made comfortable  - Palliative care consult for PCU transfer

## 2024-04-08 NOTE — CONSULT NOTE ADULT - PROBLEM SELECTOR RECOMMENDATION 6
Refer to [facility] note 4/8  - DNR/DNI  - Comfort measures only (no labs, IV fluids, antibiotics)  - Surrogate decision maker great grandambrose Sandhu

## 2024-04-08 NOTE — CONSULT NOTE ADULT - TIME BILLING
minutes spent on total encounter. The necessity of the time spent during the encounter on this date of service was due to:     Total time spent including the following  [x ] Physical chart review and documentation   An extensive review of the physical chart, electronic health record, and documentation was conducted to obtain collateral information including but not limited to:   - Current inpatient records (ED, H&P, primary team, and consultants [IR])   - Inpatient values/results (CBC, CMP, CT)   - Prior inpatient records (prior GaP notes when he was admitted to [facility])   - Current or proposed treatment plans   - Pharmacotherapy review   [x ]discussion with the interdisciplinary palliative care team -RN, , clinicians, trainees,   [x ]discussion with the patient/family/decision maker  [x ]Physical Exam and/or review of systems   [x ]Formulation of assessment and plan   [x ]Evaluating for response to treatment and side effects of opioids or benzodiazepines.  [x ]Review of care coordination documentation.

## 2024-04-08 NOTE — CONSULT NOTE ADULT - SUBJECTIVE AND OBJECTIVE BOX
Wound Surgery Consult Note:    HPI:  89F w/ hx of HTN, B12 deficiency, colon cancer 6 years ago no chemo, gallstones, GERD, lymphedema of both legs, Presents to [facility] from [facility] rehab for fever. Information obtained from patient's great grandson. Per Andreas, he states that patient has had waxing and waning mental status since reaching [facility]. He states she has not been able to participate much in rehab. He notes deteriorating mental status throughout her time there. He is not aware of any medical changes while she was at rehab until the recent news of fever for which she was sent to the facility. On examination patient is awake but not oriented. She states she is not in pain but otherwise does not participate in meaningful conversation. Unable to obtain formal ROS    In the ED  Pt found to be febrile to 104. Tachypneic placed on 3L NC. CT scan w/ evidence of multifocal PNA and CBD dilation w/ calculi/stone in distal duct. Pt given Cefepime, vanc, flagyl in the ED. 1L IVF bolus and 75cc/hr  (2024 04:23)    Request for wound care consult for Bilateral lower extremity edema received from nursing. Patient also with sacral/buttocks skin injury noted on admission. Ms. Martinez was encountered on an alternating air with low air loss surface. She was not alert and did not verbalize during my visit. She required assistance to turn/shift in bed. Her extreme immobility, inactivity, incontinence of stool and urine and poor nutritional status all contribute to her high risk for pressure injury development and hinder healing.    PAST MEDICAL & SURGICAL HISTORY:  Hypertension  Gallstone  GERD (Gastroesophageal Reflux Disease)  Hard of Hearing  Colon cancer, 6 yrs ago. did not require chemo  Lymphedema, limb, BLE  B12 deficiency  S/P CAREY (Total Abdominal Hysterectomy)  S/P colectomy, partial colectomy due to colon ca  H/O exploratory laparotomy, for SBO?  History of open reduction and internal fixation (ORIF) procedure    REVIEW OF SYSTEMS  unable to obtain due to patient's mental status    MEDICATIONS  (STANDING):    MEDICATIONS  (PRN):  glycopyrrolate Injectable 0.2 milliGRAM(s) IV Push every 6 hours PRN Secretions  HYDROmorphone  Injectable 0.5 milliGRAM(s) IV Push every 2 hours PRN Severe Pain (7 - 10)  LORazepam   Injectable 0.5 milliGRAM(s) IV Push every 4 hours PRN Anxiety  midazolam Injectable 2 milliGRAM(s) IV Push every 4 hours PRN agitation, anxiety    Allergies    penicillin (Unknown)    Intolerances    SOCIAL HISTORY:  unable to obtain due to patient's mental status    FAMILY HISTORY: unable to obtain due to patient's mental status    Vital Signs Last 24 Hrs  T(C): 36.4 (2024 07:36), Max: 40 (2024 22:00)  T(F): 97.5 (2024 07:36), Max: 104 (2024 22:00)  HR: 51 (2024 07:36) (50 - 82)  BP: 108/50 (2024 07:36) (79/54 - 130/52)  BP(mean): 62 (2024 06:25) (59 - 90)  RR: 26 (2024 07:36) (20 - 31)  SpO2: 100% (2024 06:23) (95% - 100%)    Parameters below as of 2024 07:36  Patient On (Oxygen Delivery Method): nasal cannula  O2 Flow (L/min): 3    Physical Exam:  General: obtunded  Ophthamology: sclera clear  ENMT: moist mucous membranes, trachea midline  Respiratory: equal chest rise with respirations  Gastrointestinal: soft NT/ND  Neurology: nonverbal, not following commands  Psych: obtunded  Musculoskeletal: no contractures  Vascular: BLE edema equal, +4 pitting edema sparing the feet, BLE equally warm  Skin:  Sacral/bilateral buttocks with deep maroon discolored intact skin in and around the gluteal cleft L 2cm X W 2cm x D none, no drainage  No odor, erythema, increased warmth, tenderness, induration, fluctuance    LABS:      146<H>  |  109<H>  |  43<H>  ----------------------------<  110<H>  3.7   |  22  |  1.80<H>    Ca    9.2      2024 21:50  Mg     1.5         TPro  5.5<L>  /  Alb  2.7<L>  /  TBili  1.3<H>  /  DBili  x   /  AST  17  /  ALT  11  /  AlkPhos  85  04-07                          10.0   10.65 )-----------( 244      ( 2024 21:50 )             31.4     PT/INR - ( 2024 21:50 )   PT: 17.2 sec;   INR: 1.58 ratio         PTT - ( 2024 21:50 )  PTT:33.7 sec  Urinalysis Basic - ( 2024 21:50 )    Color: Dark Yellow / Appearance: Clear / S.016 / pH: x  Gluc: 110 mg/dL / Ketone: Trace mg/dL  / Bili: Negative / Urobili: 0.2 mg/dL   Blood: x / Protein: Trace mg/dL / Nitrite: Negative   Leuk Esterase: Negative / RBC: 22 /HPF / WBC 1 /HPF   Sq Epi: x / Non Sq Epi: 3 /HPF / Bacteria: Negative /HPF

## 2024-04-08 NOTE — ED PROVIDER NOTE - CARE PLAN
EXERCISE PLAN:  An exercise plan was discussed with the doctor for Paulina to keep goals simple to establish a routine. GOALS: Continue daily mobility walking, add home routine 2-3 times per week, including light calisthenics, strength and conditioning, core/bed exercises for abdominal strength, knee strengthening exercises. Look into a fitness center nearby to add pool exercises.    Indirect calorimetry test instructions for metabolism test:     For indirect calorimetry test instructions:  Fast for at least 5 hours with nothing to eat or drink other than water.  No coffee.  Please bring a healthy snack with to have after test completion.  If you're on medications, take as prescribed, only take it with water.        Paulina has the potential to become an appropriate candidate for weight loss surgery. They have chosen the sleeve gastrectomy surgery [id number] and I agree. They must fulfill the following goals in order to undergo bariatric surgery:        Follow Up Visits - Your follow-up care is very important to the success of your procedure.  We will see you monthly and then:    For the gastric bypass and sleeve    Year 1 - At least 6 times (1-2 weeks, 1, 3, 6, 9 and 12 months after surgery).  Year 2 - At least 3-4 times  Labs: you will need labs to check for iron or vitamin deficiencies every 6 months for the first 2 years and then yearly.    For the Lap-BAND    Year 1 - Approximately 9 times on the average (the additional visits are to adjust the BAND and also: 1-2 weeks,1, 2, 3, 6, 9 and 12 months after surgery)  Year 2 - At least 3-4 times    Labs - You will need labs to check for iron or vitamin deficiencies every 6 months for the first 2 years and then yearly  Subsequent Years for the Gastric bypass, Sleeve or Lap-BAND - At 1-2 times Annually     Pills - You will not be able to take any solid pills for six months after surgery as your new stomach heals.  Pills include prescription medications, over the counter  medications (like Tylenol) as well as vitamins.  The only exception would be very small pills such as birth control pills or Synthroid which are approximately 3/8 inch in diameter or smaller for 6 months then 3/4 of an inch for the rest of your life .        TO DO LIST:    You will need to see a psychologist and your psychiatrist  for clearance for weight loss surgery Andry Smith, Ph.D [facility] [phone]       EKG and Chest XRAY prior to surgery This is good for 6 months      You will need an upper endoscopy with GI provider through your primary care physician       Laboratory Blood Tests - Labs have been entered in Epic.  Please get these tests done but you need to be :12 HOUR FASTING AND NO VITAMINS FOR 3 DAYS     Pulmonary Function test      Indirect Calorimetry-   Please schedule at or our office     Ultrasound of the Liver - Please obtain an Ultrasound of the Liver prior to surgery      Medical Clearance Note for Surgery - Please obtain a written note from your primary care physician clearing you to have bariatric surgery due to medical issues. Please have the results sent to us.  2 weeks prior to surgery      We will need nephrology clearance for weight loss surgery, sleeve    Sleep Apnea   Please see a sleep specialist for evaluation and treatment for possible sleep apnea         The following tests and labs are in [facility]:    Orders Placed This Encounter    EXHALED AIR ANALYSIS    XR CHEST PA AND LATERAL 2 VIEWS    US LIVER GALLBLADDER PANCREAS (RUQ)    CBC with Automated Differential    Comprehensive Metabolic Panel    Ferritin    Glycohemoglobin    Insulin Level, Fasting    Iron And total Iron Binding Capacity    Lipid Panel With Reflex    Parathyroid Hormone Intact Without Calcium    Phosphorus    Thyroid Stimulating Hormone Reflex    Vitamin B1, Whole Blood    Vitamin B12 And Folate    Vitamin D -25 Hydroxy    SERVICE TO SLEEP MEDICINE    Electrocardiogram  12-Lead    PFT                Informational - Be aware of the following key points:    Goals - If your goals are not met, your surgery date may be cancelled or postponed until they are completed.    Working after surgery - The normal time off from work is 1-2 full weeks.  If you require a note for work please let us know.    Lifting after surgery - You will not be able to lift more than 20 lb for at least 4 weeks after surgery.    Surgical date - After your insurance has cleared, you will be contacted (normally by phone) by a member of our office to schedule your surgery.   Final clearance of your surgical procedure is dependent on:      Paperwork (read this section carefully):    For non-[facility] tests/labs or other results. You are required to bring copies of all paperwork to the next visit.  Do not rely on any other doctors offices, hospitals or facilities to mail or Fax test results to our office.  It is your obligation to obtain copies, and bring them to the next visit even if other facilities mail or fax copies to our office.  Please bring a copy of your exercise and food records also.     Please understand that if these items are not completed within a reasonable time before your surgical date, your surgery will be postponed pending completion.    Nutrition Goals:  1. Review the Pre-Operative Nutrition Plan/Packet.  2. Aim to eat 5-6 small, high protein meals/snacks every 2-3 hours per day - do not skip.  3. Consider tracking in an vanesa like Oobafit or Exhbit. Aim to log 3-4 complete days before your next appointment so that we can review them together during your next appointment.  4. Work on not drinking 15 minutes before and 30 minutes after eating.      Principal Discharge DX:	Severe sepsis   1

## 2024-04-08 NOTE — PATIENT PROFILE ADULT - FALL HARM RISK - HARM RISK INTERVENTIONS

## 2024-04-08 NOTE — ED PROVIDER NOTE - PROGRESS NOTE DETAILS
Rock Owens MD PGY-3  Pt with elevated creatinine, hyponatremia c/f DIOGENES, hypovolemic hyponatremia ico severe sepsis. Suspect possible aspiration component vs abdominal component given recent divertic.   - CTH/C/A/P w/o contrast Ramirez PGY3  Patient signed out to me pending CT and admission. In summary 89F presents from [facility] for AMS, found to be febrile to 104F rectally. Workup significant for DIOGENES and multifocal pneumonia.

## 2024-04-08 NOTE — H&P ADULT - CONVERSATION DETAILS
Called patient's great Grandson Andreas Sandhu to discuss GOC. Had a conversation about patient's presentations and current findings including multifocal PNA and CBD stone in the setting of severe sepsis. Andreas states he is the HCP and filled out a DNR/DNI at [facility]. He states he would like to make patient comfortable. He does not wish to prolong the inevitable. He states he does not want invasive testing. He does not want lab draws. He does not want antibiotics. He does not want IV Fluids. He wants to ensure that the patient is comfortable. He wants chaplaincy referral. He wants palliative care input. Patient made comfort measures only at the end of the conversation. new DANDRE filled.

## 2024-04-08 NOTE — PROGRESS NOTE ADULT - PROBLEM SELECTOR PLAN 2
Pt w/ multifocal PNA likely 2/2 aspiration event d/t ongoing metabolic encephalopathy   - No abx per GoC  - c/w Nasal Cannula for comfort Patient with multifocal PNA likely 2/2 aspiration event due to ongoing metabolic encephalopathy.  -No abx per GOC  -Continue with NC for comfort

## 2024-04-08 NOTE — PROGRESS NOTE ADULT - ASSESSMENT
89F w/ hx of HTN, B12 deficiency, colon cancer 6 years ago no chemo, gallstones, GERD, lymphedema of both legs presents with severe sepsis likely d/t multifocal PNA 2/2 likely aspiration event iso ongoing encephalopathy. Cannot r/o cholangitis iso CBD dilation w/ distal calculi/stone 89F w/ hx of HTN, B12 deficiency, colon cancer 6 years ago no chemo, gallstones, GERD, lymphedema of both legs presents with severe sepsis likely d/t multifocal PNA 2/2 likely aspiration event iso ongoing encephalopathy. Cannot r/o cholangitis iso CBD dilation w/ distal calculi/stone.

## 2024-04-08 NOTE — H&P ADULT - PROBLEM SELECTOR PLAN 3
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 Possible source of infection   - No further workup per goals of care  - No abx  - Palliative consult for PCU transfer

## 2024-04-08 NOTE — ED ADULT NURSE REASSESSMENT NOTE - NS ED NURSE REASSESS COMMENT FT1
RN @ bedside to find patient pulling off leads and gown. Patient is clean and dry @ this time and clean Allevyn placed to patient mid upper back and sacrum. Ofirmev, Flagyl infusing @ this time. VS to order and awaiting admit to MEDICINE.

## 2024-04-08 NOTE — CONSULT NOTE ADULT - ASSESSMENT
Impression:    Sacral/bilateral Buttocks deep tissue injury present on admission  Incontinence of bowel and bladder  Incontinence Dermatitis  Bilateral leg edema    Recommend:  1.) topical therapy: sacral/buttock injury - cleanse with incontinence cleanser, pat dry, apply Chris ointment BID and PRN for incontinent episodes  2.) Incontinence Management - incontinence cleanser, pads, pericare BID  3.) Maintain on an alternating air with low air loss surface  4.) Turn and reposition Q 2 hours  5.) Nutrition optimization   6.) Offload heels/feet with complete cair air fluidized boots; ensure that the soles of the feet are not resting on the foot board of the bed.  7.) BLE elevation    Care as per medicine. Will not actively follow but will remain available. Please recall for new issues or deterioration.  Upon [facility] [address] [place] [address] [phone]  Thank you for this consult  Discussed with clinical nurse  Radha Nolen, BROOKE-C, CWOCN via TEAMS
89 yo F with history of HTN, B12 deficiency, colon cancer 6 years ago s/p resection (no chemo), gallstones, GERD, lymphedema of both legs presents with severe sepsis likely d/t multifocal PNA 2/2 likely aspiration event iso ongoing encephalopathy. Cannot r/o cholangitis iso CBD dilation w/ distal calculi/stone.

## 2024-04-08 NOTE — CONSULT NOTE ADULT - PROBLEM SELECTOR RECOMMENDATION 7
Continue PCU care.   - Met with Andreas today and discussed palliative care unit philosophy. He is having some difficulty coping as he feels that all the decisions are falling to him. His mother plays an active role in her care but is deferring all decisions to him. Offered ongoing support. Chaplaincy paz.

## 2024-04-08 NOTE — PROGRESS NOTE ADULT - PROBLEM SELECTOR PLAN 6
DVT: None  Diet: None  Ethics: Comfort measures only   HCP: Vance Sandhu  Dispo: Pending HCP for PCU transfer DVT: None  Diet: None  Ethics: Comfort measures only --> palliative care and chaplaincy care consult placed  HCP: Andreas chilel)

## 2024-04-08 NOTE — PROGRESS NOTE ADULT - SUBJECTIVE AND OBJECTIVE BOX
PROGRESS NOTE:     Patient is a 89y old  Female who presents with a chief complaint of Sepsis (2024 04:23)      SUBJECTIVE / OVERNIGHT EVENTS:  No acute events overnight. Patient examined at bedside with no acute complaints.       MEDICATIONS  (STANDING):    MEDICATIONS  (PRN):  glycopyrrolate Injectable 0.2 milliGRAM(s) IV Push every 6 hours PRN Secretions  LORazepam   Injectable 0.5 milliGRAM(s) IV Push every 4 hours PRN Anxiety  midazolam Injectable 2 milliGRAM(s) IV Push every 4 hours PRN agitation, anxiety  morphine  - Injectable 1 milliGRAM(s) IV Push every 2 hours PRN Moderate Pain (4 - 6)  morphine  - Injectable 2 milliGRAM(s) IV Push every 2 hours PRN Severe Pain (7 - 10); Dyspnea goal RR<24      CAPILLARY BLOOD GLUCOSE        I&O's Summary      VITALS:   T(C): 36.4 (24 @ 07:36), Max: 40 (24 @ 22:00)  HR: 51 (24 @ 07:36) (50 - 82)  BP: 108/50 (24 @ 07:36) (79/54 - 130/52)  RR: 26 (24 @ 07:36) (20 - 31)  SpO2: 100% (24 @ 06:23) (95% - 100%)    GENERAL: NAD, lying in bed comfortably  HEAD:  Atraumatic, normocephalic  EYES: EOMI, PERRLA, conjunctiva and sclera clear  ENT: Moist mucous membranes  NECK: Supple, no JVD  HEART: Regular rate and rhythm, no murmurs, rubs, or gallops  LUNGS: Unlabored respirations.  Clear to auscultation bilaterally, no crackles, wheezing, or rhonchi  ABDOMEN: Soft, nontender, nondistended, +BS  EXTREMITIES: 2+ peripheral pulses bilaterally. No clubbing, cyanosis, or edema  NERVOUS SYSTEM:  A&Ox3, no focal deficits   SKIN: No rashes or lesions    LABS:                        10.0   10.65 )-----------( 244      ( 2024 21:50 )             31.4         146<H>  |  109<H>  |  43<H>  ----------------------------<  110<H>  3.7   |  22  |  1.80<H>    Ca    9.2      2024 21:50  Mg     1.5     04-    TPro  5.5<L>  /  Alb  2.7<L>  /  TBili  1.3<H>  /  DBili  x   /  AST  17  /  ALT  11  /  AlkPhos  85  04-07    PT/INR - ( 2024 21:50 )   PT: 17.2 sec;   INR: 1.58 ratio         PTT - ( 2024 21:50 )  PTT:33.7 sec      Urinalysis Basic - ( 2024 21:50 )    Color: Dark Yellow / Appearance: Clear / S.016 / pH: x  Gluc: 110 mg/dL / Ketone: Trace mg/dL  / Bili: Negative / Urobili: 0.2 mg/dL   Blood: x / Protein: Trace mg/dL / Nitrite: Negative   Leuk Esterase: Negative / RBC: 22 /HPF / WBC 1 /HPF   Sq Epi: x / Non Sq Epi: 3 /HPF / Bacteria: Negative /HPF         PROGRESS NOTE:     Patient is a 89y old  Female who presents with a chief complaint of Sepsis (2024 04:23)      SUBJECTIVE / OVERNIGHT EVENTS:  Patient admitted overnight. After GOC discussion with HCP grandson by night team, patient made comfort care. Patient transferred to PCU.      MEDICATIONS  (STANDING):    MEDICATIONS  (PRN):  glycopyrrolate Injectable 0.2 milliGRAM(s) IV Push every 6 hours PRN Secretions  LORazepam   Injectable 0.5 milliGRAM(s) IV Push every 4 hours PRN Anxiety  midazolam Injectable 2 milliGRAM(s) IV Push every 4 hours PRN agitation, anxiety  morphine  - Injectable 1 milliGRAM(s) IV Push every 2 hours PRN Moderate Pain (4 - 6)  morphine  - Injectable 2 milliGRAM(s) IV Push every 2 hours PRN Severe Pain (7 - 10); Dyspnea goal RR<24      CAPILLARY BLOOD GLUCOSE        I&O's Summary      VITALS:   T(C): 36.4 (24 @ 07:36), Max: 40 (24 @ 22:00)  HR: 51 (24 @ 07:36) (50 - 82)  BP: 108/50 (24 @ 07:36) (79/54 - 130/52)  RR: 26 (24 @ 07:36) (20 - 31)  SpO2: 100% (24 @ 06:23) (95% - 100%)    GENERAL: NAD, sleeping comfortably  HEAD: Atraumatic, normocephalic  HEART: Regular rate and rhythm, no murmurs, rubs, or gallops  LUNGS: Unlabored respirations. Clear to auscultation bilaterally, no crackles, wheezing, or rhonchi  ABDOMEN: Soft, nontender, nondistended, +BS  EXTREMITIES: No clubbing, cyanosis, or edema  NERVOUS SYSTEM: Unable to assess    LABS:                        10.0   10.65 )-----------( 244      ( 2024 21:50 )             31.4         146<H>  |  109<H>  |  43<H>  ----------------------------<  110<H>  3.7   |  22  |  1.80<H>    Ca    9.2      2024 21:50  Mg     1.5     -    TPro  5.5<L>  /  Alb  2.7<L>  /  TBili  1.3<H>  /  DBili  x   /  AST  17  /  ALT  11  /  AlkPhos  85  -    PT/INR - ( 2024 21:50 )   PT: 17.2 sec;   INR: 1.58 ratio         PTT - ( 2024 21:50 )  PTT:33.7 sec      Urinalysis Basic - ( 2024 21:50 )    Color: Dark Yellow / Appearance: Clear / S.016 / pH: x  Gluc: 110 mg/dL / Ketone: Trace mg/dL  / Bili: Negative / Urobili: 0.2 mg/dL   Blood: x / Protein: Trace mg/dL / Nitrite: Negative   Leuk Esterase: Negative / RBC: 22 /HPF / WBC 1 /HPF   Sq Epi: x / Non Sq Epi: 3 /HPF / Bacteria: Negative /HPF

## 2024-04-08 NOTE — CONSULT NOTE ADULT - CONVERSATION DETAILS
We met with her great grandson Andreas Sandhu at bedside. He reiterated that she although she had been somewhat independent (HHA for 5-8 hours per day) several months ago, she has been steadily declining since. She has been eating minimally and unable to participate in rehab at [facility]. He understands that she is dying and that he would want to focus on her comfort. 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

## 2024-04-08 NOTE — PROGRESS NOTE ADULT - PROBLEM SELECTOR PLAN 4
Pt w/ chronic encephalopathy since last admission likely metabolic iso severe sepsis Patient with chronic encephalopathy since last admission likely metabolic iso severe sepsis

## 2024-04-08 NOTE — PROGRESS NOTE ADULT - PROBLEM SELECTOR PLAN 1
Patient w/ fever 89F; tachypnea; leukocytosis; lactate 3.4. Severe sepsis likely 2/2 PNA cannot r/o infected CBD stone  - Patient s/p IVF  - In line with goals of care patient will not receive abx; blood draws, or IV fluids  - Patient to be made comfortable  - Palliative care consult for PCU transfer On admission patient febrile to 104 with tachypnea, leukocytosis, lactate 3.3. Severe sepsis likely iso multifocal pneumonia with possible aspiration event vs. infected CBD stone.   -s/p vanc/cefepime and flagyl in ED  -In line with goals of care patient will not receive abx, blood draws, or IV fluids

## 2024-04-08 NOTE — CONSULT NOTE ADULT - PROBLEM SELECTOR RECOMMENDATION 2
Declining mental status in the setting of advanced age and acute illness  - Continue symptom directed care.   - Pleasure feeds as tolerated.

## 2024-04-08 NOTE — H&P ADULT - ASSESSMENT
89F w/ hx of HTN, B12 deficiency, colon cancer 6 years ago no chemo, gallstones, GERD, lymphedema of both legs presents with severe sepsis likely d/t multifocal PNA 2/2 likely aspiration event iso ongoing encephalopathy. Cannot r/o cholangitis iso CBD dilation w/ distal calculi/stone

## 2024-04-08 NOTE — H&P ADULT - HISTORY OF PRESENT ILLNESS
89F w/ hx of HTN, B12 deficiency, colon cancer 6 years ago no chemo, gallstones, GERD, lymphedema of both legs, [place] p/w 89F w/ hx of HTN, B12 deficiency, colon cancer 6 years ago no chemo, gallstones, GERD, lymphedema of both legs, Presents to [facility] from [facility] rehab for fever. Information obtained from patient's great grandson. Per Andreas, he states that patient has had waxing and waning mental status since reaching [facility]. He states she has not been able to participate much in rehab. He notes deteriorating mental status throughout her time there. He is not aware of any medical changes while she was at rehab until the recent news of fever for which she was sent to the facility. On examination patient is awake but not oriented. She states she is not in pain but otherwise does not participate in meaningful conversation. Unable to obtain formal ROS    In the ED  Pt found to be febrile to 104. Tachypneic placed on 3L NC. CT scan w/ evidence of multifocal PNA and CBD dilation w/ calculi/stone in distal duct. Pt given Cefepime, vanc, flagyl in the ED. 1L IVF bolus and 75cc/hr

## 2024-04-08 NOTE — H&P ADULT - ATTENDING COMMENTS
89F w PMH of HTN, Afib, B12 def, colon cancer 6 years ago no chemo, gallstones, GERD, lymphedema of b/l LE pw severe sepsis i/s/o multifocal PNA w/ likely aspiration event  w/ possible cholangitis i/s/o CBD dilation w/ distal calculi/stone. Pt initially treated w/ Vanc/Cefepime and Flagyl in ED but pt now comfort measures only after further discussion w/ HCP (Kiersten Johns)    # Severe Sepsis  # Multifocal PNA  # Choledocholithiasis  # DIOGENES  - No further blood draws  - No further testing  - No abx or IV fluids  - Comfort measures only as ordered   - Chaplaincy services  - Pall care for PCU bed    # Rest of plan as above    I have personally spent 75 minutes reviewing tests and imaging, independently obtaining a history, performing a physical examination, discussing the plan with the patient, documenting clinical information, and coordinating care.

## 2024-04-08 NOTE — H&P ADULT - PROBLEM SELECTOR PLAN 6
DVT: None  Diet: None  Ethics: Comfort measures only   HCP: Vance Sandhu  Dispo: Pending HCP for PCU transfer

## 2024-04-08 NOTE — ED PROVIDER NOTE - ATTENDING CONTRIBUTION TO CARE
I performed a face to face history and physical exam of the patient and discussed their management with the resident. I reviewed the resident's note and agree with the documented findings and plan of care.     89F with h/o HTN, b12 Deficiency, CRC (6 years ago), GERD, pAF (off AC) recent admission for diverticulitis, sent from rehab for inability to get line access, Pt unable to provide hx,     Vitals reviewed, pt febrile, GEN: mild distress, awake, AxOx0 Head: atraumatic, dry mucous membranes, Neck: supple, Chest: diffuse rhonchi b/l, Cardiac: regular rate and rhythm, Abdomen: soft and nontender, Extremities: well perfused and without edema, Skin: intact, scattered ecchymosis, no visible rash, MSK: no obvious deformity or ROM deficit, Neuro: AxOx0,     Ddx includes but not limited to severe sepsis, recurrent diverticulitis, UTI, Aspiration PNA, COVID, FLU,  - La  IVF (limited d/t unclear end-organ function)  Admission

## 2024-04-08 NOTE — H&P ADULT - PROBLEM SELECTOR PLAN 2
Pt w/ multifocal PNA likely 2/2 aspiration event d/t ongoing metabolic encephalopathy   - No abx per [facility]  - c/w Nasal Cannula for comfort

## 2024-04-09 NOTE — PROGRESS NOTE ADULT - PROBLEM SELECTOR PLAN 8
Refer to [facility] note 4/8  - DNR/DNI  - Comfort measures only (no labs, IV fluids, antibiotics)  - Surrogate decision maker great grandambrose Johns Phoebe.

## 2024-04-09 NOTE — PROGRESS NOTE ADULT - ASSESSMENT
89 yo F with history of HTN, B12 deficiency, colon cancer 6 years ago s/p resection (no chemo), gallstones, GERD, lymphedema of both legs presents with severe sepsis likely d/t multifocal PNA 2/2 likely aspiration event iso ongoing encephalopathy. Cannot r/o cholangitis iso CBD dilation w/ distal calculi/stone. Family opting for symptom directed care at the end of life.

## 2024-04-09 NOTE — PROGRESS NOTE ADULT - PROBLEM SELECTOR PLAN 5
Controlled with parenteral medications.   - Continue Versed 1mg IVP q1 PRN (2 required in 24hr period 7am - 7am)

## 2024-04-09 NOTE — PROGRESS NOTE ADULT - PROBLEM SELECTOR PLAN 9
Continue PCU care.   - Ongoing support for patient and family. Disposition planning to be addressed pending clinical course.

## 2024-04-09 NOTE — PROGRESS NOTE ADULT - PROBLEM SELECTOR PLAN 4
In the setting of multifocal pneumonia and end of life.  - Dilaudid 0.5 mg IVP q1 PRN (1 required in 24 hr period 7am - 7 am)

## 2024-04-09 NOTE — PROGRESS NOTE ADULT - PROBLEM SELECTOR PLAN 1
Likely in the setting of multifocal pneumonia vs biliary etiology  - Symptom directed care, no antibiotics or fluids.

## 2024-04-09 NOTE — PROGRESS NOTE ADULT - ATTENDING COMMENTS
Prior GOC discussions reviewed; pt is full comfort care. She is accepted to PCU.
89 yo F with history of HTN, B12 deficiency, colon cancer 6 years ago s/p resection (no chemo), gallstones, GERD, lymphedema of both legs presents with severe sepsis likely d/t multifocal PNA  likely aspiration event iso ongoing encephalopathy. Cannot r/o cholangitis iso CBD dilation w/ distal calculi/stone, however family has opted for comfort measures only.  Andreas Sandhu is the surrogate decision maker as patient's daughter is the primary surrogate and has verbally deferred to him allowing for all medical decisions.      In the setting of parenteral controlled substance administration, clinical monitoring required for side effects such as respiratory depression, constipation and opioid induced neurotoxicity due to organ failure.  Patient is requiring dilaudid and midazolam for pain/dyspnea/agitation.    Hospice transition to be addressed if clinical condition permits.    Patient assessment and plan discussed on interdisciplinary team rounds today.

## 2024-04-09 NOTE — PROGRESS NOTE ADULT - PROBLEM SELECTOR PLAN 3
Controlled with parenteral medications.   - Dilaudid 0.2 - 0.5 mg IV q1 PRN (0 required in 24hr period 7am - 7am)  - Bowel regimen while on opiates.

## 2024-04-09 NOTE — PROGRESS NOTE ADULT - SUBJECTIVE AND OBJECTIVE BOX
GAP TEAM PALLIATIVE CARE UNIT PROGRESS NOTE:      [  ] Patient on hospice program.    INDICATION FOR PALLIATIVE CARE UNIT SERVICES/Interval HPI: 89F w/ hx of HTN, B12 deficiency, colon cancer 6 years ago no chemo, gallstones, GERD, lymphedema presenting from [facility] with fever. GaP team consulted for assistance in goals of care discussions and patient family opting for symptom directed care. Patient currently in PCU for symptom management at the end of life.     OVERNIGHT EVENTS: Patient evaluated at bedside. She required Dilaudid 0.5 mgx1 for respiratory distress, Versed 1 mgx2 and 2 mgx1 for agitation.    DNR on chart: Yes      Allergies    penicillin (Unknown)    Intolerances    MEDICATIONS  (STANDING):    MEDICATIONS  (PRN):  acetaminophen  Suppository .. 650 milliGRAM(s) Rectal every 6 hours PRN Temp greater or equal to 38C (100.4F), Mild Pain (1 - 3)  bisacodyl Suppository 10 milliGRAM(s) Rectal daily PRN Constipation  glycopyrrolate Injectable 0.4 milliGRAM(s) IV Push every 6 hours PRN Secretions  HYDROmorphone  Injectable 0.5 milliGRAM(s) IV Push every 1 hour PRN Severe Pain (7 - 10)  HYDROmorphone  Injectable 0.2 milliGRAM(s) IV Push every 1 hour PRN Moderate Pain (4 - 6)  HYDROmorphone  Injectable 0.5 milliGRAM(s) IV Push every 1 hour PRN Dyspnea  midazolam Injectable 1 milliGRAM(s) IV Push every 1 hour PRN Anxiety, agitation, intractable respiratory distress    ITEMS UNCHECKED ARE NOT PRESENT    PRESENT SYMPTOMS: [x ]Unable to self-report see PAINAD, RDOS below  Source if other than patient:  [ ]Family   [ ]Team     Pain: [ ] yes [ ] no  QOL impact -   Location -                    Aggravating factors -  Quality -  Radiation -  Timing-  Severity (0-10 scale):  Minimal acceptable level (0-10 scale):     Dyspnea:                           [ ]Mild [ ]Moderate [ ]Severe  Anxiety:                             [ ]Mild [ ]Moderate [ ]Severe  Fatigue:                             [ ]Mild [ ]Moderate [ ]Severe  Nausea:                             [ ]Mild [ ]Moderate [ ]Severe  Loss of appetite:              [ ]Mild [ ]Moderate [ ]Severe  Constipation:                    [ ]Mild [ ]Moderate [ ]Severe    PCSSQ [Palliative Care Spiritual Screening Question]   Severity (0-10):  Score of 4 or > indicate consideration of Chaplaincy referral.  Chaplaincy Referral: [x ] yes [ ] refused [ ] following [ ] deferred    Caregiver [place]? : [x ] yes [ ] no [ ] deferred:  Social work referral [x ] Patient & Family Centered Care Referral [ ]   Anticipatory Grief present?:  [x ] yes [ ] no [ ] deferred:  Social work referral [ ] Patient & Family Centered Care Referral [ ]  	  Other Symptoms:  [ ]All other review of systems negative     PHYSICAL EXAM:   Vital Signs Last 24 Hrs  T(C): 36.5 (2024 07:51), Max: 36.5 (2024 07:51)  T(F): 97.7 (2024 07:51), Max: 97.7 (2024 07:51)  HR: 41 (2024 07:51) (41 - 41)  BP: 102/63 (2024 07:51) (102/63 - 102/63)  BP(mean): --  RR: 20 (2024 07:51) (20 - 20)  SpO2: 90% (2024 07:51) (90% - 90%)    Parameters below as of 2024 07:51  Patient On (Oxygen Delivery Method): nasal cannula     I&O's Summary    GENERAL: [ ] Cachexia  [ ]Alert  [ ]Oriented x   [x ]Lethargic  [ ]Unarousable  [ ]Verbal  [x ]Non-Verbal  Behavioral:   [ ] Anxiety  [ ] Delirium [ ] Agitation [ ] Other  HEENT:  [ ]Normal   [x ]Dry mouth   [ ]ET Tube/Trach  [ ]Oral lesions  PULMONARY:   [x ]Clear [ ]Tachypnea  [ ]Audible excessive secretions   [ ]Rhonchi        [ ]Right [ ]Left [ ]Bilateral  [ ]Crackles        [ ]Right [ ]Left [ ]Bilateral  [ ]Wheezing     [ ]Right [ ]Left [ ]Bilateral  [ ]Diminished BS [ ]Right [ ]Left [ ]Bilateral    CARDIOVASCULAR:    [x ]Regular [ ]Irregular [ ]Tachy  [ ]Dale [ ]Murmur [ ]Other  GASTROINTESTINAL:  [x ]Soft  [ ]Distended   [ ]+BS  [x ]Non tender [ ]Tender  [ ]Other [ ]PEG [ ]OGT/ NGT   Last BM:    GENITOURINARY:  [ ]Normal [x ] Incontinent   [ ]Oliguria/Anuria   [ ]Rene  MUSCULOSKELETAL:   [ ]Normal   [ ]Weakness  [x ]Bed/Wheelchair bound [ ]Edema  NEUROLOGIC:   [ ]No focal deficits  [x ] Cognitive impairment  [ ] Dysphagia [ ]Dysarthria [ ] Paresis [ ]Other   SKIN:   [ ]Normal  [ ]Rash  [ ]Other  [ ]Pressure ulcer(s)  [x ]y [ ]n  Present on admission  - sacral and spine DTI    CRITICAL CARE:  [ ] Shock Present  [ ]Septic [ ]Cardiogenic [ ]Neurologic [ ]Hypovolemic  [ ]  Vasopressors [ ]  Inotropes   [x ] Respiratory failure present [ ] Mechanical Ventilation [ ] Non-invasive ventilatory support [ ] High-Flow  [ ] Acute  [ ] Chronic [x ] Hypoxic  [ ] Hypercarbic [ ] Other  [x] Other organ failure - brain    LABS:                        10.0   10.65 )-----------( 244      ( 2024 21:50 )             31.4   04-07    146<H>  |  109<H>  |  43<H>  ----------------------------<  110<H>  3.7   |  22  |  1.80<H>    Ca    9.2      2024 21:50  Mg     1.5     04-07    TPro  5.5<L>  /  Alb  2.7<L>  /  TBili  1.3<H>  /  DBili  x   /  AST  17  /  ALT  11  /  AlkPhos  85  04-07  PT/INR - ( 2024 21:50 )   PT: 17.2 sec;   INR: 1.58 ratio         PTT - ( 2024 21:50 )  PTT:33.7 sec    Urinalysis Basic - ( 2024 21:50 )    Color: Dark Yellow / Appearance: Clear / S.016 / pH: x  Gluc: 110 mg/dL / Ketone: Trace mg/dL  / Bili: Negative / Urobili: 0.2 mg/dL   Blood: x / Protein: Trace mg/dL / Nitrite: Negative   Leuk Esterase: Negative / RBC: 22 /HPF / WBC 1 /HPF   Sq Epi: x / Non Sq Epi: 3 /HPF / Bacteria: Negative /HPF      RADIOLOGY & ADDITIONAL STUDIES:    PROTEIN CALORIE MALNUTRITION: [ ] mild [ ] moderate [ ] severe  [ ] underweight [ ] morbid obesity    https://www.[employer].org/vault/2440/web/files/ONC/Table_Clinical%20Characteristics%20to%20Document%20Malnutrition-White%20JV%20et%20al%2020.pdf    Height (cm): 165.1 (24 @ 20:56), 165.1 (24 @ 12:40), 165.1 (24 @ 14:01)  Weight (kg): 59 (24 @ 20:56), 65.8 (24 @ 12:40), 68 (24 @ 14:01)  BMI (kg/m2): 21.6 (24 @ 20:56), 24.1 (24 @ 12:40), 24.9 (24 @ 14:01)    [ ] PPSV2 < or = 30% [ ] significant weight loss [ ] poor nutritional intake [ ] anasarca   Artificial Nutrition [ ]     Other REFERRALS:    [ ] Hospice  [ ]Child Life  [ ]Social Work  [ ]Case management [ ]Holistic Therapy [ ] Physical Therapy [ ] Dietary     Progress Notes - Care Coordination [C. Provider] (24 @ 12:05)      Palliative Performance Scale:  http://npcrc.org/files/news/palliative_performance_scale_ppsv2.pdf  (Ctrl +  left click to view)  Respiratory Distress Observation Tool:  https://homecareinformation.net/handouts/hen/Respiratory_Distress_Observation_Scale.pdf (Ctrl +  left click to view)  PAINAD Score:  http://geriatrictoolkit.[place].[email or web address]/cog/painad.pdf (Ctrl +  left click to view)   GAP TEAM PALLIATIVE CARE UNIT PROGRESS NOTE:      [  ] Patient on hospice program.    INDICATION FOR PALLIATIVE CARE UNIT SERVICES/Interval HPI: 89F w/ hx of HTN, B12 deficiency, colon cancer 6 years ago no chemo, gallstones, GERD, lymphedema presenting from [facility] with fever. GaP team consulted for assistance in goals of care discussions and patient family opting for symptom directed care. Patient currently in PCU for symptom management at the end of life.     OVERNIGHT EVENTS: Patient evaluated at bedside. She required Dilaudid 0.5 mgx1 for respiratory distress, Versed 1 mgx2 and 2 mgx1 for agitation.    DNR on chart: Yes      Allergies    penicillin (Unknown)    Intolerances    MEDICATIONS  (STANDING):    MEDICATIONS  (PRN):  acetaminophen  Suppository .. 650 milliGRAM(s) Rectal every 6 hours PRN Temp greater or equal to 38C (100.4F), Mild Pain (1 - 3)  bisacodyl Suppository 10 milliGRAM(s) Rectal daily PRN Constipation  glycopyrrolate Injectable 0.4 milliGRAM(s) IV Push every 6 hours PRN Secretions  HYDROmorphone  Injectable 0.5 milliGRAM(s) IV Push every 1 hour PRN Severe Pain (7 - 10)  HYDROmorphone  Injectable 0.2 milliGRAM(s) IV Push every 1 hour PRN Moderate Pain (4 - 6)  HYDROmorphone  Injectable 0.5 milliGRAM(s) IV Push every 1 hour PRN Dyspnea  midazolam Injectable 1 milliGRAM(s) IV Push every 1 hour PRN Anxiety, agitation, intractable respiratory distress    ITEMS UNCHECKED ARE NOT PRESENT    PRESENT SYMPTOMS: [x ]Unable to self-report see PAINAD, RDOS below  Source if other than patient:  [ ]Family   [ ]Team     Pain: [ ] yes [ ] no  QOL impact -   Location -                    Aggravating factors -  Quality -  Radiation -  Timing-  Severity (0-10 scale):  Minimal acceptable level (0-10 scale):     Dyspnea:                           [ ]Mild [ ]Moderate [ ]Severe  Anxiety:                             [ ]Mild [ ]Moderate [ ]Severe  Fatigue:                             [ ]Mild [ ]Moderate [ ]Severe  Nausea:                             [ ]Mild [ ]Moderate [ ]Severe  Loss of appetite:              [ ]Mild [ ]Moderate [ ]Severe  Constipation:                    [ ]Mild [ ]Moderate [ ]Severe    PCSSQ [Palliative Care Spiritual Screening Question]   Severity (0-10):  Score of 4 or > indicate consideration of Chaplaincy referral.  Chaplaincy Referral: [x ] yes [ ] refused [ ] following [ ] deferred    Caregiver [place]? : [x ] yes [ ] no [ ] deferred:  Social work referral [x ] Patient & Family Centered Care Referral [ ]   Anticipatory Grief present?:  [x ] yes [ ] no [ ] deferred:  Social work referral [ ] Patient & Family Centered Care Referral [ ]  	  Other Symptoms:  [ ]All other review of systems negative The patient is unable to self-report.      PHYSICAL EXAM:   Vital Signs Last 24 Hrs  T(C): 36.5 (09 Apr 2024 07:51), Max: 36.5 (09 Apr 2024 07:51)  T(F): 97.7 (09 Apr 2024 07:51), Max: 97.7 (09 Apr 2024 07:51)  HR: 41 (09 Apr 2024 07:51) (41 - 41)  BP: 102/63 (09 Apr 2024 07:51) (102/63 - 102/63)  BP(mean): --  RR: 20 (09 Apr 2024 07:51) (20 - 20)  SpO2: 90% (09 Apr 2024 07:51) (90% - 90%)    Parameters below as of 09 Apr 2024 07:51  Patient On (Oxygen Delivery Method): nasal cannula     I&O's Summary    GENERAL: [ ] Cachexia  [ ]Alert  [ ]Oriented x   [x ]Lethargic  [ ]Unarousable  [ ]Verbal  [x ]Non-Verbal  Behavioral:   [ ] Anxiety  x[ ] Delirium [x ] Agitation [ ] Other  HEENT:  [ ]Normal   [x ]Dry mouth   [ ]ET Tube/Trach  [ ]Oral lesions  PULMONARY:   [x ]Clear [ ]Tachypnea  [ ]Audible excessive secretions   [ ]Rhonchi        [ ]Right [ ]Left [ ]Bilateral  [ ]Crackles        [ ]Right [ ]Left [ ]Bilateral  [ ]Wheezing     [ ]Right [ ]Left [ ]Bilateral  [ ]Diminished BS [ ]Right [ ]Left [ ]Bilateral    CARDIOVASCULAR:    [x ]Regular [ ]Irregular [ ]Tachy  [ ]Dale [ ]Murmur [ ]Other  GASTROINTESTINAL:  [x ]Soft  [ ]Distended   [ x]+BS  [x ]Non tender [ ]Tender  [ ]Other [ ]PEG [ ]OGT/ NGT   Last BM:   4/8/2024  GENITOURINARY:  [x ]Normal [x ] Incontinent   [ ]Oliguria/Anuria   [ ]Rene  MUSCULOSKELETAL:   [ ]Normal   [ ]Weakness  [x ]Bed/Wheelchair bound [ x]Edema  NEUROLOGIC:   [ ]No focal deficits  [x ] Cognitive impairment  [ ] Dysphagia [ ]Dysarthria [ ] Paresis [ ]Other   SKIN:   [ ]Normal  [ ]Rash  [ ]Other  [ ]Pressure ulcer(s)  [x ]y [ ]n  Present on admission  - sacral and spine DTI    CRITICAL CARE:  [ ] Shock Present  [ ]Septic [ ]Cardiogenic [ ]Neurologic [ ]Hypovolemic  [ ]  Vasopressors [ ]  Inotropes   [x ] Respiratory failure present [ ] Mechanical Ventilation [ ] Non-invasive ventilatory support [ ] High-Flow  [ ] Acute  [ ] Chronic [x ] Hypoxic  [ ] Hypercarbic [ ] Other  [x] Other organ failure - brain    LABS: None new.     RADIOLOGY & ADDITIONAL STUDIES: None new.     PROTEIN CALORIE MALNUTRITION: [ ] mild [ ] moderate [ ] severe  [ ] underweight [ ] morbid obesity    https://www.[employer].org/vault/2440/web/files/ONC/Table_Clinical%20Characteristics%20to%20Document%20Malnutrition-White%20JV%20et%20al%202012.pdf    Height (cm): 165.1 (04-07-24 @ 20:56), 165.1 (03-03-24 @ 12:40), 165.1 (02-21-24 @ 14:01)  Weight (kg): 59 (04-07-24 @ 20:56), 65.8 (03-03-24 @ 12:40), 68 (02-21-24 @ 14:01)  BMI (kg/m2): 21.6 (04-07-24 @ 20:56), 24.1 (03-03-24 @ 12:40), 24.9 (02-21-24 @ 14:01)    [x ] PPSV2 < or = 30% [ ] significant weight loss [x ] poor nutritional intake [ ] anasarca   Artificial Nutrition [ ]     Other REFERRALS:    [ ] Hospice  [ ]Child Life  [ ]Social Work  [ ]Case management [ ]Holistic Therapy [ ] Physical Therapy [ ] Dietary     Progress Notes - Care Coordination [C. Provider] (04-09-24 @ 12:05)      Palliative Performance Scale:  http://npcrc.org/files/news/palliative_performance_scale_ppsv2.pdf  (Ctrl +  left click to view)  Respiratory Distress Observation Tool:  https://homecareinformation.net/handouts/hen/Respiratory_Distress_Observation_Scale.pdf (Ctrl +  left click to view)  PAINAD Score:  http://geriatrictoolkit.[facility]/cog/painad.pdf (Ctrl +  left click to view)

## 2024-04-10 NOTE — DISCHARGE NOTE FOR THE EXPIRED PATIENT - TIME PATIENT HAD NO SPONTANEOUS RESPIRATION AND ABSENCE OF PULSE
Nurse to nurse report called, patient will be transferred to [place] [id number]    Wife, Klaus Mack, called and made aware of transfer. 10-Apr-2024 05:01

## 2024-04-10 NOTE — DISCHARGE NOTE FOR THE EXPIRED PATIENT - HOSPITAL COURSE
89F w/ hx of HTN, B12 deficiency, colon cancer 6 years ago no chemo, gallstones, GERD, lymphedema presenting from [facility] with fever. GaP team consulted for assistance in goals of care discussions and patient family opting for symptom directed care. Patient currently in PCU for symptom management at the end of life.  She  on 2024

## 2024-04-13 LAB
CULTURE RESULTS: SIGNIFICANT CHANGE UP
CULTURE RESULTS: SIGNIFICANT CHANGE UP
SPECIMEN SOURCE: SIGNIFICANT CHANGE UP
SPECIMEN SOURCE: SIGNIFICANT CHANGE UP

## 2024-05-23 NOTE — PHYSICAL THERAPY INITIAL EVALUATION ADULT - PHYSICAL ASSIST/NONPHYSICAL ASSIST: SUPINE/SIT, REHAB EVAL
C= \"Have you ever felt that you should Cut down on your drinking?\"  Yes  A= \"Have people Annoyed you by criticizing your drinking?\"  No  G= \"Have you ever felt bad or Guilty about your drinking?\"  No  E= \"Have you ever had a drink as an Eye-opener first thing in the morning to steady your nerves or to help a hangover?\"  No      Deferred []      Reason for deferring:     *If yes to two or more: probable alcohol abuse.*     verbal cues/1 person assist

## 2024-12-03 NOTE — PHYSICAL THERAPY INITIAL EVALUATION ADULT - SITTING BALANCE: DYNAMIC
Assessment & Plan     Right maxillary sinusitis  Start doxycycline.  Recommend nasal saline irrigation, intranasal corticosteroids, and analgesics for symptom relief.  Consider ordering a CT scan of the sinuses and refer to ENT for further evaluation if it ends persist.  Follow-up in 2 to 4 weeks to assess response.  - doxycycline hyclate (VIBRAMYCIN) 100 MG capsule; Take 1 capsule (100 mg) by mouth 2 times daily.  - fluticasone (FLONASE) 50 MCG/ACT nasal spray; Spray 1 spray into both nostrils daily.  - sodium chloride (OCEAN) 0.65 % nasal spray; Spray every 2 hours as needed for nasal congestion.    Nausea and vomiting, unspecified vomiting type  Plan includes continuing ondansetron for symptomatic relief and recommending hydration with oral fluids appropriately.  Evaluate for underlying causes, including sinus related postnasal drip, gastroenteritis, or medication side effect.      Migraine without status migrainosus, not intractable, unspecified migraine type  Recommend lifestyle modifications, including regular sleep, hydration, and avoidance of known triggers.  Advise over-the-counter NSAIDs for mild symptoms and provide education on proper medication use to prevent overuse headaches.  - ondansetron (ZOFRAN ODT) 4 MG ODT tab; Take 1 tablet (4 mg) by mouth every 8 hours as needed for nausea.    Subjective   Stacie is a 33 year old, presenting for the following health issues:  Sinus Problem (Left side, pressure/pain, behind the eye and teeth, 4 days)      12/3/2024    10:22 AM   Additional Questions   Roomed by sapphire   Accompanied by self         12/3/2024    10:22 AM   Patient Reported Additional Medications   Patient reports taking the following new medications see chart     Patient is a 33-year-old female who presents to the clinic complaining of sinus pain and pressure on left side of face for 3 days.  States that she started experiencing the symptoms with pressure on left cheek under the eye which was  "accompanied by occasional toothache.  Additional symptoms include headache, runny nose with watery discharge, low-grade fever, night sweats, and fatigue.  Denies any ear pain or discharge, nasal congestion, any cough, chills, sputum production, sore throat, and muscle aches.  She has tried Tylenol and ibuprofen which provided relief from fever.  For sinus pressure and pain she has tried over-the-counter medication which provided partial relief.  At that she has been having the symptoms recurrently over last year with partial relief in between these episodes.  In addition she complains of nausea and vomiting which is not related to these symptoms or episodes.    History of Present Illness       Reason for visit:  Sinus infection  Symptom onset:  3-7 days ago  Symptoms include:  Sinus pain behind right eye on right side of nose and cheek on same side  Symptom intensity:  Moderate  Symptom progression:  Worsening  Had these symptoms before:  Yes  Has tried/received treatment for these symptoms:  No  What makes it better:  Sinus medicine temporarily   She is taking medications regularly.             Review of Systems  Constitutional, HEENT, cardiovascular, pulmonary, gi and gu systems are negative, except as otherwise noted.      Objective    /67   Pulse 74   Temp 97.9  F (36.6  C) (Tympanic)   Resp 20   Ht 1.562 m (5' 1.5\")   Wt 56.2 kg (124 lb)   LMP 11/14/2024 (Exact Date)   SpO2 100%   BMI 23.05 kg/m    Body mass index is 23.05 kg/m .  Physical Exam  Constitutional:       Appearance: Normal appearance.   HENT:      Right Ear: Tympanic membrane, ear canal and external ear normal.      Left Ear: Tympanic membrane, ear canal and external ear normal.      Nose: Mucosal edema and congestion present.      Left Sinus: Maxillary sinus tenderness present.   Cardiovascular:      Rate and Rhythm: Normal rate and regular rhythm.      Pulses: Normal pulses.      Heart sounds: Normal heart sounds.   Pulmonary:      " Effort: Pulmonary effort is normal.      Breath sounds: Normal breath sounds.   Lymphadenopathy:      Cervical: No cervical adenopathy.   Neurological:      Mental Status: She is alert and oriented to person, place, and time.   Psychiatric:         Mood and Affect: Mood normal.         Speech: Speech normal.         Behavior: Behavior normal.         Cognition and Memory: Cognition normal.          Documentation completed by: GALLO Venegas    I was present for the evaluation of patient. I verify that documentation is correct and true. LORENZO Barrera PA-C    Signed Electronically by: ROMAN BARRERA PA-C     good minus

## 2024-12-09 NOTE — PATIENT PROFILE ADULT - FALL HARM RISK - FALL HARM RISK
Number Of Freeze-Thaw Cycles: 1 freeze-thaw cycle Duration Of Freeze Thaw-Cycle (Seconds): 3 Application Tool (Optional): Liquid Nitrogen Sprayer Detail Level: Detailed Render Post-Care Instructions In Note?: no Post-Care Instructions: I reviewed with the patient in detail post-care instructions. Patient is to wear sunprotection, and avoid picking at any of the treated lesions. Pt may apply Vaseline to crusted or scabbing areas. Show Applicator Variable?: Yes Consent: The patient's consent was obtained including but not limited to risks of crusting, scabbing, blistering, scarring, darker or lighter pigmentary change, recurrence, incomplete removal and infection. Age

## 2024-12-16 NOTE — DISCHARGE NOTE PROVIDER - NSDCCAREPROVSEEN_GEN_ALL_CORE_FT
Rx Refill Note  Requested Prescriptions     Pending Prescriptions Disp Refills    amphetamine-dextroamphetamine (Adderall) 30 MG tablet 60 tablet 0     Sig: Take 1 tablet by mouth 2 (Two) Times a Day.      Last office visit with prescribing clinician: 10/24/2024   Last telemedicine visit with prescribing clinician: Visit date not found   Next office visit with prescribing clinician: 1/22/2025                         Would you like a call back once the refill request has been completed: [] Yes [] No    If the office needs to give you a call back, can they leave a voicemail: [] Yes [] No    Mayra Lira MA  12/16/24, 13:56 EST   Aisha Holt

## 2025-02-10 NOTE — PROGRESS NOTE ADULT - PROBLEM SELECTOR PLAN 3
EKG in ED with 1st degree AV block, incomplete RBBB and LAFB. Asymptomatic  - No prior EKGs found in sunrise or allscripts for comparison  - continue monitoring
no strength deficits were identified

## 2025-04-09 NOTE — H&P ADULT - NSCORESITESY/N_GEN_A_CORE_RD
Pt seen in office today for recurrent UTI symptoms per Dr. Sotelo's orders.  Per Dr. Sotelo's orders, straight cath urine obtained.  ~60cc PVR.  Urine dipped positive for leukocytes, nitrites and blood.    Per Dr. Sotelo, send urine for culture and Augmentin BID x7-days.  Urine culture sent.  Dr. Sotelo sent in Augmentin.    Pt advised as such and instructed to call back if symptoms do not improve or worsen.  Pt verbalizes understanding, remain available as needed.  
No

## 2025-05-02 NOTE — PHYSICAL THERAPY INITIAL EVALUATION ADULT - PLANNED THERAPY INTERVENTIONS, PT EVAL
[facility] provides services at a reduced cost to those who are determined to be eligible through [facility]’s financial assistance program. Information regarding [facility]’s financial assistance program can be found by going to [email or web address] or by calling [phone].
stairs: GOAL: patient will be able to negotiated 6 stairs with min A and one HR in 2 weeks/bed mobility training/gait training/transfer training

## 2025-07-15 NOTE — ED PROVIDER NOTE - PMH
INFECTIOUS DISEASE PROGRESS NOTE    ASSESSMENT:   Secondary peritonitis. Cx with mixed aerobic isauro  Pneumoperitoneum due to gastric ulcer perforation s/p Chris patch on 7/10  Sepsis, present on admission  Fluid overload, BNP elevated, possible CHF?  Acute kidney injury  Profound leukopenia  Osteoarthritis on NSAIDs    PLAN:   - Continue zosyn and fluconazole, day 6  - Agree with repeat CTAP given uptrending leukocytosis and abdominal distension  - F/u bcx from 7/10, NGTD  - Surgery following    Discussed with family at the bedside.    Olive Galdamez MD  [phone]    -------------------------------------------------------------------------------------------------------------------    SUBJECTIVE:  Remains afebrile, WBC uptrending. Drinking contrast so feeling more distended today, felt okay earlier this morning.     OBJECTIVE:  Tmax Temp (24hrs), Av.2 °F (36.8 °C), Min:98.1 °F (36.7 °C), Max:98.2 °F (36.8 °C)     Last Vitals   Vitals:    07/15/25 0616   BP: (!) 153/93   Pulse: 84   Resp: 16   Temp: 98.1 °F (36.7 °C)     Gen: No apparent distress  HEENT: Anicteric, no thrush  CVS: Regular rate and rhythm  Lung: Crackles at the bases bilaterally  Abd: Distended, right-sided LENO drain in place, midline surgical scar with dressings in place  Extr: No edema  Neuro: Awake, alert  Skin: No rashes    ANTIMICROBIALS  Zosyn  fluconazole    LAB:  Recent Labs     25  0453 25  0456 07/15/25  0436   WBC 11.5* 12.6* 15.1*   HGB 11.2* 10.3* 10.1*   HCT 34.2* 30.8* 30.5*   * 109* 102*   MCV 98.6 96.6 95.6       Recent Labs   Lab 07/15/25  0436 25  0456 25  0452   SODIUM 140 137 140   POTASSIUM 4.0 3.8 3.4   CHLORIDE 103 104 109   CO2 27 24 20*   BUN 20 24* 29*   CREATININE 0.73 0.79 1.00*   GLUCOSE 128* 133* 94   CALCIUM 8.1* 7.7* 7.1*       Microbiology Results  (Last 10 results in the past 7 days)      Specimen   Gram Smear   Culture Result   Status       07/10/25  6159         Rare  Polymorphonuclear cells.            Moderate Gram positive cocci            No epithelial cells seen.                    RADIOLOGY: images reviewed       Note:  Assessment and Plan have been moved to the top of the screen for ease of the viewer such that the plan can be seen without scrolling to the bottom of the note.           B12 deficiency    Colon cancer  6 yrs ago. did not require chemo  Gallstone    GERD (Gastroesophageal Reflux Disease)    Hard of Hearing    Hypertension    Lymphedema, limb  BLE

## 2025-07-15 NOTE — PATIENT PROFILE ADULT - OVER THE PAST TWO WEEKS HAVE YOU FELT DOWN, DEPRESSED OR HOPELESS?
Admitted to [facility] from 7/7/25 through 7/9/25 for palpitations, diagnosed with afib.  EP consulted, s/p DC cardioversion during admission; started on Toprol XL 25mg BID, as well as Eliquis 5mg BID.  CHADS score at zero.    No recurrent symptoms / episodes since discharge, NSR on exam today.  Pt already has appt with Dr. Jackson's office (EP), but advised to call to see if there had been any cancellations.  Will send for repeat ECHO for better estimation of.  Continue current meds and monitor with warning signs.   no